# Patient Record
Sex: FEMALE | Race: WHITE | NOT HISPANIC OR LATINO | Employment: OTHER | URBAN - METROPOLITAN AREA
[De-identification: names, ages, dates, MRNs, and addresses within clinical notes are randomized per-mention and may not be internally consistent; named-entity substitution may affect disease eponyms.]

---

## 2019-08-26 ENCOUNTER — OFFICE VISIT (OUTPATIENT)
Dept: OBGYN CLINIC | Facility: CLINIC | Age: 59
End: 2019-08-26
Payer: MEDICARE

## 2019-08-26 ENCOUNTER — APPOINTMENT (OUTPATIENT)
Dept: RADIOLOGY | Facility: AMBULARY SURGERY CENTER | Age: 59
End: 2019-08-26
Payer: MEDICARE

## 2019-08-26 VITALS
DIASTOLIC BLOOD PRESSURE: 83 MMHG | BODY MASS INDEX: 27.27 KG/M2 | WEIGHT: 153.9 LBS | HEIGHT: 63 IN | SYSTOLIC BLOOD PRESSURE: 155 MMHG | HEART RATE: 63 BPM

## 2019-08-26 DIAGNOSIS — M54.42 CHRONIC BILATERAL LOW BACK PAIN WITH BILATERAL SCIATICA: ICD-10-CM

## 2019-08-26 DIAGNOSIS — G89.29 CHRONIC BILATERAL LOW BACK PAIN WITH BILATERAL SCIATICA: ICD-10-CM

## 2019-08-26 DIAGNOSIS — G89.29 CHRONIC BILATERAL LOW BACK PAIN WITH BILATERAL SCIATICA: Primary | ICD-10-CM

## 2019-08-26 DIAGNOSIS — M54.41 CHRONIC BILATERAL LOW BACK PAIN WITH BILATERAL SCIATICA: Primary | ICD-10-CM

## 2019-08-26 DIAGNOSIS — M54.41 CHRONIC BILATERAL LOW BACK PAIN WITH BILATERAL SCIATICA: ICD-10-CM

## 2019-08-26 DIAGNOSIS — M54.42 CHRONIC BILATERAL LOW BACK PAIN WITH BILATERAL SCIATICA: Primary | ICD-10-CM

## 2019-08-26 DIAGNOSIS — M62.9 HAMSTRING TIGHTNESS OF BOTH LOWER EXTREMITIES: ICD-10-CM

## 2019-08-26 PROBLEM — M54.50 LOW BACK PAIN: Status: ACTIVE | Noted: 2019-08-26

## 2019-08-26 PROCEDURE — 72110 X-RAY EXAM L-2 SPINE 4/>VWS: CPT

## 2019-08-26 PROCEDURE — 99203 OFFICE O/P NEW LOW 30 MIN: CPT | Performed by: PHYSICAL MEDICINE & REHABILITATION

## 2019-08-26 RX ORDER — LANOLIN ALCOHOL/MO/W.PET/CERES
1 CREAM (GRAM) TOPICAL 3 TIMES DAILY
COMMUNITY
End: 2021-10-01 | Stop reason: ALTCHOICE

## 2019-08-26 RX ORDER — SIMVASTATIN 20 MG
20 TABLET ORAL DAILY
Refills: 0 | COMMUNITY
Start: 2019-08-19 | End: 2021-01-26 | Stop reason: SDUPTHER

## 2019-08-26 RX ORDER — METOPROLOL TARTRATE 50 MG/1
50 TABLET, FILM COATED ORAL 2 TIMES DAILY
Refills: 0 | COMMUNITY
Start: 2019-08-20 | End: 2021-01-26 | Stop reason: SDUPTHER

## 2019-08-26 RX ORDER — TRAMADOL HYDROCHLORIDE 50 MG/1
50 TABLET ORAL EVERY 8 HOURS PRN
Refills: 0 | COMMUNITY
Start: 2019-08-19

## 2019-08-26 RX ORDER — LOSARTAN POTASSIUM 50 MG/1
50 TABLET ORAL DAILY
Refills: 0 | COMMUNITY
Start: 2019-08-19 | End: 2021-01-23 | Stop reason: SDUPTHER

## 2019-08-26 NOTE — PROGRESS NOTES
Assessments & Orders:   Diagnoses and all orders for this visit:    Chronic bilateral low back pain with bilateral sciatica  -     XR spine lumbar minimum 4 views non injury; Future    Hamstring tightness of both lower extremities    Other orders  -     metoprolol tartrate (LOPRESSOR) 50 mg tablet; Take 50 mg by mouth 2 (two) times a day  -     simvastatin (ZOCOR) 20 mg tablet; Take 20 mg by mouth daily  -     traMADol (ULTRAM) 50 mg tablet; Take 50 mg by mouth every 8 (eight) hours as needed  -     DEXILANT 30 MG capsule  -     losartan (COZAAR) 50 mg tablet; Take 50 mg by mouth daily  -     calcium acetate (PHOSLO) 667 mg capsule; Take 500 mg by mouth 3 (three) times a day with meals  -     glucosamine-chondroitin 500-400 MG tablet; Take 1 tablet by mouth 3 (three) times a day          Imaging Studies:  I personally reviewed the following images:  AP, lateral and dynamic views of the lumbar spine obtained today  There are minimal degenerative changes  No evidence of dynamic instability  No acute osseous abnormality  Impression/Plan:  Low back pain secondary to mechanical low back pain versus radiculopathy  Her back pain with radicular symptoms is likely multifactorial as she has a history of right ankle surgery  Start physical therapy and reassess in 5-6 weeks  Return in about 6 weeks (around 10/7/2019)  HPI:  Wan Jarrell is a 60 y o  female  who presents for evaluation of   Chief Complaint   Patient presents with    Right Leg - Pain       Onset/Mechanism: Chronic but gradually worsening over the past few weeks  Location: Back of the right knee and radiates up into the right buttocks  Quality: Aching  Radiation: As above  Palliative: Tramadol and ibuprofen help a little bit  Provocative: If standing for long periods  Severity: Very severe  Treatment so far: Tramadol and ibuprofen  Review of Systems   Constitutional: Positive for activity change  Negative for fatigue and fever     HENT: Negative for trouble swallowing  Eyes: Negative for visual disturbance  Respiratory: Negative for shortness of breath  Cardiovascular: Negative for chest pain  Gastrointestinal: Negative for abdominal pain  Denies bowel incontinence  Endocrine: Negative for polydipsia  Genitourinary: Negative for difficulty urinating  Denies urinary incontinence  Musculoskeletal: Positive for back pain and gait problem  Skin: Negative for rash and wound  Allergic/Immunologic: Negative for immunocompromised state  Neurological: Positive for numbness  Negative for weakness  Denies saddle anesthesia  Has numbness in the ball of her foot and in the three toes  Hematological: Does not bruise/bleed easily  Psychiatric/Behavioral: Negative for confusion  No calf swelling/tenderness  Following history reviewed and updated:  Past Medical History:   Diagnosis Date    Hyperlipemia     Hypertension      Past Surgical History:   Procedure Laterality Date    ANKLE SURGERY Right 2009/2012    CARPAL TUNNEL RELEASE Bilateral 1990    GALLBLADDER SURGERY  2004    HYSTERECTOMY  2004    MAMMO STEREOTACTIC BREAST BIOPSY RIGHT (ALL INC) Right 2014     Social History   Social History     Substance and Sexual Activity   Alcohol Use Yes    Comment: SOCIAL     Social History     Substance and Sexual Activity   Drug Use Not Currently     Social History     Tobacco Use   Smoking Status Former Smoker   Smokeless Tobacco Former User   Tobacco Comment    13 YEARS      Family History   Problem Relation Age of Onset    Cancer Mother     Diabetes Mother     Hyperlipidemia Mother     Cancer Maternal Grandmother      No Known Allergies     Constitutional:  /83 (BP Location: Right arm, Patient Position: Sitting, Cuff Size: Standard)   Pulse 63   Ht 5' 3" (1 6 m)   Wt 69 8 kg (153 lb 14 4 oz)   BMI 27 26 kg/m²    General: NAD  Eyes: Clear sclerae  ENT: No inflammation, lesion, or mass of lips  No tracheal deviation  Musculoskeletal: As mentioned below  Integumentary: No visible rashes or skin lesions  Pulmonary/Chest: Effort normal  No respiratory distress  Neuro: CN's grossly intact, MCGRATH  Psych: Normal affect and judgement  Vascular: WWP  Back Exam     Comments: Inspection: Morbidly obese  No obvious deformities, lesions or rashes  ROM: Lumbar flexion to 60, extension to just past neutral    Palpation: Diffusely tender from the mid-lumbar paraspinals and down the posterior buttocks, hamstrings and into the hamstring insertions bilaterally  There are no step offs  Neuro: 5/5 strength with bilateral hip flexion, knee extension, ankle dorsiflexion and ankle plantarflexion  Sensation is intact in dermatomes L2-S1  Patellar and Achilles reflexes are hypooactive and equal bilaterally  No clonus  Special tests: Normal bilateral straight leg raise (hamstring tightness), dural stretch (hamstring tightness)  Positive Penny's maneuver  Procedures - none for this visit

## 2019-09-09 ENCOUNTER — EVALUATION (OUTPATIENT)
Dept: PHYSICAL THERAPY | Facility: REHABILITATION | Age: 59
End: 2019-09-09
Payer: MEDICARE

## 2019-09-09 DIAGNOSIS — M54.41 CHRONIC BILATERAL LOW BACK PAIN WITH RIGHT-SIDED SCIATICA: ICD-10-CM

## 2019-09-09 DIAGNOSIS — M62.9 HAMSTRING TIGHTNESS OF BOTH LOWER EXTREMITIES: ICD-10-CM

## 2019-09-09 DIAGNOSIS — G89.29 CHRONIC BILATERAL LOW BACK PAIN WITH RIGHT-SIDED SCIATICA: ICD-10-CM

## 2019-09-09 PROCEDURE — 97162 PT EVAL MOD COMPLEX 30 MIN: CPT | Performed by: PHYSICAL THERAPIST

## 2019-09-09 PROCEDURE — 97110 THERAPEUTIC EXERCISES: CPT | Performed by: PHYSICAL THERAPIST

## 2019-09-09 RX ORDER — MULTIVITAMIN
1 CAPSULE ORAL DAILY
COMMUNITY

## 2019-09-09 NOTE — PROGRESS NOTES
PT Evaluation     Today's date: 2019  Patient name: Ethan Alex  : 1960  MRN: [id number]  Referring provider: Ileana Ojeda DO  Dx:   Encounter Diagnosis     ICD-10-CM    1  Chronic bilateral low back pain with right-sided sciatica M54 41 Ambulatory referral to Physical Therapy    G89 29    2  Hamstring tightness of both lower extremities M62 9 Ambulatory referral to Physical Therapy       Start Time: 1010  Stop Time: 1100  Total time in clinic (min): 50 minutes    Assessment  Assessment details: Ethan Alex is a 60 y o  female present with:   Chronic bilateral low back pain with right-sided sciatica  Hamstring tightness of both lower extremities    Cy Jeannine is presenting with significant right hip musculature weakness which is contributing to her sciatica  She overall is just deconditioned in her tolerance to activity  She has the above listed impairments and will benefit from skilled Physical Therapist management to improve deficits to return to prior level of function      Impairments: abnormal muscle firing, abnormal or restricted ROM, activity intolerance, impaired physical strength, lacks appropriate home exercise program and pain with function    Symptom irritability: moderateBarriers to therapy: Chronic pain, BMI, deconditioning,   Understanding of Dx/Px/POC: good   Prognosis: good    Goals  Impairment Goals  - Decrease pain 4/10  - Improve ROM to 60 degrees flexion  - Increase strength to 4+/5 throughout    Functional Goals  - Return to Prior Level of Function  - Increase Functional Status Measure to: 62  - Patient will be independent with HEP  -Patient will be able to return to gardening for 30 minutes prior to needing to rest    Plan  Patient would benefit from: skilled PT  Planned therapy interventions: joint mobilization, manual therapy, patient education, postural training, activity modification, abdominal trunk stabilization, body mechanics training, flexibility, functional ROM exercises, graded exercise, home exercise program, neuromuscular re-education, strengthening, stretching, therapeutic activities and therapeutic exercise  Frequency: 2x week  Duration in weeks: 10  Plan of Care beginning date: 2019  Plan of Care expiration date: 2019  Treatment plan discussed with: patient        Subjective Evaluation    History of Present Illness  Mechanism of injury: Calin Mondragon notes low back pain of over 10yrs  Notes over the last 5yrs it has been more pronounced  Notes she has had years of right ankle pain, for which she had surgery for two times  Notes currently she gets pain with prolonged standing, activities of daily living gardening, "I push myself and I pay for it " "going out for walks with my 2yr old  granddaughter I make it a block and a half " Notes pain refers into the posterior aspect of her right leg down to knee  When her ankle is irritated she notices swelling and numbness into her toes         Gardening  Pain  Current pain ratin  At worst pain rating: 10  Location: R LB and buttock  Quality: dull ache and burning  Relieving factors: rest and relaxation  Aggravating factors: walking, standing, sitting, stair climbing and lifting  Progression: no change    Social Support    Employment status: not working ([address] [employer] worker)  Patient Goals  Patient goals for therapy: decreased pain, increased motion and increased strength  Patient goal: Gardening,         Objective     Concurrent Complaints  Negative for bladder dysfunction, bowel dysfunction, saddle (S4) numbness and history of trauma    Active Range of Motion   Cervical/Thoracic Spine       Thoracic    Flexion: 45 degrees  with pain  Extension: 5 degrees     with pain  Left rotation:  Restriction level: minimal  Right rotation:  Restriction level: minimal    Strength/Myotome Testing     Left Hip   Planes of Motion   Flexion: 3  Abduction: 2-    Right Hip   Planes of Motion   Flexion: 2-  Abduction: 2-    Left Knee Flexion: 3+  Extension: 3+    Right Knee   Flexion: 3+  Extension: 3+    Tests     Lumbar     Left   Positive crossed SLR and passive SLR  Right   Positive crossed SLR, passive SLR and slump test      Left Hip   Positive ERWIN  Right Hip   Positive ERWIN          Precautions: HTN, chronic pain, prior right ankle surgery     Daily Treatment Diary      Manual 09/09/19                                       Exercise Diary                     bike nv                   Glute sets* 10x5"x2                   Treadmill ambulation nv                   Clamshells 3x8x2" bw                   Bridges* 3x10                                       Low Rows nv                   Mini squats  nv                                                                                                                                                                            Modalities                                                    *=on HEP

## 2019-09-12 ENCOUNTER — OFFICE VISIT (OUTPATIENT)
Dept: PHYSICAL THERAPY | Facility: REHABILITATION | Age: 59
End: 2019-09-12
Payer: MEDICARE

## 2019-09-12 DIAGNOSIS — G89.29 CHRONIC BILATERAL LOW BACK PAIN WITH RIGHT-SIDED SCIATICA: Primary | ICD-10-CM

## 2019-09-12 DIAGNOSIS — M54.41 CHRONIC BILATERAL LOW BACK PAIN WITH RIGHT-SIDED SCIATICA: Primary | ICD-10-CM

## 2019-09-12 DIAGNOSIS — M62.9 HAMSTRING TIGHTNESS OF BOTH LOWER EXTREMITIES: ICD-10-CM

## 2019-09-12 PROCEDURE — 97110 THERAPEUTIC EXERCISES: CPT | Performed by: PHYSICAL THERAPIST

## 2019-09-12 NOTE — PROGRESS NOTES
Daily Note     Today's date: 2019  Patient name: Suzie Pérez  : 1960  MRN: [id number]  Referring provider: Katharina Obrien DO  Dx:   Encounter Diagnosis     ICD-10-CM    1  Chronic bilateral low back pain with right-sided sciatica M54 41     G89 29    2  Hamstring tightness of both lower extremities M62 9        Start Time: 1705  Stop Time: 1750  Total time in clinic (min): 45 minutes    Subjective: Kelsie Hill reports that her back has been sore as she has been pushing through discomfort during her activities of daily living and exercises  Objective: See treatment diary below      Assessment: Tolerated treatment fair  Patient demonstrated fatigue post treatment, exhibited good technique with therapeutic exercises and would benefit from continued PT  Explained to patient importance of explaining her symptoms to PT and telling us when she has pain in order for us to modify her programming, verbalized understanding  Plan: Continue per plan of care             Precautions: HTN, chronic pain, prior right ankle surgery     Daily Treatment Diary      Manual 19                                     Exercise Diary                     bike nv pain                  Glute sets* 10x5"x2 10x5x2"                  Treadmill ambulation nv 5' 0 5 mph                  SLR   3x8           Clamshells* 3x8x2" bw 3x8x2"                  Bridges* 3x10 3x8 pain                  Seated HS stretch  3x30"                  Low Rows nv nv                  Mini squats  nv nv                                                                                                                                                                           Modalities                                                    *=on HEP

## 2019-09-16 ENCOUNTER — OFFICE VISIT (OUTPATIENT)
Dept: PHYSICAL THERAPY | Facility: REHABILITATION | Age: 59
End: 2019-09-16
Payer: MEDICARE

## 2019-09-16 DIAGNOSIS — M62.9 HAMSTRING TIGHTNESS OF BOTH LOWER EXTREMITIES: ICD-10-CM

## 2019-09-16 DIAGNOSIS — G89.29 CHRONIC BILATERAL LOW BACK PAIN WITH RIGHT-SIDED SCIATICA: Primary | ICD-10-CM

## 2019-09-16 DIAGNOSIS — M54.41 CHRONIC BILATERAL LOW BACK PAIN WITH RIGHT-SIDED SCIATICA: Primary | ICD-10-CM

## 2019-09-16 PROCEDURE — 97530 THERAPEUTIC ACTIVITIES: CPT | Performed by: PHYSICAL THERAPIST

## 2019-09-16 PROCEDURE — 97112 NEUROMUSCULAR REEDUCATION: CPT | Performed by: PHYSICAL THERAPIST

## 2019-09-16 PROCEDURE — 97110 THERAPEUTIC EXERCISES: CPT | Performed by: PHYSICAL THERAPIST

## 2019-09-16 NOTE — PROGRESS NOTES
Daily Note     Today's date: 2019  Patient name: Houston Pak  : 1960  MRN: [id number]  Referring provider: Clark Horton DO  Dx:   Encounter Diagnosis     ICD-10-CM    1  Chronic bilateral low back pain with right-sided sciatica M54 41     G89 29    2  Hamstring tightness of both lower extremities M62 9        Start Time: 1203  Stop Time: 1250  Total time in clinic (min): 47 minutes    Subjective: Greg Nguyen reports that her ankles are sore today due to them "giving out on me all day " Notes gardening over the weekend and feeling swollen in her right anterior thigh  Objective: See treatment diary below      Assessment: Tolerated treatment well  Patient demonstrated fatigue post treatment, exhibited good technique with therapeutic exercises and would benefit from continued PT  Patient able to better self monitor her pain levels during exercises and perform more appropriate rests  Plan: Continue per plan of care             Precautions: HTN, chronic pain, prior right ankle surgery     Daily Treatment Diary      Manual 19                                    Exercise Diary                     bike nv pain                  Glute sets* 10x5"x2 10x5x2" 10x5x2"                 Treadmill ambulation nv 5' 0 5 mph np                 SLR *  3x8 3x8          Clamshells* 3x8x2" bw 3x8x2" 3x10x2"                 Bridges* 3x10 3x8 pain 3x8                 Seated HS stretch  3x30" 3x30"                 Low Rows nv nv OTB 3x10                 Mini squats  nv nv 3x10                                                                                                                                                                          Modalities                                                    *=on HEP

## 2019-09-19 ENCOUNTER — OFFICE VISIT (OUTPATIENT)
Dept: PHYSICAL THERAPY | Facility: REHABILITATION | Age: 59
End: 2019-09-19
Payer: MEDICARE

## 2019-09-19 DIAGNOSIS — M54.41 CHRONIC BILATERAL LOW BACK PAIN WITH RIGHT-SIDED SCIATICA: Primary | ICD-10-CM

## 2019-09-19 DIAGNOSIS — M62.9 HAMSTRING TIGHTNESS OF BOTH LOWER EXTREMITIES: ICD-10-CM

## 2019-09-19 DIAGNOSIS — G89.29 CHRONIC BILATERAL LOW BACK PAIN WITH RIGHT-SIDED SCIATICA: Primary | ICD-10-CM

## 2019-09-19 PROCEDURE — 97112 NEUROMUSCULAR REEDUCATION: CPT

## 2019-09-19 PROCEDURE — 97110 THERAPEUTIC EXERCISES: CPT

## 2019-09-23 ENCOUNTER — OFFICE VISIT (OUTPATIENT)
Dept: PHYSICAL THERAPY | Facility: REHABILITATION | Age: 59
End: 2019-09-23
Payer: MEDICARE

## 2019-09-23 DIAGNOSIS — G89.29 CHRONIC BILATERAL LOW BACK PAIN WITH RIGHT-SIDED SCIATICA: Primary | ICD-10-CM

## 2019-09-23 DIAGNOSIS — M54.41 CHRONIC BILATERAL LOW BACK PAIN WITH RIGHT-SIDED SCIATICA: Primary | ICD-10-CM

## 2019-09-23 DIAGNOSIS — M62.9 HAMSTRING TIGHTNESS OF BOTH LOWER EXTREMITIES: ICD-10-CM

## 2019-09-23 PROCEDURE — 97110 THERAPEUTIC EXERCISES: CPT

## 2019-09-23 PROCEDURE — 97112 NEUROMUSCULAR REEDUCATION: CPT

## 2019-09-23 NOTE — PROGRESS NOTES
Daily Note     Today's date: 2019  Patient name: Meenu Hightower  : 1960  MRN: [id number]  Referring provider: Haritha Snell DO  Dx:   Encounter Diagnosis     ICD-10-CM    1  Chronic bilateral low back pain with right-sided sciatica M54 41     G89 29    2  Hamstring tightness of both lower extremities M62 9        Start Time: 1500  Stop Time: 1558  Total time in clinic (min): 58 minutes    Subjective: Patient reports right sided buttock discomfort prior to session stating "still there, better than what it was though "       Objective: See treatment diary below       Assessment: Tolerated treatment fair  Patient demonstrated fatigue post treatment, exhibited good technique with therapeutic exercises and would benefit from continued PT Patient demonstrates most difficulty with SLR flexion RLE  Fatigue noted with increased reps, no pain reported  Plan: Continue per plan of care  Progress treatment as tolerated         Precautions: HTN, chronic pain, prior right ankle surgery     Daily Treatment Diary      Manual 19                                  Exercise Diary                     bike nv pain                  Glute sets* 10x5"x2 10x5x2" 10x5x2"   10x5x2" 2x10x5''             Treadmill ambulation nv 5' 0 5 mph np                 SLR *  3x8 3x8 3x10 3x10        Clamshells* 3x8x2" bw 3x8x2" 3x10x2"   3x10x2"  3x12x2''             Bridges* 3x10 3x8 pain 3x8   3x8  3x10             Seated HS stretch  3x30" 3x30"   3x30"  3x30''             Low Rows nv nv OTB 3x10   OTB  3x10  OTB 3x12             Mini squats  nv nv 3x10   3x10  3x10                                                                                                                                                                       Modalities                                                    *=on HEP

## 2019-09-26 ENCOUNTER — OFFICE VISIT (OUTPATIENT)
Dept: PHYSICAL THERAPY | Facility: REHABILITATION | Age: 59
End: 2019-09-26
Payer: MEDICARE

## 2019-09-26 DIAGNOSIS — M54.41 CHRONIC BILATERAL LOW BACK PAIN WITH RIGHT-SIDED SCIATICA: Primary | ICD-10-CM

## 2019-09-26 DIAGNOSIS — M62.9 HAMSTRING TIGHTNESS OF BOTH LOWER EXTREMITIES: ICD-10-CM

## 2019-09-26 DIAGNOSIS — G89.29 CHRONIC BILATERAL LOW BACK PAIN WITH RIGHT-SIDED SCIATICA: Primary | ICD-10-CM

## 2019-09-26 PROCEDURE — 97530 THERAPEUTIC ACTIVITIES: CPT | Performed by: PHYSICAL THERAPIST

## 2019-09-26 PROCEDURE — 97110 THERAPEUTIC EXERCISES: CPT | Performed by: PHYSICAL THERAPIST

## 2019-09-26 NOTE — PROGRESS NOTES
Daily Note     Today's date: 2019  Patient name: Devyn Abdi  : 1960  MRN: [id number]  Referring provider: Aly Zarate DO  Dx:   Encounter Diagnosis     ICD-10-CM    1  Chronic bilateral low back pain with right-sided sciatica M54 41     G89 29    2  Hamstring tightness of both lower extremities M62 9        Start Time: 1530  Stop Time: 1615  Total time in clinic (min): 45 minutes    Subjective: Alicia Lennon reports that her ankle is bothering her, but her low back pain is okay today  Objective: See treatment diary below      Assessment: Tolerated treatment well  Patient demonstrated fatigue post treatment, exhibited good technique with therapeutic exercises and would benefit from continued PT  Plan: Continue per plan of care              Precautions: HTN, chronic pain, prior right ankle surgery     Daily Treatment Diary      Manual 19                                 Exercise Diary                     bike nv pain                  Glute sets* 10x5"x2 10x5x2" 10x5x2"   10x5x2" 2x10x5''  dc           Treadmill ambulation nv 5' 0 5 mph np                 SLR *  3x8 3x8 3x10 3x10 3x10       Clamshells* 3x8x2" bw 3x8x2" 3x10x2"   3x10x2"   3x12x2''  3x10x2"           Bridges* 3x10 3x8 pain 3x8   3x8   3x10  3x10           Seated HS stretch  3x30" 3x30"   3x30"   3x30''  3x30"           Low Rows nv nv OTB 3x10   OTB  3x10   OTB 3x12  OTB 3x12           Mini squats  nv nv 3x10   3x10   3x10   3x10           Step ups                                                                                                                                                         Modalities                                                    *=on HEP

## 2019-09-30 ENCOUNTER — OFFICE VISIT (OUTPATIENT)
Dept: PHYSICAL THERAPY | Facility: REHABILITATION | Age: 59
End: 2019-09-30
Payer: MEDICARE

## 2019-09-30 DIAGNOSIS — M62.9 HAMSTRING TIGHTNESS OF BOTH LOWER EXTREMITIES: ICD-10-CM

## 2019-09-30 DIAGNOSIS — M54.41 CHRONIC BILATERAL LOW BACK PAIN WITH RIGHT-SIDED SCIATICA: Primary | ICD-10-CM

## 2019-09-30 DIAGNOSIS — G89.29 CHRONIC BILATERAL LOW BACK PAIN WITH RIGHT-SIDED SCIATICA: Primary | ICD-10-CM

## 2019-09-30 PROCEDURE — 97110 THERAPEUTIC EXERCISES: CPT | Performed by: PHYSICAL THERAPIST

## 2019-09-30 PROCEDURE — 97530 THERAPEUTIC ACTIVITIES: CPT | Performed by: PHYSICAL THERAPIST

## 2019-09-30 NOTE — PROGRESS NOTES
Daily Note     Today's date: 2019  Patient name: Noy Luna  : 1960  MRN: [id number]  Referring provider: Abelardo Alvares DO  Dx:   Encounter Diagnosis     ICD-10-CM    1  Chronic bilateral low back pain with right-sided sciatica M54 41     G89 29    2  Hamstring tightness of both lower extremities M62 9                 Pt 1 on 1 from 1047 to 1125    Subjective: Steven Lowry states her low back is ok, but has continued R ankle pain  Objective: See treatment diary below      Assessment: Tolerated treatment well  Cues needed to perform exercises properly  Reported some R ankle pain through out session but able to tolerated  Did well with addition of step ups today, but was fatigued by end of session  Would benefit from continued PT  Plan: Continue per plan of care              Precautions: HTN, chronic pain, prior right ankle surgery     Daily Treatment Diary      Manual 19                                Exercise Diary                     bike nv pain                  Glute sets* 10x5"x2 10x5x2" 10x5x2"   10x5x2" 2x10x5''  dc           Treadmill ambulation nv 5' 0 5 mph np                 SLR *  3x8 3x8 3x10 3x10 3x10 3x10      Clamshells* 3x8x2" bw 3x8x2" 3x10x2"   3x10x2"   3x12x2''  3x10x2"  3x10x2"         Bridges* 3x10 3x8 pain 3x8   3x8   3x10  3x10  3x10         Seated HS stretch  3x30" 3x30"   3x30"   3x30''  3x30"  3x30"         Low Rows nv nv OTB 3x10   OTB  3x10   OTB 3x12  OTB 3x12  OTB 3x12         Mini squats  nv nv 3x10   3x10   3x10   3x10  3x10         Step ups           4" 10x fwrd, 10x lat (w/ L)                                                                                                                                              Modalities                                                    *=on HEP

## 2019-10-03 ENCOUNTER — OFFICE VISIT (OUTPATIENT)
Dept: PHYSICAL THERAPY | Facility: REHABILITATION | Age: 59
End: 2019-10-03
Payer: MEDICARE

## 2019-10-03 DIAGNOSIS — G89.29 CHRONIC BILATERAL LOW BACK PAIN WITH RIGHT-SIDED SCIATICA: Primary | ICD-10-CM

## 2019-10-03 DIAGNOSIS — M62.9 HAMSTRING TIGHTNESS OF BOTH LOWER EXTREMITIES: ICD-10-CM

## 2019-10-03 DIAGNOSIS — M54.41 CHRONIC BILATERAL LOW BACK PAIN WITH RIGHT-SIDED SCIATICA: Primary | ICD-10-CM

## 2019-10-03 PROCEDURE — 97110 THERAPEUTIC EXERCISES: CPT

## 2019-10-03 PROCEDURE — 97530 THERAPEUTIC ACTIVITIES: CPT

## 2019-10-03 NOTE — PROGRESS NOTES
Daily Note     Today's date: 10/3/2019  Patient name: Mine Mejias  : 1960  MRN: [id number]  Referring provider: Duy Negron DO  Dx:   Encounter Diagnosis     ICD-10-CM    1  Chronic bilateral low back pain with right-sided sciatica M54 41     G89 29    2  Hamstring tightness of both lower extremities M62 9        Start Time: 1400  Stop Time: 1445  Total time in clinic (min): 45 minutes    Subjective: Patient reports increased back discomfort prior to session today stating "I am achy, going down the leg a little, think its the weather " Patient is to follow up with MD Monday  Objective: See treatment diary below      Assessment: Tolerated treatment fair  Patient demonstrated fatigue post treatment, exhibited good technique with therapeutic exercises and would benefit from continued PT      Plan: Continue per plan of care  Progress treatment as tolerated         Precautions: HTN, chronic pain, prior right ankle surgery     Daily Treatment Diary      Manual 09/09/19  9/12 9/16 9/19 9/23 9/26 9/30 10/3                               Exercise Diary                     bike nv pain                  Glute sets* 10x5"x2 10x5x2" 10x5x2"   10x5x2" 2x10x5''  dc           Treadmill ambulation nv 5' 0 5 mph np                 SLR *  3x8 3x8 3x10 3x10 3x10 3x10 3x10      Clamshells* 3x8x2" bw 3x8x2" 3x10x2"   3x10x2"   3x12x2''  3x10x2"  3x10x2"  3x10x2''       Bridges* 3x10 3x8 pain 3x8   3x8   3x10  3x10   3x10  3x10       Seated HS stretch  3x30" 3x30"   3x30"   3x30''  3x30"  3x30"  3x30''       Low Rows nv nv OTB 3x10   OTB  3x10   OTB 3x12  OTB 3x12  OTB 3x12  OTB 3x15       Mini squats  nv nv 3x10   3x10   3x10   3x10   3x10  3x10       Step ups            4" 10x fwrd, 10x lat (w/ L)  4'' 2x10 right only ea                                                                                                                                            Modalities *=on HEP

## 2019-10-07 ENCOUNTER — OFFICE VISIT (OUTPATIENT)
Dept: OBGYN CLINIC | Facility: CLINIC | Age: 59
End: 2019-10-07
Payer: MEDICARE

## 2019-10-07 VITALS
SYSTOLIC BLOOD PRESSURE: 144 MMHG | BODY MASS INDEX: 42.15 KG/M2 | HEART RATE: 63 BPM | DIASTOLIC BLOOD PRESSURE: 82 MMHG | WEIGHT: 253 LBS | HEIGHT: 65 IN

## 2019-10-07 DIAGNOSIS — M54.41 CHRONIC BILATERAL LOW BACK PAIN WITH RIGHT-SIDED SCIATICA: Primary | ICD-10-CM

## 2019-10-07 DIAGNOSIS — G57.01 PIRIFORMIS SYNDROME OF RIGHT SIDE: ICD-10-CM

## 2019-10-07 DIAGNOSIS — M62.9 HAMSTRING TIGHTNESS OF BOTH LOWER EXTREMITIES: ICD-10-CM

## 2019-10-07 DIAGNOSIS — G89.29 CHRONIC BILATERAL LOW BACK PAIN WITH RIGHT-SIDED SCIATICA: Primary | ICD-10-CM

## 2019-10-07 PROCEDURE — 99213 OFFICE O/P EST LOW 20 MIN: CPT | Performed by: PHYSICAL MEDICINE & REHABILITATION

## 2019-10-07 NOTE — PROGRESS NOTES
1  Chronic bilateral low back pain with right-sided sciatica  MRI lumbar spine wo contrast   2  Hamstring tightness of both lower extremities       Orders Placed This Encounter   Procedures    MRI lumbar spine wo contrast        Imaging Studies (I personally reviewed images in PACS and report):  AP, lateral and dynamic views of the lumbar spine obtained today  There are minimal degenerative changes  No evidence of dynamic instability  No acute osseous abnormality  Impression:  Patient is here in follow up of chronic low back pain  Since her last visit she has improved  She does still continue to have pain and weakness with right hip flexion which was not as bad on her last visit  She also has findings consistent with piriformis syndrome/sciatic nerve compression  We will obtain an MRI of her lumbar spine  She will follow up with me afterwards  No follow-ups on file  HPI:  Freedom Jessica is a 60 y o  female  who presents in follow up  Here for   Chief Complaint   Patient presents with    Follow-up     Trajectory of symptoms:  50% improved since her last visit  She continues to have issues with stairs and feels that right hip flexion is difficult for her  No other complaints or concerns at this time  No red flag symptoms  Review of Systems   Constitutional: Positive for activity change  Negative for fever  HENT: Negative for sore throat and trouble swallowing  Eyes: Negative for visual disturbance  Respiratory: Negative for shortness of breath  Cardiovascular: Negative for chest pain  Gastrointestinal: Negative for abdominal pain  Denies bowel incontinence  Endocrine: Negative for polydipsia  Genitourinary: Negative for difficulty urinating  Denies urinary incontinence  Musculoskeletal: Positive for arthralgias and back pain  Skin: Negative for rash  Allergic/Immunologic: Negative for immunocompromised state  Neurological: Positive for weakness   Negative for numbness  Denies saddle anesthesia  Hematological: Does not bruise/bleed easily  Psychiatric/Behavioral: Negative for confusion  Following history reviewed and updated:  Past Medical History:   Diagnosis Date    Hyperlipemia     Hypertension      Past Surgical History:   Procedure Laterality Date    ANKLE SURGERY Right 2009/2012    CARPAL TUNNEL RELEASE Bilateral 1990    GALLBLADDER SURGERY  2004    HYSTERECTOMY  2004    MAMMO STEREOTACTIC BREAST BIOPSY RIGHT (ALL INC) Right 2014     Social History   Social History     Substance and Sexual Activity   Alcohol Use Yes    Comment: SOCIAL     Social History     Substance and Sexual Activity   Drug Use Not Currently     Social History     Tobacco Use   Smoking Status Former Smoker   Smokeless Tobacco Former User   Tobacco Comment    13 YEARS      Family History   Problem Relation Age of Onset    Cancer Mother     Diabetes Mother     Hyperlipidemia Mother     Cancer Maternal Grandmother      No Known Allergies     Constitutional:  /82 (BP Location: Right arm, Patient Position: Sitting, Cuff Size: Standard)   Pulse 63   Ht 5' 5" (1 651 m)   Wt 115 kg (253 lb)   BMI 42 10 kg/m²    General: NAD  Eyes: Clear sclerae  ENT: No inflammation, lesion, or mass of lips  No tracheal deviation  Musculoskeletal: As mentioned below  Integumentary: No visible rashes or skin lesions  Pulmonary/Chest: Effort normal  No respiratory distress  Neuro: CN's grossly intact, MCGRATH  Psych: Normal affect and judgement  Vascular: WWP  Ortho Exam   Back Exam      Comments: Inspection: Morbidly obese  No obvious deformities, lesions or rashes  ROM: Lumbar flexion to 60, extension to just past neutral    Palpation: Diffusely tender from the mid-lumbar paraspinals and down the posterior buttocks, hamstrings and into the hamstring insertions bilaterally  There are no step offs    Neuro: 5/5 strength with bilateral hip flexion but has give-way weakness with right hip flexion, knee extension, ankle dorsiflexion and ankle plantarflexion  Sensation is intact in dermatomes L2-S1  Patellar and Achilles reflexes are hypooactive and equal bilaterally  No clonus  Special tests: Normal bilateral straight leg raise (hamstring tightness), dural stretch (hamstring tightness)  Positive Penny's maneuver  Procedures - none for this visit

## 2019-10-10 ENCOUNTER — OFFICE VISIT (OUTPATIENT)
Dept: PHYSICAL THERAPY | Facility: REHABILITATION | Age: 59
End: 2019-10-10
Payer: MEDICARE

## 2019-10-10 DIAGNOSIS — M54.41 CHRONIC BILATERAL LOW BACK PAIN WITH RIGHT-SIDED SCIATICA: Primary | ICD-10-CM

## 2019-10-10 DIAGNOSIS — M62.9 HAMSTRING TIGHTNESS OF BOTH LOWER EXTREMITIES: ICD-10-CM

## 2019-10-10 DIAGNOSIS — G89.29 CHRONIC BILATERAL LOW BACK PAIN WITH RIGHT-SIDED SCIATICA: Primary | ICD-10-CM

## 2019-10-10 PROCEDURE — 97530 THERAPEUTIC ACTIVITIES: CPT

## 2019-10-10 PROCEDURE — 97110 THERAPEUTIC EXERCISES: CPT

## 2019-10-10 NOTE — PROGRESS NOTES
Daily Note     Today's date: 10/10/2019  Patient name: Aury Rashid  : 1960  MRN: [id number]  Referring provider: Eliel Malone DO  Dx:   Encounter Diagnosis     ICD-10-CM    1  Chronic bilateral low back pain with right-sided sciatica M54 41     G89 29    2  Hamstring tightness of both lower extremities M62 9        Start Time: 1500  Stop Time: 1557  Total time in clinic (min): 57 minutes    Subjective: Patient denies any back pain prior to session today stating "I am fine until I start doing stuff " Patient reports having follow up with MD stating "I am getting and MRI done and then going follow back up with the doctor "        Objective: See treatment diary below        Assessment: Tolerated treatment fair  Patient demonstrated fatigue post treatment, exhibited good technique with therapeutic exercises and would benefit from continued PTEncouragement required for patient to complete desired reps as well as desired TE  Therapist educated patient on effects of exercise and what should be felt, little understanding noted by patient  Plan: Continue per plan of care  Progress treatment as tolerated         Precautions: HTN, chronic pain, prior right ankle surgery     Daily Treatment Diary      Manual 09/09/19  9/12 9/16 9/19 9/23 9/26 9/30 10/3 10/10                              Exercise Diary                     bike nv pain                  Glute sets* 10x5"x2 10x5x2" 10x5x2"   10x5x2" 2x10x5''  dc           Treadmill ambulation nv 5' 0 5 mph np                 SLR *  3x8 3x8 3x10 3x10 3x10 3x10 3x10  3x12    Clamshells* 3x8x2" bw 3x8x2" 3x10x2"   3x10x2"   3x12x2''  3x10x2"  3x10x2"   3x10x2''  3x12x2''     Bridges* 3x10 3x8 pain 3x8   3x8   3x10  3x10   3x10   3x10  3x12     Seated HS stretch  3x30" 3x30"   3x30"   3x30''  3x30"  3x30"   3x30''  3x30''     Low Rows nv nv OTB 3x10   OTB  3x10   OTB 3x12  OTB 3x12  OTB 3x12   OTB 3x15  3x12x3'' OTB     Mini squats  nv nv 3x10   3x10   3x10   3x10 3x10   3x10  3x12     Step ups            4" 10x fwrd, 10x lat (w/ L)   4'' 2x10 right only ea  4'' 2x10 R 10 L                                                                                                                                           Modalities                                                    *=on HEP

## 2019-10-14 ENCOUNTER — EVALUATION (OUTPATIENT)
Dept: PHYSICAL THERAPY | Facility: REHABILITATION | Age: 59
End: 2019-10-14
Payer: MEDICARE

## 2019-10-14 DIAGNOSIS — G89.29 CHRONIC BILATERAL LOW BACK PAIN WITH RIGHT-SIDED SCIATICA: Primary | ICD-10-CM

## 2019-10-14 DIAGNOSIS — M54.41 CHRONIC BILATERAL LOW BACK PAIN WITH RIGHT-SIDED SCIATICA: Primary | ICD-10-CM

## 2019-10-14 DIAGNOSIS — M62.9 HAMSTRING TIGHTNESS OF BOTH LOWER EXTREMITIES: ICD-10-CM

## 2019-10-14 PROCEDURE — 97110 THERAPEUTIC EXERCISES: CPT | Performed by: PHYSICAL THERAPIST

## 2019-10-14 PROCEDURE — 97112 NEUROMUSCULAR REEDUCATION: CPT | Performed by: PHYSICAL THERAPIST

## 2019-10-14 NOTE — PROGRESS NOTES
PT Re-Evaluation     Today's date: 10/14/2019  Patient name: Thomas Dick  : 1960  MRN: [id number]   Referring provider: Fernando Sever, DO  Dx:   Encounter Diagnosis     ICD-10-CM    1  Chronic bilateral low back pain with right-sided sciatica M54 41     G89 29    2  Hamstring tightness of both lower extremities M62 9        Start Time: 1140  Stop Time: 1230  Total time in clinic (min): 50 minutes    Assessment  Assessment details: Stella Chatterjee continues to have chief complaint of right leg pain radiating from buttock to posterior aspect of her knee  Patient's back pain has improved, however continues to have pain and limitations of her right hip  Will be having MRI of lumbar spine next week  Dorian Callahan has been compliant with attending PT and home exercise program since initial eval   Stella Chatterjee  has made improvements in objective data since initial eval but is still limited compared to prior level of function  Stella Chatterjee continues with above listed impairments and would benefit from additional skilled PT to address these deficits to return to prior level of function     Impairments: abnormal muscle firing, abnormal or restricted ROM, activity intolerance, impaired physical strength and pain with function    Symptom irritability: moderateBarriers to therapy: Chronic pain, BMI, deconditioning,   Understanding of Dx/Px/POC: good   Prognosis: good    Goals  Impairment Goals  - Decrease pain 4/10 progressing  - Improve ROM to 60 degrees flexion lumbar spine   - Increase strength to 4+/5 throughout progressing    Functional Goals  - Return to Prior Level of Function progressing  - Increase Functional Status Measure to: 62 met  - Patient will be independent with HEP progressing  -Patient will be able to return to gardening for 30 minutes prior to needing to rest currently 20minutes    Plan  Patient would benefit from: skilled PT  Planned therapy interventions: joint mobilization, manual therapy, patient education, postural training, activity modification, abdominal trunk stabilization, body mechanics training, flexibility, functional ROM exercises, graded exercise, home exercise program, neuromuscular re-education, strengthening, stretching, therapeutic activities and therapeutic exercise  Frequency: 2x week  Duration in weeks: 10  Plan of Care beginning date: 2019  Plan of Care expiration date: 2019  Treatment plan discussed with: patient        Subjective Evaluation    History of Present Illness  Mechanism of injury: Levi Villafuerte has been seen for total of 10 visits for outpatient physical therapy  Patient rates overall improvement in her back since beginning PT 75%  In terms of her right leg, she notes no change Patient's global rating of change is " Moderately better (4) " Patient reports improvements with "my back is feeling better " "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" Patient reports most difficulty with "my leg really is what bothers me"    "The pain is always there, especially below my buttock "  Pain  Current pain ratin  At worst pain rating: 10  Location: R LB and buttock  Quality: dull ache and burning  Relieving factors: rest and relaxation  Aggravating factors: walking, standing, sitting, stair climbing and lifting  Progression: no change    Social Support    Employment status: not working ([address] [employer] worker)  Patient Goals  Patient goals for therapy: decreased pain, increased motion and increased strength  Patient goal: Gardening,         Objective     Concurrent Complaints  Negative for bladder dysfunction, bowel dysfunction, saddle (S4) numbness and history of trauma    Active Range of Motion   Cervical/Thoracic Spine       Thoracic    Left rotation:  Restriction level: minimal  Right rotation:  Restriction level: minimal    Lumbar   Flexion: 45 degrees   Extension: 5 degrees     Strength/Myotome Testing     Left Hip   Planes of Motion   Flexion: 3  Abduction: 2-    Right Hip   Planes of Motion Flexion: 2-  Abduction: 2-    Left Knee   Flexion: 3+  Extension: 3+    Right Knee   Flexion: 3+  Extension: 3+    Tests     Lumbar     Left   Positive crossed SLR and passive SLR  Right   Positive crossed SLR, passive SLR and slump test      Left Hip   Positive ERWIN  Right Hip   Positive ERWIN               Precautions: HTN, chronic pain, prior right ankle surgery     Daily Treatment Diary      Manual 09/09/19  9/12 9/16 9/19 9/23 9/26 9/30 10/3 10/10 10/14                             Exercise Diary                     bike nv pain                  Glute sets* 10x5"x2 10x5x2" 10x5x2"   10x5x2" 2x10x5''  dc           Treadmill ambulation nv 5' 0 5 mph np                 SLR *  3x8 3x8 3x10 3x10 3x10 3x10 3x10  3x12 np   Clamshells* 3x8x2" bw 3x8x2" 3x10x2"   3x10x2"   3x12x2''  3x10x2"  3x10x2"   3x10x2''  3x12x2''  3x8 PTB   Seated Sciatic N glide LE only          10x5"x2   Bridges* 3x10 3x8 pain 3x8   3x8   3x10  3x10   3x10   3x10  3x12  3x8   Seated HS stretch  3x30" 3x30"   3x30"   3x30''  3x30"  3x30"   3x30''  3x30''  np   Low Rows nv nv OTB 3x10   OTB  3x10   OTB 3x12  OTB 3x12  OTB 3x12   OTB 3x15  3x12x3'' OTB  np   Mini squats  nv nv 3x10   3x10   3x10   3x10   3x10   3x10  3x12  np   Step ups            4" 10x fwrd, 10x lat (w/ L)   4'' 2x10 right only ea  4'' 2x10 R 10 L   np                                                                                                                                        Modalities                                                    *=on HEP

## 2019-10-15 NOTE — PROGRESS NOTES
Daily Note     Today's date: 2019  Patient name: Es Mojica  : 1960  MRN: [id number]  Referring provider: Edvin Kimbrough DO  Dx:   Encounter Diagnosis     ICD-10-CM    1  Chronic bilateral low back pain with right-sided sciatica M54 41     G89 29    2  Hamstring tightness of both lower extremities M62 9        Start Time: 1530  Stop Time: 1620  Total time in clinic (min): 50 minutes    Subjective: Patient reports feeling "just about the same" compared to last visit  States she knows she is going to overdue it this weekend  Notes laying on her right side when doing the clamshell at home she noted feeling a "strain"  Objective: See treatment diary below      Assessment: Encouraged patient to stop before she gets sore  "Some soreness" noted post treatment  Good understanding of proper form with TE  Tolerated treatment well  Patient demonstrated fatigue post treatment and would benefit from continued PT      Plan: Continue per plan of care        Precautions: HTN, chronic pain, prior right ankle surgery     Daily Treatment Diary      Manual 19                                   Exercise Diary                     bike nv pain                  Glute sets* 10x5"x2 10x5x2" 10x5x2"  10x5x2"               Treadmill ambulation nv 5' 0 5 mph np                 SLR *  3x8 3x8 3x10         Clamshells* 3x8x2" bw 3x8x2" 3x10x2"  3x10x2"               Bridges* 3x10 3x8 pain 3x8  3x8               Seated HS stretch  3x30" 3x30"  3x30"               Low Rows nv nv OTB 3x10  OTB  3x10               Mini squats  nv nv 3x10  3x10                                                                                                                                                                        Modalities                                                    *=on HEP English

## 2019-10-17 ENCOUNTER — OFFICE VISIT (OUTPATIENT)
Dept: PHYSICAL THERAPY | Facility: REHABILITATION | Age: 59
End: 2019-10-17
Payer: MEDICARE

## 2019-10-17 DIAGNOSIS — M54.41 CHRONIC BILATERAL LOW BACK PAIN WITH RIGHT-SIDED SCIATICA: Primary | ICD-10-CM

## 2019-10-17 DIAGNOSIS — G89.29 CHRONIC BILATERAL LOW BACK PAIN WITH RIGHT-SIDED SCIATICA: Primary | ICD-10-CM

## 2019-10-17 DIAGNOSIS — M62.9 HAMSTRING TIGHTNESS OF BOTH LOWER EXTREMITIES: ICD-10-CM

## 2019-10-17 PROCEDURE — 97112 NEUROMUSCULAR REEDUCATION: CPT

## 2019-10-17 PROCEDURE — 97110 THERAPEUTIC EXERCISES: CPT

## 2019-10-17 NOTE — PROGRESS NOTES
Daily Note     Today's date: 10/17/2019  Patient name: Ron Blanchard  : 1960  MRN: [id number]  Referring provider: Gabriel Fairly, DO  Dx: No diagnosis found  Subjective: "It's about the same, 5/10, it really in my R tuckus and down the back of my leg  I was stepping up on a step stool  Boy did I hurt the other night, I barely slept " Pt verbalizes compliance with modifying activities at home, will do only one chore at a time and give herself rest in between  Still struggles with stairs negotiating L LE first   Pt reports not having had MRI done yet, scheduled for Monday 10/21/19  Objective: See treatment diary below      Assessment: Tolerated treatment well  Patient would benefit from continued PT For improved strength, flexibility and overall function  Demonstrates good understanding of TE program  Fatigued by end of session but without increased LBP/radicular sx following treatment  Plan: Continue per plan of care        Precautions: HTN, chronic pain, prior right ankle surgery     Daily Treatment Diary      Manual 10/17  9/16 9/19 9/23 9/26 9/30 10/3 10/10 10/14                             Exercise Diary                     bike                    Glute sets*   10x5x2"   10x5x2" 2x10x5''  dc           Treadmill ambulation   np                 SLR * 3x12  3x8 3x10 3x10 3x10 3x10 3x10  3x12 np   Clamshells* 3x10 PTB  3x10x2"   3x10x2"   3x12x2''  3x10x2"  3x10x2"   3x10x2''   3x12x2''   3x8 PTB   Seated Sciatic N glide LE only @ home today         10x5"x2   Bridges* 3x12  3x8   3x8   3x10  3x10   3x10   3x10   3x12   3x8   Seated HS stretch 3x30''  3x30"   3x30"   3x30''  3x30"  3x30"   3x30''   3x30''   np   Low Rows 3x12x3''   OTB  OTB 3x10   OTB  3x10   OTB 3x12  OTB 3x12  OTB 3x12   OTB 3x15   3x12x3'' OTB   np   Mini squats  3x12  3x10   3x10   3x10   3x10   3x10   3x10   3x12   np   Step ups 4'' 2x10 R, 10 L fwd;   2x10 B lat            4" 10x fwrd, 10x lat (w/ L)   4'' 2x10 right only ea   4'' 2x10 R 10 L    np                                                                                                                                        Modalities                                                    *=on HEP

## 2019-10-21 ENCOUNTER — OFFICE VISIT (OUTPATIENT)
Dept: PHYSICAL THERAPY | Facility: REHABILITATION | Age: 59
End: 2019-10-21
Payer: MEDICARE

## 2019-10-21 ENCOUNTER — HOSPITAL ENCOUNTER (OUTPATIENT)
Dept: MRI IMAGING | Facility: HOSPITAL | Age: 59
Discharge: HOME | End: 2019-10-21
Attending: PHYSICAL MEDICINE & REHABILITATION
Payer: MEDICARE

## 2019-10-21 DIAGNOSIS — M54.41 CHRONIC BILATERAL LOW BACK PAIN WITH RIGHT-SIDED SCIATICA: ICD-10-CM

## 2019-10-21 DIAGNOSIS — M54.41 CHRONIC BILATERAL LOW BACK PAIN WITH RIGHT-SIDED SCIATICA: Primary | ICD-10-CM

## 2019-10-21 DIAGNOSIS — G89.29 CHRONIC BILATERAL LOW BACK PAIN WITH RIGHT-SIDED SCIATICA: ICD-10-CM

## 2019-10-21 DIAGNOSIS — G89.29 CHRONIC BILATERAL LOW BACK PAIN WITH RIGHT-SIDED SCIATICA: Primary | ICD-10-CM

## 2019-10-21 DIAGNOSIS — M62.9 HAMSTRING TIGHTNESS OF BOTH LOWER EXTREMITIES: ICD-10-CM

## 2019-10-21 PROCEDURE — 97112 NEUROMUSCULAR REEDUCATION: CPT

## 2019-10-21 PROCEDURE — 97530 THERAPEUTIC ACTIVITIES: CPT

## 2019-10-21 PROCEDURE — 97110 THERAPEUTIC EXERCISES: CPT

## 2019-10-21 PROCEDURE — 72148 MRI LUMBAR SPINE W/O DYE: CPT

## 2019-10-21 NOTE — PROGRESS NOTES
Daily Note     Today's date: 10/21/2019  Patient name: Deidra Cedeno  : 1960  MRN: [id number]  Referring provider: Mahamed Mueller DO  Dx:   Encounter Diagnosis     ICD-10-CM    1  Chronic bilateral low back pain with right-sided sciatica M54 41     G89 29    2  Hamstring tightness of both lower extremities M62 9        Start Time: 1030  Stop Time: 1118  Total time in clinic (min): 48 minutes    Subjective: Patient reports gardening on Saturday stating "after about 20 minutes of standing, I can feel it and I have to take a break, but then I go back to it " She reports she is to have an MRI completed tonight  Objective: See treatment diary below      Assessment: Tolerated treatment well  Patient demonstrated fatigue post treatment, exhibited good technique with therapeutic exercises and would benefit from continued PT Patient able to tolerate increased reps with certain TE, no pain reported with completion of step ups today  Plan: Continue per plan of care  Progress treatment as tolerated         Precautions: HTN, chronic pain, prior right ankle surgery     Daily Treatment Diary      Manual 10/17 10/21    9/26 9/30 10/3 10/10 10/14                             Exercise Diary                   bike                  Glute sets*       dc           Treadmill ambulation                  SLR * 3x12 3x12     3x10 3x10 3x10  3x12 np   Clamshells* 3x10 PTB 3x12 PTB     3x10x2"  3x10x2"   3x10x2''   3x12x2''   3x8 PTB   Seated Sciatic N glide LE only @ home today 10x5''x2        10x5"x2   Bridges* 3x12 3x12     3x10   3x10   3x10   3x12   3x8   Seated HS stretch 3x30'' 3x30''     3x30"  3x30"   3x30''   3x30''   np   Low Rows 3x12x3''   OTB 3x15x3'' OTB     OTB 3x12  OTB 3x12   OTB 3x15   3x12x3'' OTB   np   Mini squats  3x12 3x12     3x10   3x10   3x10   3x12   np   Step ups 4'' 2x10 R, 10 L fwd;   2x10 B lat  4'' 2x12 ea  Fwd/lat        4" 10x fwrd, 10x lat (w/ L)   4'' 2x10 right only ea   4'' 2x10 R 10 L np                                                                                                                                        Modalities                                                    *=on HEP

## 2019-10-24 ENCOUNTER — OFFICE VISIT (OUTPATIENT)
Dept: PHYSICAL THERAPY | Facility: REHABILITATION | Age: 59
End: 2019-10-24
Payer: MEDICARE

## 2019-10-24 DIAGNOSIS — G89.29 CHRONIC BILATERAL LOW BACK PAIN WITH RIGHT-SIDED SCIATICA: Primary | ICD-10-CM

## 2019-10-24 DIAGNOSIS — M54.41 CHRONIC BILATERAL LOW BACK PAIN WITH RIGHT-SIDED SCIATICA: Primary | ICD-10-CM

## 2019-10-24 DIAGNOSIS — M62.9 HAMSTRING TIGHTNESS OF BOTH LOWER EXTREMITIES: ICD-10-CM

## 2019-10-24 PROCEDURE — 97530 THERAPEUTIC ACTIVITIES: CPT

## 2019-10-24 PROCEDURE — 97110 THERAPEUTIC EXERCISES: CPT

## 2019-10-24 PROCEDURE — 97112 NEUROMUSCULAR REEDUCATION: CPT

## 2019-10-24 NOTE — PROGRESS NOTES
Daily Note     Today's date: 10/24/2019  Patient name: Jovani Evans  : 1960  MRN: [id number]  Referring provider: Sage Barth DO  Dx:   Encounter Diagnosis     ICD-10-CM    1  Chronic bilateral low back pain with right-sided sciatica M54 41     G89 29    2  Hamstring tightness of both lower extremities M62 9        Start Time: 1450  Stop Time: 1550  Total time in clinic (min): 60 minutes    Subjective: Patient reports pain as "the same" prior to session today, reporting increased pain on Tuesday stating "it wasn't just the butt, it was the butt and the whole front of the leg " Patient reports having MRI completed this week, awaiting results  Patient is to follow up with MD this Monday  Objective: See treatment diary below      Assessment: Tolerated treatment fair  Patient demonstrated fatigue post treatment, exhibited good technique with therapeutic exercises and would benefit from continued PT Patient requiring encouragement to complete desired reps  Patient able to tolerate increased resistance with certain TE, no pain reported  Plan: Continue per plan of care  Progress treatment as tolerated         Precautions: HTN, chronic pain, prior right ankle surgery     Daily Treatment Diary      Manual 10/17 10/21 10/24   9/26 9/30 10/3 10/10 10/14                             Exercise Diary                   bike                  Glute sets*       dc           Treadmill ambulation                  SLR * 3x12 3x12  3x12   3x10 3x10 3x10  3x12 np   Clamshells* 3x10 PTB 3x12 PTB 3x12 OTB    3x10x2"  3x10x2"   3x10x2''   3x12x2''   3x8 PTB   Seated Sciatic N glide LE only @ home today 10x5''x2 10x5''x2        10x5"x2   Bridges* 3x12 3x12 3x12    3x10   3x10   3x10   3x12   3x8   Seated HS stretch 3x30'' 3x30'' 3x30''    3x30"  3x30"   3x30''   3x30''   np   Low Rows 3x12x3''   OTB 3x15x3'' OTB 3x10x3'' GTB    OTB 3x12  OTB 3x12   OTB 3x15   3x12x3'' OTB   np   Mini squats  3x12 3x12 3x12    3x10   3x10 3x10   3x12   np   Step ups 4'' 2x10 R, 10 L fwd;   2x10 B lat  4'' 2x12 ea  Fwd/lat 4'' 2x12 ea fwd/lat       4" 10x fwrd, 10x lat (w/ L)   4'' 2x10 right only ea   4'' 2x10 R 10 L    np                                                                                                                                        Modalities                                                    *=on HEP

## 2019-10-28 ENCOUNTER — OFFICE VISIT (OUTPATIENT)
Dept: OBGYN CLINIC | Facility: CLINIC | Age: 59
End: 2019-10-28
Payer: MEDICARE

## 2019-10-28 ENCOUNTER — APPOINTMENT (OUTPATIENT)
Dept: PHYSICAL THERAPY | Facility: REHABILITATION | Age: 59
End: 2019-10-28
Payer: MEDICARE

## 2019-10-28 VITALS
DIASTOLIC BLOOD PRESSURE: 83 MMHG | HEART RATE: 67 BPM | WEIGHT: 250 LBS | SYSTOLIC BLOOD PRESSURE: 135 MMHG | BODY MASS INDEX: 44.3 KG/M2 | HEIGHT: 63 IN

## 2019-10-28 DIAGNOSIS — M25.551 GREATER TROCHANTERIC PAIN SYNDROME OF RIGHT LOWER EXTREMITY: ICD-10-CM

## 2019-10-28 DIAGNOSIS — M62.9 HAMSTRING TIGHTNESS OF BOTH LOWER EXTREMITIES: ICD-10-CM

## 2019-10-28 DIAGNOSIS — G57.01 PIRIFORMIS SYNDROME OF RIGHT SIDE: Primary | ICD-10-CM

## 2019-10-28 PROCEDURE — 99212 OFFICE O/P EST SF 10 MIN: CPT | Performed by: PHYSICAL MEDICINE & REHABILITATION

## 2019-10-28 NOTE — PROGRESS NOTES
1  Piriformis syndrome of right side     2  Hamstring tightness of both lower extremities     3  Greater trochanteric pain syndrome of right lower extremity       No orders of the defined types were placed in this encounter  Imaging Studies (I personally reviewed images in PACS and report):  MRI of the lumbar spine dated 10/21/2019:  Mild facet hypertrophy in the lower lumbar spine  There is a circular hyper intensity at the L1 vertebrae which is likely representative of a hemangioma  No acute osseous abnormalities  Impression:  Patient is here in follow up of chronic right-sided low back pain that is multifactorial in likely due to piriformis syndrome and greater trochanteric pain syndrome  Since her last visit she continues to improve but slowly  We discussed different treatment options including interventional procedures but she wanted to hold off at this point  She will continue with physical therapy and continue doing a home exercise program   I will see her back if needed  Return if symptoms worsen or fail to improve  HPI:  Kelsie Chatterjee is a 60 y o  female  who presents in follow up  Here for   Chief Complaint   Patient presents with    Follow-up       Date of injury:  Chronic pain  Trajectory of symptoms:  Continuing to improve but very slowly  She still has pain at right lateral hip and in the buttocks that radiates down the leg and into the knee never goes past it  She also has chronic ankle pain which she feels like is contributing to this because she is compensating  Overall she feels like she is a lot better than when she 1st saw me  No other complaints or concerns at this time  Review of Systems   Constitutional: Positive for activity change  Negative for fever  HENT: Negative for trouble swallowing  Eyes: Negative for visual disturbance  Respiratory: Negative for shortness of breath  Cardiovascular: Negative for chest pain     Gastrointestinal: Negative for abdominal pain  Denies bowel incontinence  Endocrine: Negative for polydipsia  Genitourinary:        Denies urinary incontinence  Musculoskeletal: Positive for arthralgias and back pain  Skin: Negative for rash  Allergic/Immunologic: Negative for immunocompromised state  Neurological: Negative for weakness and numbness  Denies saddle anesthesia  Hematological: Does not bruise/bleed easily  Psychiatric/Behavioral: Negative for confusion  Following history reviewed and updated:  Past Medical History:   Diagnosis Date    Hyperlipemia     Hypertension      Past Surgical History:   Procedure Laterality Date    ANKLE SURGERY Right 2009/2012    CARPAL TUNNEL RELEASE Bilateral 1990    GALLBLADDER SURGERY  2004    HYSTERECTOMY  2004    MAMMO STEREOTACTIC BREAST BIOPSY RIGHT (ALL INC) Right 2014     Social History   Social History     Substance and Sexual Activity   Alcohol Use Yes    Comment: SOCIAL     Social History     Substance and Sexual Activity   Drug Use Not Currently     Social History     Tobacco Use   Smoking Status Former Smoker   Smokeless Tobacco Former User   Tobacco Comment    13 YEARS      Family History   Problem Relation Age of Onset    Cancer Mother     Diabetes Mother     Hyperlipidemia Mother     Cancer Maternal Grandmother      No Known Allergies     Constitutional:  /83 (BP Location: Right arm, Patient Position: Sitting, Cuff Size: Standard)   Pulse 67   Ht 5' 3" (1 6 m)   Wt 113 kg (250 lb)   BMI 44 29 kg/m²    General: NAD  Eyes: Clear sclerae  ENT: No inflammation, lesion, or mass of lips  No tracheal deviation  Musculoskeletal: As mentioned below  Integumentary: No visible rashes or skin lesions  Pulmonary/Chest: Effort normal  No respiratory distress  Neuro: CN's grossly intact, MCGRATH  Psych: Normal affect and judgement  Vascular: WWP      Ortho Exam   Back Exam      Comments:  Inspection: Morbidly obese   No obvious deformities, lesions or rashes  ROM: Lumbar flexion to 60, extension to just past neutral    Palpation: Diffusely tender from the mid-lumbar paraspinals and down the posterior buttocks, hamstrings and into the hamstring insertions bilaterally  Is also tender to palpation along the greater trochanteric area and down the iliotibial band  There are no step offs  Neuro: 5/5 strength with bilateral hip flexion but has give-way weakness with right hip flexion, knee extension, ankle dorsiflexion and ankle plantarflexion   Sensation is intact in dermatomes L2-S1   Patellar and Achilles reflexes are hypooactive and equal bilaterally   No clonus  Special tests: Normal bilateral straight leg raise (hamstring tightness), dural stretch (hamstring tightness)  Positive Penny's maneuver  Procedures - none for this visit

## 2019-10-31 ENCOUNTER — OFFICE VISIT (OUTPATIENT)
Dept: PHYSICAL THERAPY | Facility: REHABILITATION | Age: 59
End: 2019-10-31
Payer: MEDICARE

## 2019-10-31 DIAGNOSIS — M54.41 CHRONIC BILATERAL LOW BACK PAIN WITH RIGHT-SIDED SCIATICA: Primary | ICD-10-CM

## 2019-10-31 DIAGNOSIS — M62.9 HAMSTRING TIGHTNESS OF BOTH LOWER EXTREMITIES: ICD-10-CM

## 2019-10-31 DIAGNOSIS — G89.29 CHRONIC BILATERAL LOW BACK PAIN WITH RIGHT-SIDED SCIATICA: Primary | ICD-10-CM

## 2019-10-31 PROCEDURE — 97112 NEUROMUSCULAR REEDUCATION: CPT | Performed by: PHYSICAL THERAPIST

## 2019-10-31 PROCEDURE — 97110 THERAPEUTIC EXERCISES: CPT | Performed by: PHYSICAL THERAPIST

## 2019-10-31 NOTE — PROGRESS NOTES
Daily Note     Today's date: 10/31/2019  Patient name: Chen Ferro  : 1960  MRN: [id number]  Referring provider: Akira Gallagher DO  Dx:   Encounter Diagnosis     ICD-10-CM    1  Chronic bilateral low back pain with right-sided sciatica M54 41     G89 29    2  Hamstring tightness of both lower extremities M62 9        Start Time: 1500  Stop Time: 1540  Total time in clinic (min): 40 minutes    Subjective: Silva Lowjose a notes seeing Dr Sean Foote who reported she had OA on the MRI and for her to transition towards HEP  Objective: See treatment diary below      Assessment: Tolerated treatment well  Patient demonstrated fatigue post treatment, exhibited good technique with therapeutic exercises and would benefit from continued PT  Patient will be transitioned towards HEP over the next 3 weeks and progressed as able  Plan: Continue per plan of care        Precautions: HTN, chronic pain, prior right ankle surgery     Daily Treatment Diary      Manual 10/17 10/21 10/24 10/31   9/30 10/3 10/10 10/14                             Exercise Diary                  bike                 Glute sets*                 Treadmill ambulation                 SLR * 3x12 3x12  3x12 nv   3x10 3x10  3x12 np   Clamshells* 3x10 PTB 3x12 PTB 3x12 OTB 3x12 OTB Inc rep   3x10x2"   3x10x2''   3x12x2''   3x8 PTB   Seated Sciatic N glide LE only @ home today 10x5''x2 10x5''x2  np      10x5"x2   Bridges* 3x12 3x12 3x12 3x15     3x10   3x10   3x12   3x8   Seated HS stretch 3x30'' 3x30'' 3x30'' np    3x30"   3x30''   3x30''   np   Low Rows 3x12x3''   OTB 3x15x3'' OTB 3x10x3'' GTB 3x12x3" GTB    OTB 3x12   OTB 3x15   3x12x3'' OTB   np   Mini squats  3x12 3x12 3x12 3x12     3x10   3x10   3x12   np   Step ups fwd/lat 4'' 2x10 R, 10 L fwd;   2x10 B lat  4'' 2x12 ea  Fwd/lat 4'' 2x12 ea fwd/lat 3x12 4"  ea     4" 10x fwrd, 10x lat (w/ L)   4'' 2x10 right only ea   4'' 2x10 R 10 L    np Modalities                                                  *=on HEP

## 2019-11-04 ENCOUNTER — OFFICE VISIT (OUTPATIENT)
Dept: PHYSICAL THERAPY | Facility: REHABILITATION | Age: 59
End: 2019-11-04
Payer: MEDICARE

## 2019-11-04 DIAGNOSIS — M54.41 CHRONIC BILATERAL LOW BACK PAIN WITH RIGHT-SIDED SCIATICA: Primary | ICD-10-CM

## 2019-11-04 DIAGNOSIS — G89.29 CHRONIC BILATERAL LOW BACK PAIN WITH RIGHT-SIDED SCIATICA: Primary | ICD-10-CM

## 2019-11-04 DIAGNOSIS — M62.9 HAMSTRING TIGHTNESS OF BOTH LOWER EXTREMITIES: ICD-10-CM

## 2019-11-04 PROCEDURE — 97110 THERAPEUTIC EXERCISES: CPT

## 2019-11-04 PROCEDURE — 97530 THERAPEUTIC ACTIVITIES: CPT

## 2019-11-04 PROCEDURE — 97112 NEUROMUSCULAR REEDUCATION: CPT

## 2019-11-04 NOTE — PROGRESS NOTES
Daily Note     Today's date: 2019  Patient name: Freedom Jessica  : 1960  MRN: [id number]  Referring provider: Marco Padilla DO  Dx:   Encounter Diagnosis     ICD-10-CM    1  Chronic bilateral low back pain with right-sided sciatica M54 41     G89 29    2  Hamstring tightness of both lower extremities M62 9        Start Time: 1130  Stop Time: 1218  Total time in clinic (min): 48 minutes    Subjective: Patient reporting right knee soreness prior to session today stating "I figured out why my knee always swells up, I was going up and down the basement steps a ton "       Objective: See treatment diary below      Assessment: Tolerated treatment well  Patient demonstrated fatigue post treatment, exhibited good technique with therapeutic exercises and would benefit from continued PT   Encouragement required to complete increased reps with certain TE, with patient demonstrating most difficulty with SLR flexion  Plan: Continue per plan of care  Progress treatment as tolerated         Precautions: HTN, chronic pain, prior right ankle surgery     Daily Treatment Diary      Manual 10/17 10/21 10/24 10/31 11/4  9/30 10/3 10/10 10/14                             Exercise Diary                  bike                 Glute sets*                 Treadmill ambulation                 SLR * 3x12 3x12  3x12 nv 15, 12, 12 ea  3x10 3x10  3x12 np   Clamshells* 3x10 PTB 3x12 PTB 3x12 OTB 3x12 OTB 3x15 OTB   3x10x2"   3x10x2''   3x12x2''   3x8 PTB   Seated Sciatic N glide LE only @ home today 10x5''x2 10x5''x2  np      10x5"x2   Bridges* 3x12 3x12 3x12 3x15 3x15    3x10   3x10   3x12   3x8   Seated HS stretch 3x30'' 3x30'' 3x30'' np 3x30''   3x30"   3x30''   3x30''   np   Low Rows 3x12x3''   OTB 3x15x3'' OTB 3x10x3'' GTB 3x12x3" GTB 3x15x3''   OTB 3x12   OTB 3x15   3x12x3'' OTB   np   Mini squats  3x12 3x12 3x12 3x12 3x15    3x10   3x10   3x12   np   Step ups fwd/lat 4'' 2x10 R, 10 L fwd;   2x10 B lat  4'' 2x12 ea  Fwd/lat 4'' 2x12 ea fwd/lat 3x12 4"  ea 3x12 ea    4" 10x fwrd, 10x lat (w/ L)   4'' 2x10 right only ea   4'' 2x10 R 10 L    np                                                                                                                                  Modalities                                                  *=on HEP

## 2019-11-07 ENCOUNTER — OFFICE VISIT (OUTPATIENT)
Dept: PHYSICAL THERAPY | Facility: REHABILITATION | Age: 59
End: 2019-11-07
Payer: MEDICARE

## 2019-11-07 DIAGNOSIS — M62.9 HAMSTRING TIGHTNESS OF BOTH LOWER EXTREMITIES: ICD-10-CM

## 2019-11-07 DIAGNOSIS — G89.29 CHRONIC BILATERAL LOW BACK PAIN WITH RIGHT-SIDED SCIATICA: Primary | ICD-10-CM

## 2019-11-07 DIAGNOSIS — M54.41 CHRONIC BILATERAL LOW BACK PAIN WITH RIGHT-SIDED SCIATICA: Primary | ICD-10-CM

## 2019-11-07 PROCEDURE — 97112 NEUROMUSCULAR REEDUCATION: CPT

## 2019-11-07 PROCEDURE — 97110 THERAPEUTIC EXERCISES: CPT

## 2019-11-07 PROCEDURE — 97530 THERAPEUTIC ACTIVITIES: CPT

## 2019-11-07 NOTE — PROGRESS NOTES
Daily Note     Today's date: 2019  Patient name: Mine Mejias  : 1960  MRN: [id number]  Referring provider: Duy Negron DO  Dx:   Encounter Diagnosis     ICD-10-CM    1  Chronic bilateral low back pain with right-sided sciatica M54 41     G89 29    2  Hamstring tightness of both lower extremities M62 9        Start Time: 1500  Stop Time: 1558  Total time in clinic (min): 58 minutes    Subjective: Patient reports feeling "okay" prior to session today  She reports "achy, but I know it's the weather "       Objective: See treatment diary below      Assessment: Tolerated treatment well  Patient demonstrated fatigue post treatment, exhibited good technique with therapeutic exercises and would benefit from continued PT Trialed paloff press with patient tolerating well, no pain reported, evident fatigue  Plan: Continue per plan of care  Progress treatment as tolerated         Precautions: HTN, chronic pain, prior right ankle surgery     Daily Treatment Diary      Manual 10/17 10/21 10/24 10/31 11/4 11/7    10/14                             Exercise Diary               bike              Glute sets*              Treadmill ambulation              SLR * 3x12 3x12  3x12 nv 15, 12, 12 ea 15, 12, 12 ea    np   Clamshells* 3x10 PTB 3x12 PTB 3x12 OTB 3x12 OTB 3x15 OTB 3x10 GTB      3x8 PTB   Seated Sciatic N glide LE only @ home today 10x5''x2 10x5''x2  np  10x5''x2    10x5"x2   Bridges* 3x12 3x12 3x12 3x15 3x15 3x15       3x8   Seated HS stretch 3x30'' 3x30'' 3x30'' np 3x30'' 3x30''      np   Low Rows 3x12x3''   OTB 3x15x3'' OTB 3x10x3'' GTB 3x12x3" GTB 3x15x3'' 3x15x3''      np   Mini squats  3x12 3x12 3x12 3x12 3x15 3x15      np   Step ups fwd/lat 4'' 2x10 R, 10 L fwd;   2x10 B lat  4'' 2x12 ea  Fwd/lat 4'' 2x12 ea fwd/lat 3x12 4"  ea 3x12 ea 3x15 ea 4''      np   Paloff press        PTB 10 ea Modalities                                                  *=on HEP

## 2019-11-11 ENCOUNTER — APPOINTMENT (OUTPATIENT)
Dept: PHYSICAL THERAPY | Facility: REHABILITATION | Age: 59
End: 2019-11-11
Payer: MEDICARE

## 2019-11-14 ENCOUNTER — OFFICE VISIT (OUTPATIENT)
Dept: PHYSICAL THERAPY | Facility: REHABILITATION | Age: 59
End: 2019-11-14
Payer: MEDICARE

## 2019-11-14 DIAGNOSIS — M54.41 CHRONIC BILATERAL LOW BACK PAIN WITH RIGHT-SIDED SCIATICA: Primary | ICD-10-CM

## 2019-11-14 DIAGNOSIS — G89.29 CHRONIC BILATERAL LOW BACK PAIN WITH RIGHT-SIDED SCIATICA: Primary | ICD-10-CM

## 2019-11-14 DIAGNOSIS — M62.9 HAMSTRING TIGHTNESS OF BOTH LOWER EXTREMITIES: ICD-10-CM

## 2019-11-14 PROCEDURE — 97112 NEUROMUSCULAR REEDUCATION: CPT | Performed by: PHYSICAL THERAPIST

## 2019-11-14 PROCEDURE — 97530 THERAPEUTIC ACTIVITIES: CPT | Performed by: PHYSICAL THERAPIST

## 2019-11-14 PROCEDURE — 97110 THERAPEUTIC EXERCISES: CPT | Performed by: PHYSICAL THERAPIST

## 2019-11-14 NOTE — PROGRESS NOTES
Daily Note     Today's date: 2019  Patient name: Teresita Bonilla  : 1960  MRN: [id number]  Referring provider: Yanet Marquez DO  Dx:   Encounter Diagnosis     ICD-10-CM    1  Chronic bilateral low back pain with right-sided sciatica M54 41     G89 29    2  Hamstring tightness of both lower extremities M62 9        Start Time: 1430  Stop Time: 1530  Total time in clinic (min): 60 minutes    Subjective: Bita Starkey reports that her back feels a little more sore today due to the cold weather  Objective: See treatment diary below      Assessment: Tolerated treatment well  Patient demonstrated fatigue post treatment, exhibited good technique with therapeutic exercises and would benefit from continued PT  Plan: Continue per plan of care           Precautions: HTN, chronic pain, prior right ankle surgery     Daily Treatment Diary      Manual 10/17 10/21 10/24 10/31 11/4 11/7 11/14   10/14                             Exercise Diary               bike              Glute sets*              Treadmill ambulation              SLR * 3x12 3x12  3x12 nv 15, 12, 12 ea 15, 12, 12 ea 3x12   np   Clamshells* 3x10 PTB 3x12 PTB 3x12 OTB 3x12 OTB 3x15 OTB 3x10 GTB 3x10 GTB     3x8 PTB   Seated Sciatic N glide LE only @ home today 10x5''x2 10x5''x2  np  10x5''x2 np   10x5"x2   Bridges* 3x12 3x12 3x12 3x15 3x15 3x15  3x15     3x8   Seated HS stretch 3x30'' 3x30'' 3x30'' np 3x30'' 3x30'' 3x30"     np   Low Rows 3x12x3''   OTB 3x15x3'' OTB 3x10x3'' GTB 3x12x3" GTB 3x15x3'' 3x15x3'' 3x15 GTB     np   Mini squats  3x12 3x12 3x12 3x12 3x15 3x15 dc     np   STS       3x10      Step ups fwd/lat 4'' 2x10 R, 10 L fwd;   2x10 B lat  4'' 2x12 ea  Fwd/lat 4'' 2x12 ea fwd/lat 3x12 4"  ea 3x12 ea 3x15 ea 4'' 3x10 ea 4"     np   Paloff press        PTB 10 ea  OTB 10ea                                                                                                                     Modalities *=on HEP      Treated by Emelyn COYT [id number]

## 2019-12-02 NOTE — PROGRESS NOTES
PT Discharge    Today's date: 2019  Patient name: Ron Blanchard  : 1960  MRN: [id number]  Referring provider: Gabriel Quiles DO  Dx:   Encounter Diagnosis     ICD-10-CM    1  Chronic bilateral low back pain with right-sided sciatica M54 41     G89 29    2  Hamstring tightness of both lower extremities M62 9        Start Time: 1430  Stop Time: 1530  Total time in clinic (min): 60 minutes    Assessment/Plan  Ron Blanchard has not returned since last appointment, unable to perform objective measures since then  It is assumed they have returned to prior level of function and do not desire additional physical therapy  At this time, Ron Blanchard will be discharged from physical therapy services      Subjective    Objective

## 2020-07-21 ENCOUNTER — APPOINTMENT (OUTPATIENT)
Dept: LAB | Facility: HOSPITAL | Age: 60
End: 2020-07-21
Payer: MEDICARE

## 2020-07-21 ENCOUNTER — TRANSCRIBE ORDERS (OUTPATIENT)
Dept: ADMINISTRATIVE | Facility: HOSPITAL | Age: 60
End: 2020-07-21

## 2020-07-21 DIAGNOSIS — E78.5 HYPERLIPIDEMIA, UNSPECIFIED HYPERLIPIDEMIA TYPE: ICD-10-CM

## 2020-07-21 DIAGNOSIS — D72.829 LEUKOCYTOSIS, UNSPECIFIED TYPE: ICD-10-CM

## 2020-07-21 DIAGNOSIS — I10 ESSENTIAL HYPERTENSION, MALIGNANT: ICD-10-CM

## 2020-07-21 DIAGNOSIS — Z79.899 ENCOUNTER FOR LONG-TERM (CURRENT) USE OF OTHER MEDICATIONS: ICD-10-CM

## 2020-07-21 DIAGNOSIS — E11.9 DIABETES MELLITUS WITHOUT COMPLICATION (HCC): Primary | ICD-10-CM

## 2020-07-21 DIAGNOSIS — E11.9 DIABETES MELLITUS WITHOUT COMPLICATION (HCC): ICD-10-CM

## 2020-07-21 LAB
ALBUMIN SERPL BCP-MCNC: 3.9 G/DL (ref 3.4–4.8)
ALP SERPL-CCNC: 90 U/L (ref 35–140)
ALT SERPL W P-5'-P-CCNC: 17 U/L (ref 5–54)
ANION GAP SERPL CALCULATED.3IONS-SCNC: 7 MMOL/L (ref 4–13)
AST SERPL W P-5'-P-CCNC: 17 U/L (ref 15–41)
BACTERIA UR QL AUTO: NORMAL /HPF
BASOPHILS # BLD AUTO: 0.04 THOUSANDS/ΜL (ref 0–0.1)
BASOPHILS NFR BLD AUTO: 1 % (ref 0–1)
BILIRUB SERPL-MCNC: 0.46 MG/DL (ref 0.3–1.2)
BILIRUB UR QL STRIP: NEGATIVE
BUN SERPL-MCNC: 21 MG/DL (ref 6–20)
CALCIUM SERPL-MCNC: 9.1 MG/DL (ref 8.4–10.2)
CHLORIDE SERPL-SCNC: 102 MMOL/L (ref 96–108)
CHOLEST SERPL-MCNC: 161 MG/DL
CLARITY UR: CLEAR
CO2 SERPL-SCNC: 30 MMOL/L (ref 22–33)
COLOR UR: YELLOW
CREAT SERPL-MCNC: 0.95 MG/DL (ref 0.4–1.1)
CREAT UR-MCNC: 112 MG/DL
EOSINOPHIL # BLD AUTO: 0.24 THOUSAND/ΜL (ref 0–0.61)
EOSINOPHIL NFR BLD AUTO: 3 % (ref 0–6)
ERYTHROCYTE [DISTWIDTH] IN BLOOD BY AUTOMATED COUNT: 13.5 % (ref 11.6–15.1)
EST. AVERAGE GLUCOSE BLD GHB EST-MCNC: 151 MG/DL
GFR SERPL CREATININE-BSD FRML MDRD: 65 ML/MIN/1.73SQ M
GLUCOSE P FAST SERPL-MCNC: 124 MG/DL (ref 65–99)
GLUCOSE UR STRIP-MCNC: NEGATIVE MG/DL
HBA1C MFR BLD: 6.9 %
HCT VFR BLD AUTO: 41.7 % (ref 34.8–46.1)
HDLC SERPL-MCNC: 48 MG/DL
HGB BLD-MCNC: 13.3 G/DL (ref 11.5–15.4)
HGB UR QL STRIP.AUTO: NEGATIVE
IMM GRANULOCYTES # BLD AUTO: 0 THOUSAND/UL (ref 0–0.2)
IMM GRANULOCYTES NFR BLD AUTO: 0 % (ref 0–2)
KETONES UR STRIP-MCNC: NEGATIVE MG/DL
LDLC SERPL CALC-MCNC: 88 MG/DL (ref 0–100)
LEUKOCYTE ESTERASE UR QL STRIP: NEGATIVE
LYMPHOCYTES # BLD AUTO: 2.85 THOUSANDS/ΜL (ref 0.6–4.47)
LYMPHOCYTES NFR BLD AUTO: 38 % (ref 14–44)
MCH RBC QN AUTO: 28.1 PG (ref 26.8–34.3)
MCHC RBC AUTO-ENTMCNC: 31.9 G/DL (ref 31.4–37.4)
MCV RBC AUTO: 88 FL (ref 82–98)
MICROALBUMIN UR-MCNC: 5 MG/L (ref 0–20)
MICROALBUMIN/CREAT 24H UR: 4 MG/G CREATININE (ref 0–30)
MONOCYTES # BLD AUTO: 0.55 THOUSAND/ΜL (ref 0.17–1.22)
MONOCYTES NFR BLD AUTO: 7 % (ref 4–12)
NEUTROPHILS # BLD AUTO: 3.81 THOUSANDS/ΜL (ref 1.85–7.62)
NEUTS SEG NFR BLD AUTO: 51 % (ref 43–75)
NITRITE UR QL STRIP: NEGATIVE
NON-SQ EPI CELLS URNS QL MICRO: NORMAL /HPF
NONHDLC SERPL-MCNC: 113 MG/DL
PH UR STRIP.AUTO: 5.5 [PH]
PLATELET # BLD AUTO: 317 THOUSANDS/UL (ref 149–390)
PMV BLD AUTO: 10.4 FL (ref 8.9–12.7)
POTASSIUM SERPL-SCNC: 4.5 MMOL/L (ref 3.5–5)
PROT SERPL-MCNC: 7.2 G/DL (ref 6.4–8.3)
PROT UR STRIP-MCNC: NEGATIVE MG/DL
RBC # BLD AUTO: 4.73 MILLION/UL (ref 3.81–5.12)
RBC #/AREA URNS AUTO: NORMAL /HPF
SODIUM SERPL-SCNC: 139 MMOL/L (ref 133–145)
SP GR UR STRIP.AUTO: 1.02 (ref 1–1.03)
TRIGL SERPL-MCNC: 126.2 MG/DL
UROBILINOGEN UR QL STRIP.AUTO: 0.2 E.U./DL
WBC # BLD AUTO: 7.49 THOUSAND/UL (ref 4.31–10.16)
WBC #/AREA URNS AUTO: NORMAL /HPF

## 2020-07-21 PROCEDURE — 81001 URINALYSIS AUTO W/SCOPE: CPT | Performed by: INTERNAL MEDICINE

## 2020-07-21 PROCEDURE — 36415 COLL VENOUS BLD VENIPUNCTURE: CPT

## 2020-07-21 PROCEDURE — 80061 LIPID PANEL: CPT

## 2020-07-21 PROCEDURE — 80053 COMPREHEN METABOLIC PANEL: CPT

## 2020-07-21 PROCEDURE — 85025 COMPLETE CBC W/AUTO DIFF WBC: CPT

## 2020-07-21 PROCEDURE — 83036 HEMOGLOBIN GLYCOSYLATED A1C: CPT

## 2020-07-21 PROCEDURE — 82570 ASSAY OF URINE CREATININE: CPT | Performed by: INTERNAL MEDICINE

## 2020-07-21 PROCEDURE — 82043 UR ALBUMIN QUANTITATIVE: CPT | Performed by: INTERNAL MEDICINE

## 2020-10-28 ENCOUNTER — TRANSCRIBE ORDERS (OUTPATIENT)
Dept: ADMINISTRATIVE | Facility: HOSPITAL | Age: 60
End: 2020-10-28

## 2020-10-28 DIAGNOSIS — R10.13 EPIGASTRIC PAIN: ICD-10-CM

## 2020-10-28 DIAGNOSIS — R10.12 LEFT UPPER QUADRANT PAIN: Primary | ICD-10-CM

## 2020-11-02 ENCOUNTER — HOSPITAL ENCOUNTER (OUTPATIENT)
Dept: ULTRASOUND IMAGING | Facility: HOSPITAL | Age: 60
Discharge: HOME | End: 2020-11-02
Attending: INTERNAL MEDICINE
Payer: MEDICARE

## 2020-11-02 DIAGNOSIS — R10.12 LEFT UPPER QUADRANT PAIN: ICD-10-CM

## 2020-11-02 DIAGNOSIS — R10.13 EPIGASTRIC PAIN: ICD-10-CM

## 2020-11-02 PROCEDURE — 76705 ECHO EXAM OF ABDOMEN: CPT

## 2021-01-19 ENCOUNTER — LAB (OUTPATIENT)
Dept: LAB | Facility: HOSPITAL | Age: 61
End: 2021-01-19
Attending: INTERNAL MEDICINE
Payer: MEDICARE

## 2021-01-19 ENCOUNTER — TRANSCRIBE ORDERS (OUTPATIENT)
Dept: ADMINISTRATIVE | Facility: HOSPITAL | Age: 61
End: 2021-01-19

## 2021-01-19 DIAGNOSIS — I10 ESSENTIAL HYPERTENSION, MALIGNANT: ICD-10-CM

## 2021-01-19 DIAGNOSIS — E11.9 DIABETES MELLITUS WITHOUT COMPLICATION (HCC): Primary | ICD-10-CM

## 2021-01-19 DIAGNOSIS — Z79.899 ENCOUNTER FOR LONG-TERM (CURRENT) USE OF OTHER MEDICATIONS: ICD-10-CM

## 2021-01-19 DIAGNOSIS — E78.5 HYPERLIPIDEMIA, UNSPECIFIED HYPERLIPIDEMIA TYPE: ICD-10-CM

## 2021-01-19 DIAGNOSIS — E11.9 DIABETES MELLITUS WITHOUT COMPLICATION (HCC): ICD-10-CM

## 2021-01-19 LAB
ALBUMIN SERPL BCP-MCNC: 3.9 G/DL (ref 3.4–4.8)
ALP SERPL-CCNC: 98.4 U/L (ref 35–140)
ALT SERPL W P-5'-P-CCNC: 16 U/L (ref 5–54)
ANION GAP SERPL CALCULATED.3IONS-SCNC: 7 MMOL/L (ref 4–13)
AST SERPL W P-5'-P-CCNC: 17 U/L (ref 15–41)
BILIRUB SERPL-MCNC: 0.42 MG/DL (ref 0.3–1.2)
BUN SERPL-MCNC: 18 MG/DL (ref 6–20)
CALCIUM SERPL-MCNC: 9.3 MG/DL (ref 8.4–10.2)
CHLORIDE SERPL-SCNC: 103 MMOL/L (ref 96–108)
CHOLEST SERPL-MCNC: 149 MG/DL
CO2 SERPL-SCNC: 29 MMOL/L (ref 22–33)
CREAT SERPL-MCNC: 0.94 MG/DL (ref 0.4–1.1)
EST. AVERAGE GLUCOSE BLD GHB EST-MCNC: 148 MG/DL
GFR SERPL CREATININE-BSD FRML MDRD: 66 ML/MIN/1.73SQ M
GLUCOSE P FAST SERPL-MCNC: 145 MG/DL (ref 70–105)
HBA1C MFR BLD: 6.8 %
HDLC SERPL-MCNC: 53 MG/DL
LDLC SERPL CALC-MCNC: 72 MG/DL (ref 0–100)
NONHDLC SERPL-MCNC: 96 MG/DL
POTASSIUM SERPL-SCNC: 4.6 MMOL/L (ref 3.5–5)
PROT SERPL-MCNC: 7.1 G/DL (ref 6.4–8.3)
SODIUM SERPL-SCNC: 139 MMOL/L (ref 133–145)
TRIGL SERPL-MCNC: 117.9 MG/DL

## 2021-01-19 PROCEDURE — 80061 LIPID PANEL: CPT

## 2021-01-19 PROCEDURE — 83036 HEMOGLOBIN GLYCOSYLATED A1C: CPT

## 2021-01-19 PROCEDURE — 36415 COLL VENOUS BLD VENIPUNCTURE: CPT

## 2021-01-19 PROCEDURE — 80053 COMPREHEN METABOLIC PANEL: CPT

## 2021-01-23 PROBLEM — I10 ESSENTIAL HYPERTENSION: Chronic | Status: ACTIVE | Noted: 2021-01-23

## 2021-01-23 PROBLEM — I12.9 HYPERTENSIVE ARTERIONEPHROSCLEROSIS OF TRANSPLANTED KIDNEY: Chronic | Status: RESOLVED | Noted: 2021-01-23 | Resolved: 2021-01-23

## 2021-01-23 PROBLEM — E78.2 MIXED HYPERLIPIDEMIA: Chronic | Status: ACTIVE | Noted: 2021-01-23

## 2021-01-23 PROBLEM — E11.9 TYPE 2 DIABETES MELLITUS WITHOUT COMPLICATION, WITHOUT LONG-TERM CURRENT USE OF INSULIN (HCC): Chronic | Status: ACTIVE | Noted: 2021-01-23

## 2021-01-23 PROBLEM — M79.672 PAIN IN BOTH FEET: Chronic | Status: ACTIVE | Noted: 2021-01-23

## 2021-01-23 PROBLEM — G47.33 OBSTRUCTIVE SLEEP APNEA SYNDROME: Chronic | Status: ACTIVE | Noted: 2021-01-23

## 2021-01-23 PROBLEM — K21.9 GASTROESOPHAGEAL REFLUX DISEASE WITHOUT ESOPHAGITIS: Chronic | Status: ACTIVE | Noted: 2021-01-23

## 2021-01-23 PROBLEM — M25.579 PAIN IN ANKLE: Chronic | Status: ACTIVE | Noted: 2021-01-23

## 2021-01-23 PROBLEM — M79.671 PAIN IN BOTH FEET: Chronic | Status: ACTIVE | Noted: 2021-01-23

## 2021-01-23 PROBLEM — T86.19 HYPERTENSIVE ARTERIONEPHROSCLEROSIS OF TRANSPLANTED KIDNEY: Chronic | Status: ACTIVE | Noted: 2021-01-23

## 2021-01-23 PROBLEM — I12.9 HYPERTENSIVE ARTERIONEPHROSCLEROSIS OF TRANSPLANTED KIDNEY: Chronic | Status: ACTIVE | Noted: 2021-01-23

## 2021-01-23 PROBLEM — T86.19 HYPERTENSIVE ARTERIONEPHROSCLEROSIS OF TRANSPLANTED KIDNEY: Chronic | Status: RESOLVED | Noted: 2021-01-23 | Resolved: 2021-01-23

## 2021-01-23 RX ORDER — LOSARTAN POTASSIUM 100 MG/1
25 TABLET ORAL DAILY
COMMUNITY
End: 2021-01-26 | Stop reason: SDUPTHER

## 2021-01-23 RX ORDER — CALCIUM CARBONATE 200(500)MG
1 TABLET,CHEWABLE ORAL 2 TIMES DAILY
COMMUNITY
End: 2021-08-31 | Stop reason: HOSPADM

## 2021-01-23 NOTE — ASSESSMENT & PLAN NOTE
She was seen [place] [facility] [place] [place] [facility]  Had MRI

## 2021-01-23 NOTE — ASSESSMENT & PLAN NOTE
And has pain in both ankles  Patient has been following podiatry , she requires pain medication tramadol all  most 3 times per day

## 2021-01-26 ENCOUNTER — OFFICE VISIT (OUTPATIENT)
Dept: INTERNAL MEDICINE CLINIC | Facility: CLINIC | Age: 61
End: 2021-01-26
Payer: MEDICARE

## 2021-01-26 VITALS
TEMPERATURE: 98.7 F | OXYGEN SATURATION: 97 % | SYSTOLIC BLOOD PRESSURE: 132 MMHG | HEIGHT: 63 IN | WEIGHT: 278 LBS | HEART RATE: 68 BPM | BODY MASS INDEX: 49.26 KG/M2 | DIASTOLIC BLOOD PRESSURE: 84 MMHG

## 2021-01-26 DIAGNOSIS — M25.562 CHRONIC PAIN OF BOTH KNEES: ICD-10-CM

## 2021-01-26 DIAGNOSIS — E66.01 OBESITY, MORBID (HCC): ICD-10-CM

## 2021-01-26 DIAGNOSIS — G89.29 CHRONIC PAIN OF BOTH KNEES: ICD-10-CM

## 2021-01-26 DIAGNOSIS — M79.671 PAIN IN BOTH FEET: Chronic | ICD-10-CM

## 2021-01-26 DIAGNOSIS — M25.561 CHRONIC PAIN OF BOTH KNEES: ICD-10-CM

## 2021-01-26 DIAGNOSIS — M79.672 PAIN IN BOTH FEET: Chronic | ICD-10-CM

## 2021-01-26 DIAGNOSIS — E11.9 TYPE 2 DIABETES MELLITUS WITHOUT COMPLICATION, WITHOUT LONG-TERM CURRENT USE OF INSULIN (HCC): ICD-10-CM

## 2021-01-26 DIAGNOSIS — K21.9 GASTROESOPHAGEAL REFLUX DISEASE WITHOUT ESOPHAGITIS: Chronic | ICD-10-CM

## 2021-01-26 DIAGNOSIS — E78.2 MIXED HYPERLIPIDEMIA: Chronic | ICD-10-CM

## 2021-01-26 DIAGNOSIS — I10 ESSENTIAL HYPERTENSION: Primary | Chronic | ICD-10-CM

## 2021-01-26 PROCEDURE — 99214 OFFICE O/P EST MOD 30 MIN: CPT | Performed by: INTERNAL MEDICINE

## 2021-01-26 RX ORDER — LOSARTAN POTASSIUM 100 MG/1
100 TABLET ORAL DAILY
Qty: 90 TABLET | Refills: 1 | Status: SHIPPED | OUTPATIENT
Start: 2021-01-26 | End: 2021-07-20

## 2021-01-26 RX ORDER — SIMVASTATIN 20 MG
20 TABLET ORAL DAILY
Qty: 90 TABLET | Refills: 1 | Status: SHIPPED | OUTPATIENT
Start: 2021-01-26 | End: 2021-07-20

## 2021-01-26 RX ORDER — METOPROLOL TARTRATE 50 MG/1
50 TABLET, FILM COATED ORAL 2 TIMES DAILY
Qty: 180 TABLET | Refills: 1 | Status: SHIPPED | OUTPATIENT
Start: 2021-01-26 | End: 2021-07-20

## 2021-01-26 RX ORDER — IBUPROFEN 200 MG
1 TABLET ORAL 3 TIMES DAILY PRN
COMMUNITY
End: 2021-08-31 | Stop reason: HOSPADM

## 2021-01-26 NOTE — PATIENT INSTRUCTIONS
Patient advised to continue present medications discussed with the patient medications and laboratory data in detail  Follow-up with me in 3 months    If any blood test was ordered please do 1 week prior to next appointment  If you have any questions please call the office [phone]

## 2021-01-27 NOTE — ASSESSMENT & PLAN NOTE
Discussed with the patient about surgical intervention for obesity, but she does not want any kind of surgery So advised to decrease portion/ calorie intake exercise to lose weight

## 2021-01-27 NOTE — ASSESSMENT & PLAN NOTE
Patient is status post EGD has been followed by gastroenterologist Dr Walter Sanchez  on [address] [place] [address]

## 2021-01-27 NOTE — ASSESSMENT & PLAN NOTE
Most likely osteoarthritis  Discussed with the patient to see an orthopedic specialist but she refused  Strongly advised to lose weight and exercise

## 2021-01-27 NOTE — ASSESSMENT & PLAN NOTE
Lab Results   Component Value Date    HGBA1C 6 8 (H) 01/19/2021   Her fasting blood sugar 145  on diet  Advise for 1800 calorie ADA diet

## 2021-01-27 NOTE — ASSESSMENT & PLAN NOTE
Her cholesterol is 149 and LDL is 72  On medication and low-cholesterol low saturated fat diet  Advised to continue present medication and diet

## 2021-01-27 NOTE — PROGRESS NOTES
Assessment/Plan:         Problem List Items Addressed This Visit        Digestive    Gastroesophageal reflux disease without esophagitis (Chronic)     Patient is status post EGD has been followed by gastroenterologist Dr Marilu Armando  on [address]  Relevant Medications    calcium carbonate (TUMS) 500 mg chewable tablet    Dexilant 60 MG capsule       Endocrine    Type 2 diabetes mellitus without complication, without long-term current use of insulin (HCC) (Chronic)       Lab Results   Component Value Date    HGBA1C 6 8 (H) 01/19/2021   Her fasting blood sugar 145  on diet  Advise for 1800 calorie ADA diet  Relevant Orders    Basic metabolic panel    CBC and differential       Cardiovascular and Mediastinum    Essential hypertension - Primary (Chronic)     Her blood pressure is well controlled on present medication and on low salt  diet  Relevant Medications    metoprolol tartrate (LOPRESSOR) 50 mg tablet    losartan (COZAAR) 100 MG tablet    Other Relevant Orders    Basic metabolic panel    CBC and differential       Other    Mixed hyperlipidemia (Chronic)     Her cholesterol is 149 and LDL is 72  On medication and low-cholesterol low saturated fat diet  Advised to continue present medication and diet  Relevant Medications    simvastatin (ZOCOR) 20 mg tablet    Pain in both feet (Chronic)     And has pain in both ankles  Patient has been following podiatry , she requires pain medication tramadol all  most 3 times per day  Obesity, morbid ([address] )     Discussed with the patient about surgical intervention for obesity, but she does not want any kind of surgery So advised to decrease portion/ calorie intake exercise to lose weight  Pain in both knees     Most likely osteoarthritis  Discussed with the patient to see an orthopedic specialist but she refused  Strongly advised to lose weight and exercise                    Subjective:      Patient ID: Yoni Blanca is a 63 y o  female  Patient is here for follow-up of  She has a chronic pain in her feet,ankles and also with both knees  She denies any chest pain, shortness of breath, pain in abdomen  She denies any fever, chills, mucus her  Patient has been following her podiatrist for management of her chronic pain of the feet and ankles  She had seen orthopedic specialist in the past for pain in the knees  The following portions of the patient's history were reviewed and updated as appropriate:   Past Medical History:  She has a past medical history of Dyslipidemia, Essential hypertension (1/23/2021), Gastroesophageal reflux disease without esophagitis (1/23/2021), GERD (gastroesophageal reflux disease), HTN (hypertension), Hyperlipemia, Hypertension, Hypertensive arterionephrosclerosis of transplanted kidney (1/23/2021), Insomnia, Leukocytosis, Mixed hyperlipidemia (1/23/2021), Numbness, Obstructive sleep apnea syndrome (1/23/2021), Pain in ankle (1/23/2021), Pain in both feet (1/23/2021), Pain in both knees (1/26/2021), Skin lesions, and Type 2 diabetes mellitus without complication, without long-term current use of insulin ([facility] [address] ) (1/23/2021)  _______________________________________________________________________  Past Surgical History:   has a past surgical history that includes Carpal tunnel release (Bilateral, 1990); Gallbladder surgery (2004); Hysterectomy (2004); Ankle surgery (Right, 2009/2012); Mammo stereotactic breast biopsy right (all inc) (Right, 2014); Mammo (historical) (10/02/2018); and Colonoscopy (03/07/2017)  ,  _______________________________________________________________________  Family History:  family history includes Breast cancer in her mother; Cancer in her maternal grandmother and mother; Diabetes in her mother; Hyperlipidemia in her mother ,  _______________________________________________________________________  Social History:   reports that she has quit smoking   She has quit using smokeless tobacco  She reports current alcohol use  She reports previous drug use ,  _______________________________________________________________________  Allergies:  is allergic to aspirin and lisinopril     _______________________________________________________________________  Current Outpatient Medications   Medication Sig Dispense Refill    calcium carbonate (TUMS) 500 mg chewable tablet Chew 1 tablet 2 (two) times a day      DEXILANT 30 MG capsule   0    Dexilant 60 MG capsule Use 30 capsules daily after lunch       glucosamine-chondroitin 500-400 MG tablet Take 1 tablet by mouth 3 (three) times a day      ibuprofen (MOTRIN) 200 mg tablet Take 1 tablet by mouth 3 (three) times a day as needed      losartan (COZAAR) 100 MG tablet Take 1 tablet (100 mg total) by mouth daily 90 tablet 1    metoprolol tartrate (LOPRESSOR) 50 mg tablet Take 1 tablet (50 mg total) by mouth 2 (two) times a day 180 tablet 1    Multiple Vitamin (MULTIVITAMIN) capsule Take 1 capsule by mouth daily      simvastatin (ZOCOR) 20 mg tablet Take 1 tablet (20 mg total) by mouth daily 90 tablet 1    traMADol (ULTRAM) 50 mg tablet Take 50 mg by mouth every 8 (eight) hours as needed  0     No current facility-administered medications for this visit       _______________________________________________________________________  Review of Systems   Constitutional: Negative for chills, fatigue and fever  HENT: Negative for congestion, ear pain, postnasal drip, sinus pain, sore throat and trouble swallowing  Eyes: Negative for pain and visual disturbance  Respiratory: Negative for cough, chest tightness and shortness of breath  Cardiovascular: Negative for chest pain, palpitations and leg swelling  Gastrointestinal: Negative for abdominal pain, blood in stool, constipation, diarrhea and nausea  Genitourinary: Negative for dysuria, flank pain and frequency  Musculoskeletal: Positive for arthralgias  Negative for myalgias  Skin: Negative for rash  Neurological: Negative for dizziness, speech difficulty, weakness and headaches  Hematological: Does not bruise/bleed easily  Psychiatric/Behavioral: Negative for behavioral problems  Objective:  Vitals:    01/26/21 1125   BP: 132/84   BP Location: Right arm   Patient Position: Sitting   Cuff Size: Adult   Pulse: 68   Temp: 98 7 °F (37 1 °C)   TempSrc: Tympanic   SpO2: 97%   Weight: 126 kg (278 lb)   Height: 5' 3" (1 6 m)     Body mass index is 49 25 kg/m²  Physical Exam  Vitals signs and nursing note reviewed  Constitutional:       Appearance: Normal appearance  HENT:      Head: Normocephalic  Right Ear: Tympanic membrane and external ear normal       Left Ear: Tympanic membrane and external ear normal       Mouth/Throat:      Comments: Patient has face mask on  Eyes:      General: No scleral icterus  Pupils: Pupils are equal, round, and reactive to light  Neck:      Musculoskeletal: No neck rigidity or muscular tenderness  Vascular: No carotid bruit  Cardiovascular:      Rate and Rhythm: Normal rate and regular rhythm  Pulses: Normal pulses  Heart sounds: Normal heart sounds  No murmur  Pulmonary:      Effort: Pulmonary effort is normal  No respiratory distress  Breath sounds: Normal breath sounds  No wheezing  Abdominal:      General: Abdomen is flat  Palpations: Abdomen is soft  There is no mass  Tenderness: There is no abdominal tenderness  There is no rebound  Musculoskeletal:         General: Tenderness (Who was knees has some tenderness on flexion extension with some crepitation  Also has a pain ankles on flexion active extension and pain in feet as well  No gross swelling noted  Patient is ambulating without assistance at present ) present  Right lower leg: No edema  Left lower leg: No edema  Skin:     General: Skin is warm  Findings: No rash     Neurological:      General: No focal deficit present  Mental Status: She is alert  Motor: No weakness  Psychiatric:         Mood and Affect: Mood normal          Behavior: Behavior normal        BMI Counseling: Body mass index is 49 25 kg/m²  The BMI is above normal  Nutrition recommendations include decreasing portion sizes, decreasing fast food intake, consuming healthier snacks, moderation in carbohydrate intake, increasing intake of lean protein and reducing intake of cholesterol  Exercise recommendations include exercising 3-5 times per week and strength training exercises  No pharmacotherapy was ordered

## 2021-03-19 ENCOUNTER — OFFICE VISIT (OUTPATIENT)
Dept: INTERNAL MEDICINE CLINIC | Facility: CLINIC | Age: 61
End: 2021-03-19
Payer: MEDICARE

## 2021-03-19 ENCOUNTER — TELEPHONE (OUTPATIENT)
Dept: INTERNAL MEDICINE CLINIC | Facility: CLINIC | Age: 61
End: 2021-03-19

## 2021-03-19 VITALS
SYSTOLIC BLOOD PRESSURE: 154 MMHG | TEMPERATURE: 98.2 F | HEIGHT: 63 IN | OXYGEN SATURATION: 97 % | WEIGHT: 278 LBS | BODY MASS INDEX: 49.26 KG/M2 | HEART RATE: 68 BPM | DIASTOLIC BLOOD PRESSURE: 82 MMHG

## 2021-03-19 DIAGNOSIS — E11.9 TYPE 2 DIABETES MELLITUS WITHOUT COMPLICATION, WITHOUT LONG-TERM CURRENT USE OF INSULIN (HCC): Chronic | ICD-10-CM

## 2021-03-19 DIAGNOSIS — R60.0 EDEMA OF BOTH LOWER LEGS: Primary | ICD-10-CM

## 2021-03-19 DIAGNOSIS — I10 ESSENTIAL HYPERTENSION: Chronic | ICD-10-CM

## 2021-03-19 DIAGNOSIS — S80.811S ABRASION OF RIGHT LOWER EXTREMITY, SEQUELA: ICD-10-CM

## 2021-03-19 DIAGNOSIS — E78.2 MIXED HYPERLIPIDEMIA: Chronic | ICD-10-CM

## 2021-03-19 PROBLEM — S80.811A ABRASION OF RIGHT LEG: Status: ACTIVE | Noted: 2021-03-19

## 2021-03-19 PROCEDURE — 99213 OFFICE O/P EST LOW 20 MIN: CPT | Performed by: INTERNAL MEDICINE

## 2021-03-19 RX ORDER — HYDROCHLOROTHIAZIDE 25 MG/1
25 TABLET ORAL DAILY
Qty: 90 TABLET | Refills: 1 | Status: SHIPPED | OUTPATIENT
Start: 2021-03-19 | End: 2021-05-13 | Stop reason: SDUPTHER

## 2021-03-19 RX ORDER — DOXYCYCLINE HYCLATE 100 MG/1
100 CAPSULE ORAL EVERY 12 HOURS SCHEDULED
Qty: 14 CAPSULE | Refills: 0 | Status: SHIPPED | OUTPATIENT
Start: 2021-03-19 | End: 2021-03-26

## 2021-03-19 NOTE — PATIENT INSTRUCTIONS
Patient advised to continue present medications discussed with the patient medications and laboratory data in detail  Follow-up with me in 3 months    If any blood test was ordered please do 1 week prior to next appointment  If you have any questions please call the office [phone]

## 2021-03-19 NOTE — ASSESSMENT & PLAN NOTE
Lab Results   Component Value Date    HGBA1C 6 8 (H) 01/19/2021   Diet controlled  Advised to continue 1800 calorie ADA diet

## 2021-03-19 NOTE — TELEPHONE ENCOUNTER
She bumped her right leg last week - noticed last night that her shin area is leaking clear liquid  Do you want to see her? Or just call her?

## 2021-03-19 NOTE — PROGRESS NOTES
Assessment/Plan:    1  Edema of both lower legs  Assessment & Plan:  Possible dependent edema  Advised to keep legs elevated  Decrease salt intake  Will start HCTZ 25 mg once a day as blood pressure is elevated  Orders:  -     hydrochlorothiazide (HYDRODIURIL) 25 mg tablet; Take 1 tablet (25 mg total) by mouth daily    2  Essential hypertension  Assessment & Plan:  Blood pressure elevated  Will add hydrochlorothiazide 25 mg tablet once a day as she also has edema both lower extremity  Advised for low-salt diet  Advised to continue the present medications  Advised to lose weight  Orders:  -     hydrochlorothiazide (HYDRODIURIL) 25 mg tablet; Take 1 tablet (25 mg total) by mouth daily    3  Abrasion of right lower extremity, sequela  Assessment & Plan:  About week ago she script her right lower leg  since then she had open skin with some a clear fluid discharge due to edema of legs  Has a very minimal erythema possible mild as early cellulitis  Will start on cephalexin 500 mg p o  T t i d     Orders:  -     doxycycline hyclate (VIBRAMYCIN) 100 mg capsule; Take 1 capsule (100 mg total) by mouth every 12 (twelve) hours for 7 days    4  Type 2 diabetes mellitus without complication, without long-term current use of insulin [facility])  Assessment & Plan:    Lab Results   Component Value Date    HGBA1C 6 8 (H) 01/19/2021   Diet controlled  Advised to continue 1800 calorie ADA diet  5  Mixed hyperlipidemia  Assessment & Plan:  Well controlled on present medication and diet  Cholesterol 149 and LDL 72  Advised to continue low shift cholesterol low saturated fat diet      6  BMI 45 0-49 9, adult [facility])  Assessment & Plan:  Discussed with the patient advised to lose weight exercise  Decrease portion control low-calorie diet  Subjective:  Patient presents for follow-up of her medical problems  Patient ID: Izaiah Briones is a 63 y o  female      HPI  60-year-old white female presents for follow-up of her medical problems  She scraped her right lower leg since then she is having some open skin and some clear watery discharge coming out from that  She has edema of lower extremities bilaterally  She denies any fever or chills  She denies any cough, orthopnea , PND shortness of breath  The following portions of the patient's history were reviewed and updated as appropriate:     Past Medical History:  She has a past medical history of Abrasion of right leg (3/19/2021), BMI 45 0-49 9, adult ([facility] ) (3/19/2021), Dyslipidemia, Edema of both lower legs (3/19/2021), Essential hypertension (1/23/2021), Gastroesophageal reflux disease without esophagitis (1/23/2021), GERD (gastroesophageal reflux disease), HTN (hypertension), Hyperlipemia, Hypertension, Hypertensive arterionephrosclerosis of transplanted kidney (1/23/2021), Insomnia, Leukocytosis, Mixed hyperlipidemia (1/23/2021), Numbness, Obstructive sleep apnea syndrome (1/23/2021), Pain in ankle (1/23/2021), Pain in both feet (1/23/2021), Pain in both knees (1/26/2021), Skin lesions, and Type 2 diabetes mellitus without complication, without long-term current use of insulin ([facility] ) (1/23/2021)  ,  _______________________________________________________________________  Past Surgical History:   has a past surgical history that includes Carpal tunnel release (Bilateral, 1990); Gallbladder surgery (2004); Hysterectomy (2004); Ankle surgery (Right, 2009/2012); Mammo stereotactic breast biopsy right (all inc) (Right, 2014); Mammo (historical) (10/02/2018); and Colonoscopy (03/07/2017)  ,  _______________________________________________________________________  Family History:  family history includes Breast cancer in her mother; Cancer in her maternal grandmother and mother; Diabetes in her mother; Hyperlipidemia in her mother ,  _______________________________________________________________________  Social History:   reports that she has quit smoking   She has quit using smokeless tobacco  She reports current alcohol use  She reports previous drug use ,  _______________________________________________________________________  Allergies:  is allergic to aspirin and lisinopril     _______________________________________________________________________  Current Outpatient Medications   Medication Sig Dispense Refill    calcium carbonate (TUMS) 500 mg chewable tablet Chew 1 tablet 2 (two) times a day      DEXILANT 30 MG capsule   0    esomeprazole (NexIUM) 20 mg capsule Take 20 mg by mouth every morning before breakfast      glucosamine-chondroitin 500-400 MG tablet Take 1 tablet by mouth 3 (three) times a day      ibuprofen (MOTRIN) 200 mg tablet Take 1 tablet by mouth 3 (three) times a day as needed      losartan (COZAAR) 100 MG tablet Take 1 tablet (100 mg total) by mouth daily 90 tablet 1    metoprolol tartrate (LOPRESSOR) 50 mg tablet Take 1 tablet (50 mg total) by mouth 2 (two) times a day 180 tablet 1    Multiple Vitamin (MULTIVITAMIN) capsule Take 1 capsule by mouth daily      simvastatin (ZOCOR) 20 mg tablet Take 1 tablet (20 mg total) by mouth daily 90 tablet 1    traMADol (ULTRAM) 50 mg tablet Take 50 mg by mouth every 8 (eight) hours as needed  0    Dexilant 60 MG capsule Use 30 capsules daily after lunch       doxycycline hyclate (VIBRAMYCIN) 100 mg capsule Take 1 capsule (100 mg total) by mouth every 12 (twelve) hours for 7 days 14 capsule 0    hydrochlorothiazide (HYDRODIURIL) 25 mg tablet Take 1 tablet (25 mg total) by mouth daily 90 tablet 1     No current facility-administered medications for this visit       _______________________________________________________________________  Review of Systems   Constitutional: Negative for chills and fever  HENT: Negative for congestion, ear pain, hearing loss, nosebleeds, sinus pain, sore throat and trouble swallowing  Eyes: Negative for discharge, redness and visual disturbance     Respiratory: Negative for cough, chest tightness and shortness of breath  Cardiovascular: Positive for leg swelling  Negative for chest pain and palpitations  Gastrointestinal: Negative for abdominal pain, blood in stool, constipation, diarrhea, nausea and vomiting  Genitourinary: Negative for dysuria, flank pain, frequency and hematuria  Musculoskeletal: Positive for arthralgias (Patient has pain in knees and has a chronic pain in feet  )  Negative for myalgias and neck pain  Skin: Positive for wound ( right lower leg)  Negative for color change and rash  Neurological: Negative for dizziness, speech difficulty, weakness and headaches  Hematological: Does not bruise/bleed easily  Psychiatric/Behavioral: Negative for agitation and behavioral problems  Objective:  Vitals:    03/19/21 1058   BP: 154/82   BP Location: Right arm   Patient Position: Sitting   Cuff Size: Adult   Pulse: 68   Temp: 98 2 °F (36 8 °C)   TempSrc: Tympanic   SpO2: 97%   Weight: 126 kg (278 lb)   Height: 5' 3" (1 6 m)     Body mass index is 49 25 kg/m²  Physical Exam  Vitals signs and nursing note reviewed  Constitutional:       General: She is not in acute distress  Appearance: Normal appearance  She is obese  HENT:      Head: Normocephalic and atraumatic  Right Ear: Ear canal and external ear normal       Left Ear: Ear canal and external ear normal       Mouth/Throat:      Comments: Patient has a face mask on  Eyes:      General: No scleral icterus  Extraocular Movements: Extraocular movements intact  Conjunctiva/sclera: Conjunctivae normal    Neck:      Musculoskeletal: Normal range of motion and neck supple  No muscular tenderness  Cardiovascular:      Rate and Rhythm: Normal rate and regular rhythm  Pulses: Normal pulses  Heart sounds: No murmur  Pulmonary:      Effort: Pulmonary effort is normal       Breath sounds: Normal breath sounds     Abdominal:      General: Bowel sounds are normal  Palpations: Abdomen is soft  Tenderness: There is no abdominal tenderness  Musculoskeletal: Normal range of motion  Right lower leg: Edema (Has 1+ pedal edema Homans signs negative) present  Left lower leg: Edema ( has 1+ pedal edema Homans signs negative) present  Skin:     General: Skin is warm  Findings: No rash  Comments: Patient has about dime-size abrasion right mid lower leg with some mild erythema without any foreign discharge  Has a slight clear watery discharge from it  Neurological:      General: No focal deficit present  Mental Status: She is alert and oriented to person, place, and time     Psychiatric:         Mood and Affect: Mood normal          Behavior: Behavior normal

## 2021-03-20 NOTE — ASSESSMENT & PLAN NOTE
Possible dependent edema  Advised to keep legs elevated  Decrease salt intake  Will start HCTZ 25 mg once a day as blood pressure is elevated

## 2021-03-20 NOTE — ASSESSMENT & PLAN NOTE
Well controlled on present medication and diet  Cholesterol 149 and LDL 72    Advised to continue low shift cholesterol low saturated fat diet

## 2021-03-20 NOTE — ASSESSMENT & PLAN NOTE
Blood pressure elevated  Will add hydrochlorothiazide 25 mg tablet once a day as she also has edema both lower extremity  Advised for low-salt diet  Advised to continue the present medications  Advised to lose weight

## 2021-03-20 NOTE — ASSESSMENT & PLAN NOTE
About week ago she script her right lower leg  since then she had open skin with some a clear fluid discharge due to edema of legs  Has a very minimal erythema possible mild as early cellulitis  Will start on cephalexin 500 mg p o  T t i d

## 2021-03-20 NOTE — ASSESSMENT & PLAN NOTE
Discussed with the patient advised to lose weight exercise  Decrease portion control low-calorie diet

## 2021-04-19 ENCOUNTER — APPOINTMENT (OUTPATIENT)
Dept: LAB | Facility: HOSPITAL | Age: 61
End: 2021-04-19
Attending: INTERNAL MEDICINE
Payer: MEDICARE

## 2021-04-19 ENCOUNTER — TELEPHONE (OUTPATIENT)
Dept: INTERNAL MEDICINE CLINIC | Facility: CLINIC | Age: 61
End: 2021-04-19

## 2021-04-19 DIAGNOSIS — E11.9 TYPE 2 DIABETES MELLITUS WITHOUT COMPLICATION, WITHOUT LONG-TERM CURRENT USE OF INSULIN (HCC): ICD-10-CM

## 2021-04-19 DIAGNOSIS — I10 ESSENTIAL HYPERTENSION: Chronic | ICD-10-CM

## 2021-04-19 LAB
ANION GAP SERPL CALCULATED.3IONS-SCNC: 9 MMOL/L (ref 4–13)
BASOPHILS # BLD AUTO: 0.05 THOUSANDS/ΜL (ref 0–0.1)
BASOPHILS NFR BLD AUTO: 1 % (ref 0–1)
BUN SERPL-MCNC: 16 MG/DL (ref 6–20)
CALCIUM SERPL-MCNC: 9.3 MG/DL (ref 8.4–10.2)
CHLORIDE SERPL-SCNC: 98 MMOL/L (ref 96–108)
CO2 SERPL-SCNC: 30 MMOL/L (ref 22–33)
CREAT SERPL-MCNC: 1.04 MG/DL (ref 0.4–1.1)
EOSINOPHIL # BLD AUTO: 0.23 THOUSAND/ΜL (ref 0–0.61)
EOSINOPHIL NFR BLD AUTO: 2 % (ref 0–6)
ERYTHROCYTE [DISTWIDTH] IN BLOOD BY AUTOMATED COUNT: 14.3 % (ref 11.6–15.1)
GFR SERPL CREATININE-BSD FRML MDRD: 58 ML/MIN/1.73SQ M
GLUCOSE P FAST SERPL-MCNC: 201 MG/DL (ref 70–105)
HCT VFR BLD AUTO: 41.7 % (ref 34.8–46.1)
HGB BLD-MCNC: 13.6 G/DL (ref 11.5–15.4)
IMM GRANULOCYTES # BLD AUTO: 0.02 THOUSAND/UL (ref 0–0.2)
IMM GRANULOCYTES NFR BLD AUTO: 0 % (ref 0–2)
LYMPHOCYTES # BLD AUTO: 3.71 THOUSANDS/ΜL (ref 0.6–4.47)
LYMPHOCYTES NFR BLD AUTO: 36 % (ref 14–44)
MCH RBC QN AUTO: 28.8 PG (ref 26.8–34.3)
MCHC RBC AUTO-ENTMCNC: 32.6 G/DL (ref 31.4–37.4)
MCV RBC AUTO: 88 FL (ref 82–98)
MONOCYTES # BLD AUTO: 0.97 THOUSAND/ΜL (ref 0.17–1.22)
MONOCYTES NFR BLD AUTO: 9 % (ref 4–12)
NEUTROPHILS # BLD AUTO: 5.29 THOUSANDS/ΜL (ref 1.85–7.62)
NEUTS SEG NFR BLD AUTO: 52 % (ref 43–75)
PLATELET # BLD AUTO: 391 THOUSANDS/UL (ref 149–390)
PMV BLD AUTO: 10 FL (ref 8.9–12.7)
POTASSIUM SERPL-SCNC: 3.4 MMOL/L (ref 3.5–5)
RBC # BLD AUTO: 4.72 MILLION/UL (ref 3.81–5.12)
SODIUM SERPL-SCNC: 137 MMOL/L (ref 133–145)
WBC # BLD AUTO: 10.27 THOUSAND/UL (ref 4.31–10.16)

## 2021-04-19 PROCEDURE — 36415 COLL VENOUS BLD VENIPUNCTURE: CPT

## 2021-04-19 PROCEDURE — 80048 BASIC METABOLIC PNL TOTAL CA: CPT

## 2021-04-19 PROCEDURE — 85025 COMPLETE CBC W/AUTO DIFF WBC: CPT

## 2021-04-20 ENCOUNTER — OFFICE VISIT (OUTPATIENT)
Dept: INTERNAL MEDICINE CLINIC | Facility: CLINIC | Age: 61
End: 2021-04-20
Payer: MEDICARE

## 2021-04-20 VITALS
WEIGHT: 267 LBS | OXYGEN SATURATION: 97 % | HEIGHT: 63 IN | TEMPERATURE: 98.4 F | BODY MASS INDEX: 47.31 KG/M2 | HEART RATE: 74 BPM | SYSTOLIC BLOOD PRESSURE: 132 MMHG | DIASTOLIC BLOOD PRESSURE: 82 MMHG

## 2021-04-20 DIAGNOSIS — R60.0 EDEMA OF BOTH LOWER LEGS: ICD-10-CM

## 2021-04-20 DIAGNOSIS — I10 ESSENTIAL HYPERTENSION: Chronic | ICD-10-CM

## 2021-04-20 DIAGNOSIS — M79.672 PAIN IN BOTH FEET: Chronic | ICD-10-CM

## 2021-04-20 DIAGNOSIS — E11.9 TYPE 2 DIABETES MELLITUS WITHOUT COMPLICATION, WITHOUT LONG-TERM CURRENT USE OF INSULIN (HCC): Primary | Chronic | ICD-10-CM

## 2021-04-20 DIAGNOSIS — E78.2 MIXED HYPERLIPIDEMIA: Chronic | ICD-10-CM

## 2021-04-20 DIAGNOSIS — K21.9 GASTROESOPHAGEAL REFLUX DISEASE WITHOUT ESOPHAGITIS: Chronic | ICD-10-CM

## 2021-04-20 DIAGNOSIS — M79.671 PAIN IN BOTH FEET: Chronic | ICD-10-CM

## 2021-04-20 DIAGNOSIS — E87.6 HYPOKALEMIA: ICD-10-CM

## 2021-04-20 PROCEDURE — 99214 OFFICE O/P EST MOD 30 MIN: CPT | Performed by: INTERNAL MEDICINE

## 2021-04-20 RX ORDER — TRIAMTERENE AND HYDROCHLOROTHIAZIDE 37.5; 25 MG/1; MG/1
1 CAPSULE ORAL EVERY MORNING
Qty: 30 CAPSULE | Refills: 1 | Status: SHIPPED | OUTPATIENT
Start: 2021-04-20 | End: 2021-06-12

## 2021-04-20 NOTE — PATIENT INSTRUCTIONS
Patient advised to continue present medications discussed with the patient medications and laboratory data in detail  Follow-up with me in 3 months    If any blood test was ordered please do 1 week prior to next appointment  If you have any questions please call the office [phone]

## 2021-04-20 NOTE — PROGRESS NOTES
Assessment/Plan:    1  Type 2 diabetes mellitus without complication, without long-term current use of insulin ([facility])  Assessment & Plan:    Lab Results   Component Value Date    HGBA1C 6 8 (H) 01/19/2021   Her fasting blood sugar of 201  Discussed with the patient sugar significantly increased from 145-201 in last 3 months  She had taken prednisone about month ago for her foot pain by podiatrist   Patient prescribed glucometer and its supply to start checking blood sugar at home  Advised to check blood sugar once a day a m  p m  alternate day  Keep blood sugar log  Will check patient's hemoglobin A1c  Patient was advised strongly to watch diet for 1800 calorie ADA diet  If not better will need to start medication for diabetes  Orders:  -     Basic metabolic panel; Future  -     Hemoglobin A1C; Future    2  Essential hypertension  Assessment & Plan:  Blood pressure better  She has her potassium is low most likely from HCTZ  So I decided to discontinue HCTZ and start her on try him taking HCTZ combination as patient has slight residual edema of legs although it  is better as well  Orders:  -     triamterene-hydrochlorothiazide (DYAZIDE) 37 5-25 mg per capsule; Take 1 capsule by mouth every morning  -     Basic metabolic panel; Future    3  Mixed hyperlipidemia  Assessment & Plan:  Cholesterol 149, triglyceride 117, LDL 72 advised to continue present medication  Advised for low-cholesterol low saturated fat diet  4  Edema of both lower legs  Assessment & Plan:  Better on HCTZ but has slightly residual trace edema  for which I will change HCTZ to Dyazide and also due to hypokalemia    Orders:  -     triamterene-hydrochlorothiazide (DYAZIDE) 37 5-25 mg per capsule; Take 1 capsule by mouth every morning    5  Pain in both feet  Assessment & Plan:  Patient has been followed by podiatrist Dr Lani Villatoro  Takes time tramadol  She is she was given prednisone which helped her pain in the feet    Discussed with patient cannot continue prednisone  long-term due to diabetes getting worse and other side effects  Orders:  -     Basic metabolic panel; Future    6  Gastroesophageal reflux disease without esophagitis  Assessment & Plan:  Patient symptoms are well controlled on Dexilant  Patient had been followed by gastroenterologist       7  Hypokalemia  Assessment & Plan:  Patient states he has been almost every day  Most likely secondary to HCTZ  Will DC HCTZ and start Dyazide as above  Orders:  -     Basic metabolic panel; Future    8  BMI 45 0-49 9, adult [facility])  Assessment & Plan:  Strongly advised to watch diet lose weight exercise  Subjective:  Patient presents for follow-up  Patient ID: Naveed Cedillo is a 63 y o  female  HPI   58-year-old white female patient presents for follow-up of her medical problems  She denies any chest pain, shortness with, pain abdomen  She denies any cough, fever, chills  She denies any nausea, vomiting, diarrhea  She has pain in the feet more on the right than left side for which she has been followed by podiatrist Dr Adriano Reyes  She was prescribed prednisone therapy for pain in the feet which was effective per patient      The following portions of the patient's history were reviewed and updated as appropriate:     Past Medical History:  She has a past medical history of Abrasion of right leg (3/19/2021), BMI 45 0-49 9, adult ([facility] ) (3/19/2021), Dyslipidemia, Edema of both lower legs (3/19/2021), Essential hypertension (1/23/2021), Gastroesophageal reflux disease without esophagitis (1/23/2021), GERD (gastroesophageal reflux disease), HTN (hypertension), Hyperlipemia, Hypertension, Hypertensive arterionephrosclerosis of transplanted kidney (1/23/2021), Hypokalemia (4/20/2021), Insomnia, Leukocytosis, Mixed hyperlipidemia (1/23/2021), Numbness, Obstructive sleep apnea syndrome (1/23/2021), Pain in ankle (1/23/2021), Pain in both feet (1/23/2021), Pain in both knees (1/26/2021), Skin lesions, and Type 2 diabetes mellitus without complication, without long-term current use of insulin ([address] ) (1/23/2021)  ,  _______________________________________________________________________  Past Surgical History:   has a past surgical history that includes Carpal tunnel release (Bilateral, 1990); Gallbladder surgery (2004); Hysterectomy (2004); Ankle surgery (Right, 2009/2012); Mammo stereotactic breast biopsy right (all inc) (Right, 2014); Mammo (historical) (10/02/2018); and Colonoscopy (03/07/2017)  ,  _______________________________________________________________________  Family History:  family history includes Breast cancer in her mother; Cancer in her maternal grandmother and mother; Diabetes in her mother; Hyperlipidemia in her mother ,  _______________________________________________________________________  Social History:   reports that she has quit smoking  She has quit using smokeless tobacco  She reports current alcohol use  She reports previous drug use ,  _______________________________________________________________________  Allergies:  is allergic to aspirin and lisinopril     _______________________________________________________________________  Current Outpatient Medications   Medication Sig Dispense Refill    calcium carbonate (TUMS) 500 mg chewable tablet Chew 1 tablet 2 (two) times a day      DEXILANT 30 MG capsule Take 1 capsule by mouth daily  0    glucosamine-chondroitin 500-400 MG tablet Take 1 tablet by mouth 3 (three) times a day      hydrochlorothiazide (HYDRODIURIL) 25 mg tablet Take 1 tablet (25 mg total) by mouth daily 90 tablet 1    losartan (COZAAR) 100 MG tablet Take 1 tablet (100 mg total) by mouth daily 90 tablet 1    metoprolol tartrate (LOPRESSOR) 50 mg tablet Take 1 tablet (50 mg total) by mouth 2 (two) times a day 180 tablet 1    Multiple Vitamin (MULTIVITAMIN) capsule Take 1 capsule by mouth daily      simvastatin (ZOCOR) 20 mg tablet Take 1 tablet (20 mg total) by mouth daily 90 tablet 1    traMADol (ULTRAM) 50 mg tablet Take 50 mg by mouth every 8 (eight) hours as needed  0    ibuprofen (MOTRIN) 200 mg tablet Take 1 tablet by mouth 3 (three) times a day as needed      triamterene-hydrochlorothiazide (DYAZIDE) 37 5-25 mg per capsule Take 1 capsule by mouth every morning 30 capsule 1     No current facility-administered medications for this visit       _______________________________________________________________________  Review of Systems   Constitutional: Negative for chills and fever  HENT: Negative for congestion, ear pain, hearing loss, nosebleeds, sinus pain, sore throat and trouble swallowing  Eyes: Negative for discharge, redness and visual disturbance  Respiratory: Negative for cough, chest tightness and shortness of breath  Cardiovascular: Negative for chest pain and palpitations  Gastrointestinal: Negative for abdominal pain, blood in stool, constipation, diarrhea, nausea and vomiting  Genitourinary: Negative for dysuria, flank pain, frequency and hematuria  Musculoskeletal: Positive for arthralgias (Gets pain in knees and foot  has pain in the knees which is chronic in nature had seen Orthopedics in the past was told she has arthritis  Needs to lose weight  Gets pain in the feet  Followed by podiatrist )  Negative for myalgias and neck pain  Skin: Negative for color change and rash  Neurological: Negative for dizziness, speech difficulty, weakness and headaches  Hematological: Does not bruise/bleed easily  Psychiatric/Behavioral: Negative for agitation and behavioral problems  Objective:  Vitals:    04/20/21 1031   BP: 132/82   BP Location: Left arm   Patient Position: Sitting   Cuff Size: Adult   Pulse: 74   Temp: 98 4 °F (36 9 °C)   TempSrc: Tympanic   SpO2: 97%   Weight: 121 kg (267 lb)   Height: 5' 3" (1 6 m)     Body mass index is 47 3 kg/m²       Physical Exam  Vitals signs and nursing note reviewed  Constitutional:       General: She is not in acute distress  Appearance: Normal appearance  She is obese  HENT:      Head: Normocephalic and atraumatic  Right Ear: Ear canal and external ear normal       Left Ear: Ear canal and external ear normal       Mouth/Throat:      Comments: Patient has face mask on  Eyes:      General: No scleral icterus  Extraocular Movements: Extraocular movements intact  Conjunctiva/sclera: Conjunctivae normal    Neck:      Musculoskeletal: Normal range of motion and neck supple  No muscular tenderness  Cardiovascular:      Rate and Rhythm: Normal rate and regular rhythm  Pulses: Normal pulses  Heart sounds: No murmur  Pulmonary:      Effort: Pulmonary effort is normal       Breath sounds: Normal breath sounds  Abdominal:      General: Bowel sounds are normal       Palpations: Abdomen is soft  Tenderness: There is no abdominal tenderness  Musculoskeletal: Normal range of motion  Right lower leg: Edema (Has trace pedal edema  Jf Valarie is negative) present  Left lower leg: Edema ( has trace pedal edema Homans is negative) present  Skin:     General: Skin is warm  Findings: No rash  Neurological:      General: No focal deficit present  Mental Status: She is alert and oriented to person, place, and time  Psychiatric:         Mood and Affect: Mood normal          Behavior: Behavior normal        I spent 30 minutes with the patient today    More than 50% time spent for reviewing of external notes, reviewing of the results of diagnostics test, management of care, patient education and ordering of test

## 2021-04-21 NOTE — ASSESSMENT & PLAN NOTE
Patient symptoms are well controlled on Dexilant    Patient had been followed by gastroenterologist

## 2021-04-21 NOTE — ASSESSMENT & PLAN NOTE
Lab Results   Component Value Date    HGBA1C 6 8 (H) 01/19/2021   Her fasting blood sugar of 201  Discussed with the patient sugar significantly increased from 145-201 in last 3 months  She had taken prednisone about month ago for her foot pain by podiatrist   Patient prescribed glucometer and its supply to start checking blood sugar at home  Advised to check blood sugar once a day a m  p m  alternate day  Keep blood sugar log  Will check patient's hemoglobin A1c  Patient was advised strongly to watch diet for 1800 calorie ADA diet  If not better will need to start medication for diabetes

## 2021-04-21 NOTE — ASSESSMENT & PLAN NOTE
Better on HCTZ but has slightly residual trace edema  for which I will change HCTZ to Dyazide and also due to hypokalemia

## 2021-04-21 NOTE — ASSESSMENT & PLAN NOTE
Patient states he has been almost every day  Most likely secondary to HCTZ  Will DC HCTZ and start Dyazide as above

## 2021-04-21 NOTE — ASSESSMENT & PLAN NOTE
Blood pressure better  She has her potassium is low most likely from HCTZ  So I decided to discontinue HCTZ and start her on try him taking HCTZ combination as patient has slight residual edema of legs although it  is better as well

## 2021-04-21 NOTE — ASSESSMENT & PLAN NOTE
Patient has been followed by podiatrist Dr Angel Bajwa  Takes time tramadol  She is she was given prednisone which helped her pain in the feet  Discussed with patient cannot continue prednisone  long-term due to diabetes getting worse and other side effects

## 2021-04-21 NOTE — ASSESSMENT & PLAN NOTE
Cholesterol 149, triglyceride 117, LDL 72 advised to continue present medication  Advised for low-cholesterol low saturated fat diet

## 2021-05-04 ENCOUNTER — TELEPHONE (OUTPATIENT)
Dept: GASTROENTEROLOGY | Facility: CLINIC | Age: 61
End: 2021-05-04

## 2021-05-04 DIAGNOSIS — K21.9 GASTROESOPHAGEAL REFLUX DISEASE, UNSPECIFIED WHETHER ESOPHAGITIS PRESENT: ICD-10-CM

## 2021-05-04 DIAGNOSIS — K21.9 GASTROESOPHAGEAL REFLUX DISEASE WITHOUT ESOPHAGITIS: Primary | Chronic | ICD-10-CM

## 2021-05-04 NOTE — TELEPHONE ENCOUNTER
Pt states there is a long delay in getting refills of her Dexilant because it is unavailable  She would like to change to Nexium 20 mg

## 2021-05-11 ENCOUNTER — APPOINTMENT (OUTPATIENT)
Dept: LAB | Facility: HOSPITAL | Age: 61
End: 2021-05-11
Attending: INTERNAL MEDICINE
Payer: MEDICARE

## 2021-05-11 DIAGNOSIS — I10 ESSENTIAL HYPERTENSION: Chronic | ICD-10-CM

## 2021-05-11 DIAGNOSIS — E11.9 TYPE 2 DIABETES MELLITUS WITHOUT COMPLICATION, WITHOUT LONG-TERM CURRENT USE OF INSULIN (HCC): Chronic | ICD-10-CM

## 2021-05-11 DIAGNOSIS — M79.672 PAIN IN BOTH FEET: Chronic | ICD-10-CM

## 2021-05-11 DIAGNOSIS — E87.6 HYPOKALEMIA: ICD-10-CM

## 2021-05-11 DIAGNOSIS — M79.671 PAIN IN BOTH FEET: Chronic | ICD-10-CM

## 2021-05-11 LAB
ANION GAP SERPL CALCULATED.3IONS-SCNC: 8 MMOL/L (ref 4–13)
BUN SERPL-MCNC: 20 MG/DL (ref 6–20)
CALCIUM SERPL-MCNC: 9.7 MG/DL (ref 8.4–10.2)
CHLORIDE SERPL-SCNC: 101 MMOL/L (ref 96–108)
CO2 SERPL-SCNC: 29 MMOL/L (ref 22–33)
CREAT SERPL-MCNC: 1.28 MG/DL (ref 0.4–1.1)
EST. AVERAGE GLUCOSE BLD GHB EST-MCNC: 177 MG/DL
GFR SERPL CREATININE-BSD FRML MDRD: 45 ML/MIN/1.73SQ M
GLUCOSE P FAST SERPL-MCNC: 178 MG/DL (ref 70–105)
HBA1C MFR BLD: 7.8 %
POTASSIUM SERPL-SCNC: 4.8 MMOL/L (ref 3.5–5)
SODIUM SERPL-SCNC: 138 MMOL/L (ref 133–145)

## 2021-05-11 PROCEDURE — 36415 COLL VENOUS BLD VENIPUNCTURE: CPT

## 2021-05-11 PROCEDURE — 80048 BASIC METABOLIC PNL TOTAL CA: CPT

## 2021-05-11 PROCEDURE — 83036 HEMOGLOBIN GLYCOSYLATED A1C: CPT

## 2021-05-13 ENCOUNTER — OFFICE VISIT (OUTPATIENT)
Dept: INTERNAL MEDICINE CLINIC | Facility: CLINIC | Age: 61
End: 2021-05-13
Payer: MEDICARE

## 2021-05-13 VITALS
DIASTOLIC BLOOD PRESSURE: 78 MMHG | BODY MASS INDEX: 46.42 KG/M2 | OXYGEN SATURATION: 99 % | TEMPERATURE: 98.6 F | HEIGHT: 63 IN | SYSTOLIC BLOOD PRESSURE: 130 MMHG | HEART RATE: 70 BPM | WEIGHT: 262 LBS

## 2021-05-13 DIAGNOSIS — I10 ESSENTIAL HYPERTENSION: Chronic | ICD-10-CM

## 2021-05-13 DIAGNOSIS — E78.2 MIXED HYPERLIPIDEMIA: Chronic | ICD-10-CM

## 2021-05-13 DIAGNOSIS — K21.9 GASTROESOPHAGEAL REFLUX DISEASE WITHOUT ESOPHAGITIS: Chronic | ICD-10-CM

## 2021-05-13 DIAGNOSIS — E11.9 TYPE 2 DIABETES MELLITUS WITHOUT COMPLICATION, WITHOUT LONG-TERM CURRENT USE OF INSULIN (HCC): Primary | ICD-10-CM

## 2021-05-13 DIAGNOSIS — E87.6 HYPOKALEMIA: ICD-10-CM

## 2021-05-13 DIAGNOSIS — R60.0 EDEMA OF BOTH LOWER LEGS: ICD-10-CM

## 2021-05-13 PROCEDURE — 99213 OFFICE O/P EST LOW 20 MIN: CPT | Performed by: INTERNAL MEDICINE

## 2021-05-13 RX ORDER — BLOOD-GLUCOSE METER
KIT MISCELLANEOUS
COMMUNITY
Start: 2021-05-03 | End: 2021-11-30 | Stop reason: SDUPTHER

## 2021-05-13 RX ORDER — DEXLANSOPRAZOLE 30 MG/1
30 CAPSULE, DELAYED RELEASE ORAL DAILY
COMMUNITY
End: 2021-08-31 | Stop reason: HOSPADM

## 2021-05-13 RX ORDER — LANCETS 28 GAUGE
EACH MISCELLANEOUS
COMMUNITY
Start: 2021-05-03 | End: 2021-10-01 | Stop reason: SDUPTHER

## 2021-05-13 RX ORDER — BLOOD-GLUCOSE METER
KIT MISCELLANEOUS DAILY
COMMUNITY
Start: 2021-05-03

## 2021-05-13 NOTE — PROGRESS NOTES
Assessment/Plan:    1  Type 2 diabetes mellitus without complication, without long-term current use of insulin ([facility])  Assessment & Plan:    Lab Results   Component Value Date    HGBA1C 7 8 (H) 05/11/2021   Discussed with the patient that her hemoglobin A1c increased from 6 8-7 8  Discussed with the patient to start medication but she would like to wait and try to watch her diet very closely and lose weight  If A1c persistently more than 7 will need medication  Orders:  -     Comprehensive metabolic panel; Future  -     Microalbumin / creatinine urine ratio  -     UA (URINE) with reflex to Scope    2  Essential hypertension  Assessment & Plan:  Blood pressure well controlled  Advised to continue present medications  Advised for low-salt diet  Orders:  -     CBC and differential; Future  -     Comprehensive metabolic panel; Future    3  Gastroesophageal reflux disease without esophagitis  Assessment & Plan:  Patient has been followed by GI specialist   Symptoms are controlled on present medication Has been watching diet and GE reflux precautions      4  Edema of both lower legs  Assessment & Plan:  Resolved after being on Dyazide  Advised to keep legs elevated  Patient states he cannot put on elastic stockings  May try Ace wrap if get worse      5  Mixed hyperlipidemia  Assessment & Plan:  Her LDL is 72 well controlled on the simvastatin and low-cholesterol low saturated fat diet  Orders:  -     Lipid panel; Future    6  Hypokalemia  Assessment & Plan:  Her potassium increased from 3 4-4 8 after discussing HCTZ and started on Dyazide  7  BMI 45 0-49 9, adult [facility])  Assessment & Plan:  Discussed with the patient to decrease portion size and exercise  Advised to decrease sugar and carbs intake as well as cholesterol intake  Subjective:  Patient presents for follow-up  Patient ID: Karen Garcia is a 63 y o  female       HPI     58-year-old white female patient presents for follow-up her medical problems  Patient states her edema of legs is much better after she is taking Dyazide  She denies any chest pain or shortness of breath or pain abdomen  She denies any cough, fever, chills  Denies any nausea, vomiting, diarrhea    The following portions of the patient's history were reviewed and updated as appropriate:     Past Medical History:  She has a past medical history of Abrasion of right leg (3/19/2021), BMI 45 0-49 9, adult ([facility] ) (3/19/2021), Dyslipidemia, Edema of both lower legs (3/19/2021), Essential hypertension (1/23/2021), Gastroesophageal reflux disease without esophagitis (1/23/2021), GERD (gastroesophageal reflux disease), HTN (hypertension), Hyperlipemia, Hypertension, Hypertensive arterionephrosclerosis of transplanted kidney (1/23/2021), Hypokalemia (4/20/2021), Insomnia, Leukocytosis, Mixed hyperlipidemia (1/23/2021), Numbness, Obstructive sleep apnea syndrome (1/23/2021), Pain in ankle (1/23/2021), Pain in both feet (1/23/2021), Pain in both knees (1/26/2021), Skin lesions, and Type 2 diabetes mellitus without complication, without long-term current use of insulin ([facility] ) (1/23/2021)  ,  _______________________________________________________________________  Past Surgical History:   has a past surgical history that includes Carpal tunnel release (Bilateral, 1990); Gallbladder surgery (2004); Hysterectomy (2004); Ankle surgery (Right, 2009/2012); Mammo stereotactic breast biopsy right (all inc) (Right, 2014); Mammo (historical) (10/02/2018); and Colonoscopy (03/07/2017)  ,  _______________________________________________________________________  Family History:  family history includes Breast cancer in her mother; Cancer in her maternal grandmother and mother; Diabetes in her mother; Hyperlipidemia in her mother ,  _______________________________________________________________________  Social History:   reports that she has quit smoking   She has quit using smokeless tobacco  She reports current alcohol use  She reports previous drug use ,  _______________________________________________________________________  Allergies:  is allergic to aspirin and lisinopril     _______________________________________________________________________  Current Outpatient Medications   Medication Sig Dispense Refill    Blood Glucose Monitoring Suppl (FreeStyle Lite) TOYIN daily      calcium carbonate (TUMS) 500 mg chewable tablet Chew 1 tablet 2 (two) times a day      dexlansoprazole (DEXILANT) 30 MG capsule Take 30 mg by mouth daily      esomeprazole (NexIUM) 20 mg capsule Take 1 capsule (20 mg total) by mouth daily in the early morning 30 capsule 0    FREESTYLE LITE test strip use 1 TEST STRIP to TEST BLOOD SUGAR once daily      glucosamine-chondroitin 500-400 MG tablet Take 1 tablet by mouth 3 (three) times a day      ibuprofen (MOTRIN) 200 mg tablet Take 1 tablet by mouth 3 (three) times a day as needed      Lancets (freestyle) lancets use 1 LANCET to TEST BLOOD SUGAR once daily      losartan (COZAAR) 100 MG tablet Take 1 tablet (100 mg total) by mouth daily 90 tablet 1    metoprolol tartrate (LOPRESSOR) 50 mg tablet Take 1 tablet (50 mg total) by mouth 2 (two) times a day 180 tablet 1    Multiple Vitamin (MULTIVITAMIN) capsule Take 1 capsule by mouth daily      simvastatin (ZOCOR) 20 mg tablet Take 1 tablet (20 mg total) by mouth daily 90 tablet 1    traMADol (ULTRAM) 50 mg tablet Take 50 mg by mouth every 8 (eight) hours as needed  0    triamterene-hydrochlorothiazide (DYAZIDE) 37 5-25 mg per capsule Take 1 capsule by mouth every morning 30 capsule 1     No current facility-administered medications for this visit       _______________________________________________________________________  Review of Systems   Constitutional: Negative for chills and fever  HENT: Negative for congestion, ear pain, hearing loss, nosebleeds, sinus pain, sore throat and trouble swallowing      Eyes: Negative for discharge, redness and visual disturbance  Respiratory: Negative for cough, chest tightness and shortness of breath  Cardiovascular: Negative for chest pain and palpitations  Gastrointestinal: Negative for abdominal pain, blood in stool, constipation, diarrhea, nausea and vomiting  Genitourinary: Negative for dysuria, flank pain, frequency and hematuria  Musculoskeletal: Positive for arthralgias (Gets pain in the feet  )  Negative for myalgias and neck pain  Skin: Negative for color change and rash  Neurological: Negative for dizziness, speech difficulty, weakness and headaches  Hematological: Does not bruise/bleed easily  Psychiatric/Behavioral: Negative for agitation and behavioral problems  Objective:  Vitals:    05/13/21 1530   BP: 130/78   BP Location: Right arm   Patient Position: Sitting   Cuff Size: Adult   Pulse: 70   Temp: 98 6 °F (37 °C)   TempSrc: Tympanic   SpO2: 99%   Weight: 119 kg (262 lb)   Height: 5' 3" (1 6 m)     Body mass index is 46 41 kg/m²  Physical Exam  Vitals signs and nursing note reviewed  Constitutional:       General: She is not in acute distress  Appearance: Normal appearance  HENT:      Head: Normocephalic and atraumatic  Right Ear: Ear canal and external ear normal       Left Ear: Ear canal and external ear normal       Mouth/Throat:      Comments: Patient has a face mask on  Eyes:      General: No scleral icterus  Extraocular Movements: Extraocular movements intact  Conjunctiva/sclera: Conjunctivae normal    Neck:      Musculoskeletal: Normal range of motion and neck supple  No muscular tenderness  Cardiovascular:      Rate and Rhythm: Normal rate and regular rhythm  Pulses: Normal pulses  Heart sounds: No murmur  Pulmonary:      Effort: Pulmonary effort is normal       Breath sounds: Normal breath sounds  Abdominal:      General: Bowel sounds are normal       Palpations: Abdomen is soft        Tenderness: There is no abdominal tenderness  Musculoskeletal: Normal range of motion  Right lower leg: No edema  Left lower leg: No edema  Skin:     General: Skin is warm  Findings: No rash  Neurological:      General: No focal deficit present  Mental Status: She is alert and oriented to person, place, and time     Psychiatric:         Mood and Affect: Mood normal          Behavior: Behavior normal

## 2021-05-13 NOTE — PATIENT INSTRUCTIONS
Patient advised to continue present medications discussed with the patient medications and laboratory data in detail  Follow-up with me in 3 months    If any blood test was ordered please do 1 week prior to next appointment  If you have any questions please call the office [phone]

## 2021-05-14 NOTE — ASSESSMENT & PLAN NOTE
Discussed with the patient to decrease portion size and exercise  Advised to decrease sugar and carbs intake as well as cholesterol intake

## 2021-05-14 NOTE — ASSESSMENT & PLAN NOTE
Resolved after being on Dyazide  Advised to keep legs elevated  Patient states he cannot put on elastic stockings    May try Ace wrap if get worse

## 2021-05-14 NOTE — ASSESSMENT & PLAN NOTE
Patient has been followed by GI specialist   Symptoms are controlled on present medication Has been watching diet and GE reflux precautions

## 2021-05-14 NOTE — ASSESSMENT & PLAN NOTE
Lab Results   Component Value Date    HGBA1C 7 8 (H) 05/11/2021   Discussed with the patient that her hemoglobin A1c increased from 6 8-7 8  Discussed with the patient to start medication but she would like to wait and try to watch her diet very closely and lose weight  If A1c persistently more than 7 will need medication

## 2021-06-03 DIAGNOSIS — K21.9 GASTROESOPHAGEAL REFLUX DISEASE, UNSPECIFIED WHETHER ESOPHAGITIS PRESENT: ICD-10-CM

## 2021-06-12 DIAGNOSIS — R60.0 EDEMA OF BOTH LOWER LEGS: ICD-10-CM

## 2021-06-12 DIAGNOSIS — I10 ESSENTIAL HYPERTENSION: Chronic | ICD-10-CM

## 2021-06-12 RX ORDER — TRIAMTERENE AND HYDROCHLOROTHIAZIDE 37.5; 25 MG/1; MG/1
1 CAPSULE ORAL EVERY MORNING
Qty: 30 CAPSULE | Refills: 2 | Status: SHIPPED | OUTPATIENT
Start: 2021-06-12 | End: 2021-08-31 | Stop reason: HOSPADM

## 2021-07-20 DIAGNOSIS — I10 ESSENTIAL HYPERTENSION: Chronic | ICD-10-CM

## 2021-07-20 DIAGNOSIS — E78.2 MIXED HYPERLIPIDEMIA: Chronic | ICD-10-CM

## 2021-07-20 RX ORDER — LOSARTAN POTASSIUM 100 MG/1
TABLET ORAL
Qty: 90 TABLET | Refills: 1 | Status: SHIPPED | OUTPATIENT
Start: 2021-07-20 | End: 2021-08-31 | Stop reason: HOSPADM

## 2021-07-20 RX ORDER — METOPROLOL TARTRATE 50 MG/1
TABLET, FILM COATED ORAL
Qty: 180 TABLET | Refills: 1 | Status: SHIPPED | OUTPATIENT
Start: 2021-07-20 | End: 2021-12-07 | Stop reason: SDUPTHER

## 2021-07-20 RX ORDER — SIMVASTATIN 20 MG
TABLET ORAL
Qty: 90 TABLET | Refills: 1 | Status: SHIPPED | OUTPATIENT
Start: 2021-07-20 | End: 2021-10-01 | Stop reason: SDUPTHER

## 2021-08-13 ENCOUNTER — APPOINTMENT (EMERGENCY)
Dept: RADIOLOGY | Facility: HOSPITAL | Age: 61
End: 2021-08-13
Payer: MEDICARE

## 2021-08-13 ENCOUNTER — APPOINTMENT (EMERGENCY)
Dept: CT IMAGING | Facility: HOSPITAL | Age: 61
End: 2021-08-13
Payer: MEDICARE

## 2021-08-13 ENCOUNTER — HOSPITAL ENCOUNTER (EMERGENCY)
Facility: HOSPITAL | Age: 61
End: 2021-08-14
Payer: MEDICARE

## 2021-08-13 DIAGNOSIS — U07.1 COVID-19: Primary | ICD-10-CM

## 2021-08-13 LAB
ALBUMIN SERPL BCP-MCNC: 3.6 G/DL (ref 3.4–4.8)
ALP SERPL-CCNC: 55.8 U/L (ref 35–140)
ALT SERPL W P-5'-P-CCNC: 23 U/L (ref 5–54)
ANION GAP SERPL CALCULATED.3IONS-SCNC: 13 MMOL/L (ref 4–13)
AST SERPL W P-5'-P-CCNC: 44 U/L (ref 15–41)
BASOPHILS # BLD MANUAL: 0 THOUSAND/UL (ref 0–0.1)
BASOPHILS NFR MAR MANUAL: 0 % (ref 0–1)
BILIRUB SERPL-MCNC: 0.49 MG/DL (ref 0.3–1.2)
BNP SERPL-MCNC: 17 PG/ML (ref 1–100)
BUN SERPL-MCNC: 51 MG/DL (ref 6–20)
CALCIUM SERPL-MCNC: 8.1 MG/DL (ref 8.4–10.2)
CHLORIDE SERPL-SCNC: 95 MMOL/L (ref 96–108)
CO2 SERPL-SCNC: 24 MMOL/L (ref 22–33)
CREAT SERPL-MCNC: 5.56 MG/DL (ref 0.4–1.1)
CRP SERPL QL: 6.3 MG/L (ref 0–1)
D DIMER PPP FEU-MCNC: 0.97 MG/L FEU (ref 0.19–0.49)
EOSINOPHIL # BLD MANUAL: 0 THOUSAND/UL (ref 0–0.4)
EOSINOPHIL NFR BLD MANUAL: 0 % (ref 0–6)
ERYTHROCYTE [DISTWIDTH] IN BLOOD BY AUTOMATED COUNT: 13.4 % (ref 11.6–15.1)
GFR SERPL CREATININE-BSD FRML MDRD: 8 ML/MIN/1.73SQ M
GLUCOSE SERPL-MCNC: 126 MG/DL (ref 65–140)
HCT VFR BLD AUTO: 43.9 % (ref 34.8–46.1)
HGB BLD-MCNC: 14.9 G/DL (ref 11.5–15.4)
LACTATE SERPL-SCNC: 1.4 MMOL/L (ref 0–2)
LYMPHOCYTES # BLD AUTO: 1.02 THOUSAND/UL (ref 0.6–4.47)
LYMPHOCYTES # BLD AUTO: 14 % (ref 14–44)
MCH RBC QN AUTO: 28.8 PG (ref 26.8–34.3)
MCHC RBC AUTO-ENTMCNC: 33.9 G/DL (ref 31.4–37.4)
MCV RBC AUTO: 85 FL (ref 82–98)
MONOCYTES # BLD AUTO: 0.51 THOUSAND/UL (ref 0–1.22)
MONOCYTES NFR BLD: 7 % (ref 4–12)
NEUTROPHILS # BLD MANUAL: 5.75 THOUSAND/UL (ref 1.85–7.62)
NEUTS SEG NFR BLD AUTO: 79 % (ref 43–75)
PLATELET # BLD AUTO: 210 THOUSANDS/UL (ref 149–390)
PLATELET BLD QL SMEAR: ADEQUATE
PMV BLD AUTO: 10.6 FL (ref 8.9–12.7)
POTASSIUM SERPL-SCNC: 4 MMOL/L (ref 3.5–5)
PROT SERPL-MCNC: 7.4 G/DL (ref 6.4–8.3)
RBC # BLD AUTO: 5.18 MILLION/UL (ref 3.81–5.12)
RBC MORPH BLD: NORMAL
SARS-COV-2 RNA RESP QL NAA+PROBE: POSITIVE
SODIUM SERPL-SCNC: 132 MMOL/L (ref 133–145)
TOTAL CELLS COUNTED SPEC: 100
WBC # BLD AUTO: 7.28 THOUSAND/UL (ref 4.31–10.16)

## 2021-08-13 PROCEDURE — 85007 BL SMEAR W/DIFF WBC COUNT: CPT

## 2021-08-13 PROCEDURE — 99285 EMERGENCY DEPT VISIT HI MDM: CPT

## 2021-08-13 PROCEDURE — 86140 C-REACTIVE PROTEIN: CPT

## 2021-08-13 PROCEDURE — 87040 BLOOD CULTURE FOR BACTERIA: CPT

## 2021-08-13 PROCEDURE — 74176 CT ABD & PELVIS W/O CONTRAST: CPT

## 2021-08-13 PROCEDURE — 96374 THER/PROPH/DIAG INJ IV PUSH: CPT

## 2021-08-13 PROCEDURE — 83605 ASSAY OF LACTIC ACID: CPT

## 2021-08-13 PROCEDURE — 80053 COMPREHEN METABOLIC PANEL: CPT

## 2021-08-13 PROCEDURE — 71045 X-RAY EXAM CHEST 1 VIEW: CPT

## 2021-08-13 PROCEDURE — 85379 FIBRIN DEGRADATION QUANT: CPT

## 2021-08-13 PROCEDURE — 82728 ASSAY OF FERRITIN: CPT

## 2021-08-13 PROCEDURE — 96365 THER/PROPH/DIAG IV INF INIT: CPT

## 2021-08-13 PROCEDURE — 83880 ASSAY OF NATRIURETIC PEPTIDE: CPT

## 2021-08-13 PROCEDURE — 94760 N-INVAS EAR/PLS OXIMETRY 1: CPT

## 2021-08-13 PROCEDURE — 96375 TX/PRO/DX INJ NEW DRUG ADDON: CPT

## 2021-08-13 PROCEDURE — 85027 COMPLETE CBC AUTOMATED: CPT

## 2021-08-13 PROCEDURE — 93005 ELECTROCARDIOGRAM TRACING: CPT

## 2021-08-13 PROCEDURE — 36415 COLL VENOUS BLD VENIPUNCTURE: CPT

## 2021-08-13 PROCEDURE — 87635 SARS-COV-2 COVID-19 AMP PRB: CPT

## 2021-08-13 PROCEDURE — 96361 HYDRATE IV INFUSION ADD-ON: CPT

## 2021-08-13 PROCEDURE — 84145 PROCALCITONIN (PCT): CPT

## 2021-08-13 PROCEDURE — G1004 CDSM NDSC: HCPCS

## 2021-08-13 RX ORDER — SODIUM CHLORIDE 9 MG/ML
500 INJECTION, SOLUTION INTRAVENOUS ONCE
Status: DISCONTINUED | OUTPATIENT
Start: 2021-08-13 | End: 2021-08-13

## 2021-08-13 RX ORDER — CEFTRIAXONE 1 G/50ML
1000 INJECTION, SOLUTION INTRAVENOUS ONCE
Status: COMPLETED | OUTPATIENT
Start: 2021-08-13 | End: 2021-08-13

## 2021-08-13 RX ORDER — DEXAMETHASONE SODIUM PHOSPHATE 10 MG/ML
10 INJECTION, SOLUTION INTRAMUSCULAR; INTRAVENOUS ONCE
Status: COMPLETED | OUTPATIENT
Start: 2021-08-13 | End: 2021-08-13

## 2021-08-13 RX ORDER — SODIUM CHLORIDE 9 MG/ML
1000 INJECTION, SOLUTION INTRAVENOUS ONCE
Status: DISCONTINUED | OUTPATIENT
Start: 2021-08-13 | End: 2021-08-13

## 2021-08-13 RX ORDER — SODIUM CHLORIDE 9 MG/ML
2000 INJECTION, SOLUTION INTRAVENOUS ONCE
Status: COMPLETED | OUTPATIENT
Start: 2021-08-13 | End: 2021-08-13

## 2021-08-13 RX ADMIN — SODIUM CHLORIDE 2000 ML/HR: 0.9 INJECTION, SOLUTION INTRAVENOUS at 18:22

## 2021-08-13 RX ADMIN — DOXYCYCLINE 100 MG: 100 INJECTION, POWDER, LYOPHILIZED, FOR SOLUTION INTRAVENOUS at 18:42

## 2021-08-13 RX ADMIN — DEXAMETHASONE SODIUM PHOSPHATE 10 MG: 10 INJECTION, SOLUTION INTRAMUSCULAR; INTRAVENOUS at 17:54

## 2021-08-13 RX ADMIN — SODIUM CHLORIDE 1000 ML: 0.9 INJECTION, SOLUTION INTRAVENOUS at 20:51

## 2021-08-13 RX ADMIN — CEFTRIAXONE 1000 MG: 1 INJECTION, SOLUTION INTRAVENOUS at 18:39

## 2021-08-13 NOTE — ED NOTES
Pt found to be 85% on RA, started on 6L NC, o2 sat up to 92%, respiratory called for mid-flow at this time       Jae Heimlich, RN  08/13/21 1827

## 2021-08-13 NOTE — ED PROVIDER NOTES
History  Chief Complaint   Patient presents with    Shortness of Breath     pt presents with sob, spouse recently dx with covid, o2 sat on room air 80     26-year-old female history multiple problems including hypertension borderline diabetes obesity presents secondary to increasing difficulty breathing since last Sunday  Patient has had a cough and congestion since that time  Of nose patient had a close contact and her boyfriend who is COVID positive  Patient was having difficulty breathing this evening with any kind of exertion so therefore EMS was called  They found patient with pulse ox of 86% on room air with some labored breathing  Patient was placed on 6 L nasal cannula and pulse ox him to 92-93%  Patient arrives at 90-92% on 6 L nasal cannula  Patient denies any nausea vomiting denies any chest pain she does have some leg cramps  Patient has an ongoing persistent cough and congestion  She is awake and alert mild respiratory distress on my evaluation          Prior to Admission Medications   Prescriptions Last Dose Informant Patient Reported? Taking?    Blood Glucose Monitoring Suppl (FreeStyle Lite) TOYIN   Yes No   Sig: daily   FREESTYLE LITE test strip   Yes No   Sig: use 1 TEST STRIP to TEST BLOOD SUGAR once daily   Lancets (freestyle) lancets   Yes No   Sig: use 1 LANCET to TEST BLOOD SUGAR once daily   Multiple Vitamin (MULTIVITAMIN) capsule  Self Yes No   Sig: Take 1 capsule by mouth daily   calcium carbonate (TUMS) 500 mg chewable tablet   Yes No   Sig: Chew 1 tablet 2 (two) times a day   dexlansoprazole (DEXILANT) 30 MG capsule   Yes No   Sig: Take 30 mg by mouth daily   esomeprazole (NexIUM) 20 mg capsule   No No   Sig: Take 1 capsule (20 mg total) by mouth daily in the early morning   glucosamine-chondroitin 500-400 MG tablet   Yes No   Sig: Take 1 tablet by mouth 3 (three) times a day   ibuprofen (MOTRIN) 200 mg tablet   Yes No   Sig: Take 1 tablet by mouth 3 (three) times a day as needed losartan (COZAAR) 100 MG tablet   No No   Sig: take 1 tablet by mouth once daily   metoprolol tartrate (LOPRESSOR) 50 mg tablet   No No   Sig: take 1 tablet by mouth twice a day   simvastatin (ZOCOR) 20 mg tablet   No No   Sig: take 1 tablet by mouth once daily   traMADol (ULTRAM) 50 mg tablet   Yes No   Sig: Take 50 mg by mouth every 8 (eight) hours as needed   triamterene-hydrochlorothiazide (DYAZIDE) 37 5-25 mg per capsule   No No   Sig: take 1 capsule by mouth every morning      Facility-Administered Medications: None       Past Medical History:   Diagnosis Date    Abrasion of right leg 3/19/2021    BMI 45 0-49 9, adult ([facility] ) 3/19/2021    Dyslipidemia     Edema of both lower legs 3/19/2021    Essential hypertension 1/23/2021    Gastroesophageal reflux disease without esophagitis 1/23/2021    GERD (gastroesophageal reflux disease)     HTN (hypertension)     Hyperlipemia     Hypertension     Hypertensive arterionephrosclerosis of transplanted kidney 1/23/2021    Hypokalemia 4/20/2021    Insomnia     Leukocytosis     Mixed hyperlipidemia 1/23/2021    Numbness     Obstructive sleep apnea syndrome 1/23/2021    Pain in ankle 1/23/2021    Pain in both feet 1/23/2021    Pain in both knees 1/26/2021    Skin lesions     Type 2 diabetes mellitus without complication, without long-term current use of insulin ([facility] ) 1/23/2021       Past Surgical History:   Procedure Laterality Date    ANKLE SURGERY Right 2009/2012    CARPAL TUNNEL RELEASE Bilateral 1990    COLONOSCOPY  03/07/2017    Dr Fabián Jimenez  2004    HYSTERECTOMY  2004    MAMMO (HISTORICAL)  10/02/2018    MAMMO STEREOTACTIC BREAST BIOPSY RIGHT (ALL INC) Right 2014       Family History   Problem Relation Age of Onset    Cancer Mother     Diabetes Mother     Hyperlipidemia Mother     Breast cancer Mother     Cancer Maternal Grandmother      I have reviewed and agree with the history as documented  E-Cigarette/Vaping     E-Cigarette/Vaping Substances     Social History     Tobacco Use    Smoking status: Former Smoker    Smokeless tobacco: Former User    Tobacco comment: 15 YEARS    Substance Use Topics    Alcohol use: Yes     Comment: SOCIAL    Drug use: Not Currently     Comment: No drug use - As per [employer]       Review of Systems   Constitutional: Positive for fatigue and fever  Negative for chills  HENT: Negative for congestion  Eyes: Negative for visual disturbance  Respiratory: Positive for cough and shortness of breath  Cardiovascular: Negative for chest pain  Gastrointestinal: Negative for abdominal pain  Endocrine: Negative for cold intolerance  Genitourinary: Negative for frequency  Musculoskeletal: Negative for gait problem  Skin: Negative for rash  Neurological: Positive for weakness  Negative for dizziness  Psychiatric/Behavioral: Negative for behavioral problems and confusion  Physical Exam  Physical Exam  Vitals and nursing note reviewed  Constitutional:       Appearance: She is well-developed  She is diaphoretic  HENT:      Head: Normocephalic and atraumatic  Eyes:      Conjunctiva/sclera: Conjunctivae normal       Pupils: Pupils are equal, round, and reactive to light  Cardiovascular:      Rate and Rhythm: Normal rate and regular rhythm  Heart sounds: Normal heart sounds  Pulmonary:      Effort: Tachypnea present  Breath sounds: Examination of the right-upper field reveals rhonchi  Examination of the left-upper field reveals rhonchi  Examination of the right-middle field reveals rhonchi  Examination of the left-middle field reveals rhonchi  Examination of the right-lower field reveals rhonchi  Examination of the left-lower field reveals rhonchi  Rhonchi present  Abdominal:      General: Bowel sounds are normal       Palpations: Abdomen is soft  Musculoskeletal:         General: Normal range of motion        Cervical back: Normal range of motion and neck supple  Skin:     General: Skin is warm  Capillary Refill: Capillary refill takes less than 2 seconds  Neurological:      Mental Status: She is alert and oriented to person, place, and time     Psychiatric:         Behavior: Behavior normal          Vital Signs  ED Triage Vitals [08/13/21 1732]   Temperature Pulse Respirations BP SpO2   100 2 °F (37 9 °C) (!) 118 18 -- (!) 85 %      Temp Source Heart Rate Source Patient Position - Orthostatic VS BP Location FiO2 (%)   Oral Monitor Lying Left arm --      Pain Score       --           Vitals:    08/13/21 1732   Pulse: (!) 118   Patient Position - Orthostatic VS: Lying         Visual Acuity      ED Medications  Medications   dexamethasone (PF) (DECADRON) injection 10 mg (has no administration in time range)       Diagnostic Studies  Results Reviewed     Procedure Component Value Units Date/Time    Ferritin [[id number]]     Lab Status: No result Specimen: Blood     C-reactive protein [[id number]]     Lab Status: No result Specimen: Blood     Novel Coronavirus (Covid-19),PCR [facility] - 2 Hour Stat [[id number]]     Lab Status: No result Specimen: Nares from Nose     CBC and differential [[id number]]     Lab Status: No result Specimen: Blood     Comprehensive metabolic panel [[id number]]     Lab Status: No result Specimen: Blood     D-dimer, quantitative [[id number]]     Lab Status: No result Specimen: Blood     Procalcitonin with AM Reflex [[id number]]     Lab Status: No result Specimen: Blood     Lactic acid, plasma [[id number]]     Lab Status: No result Specimen: Blood                  XR chest 1 view portable    (Results Pending)              Procedures  Procedures         ED Course                                           MDM  Number of Diagnoses or Management Options  Diagnosis management comments:             Disposition  Final diagnoses:   None     ED Disposition     None      Follow-up Information    None         Patient's Medications   Discharge Prescriptions    No medications on file     No discharge procedures on file      PDMP Review     None          ED Provider  Electronically Signed by           Jose Cantrell MD  08/14/21 4650

## 2021-08-14 ENCOUNTER — HOSPITAL ENCOUNTER (INPATIENT)
Facility: HOSPITAL | Age: 61
LOS: 17 days | Discharge: NURSING, REHAB OR LONG-TERM CARE | DRG: 177 | End: 2021-08-31
Attending: ANESTHESIOLOGY | Admitting: ANESTHESIOLOGY
Payer: MEDICARE

## 2021-08-14 VITALS
SYSTOLIC BLOOD PRESSURE: 119 MMHG | DIASTOLIC BLOOD PRESSURE: 89 MMHG | TEMPERATURE: 99.4 F | RESPIRATION RATE: 29 BRPM | OXYGEN SATURATION: 93 % | HEART RATE: 81 BPM

## 2021-08-14 DIAGNOSIS — I48.91 ATRIAL FIBRILLATION (HCC): Primary | ICD-10-CM

## 2021-08-14 DIAGNOSIS — J96.01 ACUTE RESPIRATORY FAILURE WITH HYPOXIA (HCC): ICD-10-CM

## 2021-08-14 DIAGNOSIS — K64.9 HEMORRHOIDS: ICD-10-CM

## 2021-08-14 DIAGNOSIS — M54.50 LOW BACK PAIN: ICD-10-CM

## 2021-08-14 DIAGNOSIS — I10 ESSENTIAL HYPERTENSION: Chronic | ICD-10-CM

## 2021-08-14 DIAGNOSIS — E11.9 TYPE 2 DIABETES MELLITUS WITHOUT COMPLICATION, WITHOUT LONG-TERM CURRENT USE OF INSULIN (HCC): ICD-10-CM

## 2021-08-14 DIAGNOSIS — E83.42 HYPOMAGNESEMIA: ICD-10-CM

## 2021-08-14 PROBLEM — U07.1 COVID-19: Status: ACTIVE | Noted: 2021-08-14

## 2021-08-14 PROBLEM — N17.9 ACUTE KIDNEY INJURY (HCC): Status: ACTIVE | Noted: 2021-08-14

## 2021-08-14 LAB
ALBUMIN SERPL BCP-MCNC: 2.4 G/DL (ref 3.5–5)
ALP SERPL-CCNC: 59 U/L (ref 46–116)
ALT SERPL W P-5'-P-CCNC: 31 U/L (ref 12–78)
ANION GAP SERPL CALCULATED.3IONS-SCNC: 14 MMOL/L (ref 4–13)
ANION GAP SERPL CALCULATED.3IONS-SCNC: 18 MMOL/L (ref 4–13)
APTT PPP: 209 SECONDS (ref 23–37)
APTT PPP: 26 SECONDS (ref 23–37)
APTT PPP: >210 SECONDS (ref 23–37)
APTT PPP: >210 SECONDS (ref 23–37)
AST SERPL W P-5'-P-CCNC: 47 U/L (ref 5–45)
BASOPHILS # BLD MANUAL: 0 THOUSAND/UL (ref 0–0.1)
BASOPHILS NFR MAR MANUAL: 0 % (ref 0–1)
BILIRUB SERPL-MCNC: 0.33 MG/DL (ref 0.2–1)
BUN SERPL-MCNC: 53 MG/DL (ref 5–25)
BUN SERPL-MCNC: 55 MG/DL (ref 5–25)
CALCIUM ALBUM COR SERPL-MCNC: 8.3 MG/DL (ref 8.3–10.1)
CALCIUM SERPL-MCNC: 6.9 MG/DL (ref 8.3–10.1)
CALCIUM SERPL-MCNC: 7 MG/DL (ref 8.3–10.1)
CHLORIDE SERPL-SCNC: 104 MMOL/L (ref 100–108)
CHLORIDE SERPL-SCNC: 104 MMOL/L (ref 100–108)
CK MB SERPL-MCNC: 1.1 NG/ML (ref 0–5)
CK MB SERPL-MCNC: <1 % (ref 0–2.5)
CK SERPL-CCNC: 341 U/L (ref 26–192)
CO2 SERPL-SCNC: 15 MMOL/L (ref 21–32)
CO2 SERPL-SCNC: 21 MMOL/L (ref 21–32)
CREAT SERPL-MCNC: 4.26 MG/DL (ref 0.6–1.3)
CREAT SERPL-MCNC: 4.67 MG/DL (ref 0.6–1.3)
CRP SERPL QL: 67 MG/L
EOSINOPHIL # BLD MANUAL: 0 THOUSAND/UL (ref 0–0.4)
EOSINOPHIL NFR BLD MANUAL: 0 % (ref 0–6)
ERYTHROCYTE [DISTWIDTH] IN BLOOD BY AUTOMATED COUNT: 13.2 % (ref 11.6–15.1)
FERRITIN SERPL-MCNC: 608 NG/ML (ref 8–388)
GFR SERPL CREATININE-BSD FRML MDRD: 11 ML/MIN/1.73SQ M
GFR SERPL CREATININE-BSD FRML MDRD: 9 ML/MIN/1.73SQ M
GLUCOSE SERPL-MCNC: 191 MG/DL (ref 65–140)
GLUCOSE SERPL-MCNC: 206 MG/DL (ref 65–140)
GLUCOSE SERPL-MCNC: 228 MG/DL (ref 65–140)
GLUCOSE SERPL-MCNC: 248 MG/DL (ref 65–140)
GLUCOSE SERPL-MCNC: 262 MG/DL (ref 65–140)
HCT VFR BLD AUTO: 43 % (ref 34.8–46.1)
HGB BLD-MCNC: 13.7 G/DL (ref 11.5–15.4)
INR PPP: 1.05 (ref 0.84–1.19)
LACTATE SERPL-SCNC: 1 MMOL/L (ref 0.5–2)
LYMPHOCYTES # BLD AUTO: 0.77 THOUSAND/UL (ref 0.6–4.47)
LYMPHOCYTES # BLD AUTO: 15 % (ref 14–44)
MAGNESIUM SERPL-MCNC: 1.6 MG/DL (ref 1.6–2.6)
MCH RBC QN AUTO: 29 PG (ref 26.8–34.3)
MCHC RBC AUTO-ENTMCNC: 31.9 G/DL (ref 31.4–37.4)
MCV RBC AUTO: 91 FL (ref 82–98)
MONOCYTES # BLD AUTO: 0.26 THOUSAND/UL (ref 0–1.22)
MONOCYTES NFR BLD: 5 % (ref 4–12)
NEUTROPHILS # BLD MANUAL: 4.08 THOUSAND/UL (ref 1.85–7.62)
NEUTS BAND NFR BLD MANUAL: 3 % (ref 0–8)
NEUTS SEG NFR BLD AUTO: 77 % (ref 43–75)
NRBC BLD AUTO-RTO: 0 /100 WBCS
PHOSPHATE SERPL-MCNC: 3.7 MG/DL (ref 2.3–4.1)
PLATELET # BLD AUTO: 180 THOUSANDS/UL (ref 149–390)
PLATELET BLD QL SMEAR: ADEQUATE
PMV BLD AUTO: 10.7 FL (ref 8.9–12.7)
POTASSIUM SERPL-SCNC: 3.7 MMOL/L (ref 3.5–5.3)
POTASSIUM SERPL-SCNC: 3.7 MMOL/L (ref 3.5–5.3)
PROCALCITONIN SERPL-MCNC: 0.09 NG/ML
PROT SERPL-MCNC: 6.8 G/DL (ref 6.4–8.2)
PROTHROMBIN TIME: 13.6 SECONDS (ref 11.6–14.5)
RBC # BLD AUTO: 4.72 MILLION/UL (ref 3.81–5.12)
RBC MORPH BLD: NORMAL
SODIUM SERPL-SCNC: 137 MMOL/L (ref 136–145)
SODIUM SERPL-SCNC: 139 MMOL/L (ref 136–145)
TOTAL CELLS COUNTED SPEC: 100
TROPONIN I SERPL-MCNC: <0.02 NG/ML
WBC # BLD AUTO: 5.1 THOUSAND/UL (ref 4.31–10.16)

## 2021-08-14 PROCEDURE — 83735 ASSAY OF MAGNESIUM: CPT | Performed by: NURSE PRACTITIONER

## 2021-08-14 PROCEDURE — 82948 REAGENT STRIP/BLOOD GLUCOSE: CPT

## 2021-08-14 PROCEDURE — 84484 ASSAY OF TROPONIN QUANT: CPT | Performed by: NURSE PRACTITIONER

## 2021-08-14 PROCEDURE — 84100 ASSAY OF PHOSPHORUS: CPT | Performed by: NURSE PRACTITIONER

## 2021-08-14 PROCEDURE — 80053 COMPREHEN METABOLIC PANEL: CPT | Performed by: NURSE PRACTITIONER

## 2021-08-14 PROCEDURE — 93005 ELECTROCARDIOGRAM TRACING: CPT

## 2021-08-14 PROCEDURE — 87081 CULTURE SCREEN ONLY: CPT | Performed by: NURSE PRACTITIONER

## 2021-08-14 PROCEDURE — 84145 PROCALCITONIN (PCT): CPT | Performed by: PHYSICIAN ASSISTANT

## 2021-08-14 PROCEDURE — 94640 AIRWAY INHALATION TREATMENT: CPT

## 2021-08-14 PROCEDURE — 85007 BL SMEAR W/DIFF WBC COUNT: CPT | Performed by: NURSE PRACTITIONER

## 2021-08-14 PROCEDURE — 94760 N-INVAS EAR/PLS OXIMETRY 1: CPT

## 2021-08-14 PROCEDURE — 99223 1ST HOSP IP/OBS HIGH 75: CPT | Performed by: ANESTHESIOLOGY

## 2021-08-14 PROCEDURE — 80048 BASIC METABOLIC PNL TOTAL CA: CPT | Performed by: PHYSICIAN ASSISTANT

## 2021-08-14 PROCEDURE — 85027 COMPLETE CBC AUTOMATED: CPT | Performed by: NURSE PRACTITIONER

## 2021-08-14 PROCEDURE — 86140 C-REACTIVE PROTEIN: CPT | Performed by: NURSE PRACTITIONER

## 2021-08-14 PROCEDURE — 85730 THROMBOPLASTIN TIME PARTIAL: CPT | Performed by: ANESTHESIOLOGY

## 2021-08-14 PROCEDURE — 83605 ASSAY OF LACTIC ACID: CPT | Performed by: PHYSICIAN ASSISTANT

## 2021-08-14 PROCEDURE — 82553 CREATINE MB FRACTION: CPT | Performed by: NURSE PRACTITIONER

## 2021-08-14 PROCEDURE — 82550 ASSAY OF CK (CPK): CPT | Performed by: NURSE PRACTITIONER

## 2021-08-14 PROCEDURE — 85730 THROMBOPLASTIN TIME PARTIAL: CPT | Performed by: NURSE PRACTITIONER

## 2021-08-14 PROCEDURE — 85610 PROTHROMBIN TIME: CPT | Performed by: NURSE PRACTITIONER

## 2021-08-14 PROCEDURE — XW033E5 INTRODUCTION OF REMDESIVIR ANTI-INFECTIVE INTO PERIPHERAL VEIN, PERCUTANEOUS APPROACH, NEW TECHNOLOGY GROUP 5: ICD-10-PCS | Performed by: ANESTHESIOLOGY

## 2021-08-14 PROCEDURE — 94660 CPAP INITIATION&MGMT: CPT

## 2021-08-14 RX ORDER — HEPARIN SODIUM 1000 [USP'U]/ML
8000 INJECTION, SOLUTION INTRAVENOUS; SUBCUTANEOUS
Status: DISCONTINUED | OUTPATIENT
Start: 2021-08-14 | End: 2021-08-18

## 2021-08-14 RX ORDER — SODIUM CHLORIDE FOR INHALATION 0.9 %
3 VIAL, NEBULIZER (ML) INHALATION
Status: DISCONTINUED | OUTPATIENT
Start: 2021-08-14 | End: 2021-08-18

## 2021-08-14 RX ORDER — SODIUM CHLORIDE, SODIUM GLUCONATE, SODIUM ACETATE, POTASSIUM CHLORIDE, MAGNESIUM CHLORIDE, SODIUM PHOSPHATE, DIBASIC, AND POTASSIUM PHOSPHATE .53; .5; .37; .037; .03; .012; .00082 G/100ML; G/100ML; G/100ML; G/100ML; G/100ML; G/100ML; G/100ML
75 INJECTION, SOLUTION INTRAVENOUS CONTINUOUS
Status: DISCONTINUED | OUTPATIENT
Start: 2021-08-14 | End: 2021-08-14

## 2021-08-14 RX ORDER — ATORVASTATIN CALCIUM 40 MG/1
40 TABLET, FILM COATED ORAL
Status: DISCONTINUED | OUTPATIENT
Start: 2021-08-14 | End: 2021-08-31 | Stop reason: HOSPADM

## 2021-08-14 RX ORDER — SODIUM CHLORIDE, SODIUM GLUCONATE, SODIUM ACETATE, POTASSIUM CHLORIDE, MAGNESIUM CHLORIDE, SODIUM PHOSPHATE, DIBASIC, AND POTASSIUM PHOSPHATE .53; .5; .37; .037; .03; .012; .00082 G/100ML; G/100ML; G/100ML; G/100ML; G/100ML; G/100ML; G/100ML
500 INJECTION, SOLUTION INTRAVENOUS ONCE
Status: COMPLETED | OUTPATIENT
Start: 2021-08-14 | End: 2021-08-14

## 2021-08-14 RX ORDER — BENZONATATE 100 MG/1
100 CAPSULE ORAL 3 TIMES DAILY PRN
Status: DISCONTINUED | OUTPATIENT
Start: 2021-08-14 | End: 2021-08-31 | Stop reason: HOSPADM

## 2021-08-14 RX ORDER — PANTOPRAZOLE SODIUM 20 MG/1
20 TABLET, DELAYED RELEASE ORAL
Status: DISCONTINUED | OUTPATIENT
Start: 2021-08-14 | End: 2021-08-31 | Stop reason: HOSPADM

## 2021-08-14 RX ORDER — CEFTRIAXONE 1 G/50ML
1000 INJECTION, SOLUTION INTRAVENOUS EVERY 24 HOURS
Status: DISCONTINUED | OUTPATIENT
Start: 2021-08-14 | End: 2021-08-15

## 2021-08-14 RX ORDER — DOXYCYCLINE HYCLATE 100 MG/1
100 CAPSULE ORAL EVERY 12 HOURS
Status: DISCONTINUED | OUTPATIENT
Start: 2021-08-14 | End: 2021-08-15

## 2021-08-14 RX ORDER — DEXAMETHASONE SODIUM PHOSPHATE 4 MG/ML
6 INJECTION, SOLUTION INTRA-ARTICULAR; INTRALESIONAL; INTRAMUSCULAR; INTRAVENOUS; SOFT TISSUE EVERY 24 HOURS
Status: DISCONTINUED | OUTPATIENT
Start: 2021-08-14 | End: 2021-08-18

## 2021-08-14 RX ORDER — HEPARIN SODIUM 1000 [USP'U]/ML
4000 INJECTION, SOLUTION INTRAVENOUS; SUBCUTANEOUS
Status: DISCONTINUED | OUTPATIENT
Start: 2021-08-14 | End: 2021-08-18

## 2021-08-14 RX ORDER — TRAMADOL HYDROCHLORIDE 50 MG/1
50 TABLET ORAL EVERY 8 HOURS PRN
Status: DISCONTINUED | OUTPATIENT
Start: 2021-08-14 | End: 2021-08-31 | Stop reason: HOSPADM

## 2021-08-14 RX ORDER — HEPARIN SODIUM 10000 [USP'U]/100ML
3-30 INJECTION, SOLUTION INTRAVENOUS
Status: DISCONTINUED | OUTPATIENT
Start: 2021-08-14 | End: 2021-08-18

## 2021-08-14 RX ADMIN — DOXYCYCLINE 100 MG: 100 CAPSULE ORAL at 13:57

## 2021-08-14 RX ADMIN — SODIUM CHLORIDE, SODIUM GLUCONATE, SODIUM ACETATE, POTASSIUM CHLORIDE, MAGNESIUM CHLORIDE, SODIUM PHOSPHATE, DIBASIC, AND POTASSIUM PHOSPHATE 75 ML/HR: .53; .5; .37; .037; .03; .012; .00082 INJECTION, SOLUTION INTRAVENOUS at 08:58

## 2021-08-14 RX ADMIN — PANTOPRAZOLE SODIUM 20 MG: 20 TABLET, DELAYED RELEASE ORAL at 06:31

## 2021-08-14 RX ADMIN — REMDESIVIR 200 MG: 100 INJECTION, POWDER, LYOPHILIZED, FOR SOLUTION INTRAVENOUS at 07:13

## 2021-08-14 RX ADMIN — BENZONATATE 100 MG: 100 CAPSULE ORAL at 21:47

## 2021-08-14 RX ADMIN — SODIUM CHLORIDE, SODIUM GLUCONATE, SODIUM ACETATE, POTASSIUM CHLORIDE, MAGNESIUM CHLORIDE, SODIUM PHOSPHATE, DIBASIC, AND POTASSIUM PHOSPHATE 75 ML/HR: .53; .5; .37; .037; .03; .012; .00082 INJECTION, SOLUTION INTRAVENOUS at 14:38

## 2021-08-14 RX ADMIN — CEFTRIAXONE 1000 MG: 1 INJECTION, SOLUTION INTRAVENOUS at 14:38

## 2021-08-14 RX ADMIN — HEPARIN SODIUM 18 UNITS/KG/HR: 10000 INJECTION, SOLUTION INTRAVENOUS at 02:51

## 2021-08-14 RX ADMIN — BENZONATATE 100 MG: 100 CAPSULE ORAL at 11:41

## 2021-08-14 RX ADMIN — HEPARIN SODIUM 15 UNITS/KG/HR: 10000 INJECTION, SOLUTION INTRAVENOUS at 17:33

## 2021-08-14 RX ADMIN — ISODIUM CHLORIDE 3 ML: 0.03 SOLUTION RESPIRATORY (INHALATION) at 14:03

## 2021-08-14 RX ADMIN — ATORVASTATIN CALCIUM 40 MG: 40 TABLET, FILM COATED ORAL at 21:47

## 2021-08-14 RX ADMIN — INSULIN LISPRO 2 UNITS: 100 INJECTION, SOLUTION INTRAVENOUS; SUBCUTANEOUS at 17:26

## 2021-08-14 RX ADMIN — SODIUM CHLORIDE, SODIUM GLUCONATE, SODIUM ACETATE, POTASSIUM CHLORIDE, MAGNESIUM CHLORIDE, SODIUM PHOSPHATE, DIBASIC, AND POTASSIUM PHOSPHATE 500 ML: .53; .5; .37; .037; .03; .012; .00082 INJECTION, SOLUTION INTRAVENOUS at 14:02

## 2021-08-14 RX ADMIN — ATORVASTATIN CALCIUM 40 MG: 40 TABLET, FILM COATED ORAL at 02:19

## 2021-08-14 RX ADMIN — ISODIUM CHLORIDE 3 ML: 0.03 SOLUTION RESPIRATORY (INHALATION) at 19:23

## 2021-08-14 RX ADMIN — DEXAMETHASONE SODIUM PHOSPHATE 6 MG: 4 INJECTION INTRA-ARTICULAR; INTRALESIONAL; INTRAMUSCULAR; INTRAVENOUS; SOFT TISSUE at 11:38

## 2021-08-14 NOTE — ASSESSMENT & PLAN NOTE
· History of HTN on losartan  Will hold here given ANTHONY  · Cardiac monitoring/VS per protocol  · Consider PRN antihypertensives if SBP persistently <180   Currently controlled around 140's

## 2021-08-14 NOTE — ASSESSMENT & PLAN NOTE
Lab Results   Component Value Date    HGBA1C 7 8 (H) 05/11/2021       No results for input(s): POCGLU in the last 72 hours      Blood Sugar Average: Last 72 hrs:  · Has tried to control BG with diet  · BG AC/HS with SSI Algorithm 1 given insulin naive  · Goal BG <180 in setting of steroid administration

## 2021-08-14 NOTE — PLAN OF CARE
Problem: Potential for Falls  Goal: Patient will remain free of falls  Description: INTERVENTIONS:  - Educate patient/family on patient safety including physical limitations  - Instruct patient to call for assistance with activity   - Consult OT/PT to assist with strengthening/mobility   - Keep Call bell within reach  - Keep bed low and locked with side rails adjusted as appropriate  - Keep care items and personal belongings within reach  - Initiate and maintain comfort rounds  - Make Fall Risk Sign visible to staff  - Offer Toileting every 2  Hours, in advance of need  - Initiate/Maintain bed alarm  - Obtain necessary fall risk management equipment:   - Apply yellow socks and bracelet for high fall risk patients  - Consider moving patient to room near nurses station  Outcome: Progressing     Problem: PAIN - ADULT  Goal: Verbalizes/displays adequate comfort level or baseline comfort level  Description: Interventions:  - Encourage patient to monitor pain and request assistance  - Assess pain using appropriate pain scale  - Administer analgesics based on type and severity of pain and evaluate response  - Implement non-pharmacological measures as appropriate and evaluate response  - Consider cultural and social influences on pain and pain management  - Notify physician/advanced practitioner if interventions unsuccessful or patient reports new pain  Outcome: Progressing     Problem: INFECTION - ADULT  Goal: Absence or prevention of progression during hospitalization  Description: INTERVENTIONS:  - Assess and monitor for signs and symptoms of infection  - Monitor lab/diagnostic results  - Monitor all insertion sites, i e  indwelling lines, tubes, and drains  - Monitor endotracheal if appropriate and nasal secretions for changes in amount and color  - [place] appropriate cooling/warming therapies per order  - Administer medications as ordered  - Instruct and encourage patient and family to use good hand hygiene technique  - Identify and instruct in appropriate isolation precautions for identified infection/condition  Outcome: Progressing     Problem: DISCHARGE PLANNING  Goal: Discharge to home or other facility with appropriate resources  Description: INTERVENTIONS:  - Identify barriers to discharge w/patient and caregiver  - Arrange for needed discharge resources and transportation as appropriate  - Identify discharge learning needs (meds, wound care, etc )  - Arrange for interpretive services to assist at discharge as needed  - Refer to Case Management Department for coordinating discharge planning if the patient needs post-hospital services based on physician/advanced practitioner order or complex needs related to functional status, cognitive ability, or social support system  Outcome: Progressing     Problem: Knowledge Deficit  Goal: Patient/family/caregiver demonstrates understanding of disease process, treatment plan, medications, and discharge instructions  Description: Complete learning assessment and assess knowledge base    Interventions:  - Provide teaching at level of understanding  - Provide teaching via preferred learning methods  Outcome: Progressing     Problem: RESPIRATORY - ADULT  Goal: Achieves optimal ventilation and oxygenation  Description: INTERVENTIONS:  - Assess for changes in respiratory status  - Assess for changes in mentation and behavior  - Position to facilitate oxygenation and minimize respiratory effort  - Oxygen administered by appropriate delivery if ordered  - Initiate smoking cessation education as indicated  - Encourage broncho-pulmonary hygiene including cough, deep breathe, Incentive Spirometry  - Assess the need for suctioning and aspirate as needed  - Assess and instruct to report SOB or any respiratory difficulty  - Respiratory Therapy support as indicated  Outcome: Progressing

## 2021-08-14 NOTE — QUICK NOTE
I personally spoke with significant other, Chad Crump, and provided update on patient's clinical status  All questions answered

## 2021-08-14 NOTE — H&P
[address] 44  H&P- Soniajurgen Gomez 1960, 63 y o  female MRN: [id number]  Unit/Bed#: ICU 05 Encounter: [id number]  Primary Care Provider: Odalis Mireles MD   Date and time admitted to hospital: 8/14/2021  1:01 AM    COVID-19  Assessment & Plan  · Symptom onset was 8/8 with fatigue, poor appetite, cold sweats  · Progressed to diarrhea 8/10, cough and dyspnea with coughing fits  · Sick contact was significant other who was COVID + 2 weeks ago  · Presented to [facility] at the advisement of her daughter  · SpO2 was 85% on room air  Improved upon placement of midflow O2  · COVID + in ED 8/13  CT C/A/P with bibasilar pulmonary groundglass infiltrates consistent with Covid pneumonia  · Will initiate on moderate protocol  · Admission labs include Ferritin 608, CRP 6 3, D dimer 0 97 and procal 0 09  · Check blood type, baseline trop, CK  Admission BNP 17  · Atorvastatin 40mg qHS  · Dexamethasone 6mg IV daily x10D through 8/24 (D#1)  · She received 1 dose of Ceftriaxone and Doxycycline in the ED  Will hold on further doses as procal neg  Will monitor fever curve, CBC off of antibiotics  · Not a candidate for convalescent plasma as symptom onset -6D  · Remdesivir per protocol  · Heparin drip for full AC as Creat clearance <30      Acute kidney injury ([facility] [address] )  Assessment & Plan  · Baseline creat 0 94-1  28  Now with creat 5 56 and Crcl 20 in setting of 6 days of poor appetite and decreased PO intake  · S/p 3L IVF's in ED  Will hold on further IVF boluses at this time and allow for PO intake as to avoid worsening of COVID 19 pneumonia   · Close I&O monitoring, trend BMP, avoid nephrotoxins    Type 2 diabetes mellitus without complication, without long-term current use of insulin (HCC)  Assessment & Plan  Lab Results   Component Value Date    HGBA1C 7 8 (H) 05/11/2021       No results for input(s): POCGLU in the last 72 hours      Blood Sugar Average: Last 72 hrs:  · Has tried to control BG with diet  · BG AC/HS with SSI Algorithm 1 given insulin naive  · Goal BG <180 in setting of steroid administration    Essential hypertension  Assessment & Plan  · History of HTN on losartan  Will hold here given ANTHONY  · Cardiac monitoring/VS per protocol  · Consider PRN antihypertensives if SBP persistently <180  Currently controlled around 140's      BMI 39 0-39 9,adult  Assessment & Plan  · Nutrition and exercise counseling when appropriate      Gastroesophageal reflux disease without esophagitis  Assessment & Plan  · Takes esomeprazole  · Protonix 20mg PO daily      Mixed hyperlipidemia  Assessment & Plan  · Replaced home simvastatin with atorvastatin given COVID protocol    Pain in both feet  Assessment & Plan  · History of R  Foot pain s/p surgery x2  Unable to locate surgical documents in care everywhere  · Takes Tramadol 50mg PO TID PRN for foot pain  Will continue for now and consider d/c if oxygenation status declines    -------------------------------------------------------------------------------------------------------------  Chief Complaint: Fatigue, cough    History of Present Illness   HX and PE limited by: Good historian  Kyle Segura is a 63 y o  female past medical history of type 2 diabetes without use of insulin, hypertension, GERD, obesity, hyperlipidemia, and chronic pain in her feet who presents with ongoing fatigue, cold sweats, exertional dyspnea, and worsening dry cough which began last Sunday 8/8 approximately 6 days prior to admission  Also reports diarrhea which began 4 days ago  Otherwise no chest pain, palpitations, nausea or vomiting  She resides with her significant other who was diagnosed approximately 2 weeks ago with COVID-19, and her test was subsequently positive in the ED  On arrival to the ED she had an SpO2 of 85% on room air which improved to mid 90's on 10L midflow  Labs were significant for ANTHONY with creat 5 56, CRP 6 3, ferritin 608   She was brought to [facility] ICU for step down monitoring and treatment of her moderate COVID illness  History obtained from chart review and the patient   -------------------------------------------------------------------------------------------------------------  Dispo: Admit to Stepdown Level 1    Code Status: Level 1 - Full Code  --------------------------------------------------------------------------------------------------------------  Review of Systems   Constitutional: Positive for activity change (fatigued/mostly laying in bed this week), appetite change (decreased PO intake x6 days), diaphoresis and fatigue  Negative for chills and fever  HENT: Positive for congestion  Eyes: Negative  Respiratory: Positive for cough (dry, non productive) and shortness of breath (with coughing episodes and mild HERNANDEZ)  Negative for wheezing  Cardiovascular: Negative for chest pain, palpitations and leg swelling  Gastrointestinal: Positive for diarrhea (x4 days)  Negative for abdominal pain, blood in stool, constipation, nausea and vomiting  Anal bleeding: epigastric  Genitourinary: Negative  Musculoskeletal: Positive for arthralgias (chronic foot pain on tramadol)  Skin: Negative  Neurological: Positive for weakness (generalized x 6days, worsening)  Negative for dizziness, tremors, light-headedness and headaches  Psychiatric/Behavioral: Negative  All other systems reviewed and are negative  A 12-point, complete review of systems was reviewed and negative except as stated above     Physical Exam  Vitals and nursing note reviewed  Constitutional:       General: She is not in acute distress  Appearance: She is obese  She is ill-appearing  She is not toxic-appearing  HENT:      Mouth/Throat:      Mouth: Mucous membranes are moist    Eyes:      General: No scleral icterus  Pupils: Pupils are equal, round, and reactive to light  Cardiovascular:      Rate and Rhythm: Normal rate and regular rhythm  Pulses: Normal pulses  Heart sounds: Normal heart sounds  No murmur heard  Pulmonary:      Effort: Pulmonary effort is normal  No respiratory distress  Breath sounds: Decreased breath sounds present  No wheezing or rhonchi  Abdominal:      General: Bowel sounds are normal  There is no distension  Palpations: Abdomen is soft  Tenderness: There is abdominal tenderness (epigstric on palpation)  There is no guarding  Musculoskeletal:      Cervical back: Neck supple  Right lower leg: No edema  Left lower leg: No edema  Skin:     General: Skin is warm and dry  Capillary Refill: Capillary refill takes less than 2 seconds  Coloration: Skin is pale  Neurological:      General: No focal deficit present  Mental Status: She is alert and oriented to person, place, and time  GCS: GCS eye subscore is 4  GCS verbal subscore is 5  GCS motor subscore is 6  Psychiatric:         Behavior: Behavior is cooperative        --------------------------------------------------------------------------------------------------------------  Vitals:   Vitals:    08/14/21 0100   BP: 146/66   Pulse: 80   Resp: (!) 27   Temp: (!) 96 9 °F (36 1 °C)   TempSrc: Temporal   SpO2: 93%   Weight: 100 kg (221 lb 5 5 oz)   Height: 5' 3" (1 6 m)     Temp  Min: 96 9 °F (36 1 °C)  Max: 100 2 °F (37 9 °C)  IBW (Ideal Body Weight): 52 4 kg  Height: 5' 3" (160 cm)  Body mass index is 39 21 kg/m²      Laboratory and Diagnostics:  Results from last 7 days   Lab Units 08/13/21  1809   WBC Thousand/uL 7 28   HEMOGLOBIN g/dL 14 9   HEMATOCRIT % 43 9   PLATELETS Thousands/uL 210   MONO PCT % 7     Results from last 7 days   Lab Units 08/13/21  1809   SODIUM mmol/L 132*   POTASSIUM mmol/L 4 0   CHLORIDE mmol/L 95*   CO2 mmol/L 24   ANION GAP mmol/L 13   BUN mg/dL 51*   CREATININE mg/dL 5 56*   CALCIUM mg/dL 8 1*   GLUCOSE RANDOM mg/dL 126   ALT U/L 23   AST U/L 44*   ALK PHOS U/L 55 8   ALBUMIN g/dL 3 6   TOTAL BILIRUBIN mg/dL 0 49 Results from last 7 days   Lab Units 08/13/21  1809   LACTIC ACID mmol/L 1 4     ABG:    VBG:    Results from last 7 days   Lab Units 08/13/21  1809   PROCALCITONIN ng/ml 0 09       Micro:  Results from last 7 days   Lab Units 08/13/21  1834 08/13/21  1829   BLOOD CULTURE  Received in Microbiology Lab  Culture in Progress  Received in Microbiology Lab  Culture in Progress  EKG: NSR on monitor  EKG pending  Imaging: I have personally reviewed pertinent reports  and I have personally reviewed pertinent films in PACS  CT C/A/P Bibasilar pulmonary groundglass infiltrates indicating Covid pneumonia in this patient with confirmed Covid 19  No hydronephrosis  Fluid throughout the proximal and mid colon in keeping with a nonspecific diarrheal illness        Historical Information   Past Medical History:   Diagnosis Date    Abrasion of right leg 3/19/2021    BMI 45 0-49 9, adult ([facility] [address] ) 3/19/2021    Dyslipidemia     Edema of both lower legs 3/19/2021    Essential hypertension 1/23/2021    Gastroesophageal reflux disease without esophagitis 1/23/2021    GERD (gastroesophageal reflux disease)     HTN (hypertension)     Hyperlipemia     Hypertension     Hypertensive arterionephrosclerosis of transplanted kidney 1/23/2021    Hypokalemia 4/20/2021    Insomnia     Leukocytosis     Mixed hyperlipidemia 1/23/2021    Numbness     Obstructive sleep apnea syndrome 1/23/2021    Pain in ankle 1/23/2021    Pain in both feet 1/23/2021    Pain in both knees 1/26/2021    Skin lesions     Type 2 diabetes mellitus without complication, without long-term current use of insulin ([facility] [address] ) 1/23/2021     Past Surgical History:   Procedure Laterality Date    ANKLE SURGERY Right 2009/2012    CARPAL TUNNEL RELEASE Bilateral 1990    COLONOSCOPY  03/07/2017    Dr Turcios Dense  2004    HYSTERECTOMY  2004    MAMMO (HISTORICAL)  10/02/2018    MAMMO STEREOTACTIC BREAST BIOPSY RIGHT (ALL INC) Right 2014     Social History   Social History     Substance and Sexual Activity   Alcohol Use Yes    Comment: SOCIAL     Social History     Substance and Sexual Activity   Drug Use Not Currently    Comment: No drug use - As per [employer]     Social History     Tobacco Use   Smoking Status Former Smoker   Smokeless Tobacco Former User   Tobacco Comment    15 YEARS      Exercise History: N/A  Family History:   Family History   Problem Relation Age of Onset    Cancer Mother     Diabetes Mother     Hyperlipidemia Mother     Breast cancer Mother     Cancer Maternal Grandmother      I have reviewed this patient's family history and commented on sigificant items within the HPI      Medications:  Current Facility-Administered Medications   Medication Dose Route Frequency    atorvastatin (LIPITOR) tablet 40 mg  40 mg Oral HS    dexamethasone (DECADRON) injection 6 mg  6 mg Intravenous Q24H    heparin (porcine) 25,000 units in 0 45% NaCl 250 mL infusion (premix)  3-30 Units/kg/hr (Order-Specific) Intravenous Titrated    heparin (porcine) injection 4,000 Units  4,000 Units Intravenous Q1H PRN    heparin (porcine) injection 8,000 Units  8,000 Units Intravenous Q1H PRN    pantoprazole (PROTONIX) EC tablet 20 mg  20 mg Oral Early Morning    remdesivir (Veklury) 200 mg in sodium chloride 0 9 % 250 mL IVPB  200 mg Intravenous Q24H    Followed by   Lisbet Robles ON 8/15/2021] remdesivir (Veklury) 100 mg in sodium chloride 0 9 % 250 mL IVPB  100 mg Intravenous Q24H    traMADol (ULTRAM) tablet 50 mg  50 mg Oral Q8H PRN     Home medications:  Prior to Admission Medications   Prescriptions Last Dose Informant Patient Reported? Taking?    Blood Glucose Monitoring Suppl (FreeStyle Lite) TOYIN   Yes No   Sig: daily   FREESTYLE LITE test strip   Yes No   Sig: use 1 TEST STRIP to TEST BLOOD SUGAR once daily   Lancets (freestyle) lancets   Yes No   Sig: use 1 LANCET to TEST BLOOD SUGAR once daily   Multiple Vitamin (MULTIVITAMIN) capsule  Self Yes No   Sig: Take 1 capsule by mouth daily   calcium carbonate (TUMS) 500 mg chewable tablet Not Taking at Unknown time  Yes No   Sig: Chew 1 tablet 2 (two) times a day   Patient not taking: Reported on 8/14/2021   dexlansoprazole (DEXILANT) 30 MG capsule   Yes No   Sig: Take 30 mg by mouth daily   esomeprazole (NexIUM) 20 mg capsule   No No   Sig: Take 1 capsule (20 mg total) by mouth daily in the early morning   glucosamine-chondroitin 500-400 MG tablet   Yes No   Sig: Take 1 tablet by mouth 3 (three) times a day   ibuprofen (MOTRIN) 200 mg tablet   Yes No   Sig: Take 1 tablet by mouth 3 (three) times a day as needed   losartan (COZAAR) 100 MG tablet   No No   Sig: take 1 tablet by mouth once daily   metoprolol tartrate (LOPRESSOR) 50 mg tablet   No No   Sig: take 1 tablet by mouth twice a day   simvastatin (ZOCOR) 20 mg tablet   No No   Sig: take 1 tablet by mouth once daily   traMADol (ULTRAM) 50 mg tablet   Yes No   Sig: Take 50 mg by mouth every 8 (eight) hours as needed   triamterene-hydrochlorothiazide (DYAZIDE) 37 5-25 mg per capsule   No No   Sig: take 1 capsule by mouth every morning      Facility-Administered Medications: None     Allergies: Allergies   Allergen Reactions    Aspirin GI Intolerance    Lisinopril Fatigue       ------------------------------------------------------------------------------------------------------------  Advance Directive and Living Will:      Power of :    POLST:    ------------------------------------------------------------------------------------------------------------  Anticipated Length of Stay is > 2 midnights    Care Time Delivered:   No Critical Care time spent       [facility]        Portions of the record may have been created with voice recognition software  Occasional wrong word or "sound a like" substitutions may have occurred due to the inherent limitations of voice recognition software    Read the chart carefully and recognize, using context, where substitutions have occurred

## 2021-08-14 NOTE — ASSESSMENT & PLAN NOTE
· Symptom onset was 8/8 with fatigue, poor appetite, cold sweats  · Progressed to diarrhea 8/10, cough and dyspnea with coughing fits  · Sick contact was significant other who was COVID + 2 weeks ago  · Presented to [facility] ED at the advisement of her daughter  · SpO2 was 85% on room air  Improved upon placement of midflow O2  · COVID + in ED 8/13  CT C/A/P with bibasilar pulmonary groundglass infiltrates consistent with Covid pneumonia  · Will initiate on moderate protocol  · Admission labs include Ferritin 608, CRP 6 3, D dimer 0 97 and procal 0 09  · Check blood type, baseline trop, CK  Admission BNP 17  · Atorvastatin 40mg qHS  · Dexamethasone 6mg IV daily x10D through 8/24 (D#1)  · She received 1 dose of Ceftriaxone and Doxycycline in the ED  Will hold on further doses as procal neg  Will monitor fever curve, CBC off of antibiotics  · Not a candidate for convalescent plasma as symptom onset -6D  · Remdesivir per protocol     · Heparin drip for full AC as Creat clearance <30

## 2021-08-14 NOTE — ASSESSMENT & PLAN NOTE
· History of R  Foot pain s/p surgery x2  Unable to locate surgical documents in care everywhere  · Takes Tramadol 50mg PO TID PRN for foot pain   Will continue for now and consider d/c if oxygenation status declines

## 2021-08-14 NOTE — ASSESSMENT & PLAN NOTE
· Baseline creat 0 94-1  28  Now with creat 5 56 and Crcl 20 in setting of 6 days of poor appetite and decreased PO intake  · S/p 3L IVF's in ED   Will hold on further IVF boluses at this time and allow for PO intake as to avoid worsening of COVID 19 pneumonia   · Close I&O monitoring, trend BMP, avoid nephrotoxins

## 2021-08-14 NOTE — ED NOTES
[address] ICU bed 5   Level 1 Step Down  Accepting physician Dr Suleiman Bustos   Phone # for report [phone] [employer] , RN  08/13/21 3665 [facility], RN  08/13/21 4214

## 2021-08-14 NOTE — EMTALA/ACUTE CARE TRANSFER
[address] [facility] [address]  Dept: [phone]      EMTALA TRANSFER CONSENT    NAME Devyn Abdi                                         1960                              MRN [id number]    I have been informed of my rights regarding examination, treatment, and transfer   by Dr Dionicio Olivares DO    Benefits: Specialized equipment and/or services available at the receiving facility (Include comment)________________________    Risks: Potential for delay in receiving treatment, Potential deterioration of medical condition, Loss of IV, Increased discomfort during transfer, Possible worsening of condition or death during transfer      Consent for Transfer:  I acknowledge that my medical condition has been evaluated and explained to me by the emergency department physician or other qualified medical person and/or my attending physician, who has recommended that I be transferred to the service of  Accepting Physician: Melani Fuentes at [address] Name, [address] : [facility]  The above potential benefits of such transfer, the potential risks associated with such transfer, and the probable risks of not being transferred have been explained to me, and I fully understand them  The doctor has explained that, in my case, the benefits of transfer outweigh the risks  I agree to be transferred  I authorize the performance of emergency medical procedures and treatments upon me in both transit and upon arrival at the receiving facility  Additionally, I authorize the release of any and all medical records to the receiving facility and request they be transported with me, if possible  I understand that the safest mode of transportation during a medical emergency is an ambulance and that the Hospital advocates the use of this mode of transport   Risks of traveling to the receiving facility by car, including absence of medical control, life sustaining equipment, such as oxygen, and medical personnel has been explained to me and I fully understand them  (MORRO CORRECT BOX BELOW)  [  ]  I consent to the stated transfer and to be transported by ambulance/helicopter  [  ]  I consent to the stated transfer, but refuse transportation by ambulance and accept full responsibility for my transportation by car  I understand the risks of non-ambulance transfers and I exonerate the Hospital and its staff from any deterioration in my condition that results from this refusal     X___________________________________________    DATE  21  TIME________  Signature of patient or legally responsible individual signing on patient behalf           RELATIONSHIP TO PATIENT_________________________          Provider Certification    NAME Eric Ogden                                         1960                              MRN [id number]    A medical screening exam was performed on the above named patient  Based on the examination:    Condition Necessitating Transfer The encounter diagnosis was COVID-19  Patient Condition: The patient has been stabilized such that within reasonable medical probability, no material deterioration of the patient condition or the condition of the unborn child(sean) is likely to result from the transfer    Reason for Transfer: Level of Care needed not available at this facility    Transfer Requirements: [address]   · Space available and qualified personnel available for treatment as acknowledged by [facility]  · Agreed to accept transfer and to provide appropriate medical treatment as acknowledged by       Jose Ozuna  · Appropriate medical records of the examination and treatment of the patient are provided at the time of transfer   [address] _______  · Transfer will be performed by qualified personnel from    and appropriate transfer equipment as required, including the use of necessary and appropriate life support measures      Provider Certification: I have examined the patient and explained the following risks and benefits of being transferred/refusing transfer to the patient/family:  General risk, such as traffic hazards, adverse weather conditions, rough terrain or turbulence, possible failure of equipment (including vehicle or aircraft), or consequences of actions of persons outside the control of the transport personnel, Unanticipated needs of medical equipment and personnel during transport, Risk of worsening condition, The possibility of a transport vehicle being unavailable      Based on these reasonable risks and benefits to the patient and/or the unborn child(sean), and based upon the information available at the time of the patients examination, I certify that the medical benefits reasonably to be expected from the provision of appropriate medical treatments at another medical facility outweigh the increasing risks, if any, to the individuals medical condition, and in the case of labor to the unborn child, from effecting the transfer      X____________________________________________ DATE 08/13/21        TIME_______      ORIGINAL - SEND TO MEDICAL RECORDS   COPY - SEND WITH PATIENT DURING TRANSFER

## 2021-08-14 NOTE — EMTALA/ACUTE CARE TRANSFER
[address] [facility] [address]  Dept: [phone]      EMTALA TRANSFER CONSENT    NAME Janet Morton                                         1960                              MRN [id number]    I have been informed of my rights regarding examination, treatment, and transfer   by Dr Miya Gibson DO    Benefits: Specialized equipment and/or services available at the receiving facility (Include comment)________________________    Risks: Potential for delay in receiving treatment, Potential deterioration of medical condition, Loss of IV, Increased discomfort during transfer, Possible worsening of condition or death during transfer      Consent for Transfer:  I acknowledge that my medical condition has been evaluated and explained to me by the emergency department physician or other qualified medical person and/or my attending physician, who has recommended that I be transferred to the service of  Accepting Physician: Elisha Stockton at [address] [facility] Name, [facility] : [facility]  The above potential benefits of such transfer, the potential risks associated with such transfer, and the probable risks of not being transferred have been explained to me, and I fully understand them  The doctor has explained that, in my case, the benefits of transfer outweigh the risks  I agree to be transferred  I authorize the performance of emergency medical procedures and treatments upon me in both transit and upon arrival at the receiving facility  Additionally, I authorize the release of any and all medical records to the receiving facility and request they be transported with me, if possible  I understand that the safest mode of transportation during a medical emergency is an ambulance and that the Hospital advocates the use of this mode of transport   Risks of traveling to the receiving facility by car, including absence of medical control, life sustaining equipment, such as oxygen, and medical personnel has been explained to me and I fully understand them  (MORRO CORRECT BOX BELOW)  [  ]  I consent to the stated transfer and to be transported by ambulance/helicopter  [  ]  I consent to the stated transfer, but refuse transportation by ambulance and accept full responsibility for my transportation by car  I understand the risks of non-ambulance transfers and I exonerate the Hospital and its staff from any deterioration in my condition that results from this refusal     X___________________________________________    DATE  21  TIME________  Signature of patient or legally responsible individual signing on patient behalf           RELATIONSHIP TO PATIENT_________________________          Provider Certification    NAME Meenu Hightower                                         1960                              MRN [id number]    A medical screening exam was performed on the above named patient  Based on the examination:    Condition Necessitating Transfer The encounter diagnosis was COVID-19      Patient Condition: The patient has been stabilized such that within reasonable medical probability, no material deterioration of the patient condition or the condition of the unborn child(sean) is likely to result from the transfer    Reason for Transfer: Level of Care needed not available at this facility    Transfer Requirements: [address]   · Space available and qualified personnel available for treatment as acknowledged by [facility]  · Agreed to accept transfer and to provide appropriate medical treatment as acknowledged by       [employer]  · Appropriate medical records of the examination and treatment of the patient are provided at the time of transfer   [address] _______  · Transfer will be performed by qualified personnel from    and appropriate transfer equipment as required, including the use of necessary and appropriate life support measures  Provider Certification: I have examined the patient and explained the following risks and benefits of being transferred/refusing transfer to the patient/family:  General risk, such as traffic hazards, adverse weather conditions, rough terrain or turbulence, possible failure of equipment (including vehicle or aircraft), or consequences of actions of persons outside the control of the transport personnel, Unanticipated needs of medical equipment and personnel during transport, Risk of worsening condition, The possibility of a transport vehicle being unavailable      Based on these reasonable risks and benefits to the patient and/or the unborn child(sean), and based upon the information available at the time of the patients examination, I certify that the medical benefits reasonably to be expected from the provision of appropriate medical treatments at another medical facility outweigh the increasing risks, if any, to the individuals medical condition, and in the case of labor to the unborn child, from effecting the transfer      X____________________________________________ DATE 08/13/21        TIME_______      ORIGINAL - SEND TO MEDICAL RECORDS   COPY - SEND WITH PATIENT DURING TRANSFER

## 2021-08-15 LAB
ALBUMIN SERPL BCP-MCNC: 2.4 G/DL (ref 3.5–5)
ALP SERPL-CCNC: 61 U/L (ref 46–116)
ALT SERPL W P-5'-P-CCNC: 30 U/L (ref 12–78)
ANION GAP SERPL CALCULATED.3IONS-SCNC: 16 MMOL/L (ref 4–13)
APTT PPP: 100 SECONDS (ref 23–37)
APTT PPP: 46 SECONDS (ref 23–37)
APTT PPP: 73 SECONDS (ref 23–37)
APTT PPP: >210 SECONDS (ref 23–37)
AST SERPL W P-5'-P-CCNC: 40 U/L (ref 5–45)
BASOPHILS # BLD AUTO: 0.02 THOUSANDS/ΜL (ref 0–0.1)
BASOPHILS NFR BLD AUTO: 0 % (ref 0–1)
BILIRUB SERPL-MCNC: 0.34 MG/DL (ref 0.2–1)
BUN SERPL-MCNC: 56 MG/DL (ref 5–25)
CALCIUM ALBUM COR SERPL-MCNC: 8.5 MG/DL (ref 8.3–10.1)
CALCIUM SERPL-MCNC: 7.2 MG/DL (ref 8.3–10.1)
CHLORIDE SERPL-SCNC: 107 MMOL/L (ref 100–108)
CO2 SERPL-SCNC: 20 MMOL/L (ref 21–32)
CREAT SERPL-MCNC: 2.93 MG/DL (ref 0.6–1.3)
CRP SERPL QL: 44.5 MG/L
EOSINOPHIL # BLD AUTO: 0 THOUSAND/ΜL (ref 0–0.61)
EOSINOPHIL NFR BLD AUTO: 0 % (ref 0–6)
ERYTHROCYTE [DISTWIDTH] IN BLOOD BY AUTOMATED COUNT: 13.2 % (ref 11.6–15.1)
GFR SERPL CREATININE-BSD FRML MDRD: 17 ML/MIN/1.73SQ M
GLUCOSE SERPL-MCNC: 187 MG/DL (ref 65–140)
GLUCOSE SERPL-MCNC: 207 MG/DL (ref 65–140)
GLUCOSE SERPL-MCNC: 216 MG/DL (ref 65–140)
GLUCOSE SERPL-MCNC: 255 MG/DL (ref 65–140)
GLUCOSE SERPL-MCNC: 302 MG/DL (ref 65–140)
HCT VFR BLD AUTO: 38.2 % (ref 34.8–46.1)
HGB BLD-MCNC: 12.9 G/DL (ref 11.5–15.4)
IMM GRANULOCYTES # BLD AUTO: 0.09 THOUSAND/UL (ref 0–0.2)
IMM GRANULOCYTES NFR BLD AUTO: 1 % (ref 0–2)
LYMPHOCYTES # BLD AUTO: 1.9 THOUSANDS/ΜL (ref 0.6–4.47)
LYMPHOCYTES NFR BLD AUTO: 15 % (ref 14–44)
MAGNESIUM SERPL-MCNC: 1.7 MG/DL (ref 1.6–2.6)
MCH RBC QN AUTO: 29 PG (ref 26.8–34.3)
MCHC RBC AUTO-ENTMCNC: 33.8 G/DL (ref 31.4–37.4)
MCV RBC AUTO: 86 FL (ref 82–98)
MONOCYTES # BLD AUTO: 0.94 THOUSAND/ΜL (ref 0.17–1.22)
MONOCYTES NFR BLD AUTO: 7 % (ref 4–12)
MRSA NOSE QL CULT: NORMAL
NEUTROPHILS # BLD AUTO: 10 THOUSANDS/ΜL (ref 1.85–7.62)
NEUTS SEG NFR BLD AUTO: 77 % (ref 43–75)
NRBC BLD AUTO-RTO: 0 /100 WBCS
PHOSPHATE SERPL-MCNC: 2.3 MG/DL (ref 2.3–4.1)
PLATELET # BLD AUTO: 242 THOUSANDS/UL (ref 149–390)
PMV BLD AUTO: 10.5 FL (ref 8.9–12.7)
POTASSIUM SERPL-SCNC: 3.1 MMOL/L (ref 3.5–5.3)
PROCALCITONIN SERPL-MCNC: 0.08 NG/ML
PROCALCITONIN SERPL-MCNC: <0.05 NG/ML
PROT SERPL-MCNC: 6.6 G/DL (ref 6.4–8.2)
RBC # BLD AUTO: 4.45 MILLION/UL (ref 3.81–5.12)
SODIUM SERPL-SCNC: 143 MMOL/L (ref 136–145)
WBC # BLD AUTO: 12.95 THOUSAND/UL (ref 4.31–10.16)

## 2021-08-15 PROCEDURE — 85730 THROMBOPLASTIN TIME PARTIAL: CPT | Performed by: ANESTHESIOLOGY

## 2021-08-15 PROCEDURE — 80053 COMPREHEN METABOLIC PANEL: CPT | Performed by: PHYSICIAN ASSISTANT

## 2021-08-15 PROCEDURE — 87493 C DIFF AMPLIFIED PROBE: CPT | Performed by: PHYSICIAN ASSISTANT

## 2021-08-15 PROCEDURE — 94760 N-INVAS EAR/PLS OXIMETRY 1: CPT

## 2021-08-15 PROCEDURE — 84145 PROCALCITONIN (PCT): CPT | Performed by: PHYSICIAN ASSISTANT

## 2021-08-15 PROCEDURE — 94660 CPAP INITIATION&MGMT: CPT

## 2021-08-15 PROCEDURE — 83735 ASSAY OF MAGNESIUM: CPT | Performed by: PHYSICIAN ASSISTANT

## 2021-08-15 PROCEDURE — 86140 C-REACTIVE PROTEIN: CPT | Performed by: ANESTHESIOLOGY

## 2021-08-15 PROCEDURE — 94640 AIRWAY INHALATION TREATMENT: CPT

## 2021-08-15 PROCEDURE — 99232 SBSQ HOSP IP/OBS MODERATE 35: CPT | Performed by: ANESTHESIOLOGY

## 2021-08-15 PROCEDURE — 85025 COMPLETE CBC W/AUTO DIFF WBC: CPT | Performed by: PHYSICIAN ASSISTANT

## 2021-08-15 PROCEDURE — 82948 REAGENT STRIP/BLOOD GLUCOSE: CPT

## 2021-08-15 PROCEDURE — 84100 ASSAY OF PHOSPHORUS: CPT | Performed by: PHYSICIAN ASSISTANT

## 2021-08-15 RX ORDER — MAGNESIUM SULFATE HEPTAHYDRATE 40 MG/ML
4 INJECTION, SOLUTION INTRAVENOUS ONCE
Status: COMPLETED | OUTPATIENT
Start: 2021-08-15 | End: 2021-08-15

## 2021-08-15 RX ORDER — INSULIN GLARGINE 100 [IU]/ML
10 INJECTION, SOLUTION SUBCUTANEOUS
Status: DISCONTINUED | OUTPATIENT
Start: 2021-08-15 | End: 2021-08-17

## 2021-08-15 RX ORDER — UREA 10 %
500 LOTION (ML) TOPICAL
Status: DISCONTINUED | OUTPATIENT
Start: 2021-08-15 | End: 2021-08-15

## 2021-08-15 RX ORDER — POTASSIUM CHLORIDE 20 MEQ/1
40 TABLET, EXTENDED RELEASE ORAL
Status: COMPLETED | OUTPATIENT
Start: 2021-08-15 | End: 2021-08-15

## 2021-08-15 RX ADMIN — INSULIN LISPRO 2 UNITS: 100 INJECTION, SOLUTION INTRAVENOUS; SUBCUTANEOUS at 11:57

## 2021-08-15 RX ADMIN — INSULIN GLARGINE 10 UNITS: 100 INJECTION, SOLUTION SUBCUTANEOUS at 21:53

## 2021-08-15 RX ADMIN — HEPARIN SODIUM 4000 UNITS: 1000 INJECTION INTRAVENOUS; SUBCUTANEOUS at 22:47

## 2021-08-15 RX ADMIN — DEXAMETHASONE SODIUM PHOSPHATE 6 MG: 4 INJECTION INTRA-ARTICULAR; INTRALESIONAL; INTRAMUSCULAR; INTRAVENOUS; SOFT TISSUE at 11:30

## 2021-08-15 RX ADMIN — ISODIUM CHLORIDE 3 ML: 0.03 SOLUTION RESPIRATORY (INHALATION) at 07:23

## 2021-08-15 RX ADMIN — MAGNESIUM SULFATE HEPTAHYDRATE 4 G: 40 INJECTION, SOLUTION INTRAVENOUS at 11:30

## 2021-08-15 RX ADMIN — POTASSIUM CHLORIDE 40 MEQ: 1500 TABLET, EXTENDED RELEASE ORAL at 11:30

## 2021-08-15 RX ADMIN — POTASSIUM PHOSPHATE, MONOBASIC AND POTASSIUM PHOSPHATE, DIBASIC 12 MMOL: 224; 236 INJECTION, SOLUTION, CONCENTRATE INTRAVENOUS at 08:34

## 2021-08-15 RX ADMIN — ISODIUM CHLORIDE 3 ML: 0.03 SOLUTION RESPIRATORY (INHALATION) at 19:45

## 2021-08-15 RX ADMIN — MAGNESIUM OXIDE TAB 400 MG (241.3 MG ELEMENTAL MG) 800 MG: 400 (241.3 MG) TAB at 11:30

## 2021-08-15 RX ADMIN — PANTOPRAZOLE SODIUM 20 MG: 20 TABLET, DELAYED RELEASE ORAL at 06:39

## 2021-08-15 RX ADMIN — ISODIUM CHLORIDE 3 ML: 0.03 SOLUTION RESPIRATORY (INHALATION) at 14:26

## 2021-08-15 RX ADMIN — POTASSIUM CHLORIDE 40 MEQ: 1500 TABLET, EXTENDED RELEASE ORAL at 07:55

## 2021-08-15 RX ADMIN — DOXYCYCLINE 100 MG: 100 CAPSULE ORAL at 02:12

## 2021-08-15 RX ADMIN — POTASSIUM CHLORIDE 40 MEQ: 1500 TABLET, EXTENDED RELEASE ORAL at 17:35

## 2021-08-15 RX ADMIN — Medication 500 MG: at 08:33

## 2021-08-15 RX ADMIN — INSULIN LISPRO 1 UNITS: 100 INJECTION, SOLUTION INTRAVENOUS; SUBCUTANEOUS at 07:55

## 2021-08-15 RX ADMIN — INSULIN LISPRO 3 UNITS: 100 INJECTION, SOLUTION INTRAVENOUS; SUBCUTANEOUS at 17:41

## 2021-08-15 RX ADMIN — ATORVASTATIN CALCIUM 40 MG: 40 TABLET, FILM COATED ORAL at 21:53

## 2021-08-15 RX ADMIN — MAGNESIUM OXIDE TAB 400 MG (241.3 MG ELEMENTAL MG) 800 MG: 400 (241.3 MG) TAB at 17:35

## 2021-08-15 RX ADMIN — GLYCERIN, PETROLATUM, PHENYLEPHRINE HCL, PRAMOXINE HCL 1 APPLICATION: 144; 2.5; 10; 15 CREAM TOPICAL at 15:19

## 2021-08-15 RX ADMIN — REMDESIVIR 100 MG: 100 INJECTION, POWDER, LYOPHILIZED, FOR SOLUTION INTRAVENOUS at 07:00

## 2021-08-15 NOTE — ASSESSMENT & PLAN NOTE
· Baseline creat 0 94-1  28  Was 5 56 on arrival to ED in setting of 6 days of poor appetite, diarrhea, and decreased PO intake  · S/p 3L IVF's in ED  Minimal improvement in Creat to 4 67 on repeat labs 8/14   AM labs pending    · S/p additional 75ml isolyte x12 hours yesterday for gentle rehydration  · Close I&O monitoring, trend BMP, avoid nephrotoxins

## 2021-08-15 NOTE — QUICK NOTE
I personally spoke with patient's significant other, Pat Samuels, and provided clinical update  All questions answered

## 2021-08-15 NOTE — ASSESSMENT & PLAN NOTE
Lab Results   Component Value Date    HGBA1C 7 8 (H) 05/11/2021       Recent Labs     08/14/21  1136 08/14/21  1724 08/14/21  2206   POCGLU 248* 206* 228*       Blood Sugar Average: Last 72 hrs:  · (P) 691 [id number]   · Has tried to control BG with diet  · BG AC/HS, increased to Algorithm 3 yesterday  May require further increase to achieve goal BG<180 in setting of steroid admin

## 2021-08-15 NOTE — ASSESSMENT & PLAN NOTE
· History of HTN on losartan  Will hold here given ANTHONY  · Cardiac monitoring/VS per protocol  · Consider PRN antihypertensives if SBP persistently >180   Currently 110-120's

## 2021-08-15 NOTE — ASSESSMENT & PLAN NOTE
· Nutrition and exercise counseling when appropriate  · Habitus likely contributing to clinical picture

## 2021-08-15 NOTE — ASSESSMENT & PLAN NOTE
· AEB hypoxia, SpO2 85% in ED, tachypnea with RR 25 and HERNANDEZ necessitating placement of midflow O2 to 10L  COVID +in ED 8/13  · Symptom onset Sunday 8/8 with fatigue, poor appetite, cold sweats  Sick contact w/significant other who was COVID + 2 weeks prior  · Developed diarrhea 8/10, cough and dyspnea with coughing fits  · CT C/A/P with bibasilar pulmonary groundglass infiltrates consistent with Covid pneumonia  · Cont  on moderate protocol  · Admission labs include Ferritin 608, CRP 6 3, D dimer 0 97 and procal 0 09  Repeat levels w/CRP 67 and procal 0 08  · Antibiotics discontinued as procal neg x2   · Atorvastatin 40mg qHS  · Dexamethasone 6mg IV daily x10D through 8/24 (D#3)  · Monitor fever curve, CBC  · Not a candidate for convalescent plasma as symptom onset -6D  · Remdesivir per protocol  · Heparin drip for full AC as Creat clearance <30  · FiO2 uptitrated yesterday to 12L midflow due to hypoxia  Used CPAP 10/50% overnight with very mild improvement in oxygenation to 93% from 91%  C/o discomfort with mask and slept poorly     · Encouraged self proning today

## 2021-08-15 NOTE — PROGRESS NOTES
[address]  Progress Note Nahid Vega 1960, 63 y o  female MRN: [id number]  Unit/Bed#: ICU 05 Encounter: [id number]  Primary Care Provider: Jose Manuel Dillon MD   Date and time admitted to hospital: 8/14/2021  1:01 AM    * Acute respiratory failure with hypoxia due to COVID 19 pneumonia  Assessment & Plan  · AEB hypoxia, SpO2 85% in ED, tachypnea with RR 25 and HERNANDEZ necessitating placement of midflow O2 to 10L  COVID +in ED 8/13  · Symptom onset Sunday 8/8 with fatigue, poor appetite, cold sweats  Sick contact w/significant other who was COVID + 2 weeks prior  · Developed diarrhea 8/10, cough and dyspnea with coughing fits  · CT C/A/P with bibasilar pulmonary groundglass infiltrates consistent with Covid pneumonia  · Cont  on moderate protocol  · Admission labs include Ferritin 608, CRP 6 3, D dimer 0 97 and procal 0 09  Repeat levels w/CRP 67 and procal 0 08  · Antibiotics discontinued as procal neg x2   · Atorvastatin 40mg qHS  · Dexamethasone 6mg IV daily x10D through 8/24 (D#3)  · Monitor fever curve, CBC  · Not a candidate for convalescent plasma as symptom onset -6D  · Remdesivir per protocol  · Heparin drip for full AC as Creat clearance <30  · FiO2 uptitrated yesterday to 12L midflow due to hypoxia  Used CPAP 10/50% overnight with very mild improvement in oxygenation to 93% from 91%  C/o discomfort with mask and slept poorly  · Encouraged self proning today       Acute kidney injury ([facility] [address] )  Assessment & Plan  · Baseline creat 0 94-1  28  Was 5 56 on arrival to ED in setting of 6 days of poor appetite, diarrhea, and decreased PO intake  · S/p 3L IVF's in ED  Minimal improvement in Creat to 4 67 on repeat labs 8/14   AM labs pending    · S/p additional 75ml isolyte x12 hours yesterday for gentle rehydration  · Close I&O monitoring, trend BMP, avoid nephrotoxins    Type 2 diabetes mellitus without complication, without long-term current use of insulin (HCC)  Assessment & Plan  Lab Results   Component Value Date    HGBA1C 7 8 (H) 05/11/2021       Recent Labs     08/14/21  1136 08/14/21  1724 08/14/21  2206   POCGLU 248* 206* 228*       Blood Sugar Average: Last 72 hrs:  · (P) 185 [id number]   · Has tried to control BG with diet  · BG AC/HS, increased to Algorithm 3 yesterday  May require further increase to achieve goal BG<180 in setting of steroid admin  Essential hypertension  Assessment & Plan  · History of HTN on losartan  Will hold here given ANTHONY  · Cardiac monitoring/VS per protocol  · Consider PRN antihypertensives if SBP persistently >180  Currently 110-120's      BMI 39 0-39 9,adult  Assessment & Plan  · Nutrition and exercise counseling when appropriate  · Habitus likely contributing to clinical picture      Gastroesophageal reflux disease without esophagitis  Assessment & Plan  · Takes esomeprazole  · Replaced with Protonix 20mg PO daily      Mixed hyperlipidemia  Assessment & Plan  · Replaced home simvastatin with atorvastatin per COVID protocol    Pain in both feet  Assessment & Plan  · History of R  Foot pain s/p surgery x2  Unable to locate surgical documents in care everywhere  · Takes Tramadol 50mg PO TID PRN for foot pain  Will continue for now and consider d/c if oxygenation status declines      ----------------------------------------------------------------------------------------  HPI/24hr events: Required up-titration of FiO2 to 12L midflow yesterday  Tolerated CPAP 10/50% overnight for recruitment with minimal increase in SpO2 to 93% from 90-91%  Reports discomfort of mask and subsequently poor sleep overnight  Briefly desaturated to 80's when O2 removed for med administration  Discussed self proning plans for today while on midflow  She is resistant due to chronic pain but agreeable to try it  Procal negative x2, antibiotics discontinued  Appetite improved yesterday  BG uncontrolled >180, may require further escalation of SSI algorithm   No other changes  Patient appropriate for transfer out of the ICU today?: Patient does not meet criteria for ICU Follow-up Clinic; referral has not been made  Disposition: Transfer to Med-Surg   Code Status: Level 1 - Full Code  ---------------------------------------------------------------------------------------  SUBJECTIVE  "I hardly slept last night, the mask was uncomfortable  I'm still coughing and get short of breath with the coughing fits  I'm having pain and don't think I can lay on my stomach; I'm a side sleeper "    Review of Systems   Constitutional: Positive for fatigue (barely slept last night )  Negative for appetite change (improved appetite yesterday) and chills  HENT: Negative  Respiratory: Positive for cough (coughing fits causing SOB, not improved with tessalon) and shortness of breath (with exertion/coughing)  Negative for wheezing  Cardiovascular: Negative for chest pain, palpitations and leg swelling  Gastrointestinal: Positive for abdominal pain (muscular pain when coughing)  Negative for constipation, nausea and vomiting  Endocrine: Negative  Genitourinary: Negative  Musculoskeletal: Positive for arthralgias (generalized and back while laying in bed)  Skin: Negative  Allergic/Immunologic: Negative  Neurological: Negative  Psychiatric/Behavioral: Negative  All other systems reviewed and are negative      Review of systems was reviewed and negative unless stated above in HPI/24-hour events   ---------------------------------------------------------------------------------------  OBJECTIVE    Vitals   Vitals:    08/15/21 0000 08/15/21 0045 08/15/21 0200 08/15/21 0400   BP: 143/68  141/71    BP Location:       Pulse: 79  77 87   Resp: (!) 36  21 22   Temp:    97 8 °F (36 6 °C)   TempSrc:       SpO2: 93% 93% 94% 92%   Weight:       Height:         Temp (24hrs), Av 5 °F (36 4 °C), Min:97 1 °F (36 2 °C), Max:97 8 °F (36 6 °C)  Current: Temperature: 97 8 °F (36 6 °C)          Respiratory:  SpO2: SpO2: 92 %, SpO2 Activity: SpO2 Activity: At Rest, SpO2 Device: O2 Device: Mid flow nasal cannula  Nasal Cannula O2 Flow Rate (L/min): 12 L/min    Invasive/non-invasive ventilation settings   Respiratory    Lab Data (Last 4 hours)    None         O2/Vent Data (Last 4 hours)    None                Physical Exam  Vitals and nursing note reviewed  Constitutional:       General: She is not in acute distress  Appearance: She is obese  She is ill-appearing  She is not toxic-appearing  HENT:      Head: Normocephalic and atraumatic  Mouth/Throat:      Mouth: Mucous membranes are moist    Eyes:      General: Lids are normal       Pupils: Pupils are equal, round, and reactive to light  Cardiovascular:      Rate and Rhythm: Regular rhythm  Tachycardia present  Pulses: Normal pulses  Radial pulses are 2+ on the right side and 2+ on the left side  Dorsalis pedis pulses are 2+ on the right side and 2+ on the left side  Heart sounds: Normal heart sounds  No murmur heard  Pulmonary:      Effort: No respiratory distress  Breath sounds: No wheezing, rhonchi or rales  Abdominal:      General: There is no distension  Palpations: Abdomen is soft  Tenderness: There is abdominal tenderness (abdominal muscles tender)  There is no guarding  Musculoskeletal:      Right lower leg: No edema  Left lower leg: No edema  Skin:     General: Skin is warm and dry  Coloration: Skin is pale  Neurological:      General: No focal deficit present  Mental Status: She is alert and oriented to person, place, and time  Psychiatric:         Attention and Perception: Attention normal          Behavior: Behavior is cooperative           Laboratory and Diagnostics:  Results from last 7 days   Lab Units 08/14/21  0627 08/13/21  1809   WBC Thousand/uL 5 10 7 28   HEMOGLOBIN g/dL 13 7 14 9   HEMATOCRIT % 43 0 43 9   PLATELETS Thousands/uL 180 210 BANDS PCT % 3  --    MONO PCT % 5 7     Results from last 7 days   Lab Units 08/14/21  1400 08/14/21  0627 08/13/21  1809   SODIUM mmol/L 139 137 132*   POTASSIUM mmol/L 3 7 3 7 4 0   CHLORIDE mmol/L 104 104 95*   CO2 mmol/L 21 15* 24   ANION GAP mmol/L 14* 18* 13   BUN mg/dL 55* 53* 51*   CREATININE mg/dL 4 26* 4 67* 5 56*   CALCIUM mg/dL 6 9* 7 0* 8 1*   GLUCOSE RANDOM mg/dL 262* 191* 126   ALT U/L  --  31 23   AST U/L  --  47* 44*   ALK PHOS U/L  --  59 55 8   ALBUMIN g/dL  --  2 4* 3 6   TOTAL BILIRUBIN mg/dL  --  0 33 0 49     Results from last 7 days   Lab Units 08/14/21  0627   MAGNESIUM mg/dL 1 6   PHOSPHORUS mg/dL 3 7      Results from last 7 days   Lab Units 08/15/21  0212 08/14/21  1843 08/14/21  1753 08/14/21  0945 08/14/21  0248   INR   --   --   --   --  1 05   PTT seconds >210* 209* >210* >210* 26      Results from last 7 days   Lab Units 08/14/21  0247   TROPONIN I ng/mL <0 02     Results from last 7 days   Lab Units 08/14/21  1400 08/13/21  1809   LACTIC ACID mmol/L 1 0 1 4     ABG:    VBG:    Results from last 7 days   Lab Units 08/14/21  1400 08/13/21  1809   PROCALCITONIN ng/ml 0 08 0 09       Micro  Results from last 7 days   Lab Units 08/13/21  1834 08/13/21  1829   BLOOD CULTURE  No Growth at 24 hrs  No Growth at 24 hrs  EKG: NSR on monitor  Rate 70's-80's  Imaging: No new imaging    Intake and Output  I/O       08/13 0701 - 08/14 0700 08/14 0701 - 08/15 0700    P  O  200 230    I V  (mL/kg) 57 (0 6) 1217 7 (12 2)    IV Piggyback  300    Total Intake(mL/kg) 257 (2 6) 1747 7 (17 5)    Urine (mL/kg/hr) 550 1525 (0 6)    Total Output 550 1525    Net -293 +222 7                Height and Weights   Height: 5' 3" (160 cm)  IBW (Ideal Body Weight): 52 4 kg  Body mass index is 39 13 kg/m²    Weight (last 2 days)     Date/Time   Weight    08/14/21 0600   100 (220 9)    08/14/21 0142   100 (221 34)    08/14/21 0100   100 (221 34)                Nutrition       Diet Orders   (From admission, onward)             Start     Ordered    08/14/21 0113  Diet Zaki/CHO Controlled; Consistent Carbohydrate Diet Level 2 (5 carb servings/75 grams CHO/meal)  Diet effective now     Question Answer Comment   Diet Type Zaki/CHO Controlled    Zaki/CHO Controlled Consistent Carbohydrate Diet Level 2 (5 carb servings/75 grams CHO/meal)    RD to adjust diet per protocol?  Yes        08/14/21 0141                  Active Medications  Scheduled Meds:  Current Facility-Administered Medications   Medication Dose Route Frequency Provider Last Rate    atorvastatin  40 mg Oral HS Brenna Rome, CRNP      benzonatate  100 mg Oral TID PRN Anna Mariah, PA-C      dexamethasone  6 mg Intravenous Q24H Brenna Rome, CRNP      heparin (porcine)  3-30 Units/kg/hr (Order-Specific) Intravenous Titrated Brenna Rome, CRNP 9 Units/kg/hr (08/15/21 0356)    heparin (porcine)  4,000 Units Intravenous Q1H PRN Brenna Rome, CRNP      heparin (porcine)  8,000 Units Intravenous Q1H PRN Brenna Rome, CRNP      insulin lispro  1-6 Units Subcutaneous TID AC Anna Mariah, PA-C      pantoprazole  20 mg Oral Early Morning Brenna Rome, CRNP      remdesivir  100 mg Intravenous Q24H Brenna Rome, CRNP      sodium chloride  3 mL Nebulization TID Anna Mariah, PA-C      traMADol  50 mg Oral Q8H PRN Brenna Rome, CRNP       Continuous Infusions:  heparin (porcine), 3-30 Units/kg/hr (Order-Specific), Last Rate: 9 Units/kg/hr (08/15/21 0356)      PRN Meds:   benzonatate, 100 mg, TID PRN  heparin (porcine), 4,000 Units, Q1H PRN  heparin (porcine), 8,000 Units, Q1H PRN  traMADol, 50 mg, Q8H PRN        Invasive Devices Review  Invasive Devices     Peripheral Intravenous Line            Peripheral IV 08/13/21 Right Antecubital 1 day    Peripheral IV 08/14/21 Proximal;Right;Ventral (anterior) Forearm 1 day                Rationale for remaining devices: PIV's for med infusions  ---------------------------------------------------------------------------------------  Advance Directive and Living Will:      Power of :    POLST:    ---------------------------------------------------------------------------------------  Care Time Delivered:   No Critical Care time spent       [facility]      Portions of the record may have been created with voice recognition software  Occasional wrong word or "sound a like" substitutions may have occurred due to the inherent limitations of voice recognition software    Read the chart carefully and recognize, using context, where substitutions have occurred

## 2021-08-16 ENCOUNTER — APPOINTMENT (INPATIENT)
Dept: RADIOLOGY | Facility: HOSPITAL | Age: 61
DRG: 177 | End: 2021-08-16
Payer: MEDICARE

## 2021-08-16 LAB
ANION GAP SERPL CALCULATED.3IONS-SCNC: 13 MMOL/L (ref 4–13)
APTT PPP: 128 SECONDS (ref 23–37)
APTT PPP: 185 SECONDS (ref 23–37)
APTT PPP: 28 SECONDS (ref 23–37)
BASOPHILS # BLD AUTO: 0.01 THOUSANDS/ΜL (ref 0–0.1)
BASOPHILS NFR BLD AUTO: 0 % (ref 0–1)
BUN SERPL-MCNC: 45 MG/DL (ref 5–25)
C DIFF TOX B TCDB STL QL NAA+PROBE: NEGATIVE
CALCIUM SERPL-MCNC: 7.4 MG/DL (ref 8.3–10.1)
CHLORIDE SERPL-SCNC: 110 MMOL/L (ref 100–108)
CO2 SERPL-SCNC: 20 MMOL/L (ref 21–32)
CREAT SERPL-MCNC: 2.03 MG/DL (ref 0.6–1.3)
EOSINOPHIL # BLD AUTO: 0 THOUSAND/ΜL (ref 0–0.61)
EOSINOPHIL NFR BLD AUTO: 0 % (ref 0–6)
ERYTHROCYTE [DISTWIDTH] IN BLOOD BY AUTOMATED COUNT: 13.2 % (ref 11.6–15.1)
GFR SERPL CREATININE-BSD FRML MDRD: 26 ML/MIN/1.73SQ M
GLUCOSE SERPL-MCNC: 141 MG/DL (ref 65–140)
GLUCOSE SERPL-MCNC: 200 MG/DL (ref 65–140)
GLUCOSE SERPL-MCNC: 202 MG/DL (ref 65–140)
GLUCOSE SERPL-MCNC: 211 MG/DL (ref 65–140)
GLUCOSE SERPL-MCNC: 224 MG/DL (ref 65–140)
HCT VFR BLD AUTO: 37.6 % (ref 34.8–46.1)
HGB BLD-MCNC: 12.4 G/DL (ref 11.5–15.4)
IMM GRANULOCYTES # BLD AUTO: 0.15 THOUSAND/UL (ref 0–0.2)
IMM GRANULOCYTES NFR BLD AUTO: 1 % (ref 0–2)
LYMPHOCYTES # BLD AUTO: 1.18 THOUSANDS/ΜL (ref 0.6–4.47)
LYMPHOCYTES NFR BLD AUTO: 10 % (ref 14–44)
MAGNESIUM SERPL-MCNC: 2.8 MG/DL (ref 1.6–2.6)
MCH RBC QN AUTO: 28.5 PG (ref 26.8–34.3)
MCHC RBC AUTO-ENTMCNC: 33 G/DL (ref 31.4–37.4)
MCV RBC AUTO: 86 FL (ref 82–98)
MONOCYTES # BLD AUTO: 0.87 THOUSAND/ΜL (ref 0.17–1.22)
MONOCYTES NFR BLD AUTO: 7 % (ref 4–12)
NEUTROPHILS # BLD AUTO: 9.9 THOUSANDS/ΜL (ref 1.85–7.62)
NEUTS SEG NFR BLD AUTO: 82 % (ref 43–75)
NRBC BLD AUTO-RTO: 0 /100 WBCS
PHOSPHATE SERPL-MCNC: 2 MG/DL (ref 2.3–4.1)
PLATELET # BLD AUTO: 273 THOUSANDS/UL (ref 149–390)
PMV BLD AUTO: 10.7 FL (ref 8.9–12.7)
POTASSIUM SERPL-SCNC: 4.6 MMOL/L (ref 3.5–5.3)
RBC # BLD AUTO: 4.35 MILLION/UL (ref 3.81–5.12)
SODIUM SERPL-SCNC: 143 MMOL/L (ref 136–145)
WBC # BLD AUTO: 12.11 THOUSAND/UL (ref 4.31–10.16)

## 2021-08-16 PROCEDURE — 85730 THROMBOPLASTIN TIME PARTIAL: CPT | Performed by: ANESTHESIOLOGY

## 2021-08-16 PROCEDURE — 99232 SBSQ HOSP IP/OBS MODERATE 35: CPT | Performed by: INTERNAL MEDICINE

## 2021-08-16 PROCEDURE — 94640 AIRWAY INHALATION TREATMENT: CPT

## 2021-08-16 PROCEDURE — 83735 ASSAY OF MAGNESIUM: CPT | Performed by: PHYSICIAN ASSISTANT

## 2021-08-16 PROCEDURE — 82948 REAGENT STRIP/BLOOD GLUCOSE: CPT

## 2021-08-16 PROCEDURE — 94660 CPAP INITIATION&MGMT: CPT

## 2021-08-16 PROCEDURE — 85025 COMPLETE CBC W/AUTO DIFF WBC: CPT | Performed by: PHYSICIAN ASSISTANT

## 2021-08-16 PROCEDURE — 84100 ASSAY OF PHOSPHORUS: CPT | Performed by: PHYSICIAN ASSISTANT

## 2021-08-16 PROCEDURE — 94760 N-INVAS EAR/PLS OXIMETRY 1: CPT

## 2021-08-16 PROCEDURE — 85730 THROMBOPLASTIN TIME PARTIAL: CPT | Performed by: INTERNAL MEDICINE

## 2021-08-16 PROCEDURE — 80048 BASIC METABOLIC PNL TOTAL CA: CPT | Performed by: PHYSICIAN ASSISTANT

## 2021-08-16 PROCEDURE — 71045 X-RAY EXAM CHEST 1 VIEW: CPT

## 2021-08-16 RX ADMIN — ISODIUM CHLORIDE 3 ML: 0.03 SOLUTION RESPIRATORY (INHALATION) at 08:22

## 2021-08-16 RX ADMIN — TRAMADOL HYDROCHLORIDE 50 MG: 50 TABLET, FILM COATED ORAL at 03:48

## 2021-08-16 RX ADMIN — REMDESIVIR 100 MG: 100 INJECTION, POWDER, LYOPHILIZED, FOR SOLUTION INTRAVENOUS at 07:04

## 2021-08-16 RX ADMIN — ISODIUM CHLORIDE 3 ML: 0.03 SOLUTION RESPIRATORY (INHALATION) at 20:35

## 2021-08-16 RX ADMIN — PANTOPRAZOLE SODIUM 20 MG: 20 TABLET, DELAYED RELEASE ORAL at 06:51

## 2021-08-16 RX ADMIN — MAGNESIUM OXIDE TAB 400 MG (241.3 MG ELEMENTAL MG) 800 MG: 400 (241.3 MG) TAB at 17:11

## 2021-08-16 RX ADMIN — DEXAMETHASONE SODIUM PHOSPHATE 6 MG: 4 INJECTION INTRA-ARTICULAR; INTRALESIONAL; INTRAMUSCULAR; INTRAVENOUS; SOFT TISSUE at 12:20

## 2021-08-16 RX ADMIN — INSULIN GLARGINE 10 UNITS: 100 INJECTION, SOLUTION SUBCUTANEOUS at 21:52

## 2021-08-16 RX ADMIN — ATORVASTATIN CALCIUM 40 MG: 40 TABLET, FILM COATED ORAL at 21:52

## 2021-08-16 RX ADMIN — HEPARIN SODIUM 8000 UNITS: 1000 INJECTION INTRAVENOUS; SUBCUTANEOUS at 15:14

## 2021-08-16 RX ADMIN — HEPARIN SODIUM 7 UNITS/KG/HR: 10000 INJECTION, SOLUTION INTRAVENOUS at 07:03

## 2021-08-16 RX ADMIN — ISODIUM CHLORIDE 3 ML: 0.03 SOLUTION RESPIRATORY (INHALATION) at 14:52

## 2021-08-16 RX ADMIN — INSULIN LISPRO 4 UNITS: 100 INJECTION, SOLUTION INTRAVENOUS; SUBCUTANEOUS at 12:20

## 2021-08-16 RX ADMIN — GLYCERIN, PETROLATUM, PHENYLEPHRINE HCL, PRAMOXINE HCL: 144; 2.5; 10; 15 CREAM TOPICAL at 15:38

## 2021-08-16 RX ADMIN — INSULIN LISPRO 4 UNITS: 100 INJECTION, SOLUTION INTRAVENOUS; SUBCUTANEOUS at 08:52

## 2021-08-16 RX ADMIN — GLYCERIN, PETROLATUM, PHENYLEPHRINE HCL, PRAMOXINE HCL: 144; 2.5; 10; 15 CREAM TOPICAL at 09:00

## 2021-08-16 RX ADMIN — MAGNESIUM OXIDE TAB 400 MG (241.3 MG ELEMENTAL MG) 800 MG: 400 (241.3 MG) TAB at 08:45

## 2021-08-16 RX ADMIN — TRAMADOL HYDROCHLORIDE 50 MG: 50 TABLET, FILM COATED ORAL at 15:15

## 2021-08-16 NOTE — ASSESSMENT & PLAN NOTE
· AEB hypoxia, SpO2 85% in ED, tachypnea with RR 25 and HERNANDEZ necessitating placement of midflow O2 to 10L  COVID +in ED 8/13  · Symptom onset Sunday 8/8 with fatigue, poor appetite, cold sweats  Sick contact w/significant other who was COVID + 2 weeks prior  · Developed diarrhea 8/10, cough and dyspnea with coughing fits  · CT C/A/P with bibasilar pulmonary groundglass infiltrates consistent with Covid pneumonia  · Cont  on moderate protocol  · Admission labs include Ferritin 608, CRP 6 3, D dimer 0 97 and procal 0 09  Repeat levels w/CRP 67 and procal 0 08  Plan  · Antibiotics discontinued as procal neg x2   · Atorvastatin 40mg qHS  · Dexamethasone 6mg IV daily x10D through 8/24 (D#3)  · Monitor fever curve, CBC  · Not a candidate for convalescent plasma as symptom onset -6D  · Remdesivir per protocol  · Heparin drip for full AC as Creat clearance <30  · FiO2 uptitrated yesterday to 15L midflow /80%  · Used CPAP 10/50% overnight with very mild improvement in oxygenation to 93% from 91%  C/o discomfort with mask and slept poorly     · Encouraged self proning today

## 2021-08-16 NOTE — PROGRESS NOTES
Bill [address]  Progress Note Sameera North 1960, 63 y o  female MRN: [id number]  Unit/Bed#: ICU 05 Encounter: [id number]  Primary Care Provider: Jf Olmos MD   Date and time admitted to hospital: 8/14/2021  1:01 AM    * Acute respiratory failure with hypoxia due to COVID 19 pneumonia  Assessment & Plan  · AEB hypoxia, SpO2 85% in ED, tachypnea with RR 25 and HERNANDEZ necessitating placement of midflow O2 to 10L  COVID +in ED 8/13  · Symptom onset Sunday 8/8 with fatigue, poor appetite, cold sweats  Sick contact w/significant other who was COVID + 2 weeks prior  · Developed diarrhea 8/10, cough and dyspnea with coughing fits  · CT C/A/P with bibasilar pulmonary groundglass infiltrates consistent with Covid pneumonia  · Cont  on moderate protocol  · Admission labs include Ferritin 608, CRP 6 3, D dimer 0 97 and procal 0 09  Repeat levels w/CRP 67 and procal 0 08  Plan  · Antibiotics discontinued as procal neg x2   · Atorvastatin 40mg qHS  · Dexamethasone 6mg IV daily x10D through 8/24 (D#3)  · Monitor fever curve, CBC  · Not a candidate for convalescent plasma as symptom onset -6D  · Remdesivir per protocol  · Heparin drip for full AC as Creat clearance <30  · FiO2 uptitrated yesterday to 15L midflow /80%  · Used CPAP 10/50% overnight with very mild improvement in oxygenation to 93% from 91%  C/o discomfort with mask and slept poorly  · Encouraged self proning today       Acute kidney injury ([facility] [address] )  Assessment & Plan  · Improving 2 03  · Baseline creat 0 94-1  28  Was 5 56 on arrival to ED in setting of 6 days of poor appetite, diarrhea, and decreased PO intake  · S/p 3L IVF's in ED  Minimal improvement in Creat to 4 67 on repeat labs 8/14   AM labs pending    · S/p gentle rehydration  · Close I&O monitoring, trend BMP, avoid nephrotoxins    BMI 39 0-39 9,adult  Assessment & Plan  · Nutrition and exercise counseling when appropriate  · Habitus likely contributing to clinical picture      Pain in both feet  Assessment & Plan  · History of R  Foot pain s/p surgery x2  Unable to locate surgical documents in care everywhere  · Takes Tramadol 50mg PO TID PRN for foot pain  Will continue for now and consider d/c if oxygenation status declines    Gastroesophageal reflux disease without esophagitis  Assessment & Plan  · Takes esomeprazole  · Replaced with Protonix 20mg PO daily      Type 2 diabetes mellitus without complication, without long-term current use of insulin [facility])  Assessment & Plan  Lab Results   Component Value Date    HGBA1C 7 8 (H) 05/11/2021       Recent Labs     08/15/21  0752 08/15/21  1155 08/15/21  1737 08/15/21  2152   POCGLU 187* 216* 255* 302*       Blood Sugar Average: Last 72 hrs:  · (P) 234 [id number]   · Has tried to control BG with diet  · BG AC/HS, increased to Algorithm 4 and  Lantus 10   · May require further increase to achieve goal BG<180 in setting of steroid admin  Mixed hyperlipidemia  Assessment & Plan  · Replaced home simvastatin with atorvastatin per COVID protocol    Essential hypertension  Assessment & Plan  · History of HTN on losartan  Will hold here given ANTHONY  · Cardiac monitoring/VS per protocol  · Consider PRN antihypertensives if SBP persistently >180  Currently 110-120's      ----------------------------------------------------------------------------------------  HPI/24hr events: none, patient has problems pronging due to back pain  Disposition: transfer to [facility]  Code Status: Level 1 - Full Code  ---------------------------------------------------------------------------------------  SUBJECTIVE  No acute complains still felling SOB with dry cough but improving  Review of Systems   Constitutional: Positive for fatigue  Negative for fever  HENT: Negative for congestion  Respiratory: Positive for cough and shortness of breath  Negative for apnea, chest tightness and wheezing      Cardiovascular: Negative for chest pain, palpitations and leg swelling  Gastrointestinal: Negative for abdominal distention, constipation, diarrhea, nausea and vomiting  Genitourinary: Negative for dysuria  Musculoskeletal: Positive for arthralgias and back pain  Negative for gait problem  Skin: Negative for color change  Neurological: Negative for weakness, numbness and headaches  Psychiatric/Behavioral: Negative for confusion  Review of systems was reviewed and negative unless stated above in HPI/24-hour events   ---------------------------------------------------------------------------------------  OBJECTIVE    Vitals   Vitals:    21 0127 21 0200 21 0400 21 0600   BP:  127/82 123/70 128/58   BP Location:       Pulse:  84 82 76   Resp:  (!) 26 (!) 31 (!) 28   Temp:       TempSrc:       SpO2: 91% (!) 86% 92% 94%   Weight:       Height:         Temp (24hrs), Av 8 °F (36 6 °C), Min:97 6 °F (36 4 °C), Max:98 °F (36 7 °C)  Current: Temperature: 98 °F (36 7 °C)          Respiratory:  SpO2: SpO2: 94 %  Nasal Cannula O2 Flow Rate (L/min): 15 L/min    Invasive/non-invasive ventilation settings   Respiratory    Lab Data (Last 4 hours)    None         O2/Vent Data (Last 4 hours)    None                Physical Exam  Constitutional:       General: She is not in acute distress  Appearance: She is obese  HENT:      Head: Normocephalic and atraumatic  Eyes:      General: No scleral icterus  Right eye: No discharge  Left eye: No discharge  Cardiovascular:      Rate and Rhythm: Normal rate and regular rhythm  Pulses: Normal pulses  Heart sounds: Normal heart sounds  No murmur heard  Pulmonary:      Effort: Pulmonary effort is normal  No respiratory distress  Breath sounds: Normal breath sounds  Abdominal:      General: Abdomen is flat  Bowel sounds are normal  There is no distension  Palpations: Abdomen is soft  Tenderness: There is no abdominal tenderness   There is no guarding  Musculoskeletal:         General: Normal range of motion  Cervical back: Normal range of motion  Right lower leg: No edema  Left lower leg: No edema  Skin:     General: Skin is warm  Capillary Refill: Capillary refill takes less than 2 seconds  Neurological:      General: No focal deficit present  Mental Status: She is alert and oriented to person, place, and time  Mental status is at baseline     Psychiatric:         Mood and Affect: Mood normal          Laboratory and Diagnostics:  Results from last 7 days   Lab Units 08/16/21  0506 08/15/21  0645 08/14/21  0627 08/13/21  1809   WBC Thousand/uL 12 11* 12 95* 5 10 7 28   HEMOGLOBIN g/dL 12 4 12 9 13 7 14 9   HEMATOCRIT % 37 6 38 2 43 0 43 9   PLATELETS Thousands/uL 273 242 180 210   NEUTROS PCT % 82* 77*  --   --    BANDS PCT %  --   --  3  --    MONOS PCT % 7 7  --   --    MONO PCT %  --   --  5 7     Results from last 7 days   Lab Units 08/16/21  0505 08/15/21  0645 08/14/21  1400 08/14/21  0627 08/13/21  1809   SODIUM mmol/L 143 143 139 137 132*   POTASSIUM mmol/L 4 6 3 1* 3 7 3 7 4 0   CHLORIDE mmol/L 110* 107 104 104 95*   CO2 mmol/L 20* 20* 21 15* 24   ANION GAP mmol/L 13 16* 14* 18* 13   BUN mg/dL 45* 56* 55* 53* 51*   CREATININE mg/dL 2 03* 2 93* 4 26* 4 67* 5 56*   CALCIUM mg/dL 7 4* 7 2* 6 9* 7 0* 8 1*   GLUCOSE RANDOM mg/dL 224* 207* 262* 191* 126   ALT U/L  --  30  --  31 23   AST U/L  --  40  --  47* 44*   ALK PHOS U/L  --  61  --  59 55 8   ALBUMIN g/dL  --  2 4*  --  2 4* 3 6   TOTAL BILIRUBIN mg/dL  --  0 34  --  0 33 0 49     Results from last 7 days   Lab Units 08/16/21  0505 08/15/21  0645 08/14/21  0627   MAGNESIUM mg/dL 2 8* 1 7 1 6   PHOSPHORUS mg/dL 2 0* 2 3 3 7      Results from last 7 days   Lab Units 08/16/21  0505 08/15/21  2203 08/15/21  1520 08/15/21  0756 08/15/21  0212 08/14/21  1843 08/14/21  1753 08/14/21  0248   INR   --   --   --   --   --   --   --  1 05   PTT seconds 128* 46* 73* 100* >210* 209* >210* 26      Results from last 7 days   Lab Units 08/14/21  0247   TROPONIN I ng/mL <0 02     Results from last 7 days   Lab Units 08/14/21  1400 08/13/21  1809   LACTIC ACID mmol/L 1 0 1 4     ABG:    VBG:    Results from last 7 days   Lab Units 08/15/21  0645 08/14/21  1400 08/13/21  1809   PROCALCITONIN ng/ml <0 05 0 08 0 09       Micro  Results from last 7 days   Lab Units 08/14/21  0148 08/13/21  1834 08/13/21  1829   BLOOD CULTURE   --  No Growth at 48 hrs  No Growth at 48 hrs  MRSA CULTURE ONLY  No Methicillin Resistant Staphlyococcus aureus (MRSA) isolated  --   --    Intake and Output  I/O       08/14 0701 - 08/15 0700 08/15 0701 - 08/16 0700    P  O  230 540    I V  (mL/kg) 1650 1 (15 9) 206 3 (2)    IV Piggyback 300 350    Total Intake(mL/kg) 2180 1 (21) 1096 3 (10 5)    Urine (mL/kg/hr) 2135 (0 9) 2100 (0 8)    Stool  0    Total Output 2135 2100    Net +45 1 -1003 8          Unmeasured Stool Occurrence  3 x          Height and Weights   Height: 5' 3" (160 cm)  IBW (Ideal Body Weight): 52 4 kg  Body mass index is 40 54 kg/m²  Weight (last 2 days)     Date/Time   Weight    08/15/21 0600   104 (228 84)    08/14/21 0600   100 (220 9)    08/14/21 0142   100 (221 34)    08/14/21 0100   100 (221 34)                Nutrition       Diet Orders   (From admission, onward)             Start     Ordered    08/14/21 0113  Diet Zaki/CHO Controlled; Consistent Carbohydrate Diet Level 2 (5 carb servings/75 grams CHO/meal)  Diet effective now     Question Answer Comment   Diet Type Zaki/CHO Controlled    Zaki/CHO Controlled Consistent Carbohydrate Diet Level 2 (5 carb servings/75 grams CHO/meal)    RD to adjust diet per protocol?  Yes        08/14/21 0141                  Active Medications  Scheduled Meds:  Current Facility-Administered Medications   Medication Dose Route Frequency Provider Last Rate    atorvastatin  40 mg Oral HS Kenisha Louise, CRNP      benzonatate  100 mg Oral TID PRN Sky Cisneros, PA-GANESH      dexamethasone  6 mg Intravenous Q24H Long Drape, CRNP      heparin (porcine)  3-30 Units/kg/hr (Order-Specific) Intravenous Titrated Long Drape, CRNP 9 Units/kg/hr (08/15/21 2249)    heparin (porcine)  4,000 Units Intravenous Q1H PRN Long Drape, CRNP      heparin (porcine)  8,000 Units Intravenous Q1H PRN Long Drape, CRNP      insulin glargine  10 Units Subcutaneous HS Arley Marquez PA-C      insulin lispro  4-20 Units Subcutaneous TID [facility] Jonell Duverney, MD      magnesium oxide  800 mg Oral BID Tubbs KatTERE hartman      pantoprazole  20 mg Oral Early Morning Long Drape, CRNP      Pramox-PE-Glycerin-Petrolatum   Rectal BID PRN Tubbs TERE Marquez      remdesivir  100 mg Intravenous Q24H Long Drape, CRNP      sodium chloride  3 mL Nebulization TID Tubbs TERE Marquez      traMADol  50 mg Oral Q8H PRN Long Drape, CRNP       Continuous Infusions:  heparin (porcine), 3-30 Units/kg/hr (Order-Specific), Last Rate: 9 Units/kg/hr (08/15/21 2249)      PRN Meds:   benzonatate, 100 mg, TID PRN  heparin (porcine), 4,000 Units, Q1H PRN  heparin (porcine), 8,000 Units, Q1H PRN  Pramox-PE-Glycerin-Petrolatum, , BID PRN  traMADol, 50 mg, Q8H PRN        Invasive Devices Review  Invasive Devices     Peripheral Intravenous Line            Peripheral IV 08/13/21 Right Antecubital 2 days    Peripheral IV 08/14/21 Proximal;Right;Ventral (anterior) Forearm 2 days                Jonell Duverney, MD      Portions of the record may have been created with voice recognition software  Occasional wrong word or "sound a like" substitutions may have occurred due to the inherent limitations of voice recognition software    Read the chart carefully and recognize, using context, where substitutions have occurred

## 2021-08-16 NOTE — RESPIRATORY THERAPY NOTE
Pt refusing to use Cpap tonight  States didn't sleep well with it previous night,hard to breath with ffm on and was emotional when refusing to use it  Discussed with her some other options including  hybrid ffm or nasal but wasn't interested  TERE made aware   Pap unit at bedside on stdby

## 2021-08-16 NOTE — ASSESSMENT & PLAN NOTE
· Improving 2 03  · Baseline creat 0 94-1  28  Was 5 56 on arrival to ED in setting of 6 days of poor appetite, diarrhea, and decreased PO intake  · S/p 3L IVF's in ED  Minimal improvement in Creat to 4 67 on repeat labs 8/14   AM labs pending    · S/p gentle rehydration  · Close I&O monitoring, trend BMP, avoid nephrotoxins

## 2021-08-16 NOTE — ASSESSMENT & PLAN NOTE
Lab Results   Component Value Date    HGBA1C 7 8 (H) 05/11/2021       Recent Labs     08/15/21  0752 08/15/21  1155 08/15/21  1737 08/15/21  2152   POCGLU 187* 216* 255* 302*       Blood Sugar Average: Last 72 hrs:  · (P) 234 [id number]   · Has tried to control BG with diet  · BG AC/HS, increased to Algorithm 4 and  Lantus 10   · May require further increase to achieve goal BG<180 in setting of steroid admin

## 2021-08-17 LAB
ALBUMIN SERPL BCP-MCNC: 2.4 G/DL (ref 3.5–5)
ALP SERPL-CCNC: 70 U/L (ref 46–116)
ALT SERPL W P-5'-P-CCNC: 36 U/L (ref 12–78)
ANION GAP SERPL CALCULATED.3IONS-SCNC: 9 MMOL/L (ref 4–13)
APTT PPP: 188 SECONDS (ref 23–37)
APTT PPP: 37 SECONDS (ref 23–37)
APTT PPP: 71 SECONDS (ref 23–37)
AST SERPL W P-5'-P-CCNC: 40 U/L (ref 5–45)
BASOPHILS # BLD AUTO: 0.01 THOUSANDS/ΜL (ref 0–0.1)
BASOPHILS NFR BLD AUTO: 0 % (ref 0–1)
BILIRUB SERPL-MCNC: 0.59 MG/DL (ref 0.2–1)
BUN SERPL-MCNC: 33 MG/DL (ref 5–25)
CA-I BLD-SCNC: 1.08 MMOL/L (ref 1.12–1.32)
CALCIUM ALBUM COR SERPL-MCNC: 9.1 MG/DL (ref 8.3–10.1)
CALCIUM SERPL-MCNC: 7.8 MG/DL (ref 8.3–10.1)
CHLORIDE SERPL-SCNC: 108 MMOL/L (ref 100–108)
CO2 SERPL-SCNC: 23 MMOL/L (ref 21–32)
CREAT SERPL-MCNC: 1.57 MG/DL (ref 0.6–1.3)
CRP SERPL QL: 32.1 MG/L
D DIMER PPP FEU-MCNC: 1.21 UG/ML FEU
EOSINOPHIL # BLD AUTO: 0 THOUSAND/ΜL (ref 0–0.61)
EOSINOPHIL NFR BLD AUTO: 0 % (ref 0–6)
ERYTHROCYTE [DISTWIDTH] IN BLOOD BY AUTOMATED COUNT: 13.3 % (ref 11.6–15.1)
GFR SERPL CREATININE-BSD FRML MDRD: 35 ML/MIN/1.73SQ M
GLUCOSE SERPL-MCNC: 106 MG/DL (ref 65–140)
GLUCOSE SERPL-MCNC: 163 MG/DL (ref 65–140)
GLUCOSE SERPL-MCNC: 193 MG/DL (ref 65–140)
GLUCOSE SERPL-MCNC: 220 MG/DL (ref 65–140)
GLUCOSE SERPL-MCNC: 232 MG/DL (ref 65–140)
HCT VFR BLD AUTO: 40.6 % (ref 34.8–46.1)
HGB BLD-MCNC: 13.2 G/DL (ref 11.5–15.4)
IMM GRANULOCYTES # BLD AUTO: 0.15 THOUSAND/UL (ref 0–0.2)
IMM GRANULOCYTES NFR BLD AUTO: 1 % (ref 0–2)
LYMPHOCYTES # BLD AUTO: 1.34 THOUSANDS/ΜL (ref 0.6–4.47)
LYMPHOCYTES NFR BLD AUTO: 11 % (ref 14–44)
MCH RBC QN AUTO: 28.3 PG (ref 26.8–34.3)
MCHC RBC AUTO-ENTMCNC: 32.5 G/DL (ref 31.4–37.4)
MCV RBC AUTO: 87 FL (ref 82–98)
MONOCYTES # BLD AUTO: 0.6 THOUSAND/ΜL (ref 0.17–1.22)
MONOCYTES NFR BLD AUTO: 5 % (ref 4–12)
NEUTROPHILS # BLD AUTO: 9.72 THOUSANDS/ΜL (ref 1.85–7.62)
NEUTS SEG NFR BLD AUTO: 83 % (ref 43–75)
NRBC BLD AUTO-RTO: 0 /100 WBCS
PLATELET # BLD AUTO: 318 THOUSANDS/UL (ref 149–390)
PMV BLD AUTO: 10.5 FL (ref 8.9–12.7)
POTASSIUM SERPL-SCNC: 4.5 MMOL/L (ref 3.5–5.3)
PROT SERPL-MCNC: 6.7 G/DL (ref 6.4–8.2)
RBC # BLD AUTO: 4.66 MILLION/UL (ref 3.81–5.12)
SODIUM SERPL-SCNC: 140 MMOL/L (ref 136–145)
WBC # BLD AUTO: 11.82 THOUSAND/UL (ref 4.31–10.16)

## 2021-08-17 PROCEDURE — 94660 CPAP INITIATION&MGMT: CPT

## 2021-08-17 PROCEDURE — 80053 COMPREHEN METABOLIC PANEL: CPT | Performed by: INTERNAL MEDICINE

## 2021-08-17 PROCEDURE — 85025 COMPLETE CBC W/AUTO DIFF WBC: CPT | Performed by: INTERNAL MEDICINE

## 2021-08-17 PROCEDURE — 94640 AIRWAY INHALATION TREATMENT: CPT

## 2021-08-17 PROCEDURE — 82330 ASSAY OF CALCIUM: CPT | Performed by: INTERNAL MEDICINE

## 2021-08-17 PROCEDURE — 82948 REAGENT STRIP/BLOOD GLUCOSE: CPT

## 2021-08-17 PROCEDURE — 94760 N-INVAS EAR/PLS OXIMETRY 1: CPT

## 2021-08-17 PROCEDURE — 86140 C-REACTIVE PROTEIN: CPT | Performed by: INTERNAL MEDICINE

## 2021-08-17 PROCEDURE — 85379 FIBRIN DEGRADATION QUANT: CPT | Performed by: INTERNAL MEDICINE

## 2021-08-17 PROCEDURE — 85730 THROMBOPLASTIN TIME PARTIAL: CPT | Performed by: INTERNAL MEDICINE

## 2021-08-17 PROCEDURE — 99232 SBSQ HOSP IP/OBS MODERATE 35: CPT | Performed by: INTERNAL MEDICINE

## 2021-08-17 RX ORDER — LIDOCAINE 50 MG/G
1 PATCH TOPICAL DAILY
Status: DISCONTINUED | OUTPATIENT
Start: 2021-08-17 | End: 2021-08-31 | Stop reason: HOSPADM

## 2021-08-17 RX ORDER — INSULIN GLARGINE 100 [IU]/ML
15 INJECTION, SOLUTION SUBCUTANEOUS
Status: DISCONTINUED | OUTPATIENT
Start: 2021-08-17 | End: 2021-08-20

## 2021-08-17 RX ADMIN — INSULIN LISPRO 4 UNITS: 100 INJECTION, SOLUTION INTRAVENOUS; SUBCUTANEOUS at 06:52

## 2021-08-17 RX ADMIN — BENZONATATE 100 MG: 100 CAPSULE ORAL at 21:29

## 2021-08-17 RX ADMIN — HEPARIN SODIUM 8000 UNITS: 1000 INJECTION INTRAVENOUS; SUBCUTANEOUS at 20:39

## 2021-08-17 RX ADMIN — INSULIN LISPRO 2 UNITS: 100 INJECTION, SOLUTION INTRAVENOUS; SUBCUTANEOUS at 21:28

## 2021-08-17 RX ADMIN — ISODIUM CHLORIDE 3 ML: 0.03 SOLUTION RESPIRATORY (INHALATION) at 19:48

## 2021-08-17 RX ADMIN — MAGNESIUM OXIDE TAB 400 MG (241.3 MG ELEMENTAL MG) 800 MG: 400 (241.3 MG) TAB at 17:43

## 2021-08-17 RX ADMIN — PANTOPRAZOLE SODIUM 20 MG: 20 TABLET, DELAYED RELEASE ORAL at 06:50

## 2021-08-17 RX ADMIN — HEPARIN SODIUM 5 UNITS/KG/HR: 10000 INJECTION, SOLUTION INTRAVENOUS at 14:01

## 2021-08-17 RX ADMIN — MAGNESIUM OXIDE TAB 400 MG (241.3 MG ELEMENTAL MG) 800 MG: 400 (241.3 MG) TAB at 09:06

## 2021-08-17 RX ADMIN — DEXAMETHASONE SODIUM PHOSPHATE 6 MG: 4 INJECTION INTRA-ARTICULAR; INTRALESIONAL; INTRAMUSCULAR; INTRAVENOUS; SOFT TISSUE at 12:26

## 2021-08-17 RX ADMIN — INSULIN LISPRO 8 UNITS: 100 INJECTION, SOLUTION INTRAVENOUS; SUBCUTANEOUS at 17:45

## 2021-08-17 RX ADMIN — REMDESIVIR 100 MG: 100 INJECTION, POWDER, LYOPHILIZED, FOR SOLUTION INTRAVENOUS at 06:58

## 2021-08-17 RX ADMIN — LIDOCAINE 5% 1 PATCH: 700 PATCH TOPICAL at 09:45

## 2021-08-17 RX ADMIN — ISODIUM CHLORIDE 3 ML: 0.03 SOLUTION RESPIRATORY (INHALATION) at 13:17

## 2021-08-17 RX ADMIN — ISODIUM CHLORIDE 3 ML: 0.03 SOLUTION RESPIRATORY (INHALATION) at 08:43

## 2021-08-17 RX ADMIN — ATORVASTATIN CALCIUM 40 MG: 40 TABLET, FILM COATED ORAL at 21:29

## 2021-08-17 RX ADMIN — INSULIN GLARGINE 15 UNITS: 100 INJECTION, SOLUTION SUBCUTANEOUS at 21:28

## 2021-08-17 RX ADMIN — TRAMADOL HYDROCHLORIDE 50 MG: 50 TABLET, FILM COATED ORAL at 02:52

## 2021-08-17 NOTE — PROGRESS NOTES
[address]  Progress Note Daniel Mccauley 1960, 63 y o  female MRN: [id number]  Unit/Bed#: ICU 05 Encounter: [id number]  Primary Care Provider: June Gutierrez MD   Date and time admitted to hospital: 8/14/2021  1:01 AM    * Acute respiratory failure with hypoxia due to COVID 19 pneumonia  Assessment & Plan  · AEB hypoxia, SpO2 85% in ED, tachypnea with RR 25 and HERNANDEZ necessitating placement of midflow O2 to 10L  COVID +in ED 8/13  · Symptom onset Sunday 8/8 with fatigue, poor appetite, cold sweats  Sick contact w/significant other who was COVID + 2 weeks prior  · Developed diarrhea 8/10, cough and dyspnea with coughing fits  · CT C/A/P with bibasilar pulmonary groundglass infiltrates consistent with Covid pneumonia  · Cont  on moderate protocol  · Admission labs include Ferritin 608, CRP 6 3, D dimer 0 97 and procal 0 09  Repeat levels w/CRP 67 and procal 0 08  Plan  · Antibiotics discontinued as procal neg x2   · Atorvastatin 40mg qHS  · Dexamethasone 6mg IV daily x10D through 8/24   · Monitor fever curve, CBC  · Not a candidate for convalescent plasma as symptom onset -6D  · Remdesivir per protocol  Finished  · Heparin drip for full AC as Creat clearance <30  · FiO2 uptitrated yesterday to HF 70/30  · Denied CPAP at night  · Encouraged self proning today       Acute kidney injury ([facility] [address] )  Assessment & Plan  · Improving 2 03-->1 57  · Baseline creat 0 94-1  28  Was 5 56 on arrival to ED in setting of 6 days of poor appetite, diarrhea, and decreased PO intake  · S/p 3L IVF's in ED  Minimal improvement in Creat to 4 67 on repeat labs 8/14  AM labs pending    · S/p gentle rehydration  · Close I&O monitoring, trend BMP, avoid nephrotoxins    BMI 39 0-39 9,adult  Assessment & Plan  · Nutrition and exercise counseling when appropriate  · Habitus likely contributing to clinical picture      Pain in both feet  Assessment & Plan  · History of R  Foot pain s/p surgery x2   Unable to locate surgical documents in care everywhere  · Takes Tramadol 50mg PO TID PRN for foot pain  Will continue for now and consider d/c if oxygenation status declines    Gastroesophageal reflux disease without esophagitis  Assessment & Plan  · Takes esomeprazole  · Replaced with Protonix 20mg PO daily      Type 2 diabetes mellitus without complication, without long-term current use of insulin [facility])  Assessment & Plan  Lab Results   Component Value Date    HGBA1C 7 8 (H) 05/11/2021       Recent Labs     08/16/21  1710 08/16/21  2144 08/17/21  1101 08/17/21  1535   POCGLU 141* 211* 106 232*       Blood Sugar Average: Last 72 hrs:  · (P) 210 [id number]   · Has tried to control BG with diet  · BG AC/HS, increased to Algorithm 4 and  Lantus 15   · May require further increase to achieve goal BG<180 in setting of steroid admin  Mixed hyperlipidemia  Assessment & Plan  · Replaced home simvastatin with atorvastatin per COVID protocol    Essential hypertension  Assessment & Plan  · History of HTN on losartan  Will hold here given ANTHONY  · Cardiac monitoring/VS per protocol  · Consider PRN antihypertensives if SBP persistently >180  Currently 110-120's        ----------------------------------------------------------------------------------------  HPI/24hr events: none    Disposition: Continue Critical Care   Code Status: Level 1 - Full Code  ---------------------------------------------------------------------------------------  SUBJECTIVE  Patient reports still having shortness of breath and cough but overall feeling better  Signs of on her patient, diarrhea or constipation  She does not want to use CPAP night or sleep problems due to her reflux  Review of Systems   Constitutional: Negative for fatigue and fever  HENT: Negative for congestion  Eyes: Negative for visual disturbance  Respiratory: Positive for cough and shortness of breath  Cardiovascular: Negative for chest pain, palpitations and leg swelling  Gastrointestinal: Negative for abdominal pain, constipation, diarrhea, nausea and vomiting  Genitourinary: Negative for dysuria  Musculoskeletal: Positive for arthralgias and back pain  Skin: Negative for color change  Neurological: Negative for weakness, numbness and headaches  Psychiatric/Behavioral: Negative for confusion  Review of systems was reviewed and negative unless stated above in HPI/24-hour events   ---------------------------------------------------------------------------------------  OBJECTIVE    Vitals   Vitals:    21 1500 21 1600 21 1608 21 1612   BP:  115/62     BP Location:       Pulse:  92 90    Resp:  (!) 45 (!) 35    Temp: 99 °F (37 2 °C)      TempSrc: Temporal      SpO2:  (!) 88% 91% 90%   Weight:       Height:         Temp (24hrs), Av 5 °F (36 9 °C), Min:98 °F (36 7 °C), Max:99 °F (37 2 °C)  Current: Temperature: 99 °F (37 2 °C)          Respiratory:  SpO2: SpO2: 90 %  Nasal Cannula O2 Flow Rate (L/min): 12 L/min    Invasive/non-invasive ventilation settings   Respiratory    Lab Data (Last 4 hours)    None         O2/Vent Data (Last 4 hours)    None                Physical Exam  Constitutional:       General: She is not in acute distress  Appearance: She is obese  She is ill-appearing  HENT:      Head: Normocephalic and atraumatic  Eyes:      General:         Right eye: No discharge  Left eye: No discharge  Conjunctiva/sclera: Conjunctivae normal    Cardiovascular:      Rate and Rhythm: Normal rate and regular rhythm  Pulses: Normal pulses  Heart sounds: Normal heart sounds  No murmur heard  Pulmonary:      Effort: Pulmonary effort is normal  No respiratory distress  Breath sounds: Normal breath sounds  Abdominal:      General: Abdomen is flat  Bowel sounds are normal  There is no distension  Palpations: Abdomen is soft  Tenderness: There is no abdominal tenderness  There is no guarding  Musculoskeletal:         General: Normal range of motion  Cervical back: Normal range of motion  Skin:     General: Skin is warm  Capillary Refill: Capillary refill takes less than 2 seconds  Neurological:      General: No focal deficit present  Mental Status: She is alert and oriented to person, place, and time  Mental status is at baseline     Psychiatric:         Mood and Affect: Mood normal          Laboratory and Diagnostics:  Results from last 7 days   Lab Units 08/17/21  0455 08/16/21  0506 08/15/21  0645 08/14/21  0627 08/13/21  1809   WBC Thousand/uL 11 82* 12 11* 12 95* 5 10 7 28   HEMOGLOBIN g/dL 13 2 12 4 12 9 13 7 14 9   HEMATOCRIT % 40 6 37 6 38 2 43 0 43 9   PLATELETS Thousands/uL 318 273 242 180 210   NEUTROS PCT % 83* 82* 77*  --   --    BANDS PCT %  --   --   --  3  --    MONOS PCT % 5 7 7  --   --    MONO PCT %  --   --   --  5 7     Results from last 7 days   Lab Units 08/17/21  0455 08/16/21  0505 08/15/21  0645 08/14/21  1400 08/14/21  0627 08/13/21  1809   SODIUM mmol/L 140 143 143 139 137 132*   POTASSIUM mmol/L 4 5 4 6 3 1* 3 7 3 7 4 0   CHLORIDE mmol/L 108 110* 107 104 104 95*   CO2 mmol/L 23 20* 20* 21 15* 24   ANION GAP mmol/L 9 13 16* 14* 18* 13   BUN mg/dL 33* 45* 56* 55* 53* 51*   CREATININE mg/dL 1 57* 2 03* 2 93* 4 26* 4 67* 5 56*   CALCIUM mg/dL 7 8* 7 4* 7 2* 6 9* 7 0* 8 1*   GLUCOSE RANDOM mg/dL 163* 224* 207* 262* 191* 126   ALT U/L 36  --  30  --  31 23   AST U/L 40  --  40  --  47* 44*   ALK PHOS U/L 70  --  61  --  59 55 8   ALBUMIN g/dL 2 4*  --  2 4*  --  2 4* 3 6   TOTAL BILIRUBIN mg/dL 0 59  --  0 34  --  0 33 0 49     Results from last 7 days   Lab Units 08/16/21  0505 08/15/21  0645 08/14/21  0627   MAGNESIUM mg/dL 2 8* 1 7 1 6   PHOSPHORUS mg/dL 2 0* 2 3 3 7      Results from last 7 days   Lab Units 08/17/21  1112 08/17/21  0454 08/16/21  2150 08/16/21  1411 08/16/21  0505 08/15/21  2203 08/15/21  1520 08/14/21  0248   INR   --   --   --   --   -- --   --  1 05   PTT seconds 188* 71* 185* 28 128* 46* 73* 26      Results from last 7 days   Lab Units 08/14/21  0247   TROPONIN I ng/mL <0 02     Results from last 7 days   Lab Units 08/14/21  1400 08/13/21  1809   LACTIC ACID mmol/L 1 0 1 4     ABG:    VBG:    Results from last 7 days   Lab Units 08/15/21  0645 08/14/21  1400 08/13/21  1809   PROCALCITONIN ng/ml <0 05 0 08 0 09       Micro  Results from last 7 days   Lab Units 08/15/21  1310 08/14/21  0148 08/13/21  1834 08/13/21  1829   BLOOD CULTURE   --   --  No Growth at 72 hrs  No Growth at 72 hrs  MRSA CULTURE ONLY   --  No Methicillin Resistant Staphlyococcus aureus (MRSA) isolated  --   --    C DIFF TOXIN B BY PCR  Negative  --   --   --    Intake and Output  I/O       08/15 0701 - 08/16 0700 08/16 0701 - 08/17 0700    P  O  540 600    I V  (mL/kg) 206 3 (1 9) 89 3 (0 8)    IV Piggyback 350     Total Intake(mL/kg) 1096 3 (10 1) 689 3 (6 3)    Urine (mL/kg/hr) 2100 (0 8) 1250 (0 5)    Stool 0 0    Total Output 2100 1250    Net -1003 8 -560 7          Unmeasured Stool Occurrence 3 x 5 x          Height and Weights   Height: 5' 3" (160 cm)  IBW (Ideal Body Weight): 52 4 kg  Body mass index is 43 11 kg/m²    Weight (last 2 days)     Date/Time   Weight    08/17/21 0600   110 (243 39)    08/16/21 0600   109 (240 3)    08/15/21 0600   104 (228 84)                Nutrition       Diet Orders   (From admission, onward)             Start     Ordered    08/17/21 0933  Dietary nutrition supplements  Once     Question Answer Comment   Select Supplement: Glucerna-Chocolate    Frequency Breakfast        08/17/21 0933    08/16/21 1045  Room Service  Once     Question:  Type of Service  Answer:  Room Service - Appropriate with Assistance    08/16/21 1044    08/14/21 0113  Diet Zaki/CHO Controlled; Consistent Carbohydrate Diet Level 2 (5 carb servings/75 grams CHO/meal)  Diet effective now     Question Answer Comment   Diet Type Zaki/CHO Controlled    Zaki/CHO Controlled Consistent Carbohydrate Diet Level 2 (5 carb servings/75 grams CHO/meal)    RD to adjust diet per protocol?  Yes        08/14/21 0141                  Active Medications  Scheduled Meds:  Current Facility-Administered Medications   Medication Dose Route Frequency Provider Last Rate    atorvastatin  40 mg Oral HS Gerardo Redo, CRNP      benzonatate  100 mg Oral TID PRN Moise Kenzie PA-C      dexamethasone  6 mg Intravenous Q24H Gerardo Redo, CRNP      heparin (porcine)  3-30 Units/kg/hr (Order-Specific) Intravenous Titrated Gerardo Redo, CRNP 5 Units/kg/hr (08/17/21 1401)    heparin (porcine)  4,000 Units Intravenous Q1H PRN Gearrdo Redo, CRNP      heparin (porcine)  8,000 Units Intravenous Q1H PRN Gerardo Redo, CRNP      insulin glargine  15 Units Subcutaneous HS Radha Woods MD      insulin lispro  4-20 Units Subcutaneous TID [facility] Radha Woods MD      lidocaine  1 patch Topical Daily Radha Woods MD      magnesium oxide  800 mg Oral BID Moise Kenzie PA-C      pantoprazole  20 mg Oral Early Morning Gerardo Redo, CRNP      Pramox-PE-Glycerin-Petrolatum   Rectal BID PRN Moise KenzieHILARY cornejo-C      remdesivir  100 mg Intravenous Q24H Gerardo Redo, CRNP      sodium chloride  3 mL Nebulization TID Moise Kenzie, PA-C      traMADol  50 mg Oral Q8H PRN Gerardo Redo, CRNP       Continuous Infusions:  heparin (porcine), 3-30 Units/kg/hr (Order-Specific), Last Rate: 5 Units/kg/hr (08/17/21 1401)      PRN Meds:   benzonatate, 100 mg, TID PRN  heparin (porcine), 4,000 Units, Q1H PRN  heparin (porcine), 8,000 Units, Q1H PRN  Pramox-PE-Glycerin-Petrolatum, , BID PRN  traMADol, 50 mg, Q8H PRN        Invasive Devices Review  Invasive Devices     Peripheral Intravenous Line            Peripheral IV 08/13/21 Right Antecubital 3 days    Peripheral IV 08/14/21 Proximal;Right;Ventral (anterior) Forearm 3 days                Radha Woods MD      Portions of the record may have been created with voice recognition software  Occasional wrong word or "sound a like" substitutions may have occurred due to the inherent limitations of voice recognition software    Read the chart carefully and recognize, using context, where substitutions have occurred

## 2021-08-17 NOTE — ASSESSMENT & PLAN NOTE
· Improving 2 03-->1 57  · Baseline creat 0 94-1  28  Was 5 56 on arrival to ED in setting of 6 days of poor appetite, diarrhea, and decreased PO intake  · S/p 3L IVF's in ED  Minimal improvement in Creat to 4 67 on repeat labs 8/14   AM labs pending    · S/p gentle rehydration  · Close I&O monitoring, trend BMP, avoid nephrotoxins

## 2021-08-17 NOTE — ASSESSMENT & PLAN NOTE
Lab Results   Component Value Date    HGBA1C 7 8 (H) 05/11/2021       Recent Labs     08/16/21  1710 08/16/21  2144 08/17/21  1101 08/17/21  1535   POCGLU 141* 211* 106 232*       Blood Sugar Average: Last 72 hrs:  · (P) 733 [id number]   · Has tried to control BG with diet  · BG AC/HS, increased to Algorithm 4 and  Lantus 15   · May require further increase to achieve goal BG<180 in setting of steroid admin

## 2021-08-17 NOTE — ASSESSMENT & PLAN NOTE
· History of HTN on losartan/metoprolol/dyazide  Will hold here given ANTHONY/bradycardia   · Cardiac monitoring/VS per protocol  · Consider PRN antihypertensives if SBP persistently >180   Currently 110-120's

## 2021-08-17 NOTE — ASSESSMENT & PLAN NOTE
· Improving 2 03-->1 5  · Baseline creat 0 94-1  28   Was 5 56 on arrival to ED in setting of 6 days of poor appetite, diarrhea, and decreased PO intake  · Close I&O monitoring   · trend BMP  · avoid nephrotoxins

## 2021-08-17 NOTE — ASSESSMENT & PLAN NOTE
· AEB hypoxia, SpO2 85% in ED, tachypnea with RR 25 and HERNANDEZ necessitating placement of midflow O2 to 10L  COVID +in ED 8/13  · Symptom onset Sunday 8/8 with fatigue, poor appetite, cold sweats  Sick contact w/significant other who was COVID + 2 weeks prior  · Developed diarrhea 8/10, cough and dyspnea with coughing fits  · CT C/A/P with bibasilar pulmonary groundglass infiltrates consistent with Covid pneumonia  · Cont  on moderate protocol  · Admission labs include Ferritin 608, CRP 6 3, D dimer 0 97 and procal 0 09  Repeat levels w/CRP 67 and procal 0 08  Plan  · Antibiotics discontinued as procal neg x2   · Atorvastatin 40mg qHS  · Dexamethasone 6mg IV daily x10D through 8/24   · Monitor fever curve, CBC  · Not a candidate for convalescent plasma as symptom onset -6D  · Remdesivir per protocol  Finished  · Heparin drip for full AC as Creat clearance <30-->lovenox   · FiO2 uptitrated overnight to  HF 75/35  · Encouraged self proning today

## 2021-08-17 NOTE — ASSESSMENT & PLAN NOTE
· Takes esomeprazole  · Replaced with Protonix 20mg PO daily Follow up in outpatient epilepsy service ([phone], specify epilepsy) or clinic (need to specify this when making an appointment) [phone]; Follow up in outpatient epilepsy service ([phone], specify epilepsy) or clinic (need to specify this when making an appointment) [phone].

## 2021-08-17 NOTE — ASSESSMENT & PLAN NOTE
Lab Results   Component Value Date    HGBA1C 7 8 (H) 05/11/2021       Recent Labs     08/16/21  0844 08/16/21  1207 08/16/21  1710 08/16/21  2144   POCGLU 200* 202* 141* 211*       Blood Sugar Average: Last 72 hrs:  · (P) 217 [id number]   · Has tried to control BG with diet  · BG AC/HS, increased to Algorithm 4 and  Lantus 15   · May require further increase to achieve goal BG<180 in setting of steroid admin

## 2021-08-17 NOTE — ASSESSMENT & PLAN NOTE
· AEB hypoxia, SpO2 85% in ED, tachypnea with RR 25 and HERNANDEZ necessitating placement of midflow O2 to 10L  COVID +in ED 8/13  · Symptom onset Sunday 8/8 with fatigue, poor appetite, cold sweats  Sick contact w/significant other who was COVID + 2 weeks prior  · Developed diarrhea 8/10, cough and dyspnea with coughing fits  · CT C/A/P with bibasilar pulmonary groundglass infiltrates consistent with Covid pneumonia  · Cont  on moderate protocol  · Admission labs include Ferritin 608, CRP 6 3, D dimer 0 97 and procal 0 09  Repeat levels w/CRP 67 and procal 0 08  Plan  · Antibiotics discontinued as procal neg x2   · Atorvastatin 40mg qHS  · Dexamethasone 6mg IV daily x10D through 8/24   · Monitor fever curve, CBC  · Not a candidate for convalescent plasma as symptom onset -6D  · Remdesivir per protocol  Finished  · Heparin drip for full AC as Creat clearance <30  · FiO2 uptitrated yesterday to HF 60/30  · Denied CPAP at night  · Encouraged self proning today Island Pedicle Flap-Requiring Vessel Identification Text: The defect edges were debeveled with a #15 scalpel blade.  Given the location of the defect, shape of the defect and the proximity to free margins an island pedicle advancement flap was deemed most appropriate.  Using a sterile surgical marker, an appropriate advancement flap was drawn, based on the axial vessel mentioned above, incorporating the defect, outlining the appropriate donor tissue and placing the expected incisions within the relaxed skin tension lines where possible.    The area thus outlined was incised deep to adipose tissue with a #15 scalpel blade.  The skin margins were undermined to an appropriate distance in all directions around the primary defect and laterally outward around the island pedicle utilizing iris scissors.  There was minimal undermining beneath the pedicle flap.

## 2021-08-18 LAB
ANION GAP SERPL CALCULATED.3IONS-SCNC: 8 MMOL/L (ref 4–13)
APTT PPP: 150 SECONDS (ref 23–37)
APTT PPP: 44 SECONDS (ref 23–37)
ATRIAL RATE: 71 BPM
BASOPHILS # BLD AUTO: 0.01 THOUSANDS/ΜL (ref 0–0.1)
BASOPHILS NFR BLD AUTO: 0 % (ref 0–1)
BUN SERPL-MCNC: 30 MG/DL (ref 5–25)
CALCIUM SERPL-MCNC: 7.5 MG/DL (ref 8.3–10.1)
CHLORIDE SERPL-SCNC: 108 MMOL/L (ref 100–108)
CO2 SERPL-SCNC: 23 MMOL/L (ref 21–32)
CREAT SERPL-MCNC: 1.51 MG/DL (ref 0.6–1.3)
CRP SERPL QL: 65.4 MG/L
EOSINOPHIL # BLD AUTO: 0 THOUSAND/ΜL (ref 0–0.61)
EOSINOPHIL NFR BLD AUTO: 0 % (ref 0–6)
ERYTHROCYTE [DISTWIDTH] IN BLOOD BY AUTOMATED COUNT: 13.2 % (ref 11.6–15.1)
GFR SERPL CREATININE-BSD FRML MDRD: 37 ML/MIN/1.73SQ M
GLUCOSE SERPL-MCNC: 182 MG/DL (ref 65–140)
GLUCOSE SERPL-MCNC: 182 MG/DL (ref 65–140)
GLUCOSE SERPL-MCNC: 183 MG/DL (ref 65–140)
GLUCOSE SERPL-MCNC: 190 MG/DL (ref 65–140)
GLUCOSE SERPL-MCNC: 208 MG/DL (ref 65–140)
GLUCOSE SERPL-MCNC: 209 MG/DL (ref 65–140)
HCT VFR BLD AUTO: 36.4 % (ref 34.8–46.1)
HGB BLD-MCNC: 12 G/DL (ref 11.5–15.4)
IMM GRANULOCYTES # BLD AUTO: 0.09 THOUSAND/UL (ref 0–0.2)
IMM GRANULOCYTES NFR BLD AUTO: 1 % (ref 0–2)
LYMPHOCYTES # BLD AUTO: 1.38 THOUSANDS/ΜL (ref 0.6–4.47)
LYMPHOCYTES NFR BLD AUTO: 16 % (ref 14–44)
MCH RBC QN AUTO: 28.6 PG (ref 26.8–34.3)
MCHC RBC AUTO-ENTMCNC: 33 G/DL (ref 31.4–37.4)
MCV RBC AUTO: 87 FL (ref 82–98)
MONOCYTES # BLD AUTO: 0.43 THOUSAND/ΜL (ref 0.17–1.22)
MONOCYTES NFR BLD AUTO: 5 % (ref 4–12)
NEUTROPHILS # BLD AUTO: 6.66 THOUSANDS/ΜL (ref 1.85–7.62)
NEUTS SEG NFR BLD AUTO: 78 % (ref 43–75)
NRBC BLD AUTO-RTO: 0 /100 WBCS
P AXIS: 40 DEGREES
PLATELET # BLD AUTO: 314 THOUSANDS/UL (ref 149–390)
PMV BLD AUTO: 10.6 FL (ref 8.9–12.7)
POTASSIUM SERPL-SCNC: 4.1 MMOL/L (ref 3.5–5.3)
PR INTERVAL: 196 MS
PROCALCITONIN SERPL-MCNC: <0.05 NG/ML
QRS AXIS: 1 DEGREES
QRSD INTERVAL: 86 MS
QT INTERVAL: 402 MS
QTC INTERVAL: 436 MS
RBC # BLD AUTO: 4.19 MILLION/UL (ref 3.81–5.12)
SODIUM SERPL-SCNC: 139 MMOL/L (ref 136–145)
T WAVE AXIS: 6 DEGREES
VENTRICULAR RATE: 71 BPM
WBC # BLD AUTO: 8.57 THOUSAND/UL (ref 4.31–10.16)

## 2021-08-18 PROCEDURE — 82948 REAGENT STRIP/BLOOD GLUCOSE: CPT

## 2021-08-18 PROCEDURE — 85730 THROMBOPLASTIN TIME PARTIAL: CPT | Performed by: INTERNAL MEDICINE

## 2021-08-18 PROCEDURE — 85025 COMPLETE CBC W/AUTO DIFF WBC: CPT | Performed by: INTERNAL MEDICINE

## 2021-08-18 PROCEDURE — 84145 PROCALCITONIN (PCT): CPT | Performed by: INTERNAL MEDICINE

## 2021-08-18 PROCEDURE — 80048 BASIC METABOLIC PNL TOTAL CA: CPT | Performed by: INTERNAL MEDICINE

## 2021-08-18 PROCEDURE — 99232 SBSQ HOSP IP/OBS MODERATE 35: CPT | Performed by: INTERNAL MEDICINE

## 2021-08-18 PROCEDURE — 94660 CPAP INITIATION&MGMT: CPT

## 2021-08-18 PROCEDURE — 86140 C-REACTIVE PROTEIN: CPT | Performed by: INTERNAL MEDICINE

## 2021-08-18 PROCEDURE — 93010 ELECTROCARDIOGRAM REPORT: CPT | Performed by: INTERNAL MEDICINE

## 2021-08-18 PROCEDURE — 94640 AIRWAY INHALATION TREATMENT: CPT

## 2021-08-18 PROCEDURE — 94760 N-INVAS EAR/PLS OXIMETRY 1: CPT

## 2021-08-18 RX ORDER — AMLODIPINE BESYLATE 10 MG/1
10 TABLET ORAL DAILY
Status: DISCONTINUED | OUTPATIENT
Start: 2021-08-18 | End: 2021-08-25

## 2021-08-18 RX ADMIN — INSULIN LISPRO 4 UNITS: 100 INJECTION, SOLUTION INTRAVENOUS; SUBCUTANEOUS at 17:04

## 2021-08-18 RX ADMIN — GLYCERIN, PETROLATUM, PHENYLEPHRINE HCL, PRAMOXINE HCL: 144; 2.5; 10; 15 CREAM TOPICAL at 13:55

## 2021-08-18 RX ADMIN — MAGNESIUM OXIDE TAB 400 MG (241.3 MG ELEMENTAL MG) 800 MG: 400 (241.3 MG) TAB at 17:04

## 2021-08-18 RX ADMIN — REMDESIVIR 100 MG: 100 INJECTION, POWDER, LYOPHILIZED, FOR SOLUTION INTRAVENOUS at 05:30

## 2021-08-18 RX ADMIN — ENOXAPARIN SODIUM 105 MG: 120 INJECTION SUBCUTANEOUS at 11:23

## 2021-08-18 RX ADMIN — ENOXAPARIN SODIUM 105 MG: 120 INJECTION SUBCUTANEOUS at 21:37

## 2021-08-18 RX ADMIN — DEXAMETHASONE SODIUM PHOSPHATE 6 MG: 4 INJECTION INTRA-ARTICULAR; INTRALESIONAL; INTRAMUSCULAR; INTRAVENOUS; SOFT TISSUE at 12:29

## 2021-08-18 RX ADMIN — LIDOCAINE 5% 1 PATCH: 700 PATCH TOPICAL at 09:00

## 2021-08-18 RX ADMIN — TRAMADOL HYDROCHLORIDE 50 MG: 50 TABLET, FILM COATED ORAL at 18:49

## 2021-08-18 RX ADMIN — GLYCERIN, PETROLATUM, PHENYLEPHRINE HCL, PRAMOXINE HCL 1 APPLICATION: 144; 2.5; 10; 15 CREAM TOPICAL at 03:18

## 2021-08-18 RX ADMIN — INSULIN LISPRO 4 UNITS: 100 INJECTION, SOLUTION INTRAVENOUS; SUBCUTANEOUS at 08:19

## 2021-08-18 RX ADMIN — AMLODIPINE BESYLATE 10 MG: 10 TABLET ORAL at 11:24

## 2021-08-18 RX ADMIN — INSULIN GLARGINE 15 UNITS: 100 INJECTION, SOLUTION SUBCUTANEOUS at 21:37

## 2021-08-18 RX ADMIN — IPRATROPIUM BROMIDE 0.5 MG: 0.5 SOLUTION RESPIRATORY (INHALATION) at 19:53

## 2021-08-18 RX ADMIN — PANTOPRAZOLE SODIUM 20 MG: 20 TABLET, DELAYED RELEASE ORAL at 05:27

## 2021-08-18 RX ADMIN — IPRATROPIUM BROMIDE 0.5 MG: 0.5 SOLUTION RESPIRATORY (INHALATION) at 11:47

## 2021-08-18 RX ADMIN — INSULIN LISPRO 4 UNITS: 100 INJECTION, SOLUTION INTRAVENOUS; SUBCUTANEOUS at 11:24

## 2021-08-18 RX ADMIN — INSULIN LISPRO 4 UNITS: 100 INJECTION, SOLUTION INTRAVENOUS; SUBCUTANEOUS at 21:38

## 2021-08-18 RX ADMIN — ATORVASTATIN CALCIUM 40 MG: 40 TABLET, FILM COATED ORAL at 21:37

## 2021-08-18 RX ADMIN — MAGNESIUM OXIDE TAB 400 MG (241.3 MG ELEMENTAL MG) 800 MG: 400 (241.3 MG) TAB at 09:00

## 2021-08-18 RX ADMIN — IPRATROPIUM BROMIDE 0.5 MG: 0.5 SOLUTION RESPIRATORY (INHALATION) at 09:09

## 2021-08-18 RX ADMIN — ISODIUM CHLORIDE 3 ML: 0.03 SOLUTION RESPIRATORY (INHALATION) at 08:17

## 2021-08-18 RX ADMIN — IPRATROPIUM BROMIDE 0.5 MG: 0.5 SOLUTION RESPIRATORY (INHALATION) at 16:17

## 2021-08-18 NOTE — ASSESSMENT & PLAN NOTE
· AEB hypoxia, SpO2 85% in ED, tachypnea with RR 25 and HERNANDEZ necessitating placement of midflow O2 to 10L  COVID +in ED 8/13  · Symptom onset Sunday 8/8 with fatigue, poor appetite, cold sweats  Sick contact w/significant other who was COVID + 2 weeks prior  · Developed diarrhea 8/10, cough and dyspnea with coughing fits  · CT C/A/P with bibasilar pulmonary groundglass infiltrates consistent with Covid pneumonia  · Cont  on moderate protocol  · Admission labs include Ferritin 608, CRP 6 3, D dimer 0 97 and procal 0 09  Repeat levels w/CRP 67 and procal 0 08    Plan  · Antibiotics discontinued as procal neg x2   · Atorvastatin 40mg qHS  · Dexamethasone escalated to 0 1mg/kg BID on 8/19, continue through 8/24   · Monitor fever curve, CBC  · Not a candidate for convalescent plasma as symptom onset -6D  · Remdesivir per protocol, completed   · Heparin drip for full AC on admission, transitioned to therapeutic lovenox due to improved renal function   · Currently on HFNC 75/35 stable last 48 hours sat low 90  · Encouraged self proning today, however patient refusing   · Pulmonary hygiene

## 2021-08-18 NOTE — ASSESSMENT & PLAN NOTE
· History of HTN on losartan/metoprolol/dyazide  Will hold here given ANTHONY/bradycardia   · Cardiac monitoring/VS per protocol  · Consider PRN antihypertensives if SBP persistently >180   Currently 110-120's  · Add amlodipine 10

## 2021-08-18 NOTE — PROGRESS NOTES
[address]  Progress Note Elba Saldaña 1960, 63 y o  female MRN: [id number]  Unit/Bed#: ICU 05 Encounter: [id number]  Primary Care Provider: Christy Alexander MD   Date and time admitted to hospital: 8/14/2021  1:01 AM    * Acute respiratory failure with hypoxia due to COVID 19 pneumonia  Assessment & Plan  · AEB hypoxia, SpO2 85% in ED, tachypnea with RR 25 and HERNANDEZ necessitating placement of midflow O2 to 10L  COVID +in ED 8/13  · Symptom onset Sunday 8/8 with fatigue, poor appetite, cold sweats  Sick contact w/significant other who was COVID + 2 weeks prior  · Developed diarrhea 8/10, cough and dyspnea with coughing fits  · CT C/A/P with bibasilar pulmonary groundglass infiltrates consistent with Covid pneumonia  · Cont  on moderate protocol  · Admission labs include Ferritin 608, CRP 6 3, D dimer 0 97 and procal 0 09  Repeat levels w/CRP 67 and procal 0 08  Plan  · Antibiotics discontinued as procal neg x2   · Atorvastatin 40mg qHS  · Dexamethasone 6mg IV daily x10D through 8/24   · Monitor fever curve, CBC  · Not a candidate for convalescent plasma as symptom onset -6D  · Remdesivir per protocol  Finished  · Heparin drip for full AC as Creat clearance <30-->lovenox   · FiO2 uptitrated overnight to  HF 75/35  · Encouraged self proning today       Acute kidney injury ([facility] [address] )  Assessment & Plan  · Improving 2 03-->1 5  · Baseline creat 0 94-1  28  Was 5 56 on arrival to ED in setting of 6 days of poor appetite, diarrhea, and decreased PO intake  · Close I&O monitoring   · trend BMP  · avoid nephrotoxins    BMI 39 0-39 9,adult  Assessment & Plan  · Nutrition and exercise counseling when appropriate  · Habitus likely contributing to clinical picture      Pain in both feet  Assessment & Plan  · History of R  Foot pain s/p surgery x2  Unable to locate surgical documents in care everywhere  · Takes Tramadol 50mg PO TID PRN for foot pain   Will continue for now and consider d/c if oxygenation status declines    Gastroesophageal reflux disease without esophagitis  Assessment & Plan  · Takes esomeprazole  · Replaced with Protonix 20mg PO daily      Type 2 diabetes mellitus without complication, without long-term current use of insulin [facility])  Assessment & Plan  Lab Results   Component Value Date    HGBA1C 7 8 (H) 05/11/2021       Recent Labs     08/16/21  1710 08/16/21  2144 08/17/21  1101 08/17/21  1535   POCGLU 141* 211* 106 232*       Blood Sugar Average: Last 72 hrs:  · (P) 210 [id number]   · Has tried to control BG with diet  · BG AC/HS, increased to Algorithm 4 and  Lantus 15   · May require further increase to achieve goal BG<180 in setting of steroid admin  Mixed hyperlipidemia  Assessment & Plan  · Replaced home simvastatin with atorvastatin per COVID protocol    Essential hypertension  Assessment & Plan  · History of HTN on losartan/metoprolol/dyazide  Will hold here given ANTHONY/bradycardia   · Cardiac monitoring/VS per protocol  · Consider PRN antihypertensives if SBP persistently >180  Currently 110-120's          ----------------------------------------------------------------------------------------  HPI/24hr events: none      Disposition: Continue Critical Care   Code Status: Level 1 - Full Code  ---------------------------------------------------------------------------------------  SUBJECTIVE  No overnight events, no acute complains  Review of Systems   Constitutional: Positive for fatigue  Negative for fever  HENT: Negative for congestion  Eyes: Negative for visual disturbance  Respiratory: Positive for cough and shortness of breath  Negative for wheezing  Cardiovascular: Negative for chest pain, palpitations and leg swelling  Gastrointestinal: Negative for abdominal pain, constipation, diarrhea, nausea and vomiting  Endocrine: Negative for polydipsia, polyphagia and polyuria  Genitourinary: Negative for dysuria and urgency     Musculoskeletal: Positive for arthralgias and back pain  Skin: Negative for color change  Neurological: Negative for weakness, numbness and headaches  Psychiatric/Behavioral: Negative for confusion  Review of systems was reviewed and negative unless stated above in HPI/24-hour events   ---------------------------------------------------------------------------------------  OBJECTIVE    Vitals   Vitals:    21 0413 21 0500 21 0527 21 0600   BP:    134/63   BP Location:       Pulse:  (!) 50 (!) 52 74   Resp:  (!) 23 (!) 23 (!) 29   Temp:       TempSrc:       SpO2: 92% 92% 93% 91%   Weight:   110 kg (242 lb 1 oz)    Height:         Temp (24hrs), Av 4 °F (36 9 °C), Min:97 7 °F (36 5 °C), Max:99 °F (37 2 °C)  Current: Temperature: 98 5 °F (36 9 °C)          Respiratory:  SpO2: SpO2: 91 %  Nasal Cannula O2 Flow Rate (L/min): 12 L/min    Invasive/non-invasive ventilation settings   Respiratory    Lab Data (Last 4 hours)    None         O2/Vent Data (Last 4 hours)      413          Non-Invasive Ventilation Mode HFNC (High flow)                   Physical Exam  Constitutional:       General: She is not in acute distress  Appearance: She is obese  HENT:      Head: Normocephalic and atraumatic  Eyes:      General:         Right eye: No discharge  Left eye: No discharge  Conjunctiva/sclera: Conjunctivae normal    Cardiovascular:      Rate and Rhythm: Normal rate and regular rhythm  Pulses: Normal pulses  Heart sounds: Normal heart sounds  No murmur heard  Pulmonary:      Effort: Pulmonary effort is normal  No respiratory distress  Breath sounds: Normal breath sounds  Abdominal:      General: Abdomen is flat  Bowel sounds are normal  There is no distension  Palpations: Abdomen is soft  Tenderness: There is no abdominal tenderness  There is no guarding  Musculoskeletal:         General: Normal range of motion        Cervical back: Normal range of motion  Skin:     General: Skin is warm  Capillary Refill: Capillary refill takes less than 2 seconds  Neurological:      General: No focal deficit present  Mental Status: She is alert and oriented to person, place, and time  Mental status is at baseline     Psychiatric:         Mood and Affect: Mood normal          Laboratory and Diagnostics:  Results from last 7 days   Lab Units 08/18/21  0333 08/17/21  0455 08/16/21  0506 08/15/21  0645 08/14/21  0627 08/13/21  1809   WBC Thousand/uL 8 57 11 82* 12 11* 12 95* 5 10 7 28   HEMOGLOBIN g/dL 12 0 13 2 12 4 12 9 13 7 14 9   HEMATOCRIT % 36 4 40 6 37 6 38 2 43 0 43 9   PLATELETS Thousands/uL 314 318 273 242 180 210   NEUTROS PCT % 78* 83* 82* 77*  --   --    BANDS PCT %  --   --   --   --  3  --    MONOS PCT % 5 5 7 7  --   --    MONO PCT %  --   --   --   --  5 7     Results from last 7 days   Lab Units 08/18/21  0333 08/17/21  0455 08/16/21  0505 08/15/21  0645 08/14/21  1400 08/14/21  0627 08/13/21  1809   SODIUM mmol/L 139 140 143 143 139 137 132*   POTASSIUM mmol/L 4 1 4 5 4 6 3 1* 3 7 3 7 4 0   CHLORIDE mmol/L 108 108 110* 107 104 104 95*   CO2 mmol/L 23 23 20* 20* 21 15* 24   ANION GAP mmol/L 8 9 13 16* 14* 18* 13   BUN mg/dL 30* 33* 45* 56* 55* 53* 51*   CREATININE mg/dL 1 51* 1 57* 2 03* 2 93* 4 26* 4 67* 5 56*   CALCIUM mg/dL 7 5* 7 8* 7 4* 7 2* 6 9* 7 0* 8 1*   GLUCOSE RANDOM mg/dL 208* 163* 224* 207* 262* 191* 126   ALT U/L  --  36  --  30  --  31 23   AST U/L  --  40  --  40  --  47* 44*   ALK PHOS U/L  --  70  --  61  --  59 55 8   ALBUMIN g/dL  --  2 4*  --  2 4*  --  2 4* 3 6   TOTAL BILIRUBIN mg/dL  --  0 59  --  0 34  --  0 33 0 49     Results from last 7 days   Lab Units 08/16/21  0505 08/15/21  0645 08/14/21  0627   MAGNESIUM mg/dL 2 8* 1 7 1 6   PHOSPHORUS mg/dL 2 0* 2 3 3 7      Results from last 7 days   Lab Units 08/18/21  0333 08/17/21  1950 08/17/21  1112 08/17/21  0454 08/16/21  2150 08/16/21  1411 08/16/21  0505 08/14/21  0248 INR   --   --   --   --   --   --   --  1 05   PTT seconds 150* 37 188* 71* 185* 28 128* 26      Results from last 7 days   Lab Units 08/14/21  0247   TROPONIN I ng/mL <0 02     Results from last 7 days   Lab Units 08/14/21  1400 08/13/21  1809   LACTIC ACID mmol/L 1 0 1 4     ABG:    VBG:    Results from last 7 days   Lab Units 08/15/21  0645 08/14/21  1400 08/13/21  1809   PROCALCITONIN ng/ml <0 05 0 08 0 09       Micro  Results from last 7 days   Lab Units 08/15/21  1310 08/14/21  0148 08/13/21  1834 08/13/21  1829   BLOOD CULTURE   --   --  No Growth After 4 Days  No Growth After 4 Days  MRSA CULTURE ONLY   --  No Methicillin Resistant Staphlyococcus aureus (MRSA) isolated  --   --    C DIFF TOXIN B BY PCR  Negative  --   --   --        Intake and Output  I/O       08/16 0701 - 08/17 0700 08/17 0701 - 08/18 0700    P  O  600 770    I V  (mL/kg) 89 3 (0 8) 169 8 (1 5)    Total Intake(mL/kg) 689 3 (6 3) 939 8 (8 5)    Urine (mL/kg/hr) 2050 (0 8) 900 (0 3)    Stool 0 0    Total Output 2050 900    Net -1360 7 +39 8          Unmeasured Stool Occurrence 6 x 2 x          Height and Weights   Height: 5' 3" (160 cm)  IBW (Ideal Body Weight): 52 4 kg  Body mass index is 42 88 kg/m²    Weight (last 2 days)     Date/Time   Weight    08/18/21 0527   110 (242 07)    08/17/21 0600   110 (243 39)    08/16/21 0600   109 (240 3)                Nutrition       Diet Orders   (From admission, onward)             Start     Ordered    08/17/21 0933  Dietary nutrition supplements  Once     Question Answer Comment   Select Supplement: Glucerna-Chocolate    Frequency Breakfast        08/17/21 0933    08/16/21 1045  Room Service  Once     Question:  Type of Service  Answer:  Room Service - Appropriate with Assistance    08/16/21 1044    08/14/21 0113  Diet Zaki/CHO Controlled; Consistent Carbohydrate Diet Level 2 (5 carb servings/75 grams CHO/meal)  Diet effective now     Question Answer Comment   Diet Type Zaki/CHO Controlled    Zaki/CHO Controlled Consistent Carbohydrate Diet Level 2 (5 carb servings/75 grams CHO/meal)    RD to adjust diet per protocol? Yes        08/14/21 0141                  Active Medications  Scheduled Meds:  Current Facility-Administered Medications   Medication Dose Route Frequency Provider Last Rate    atorvastatin  40 mg Oral HS Dewane Cardinal, CRNP      benzonatate  100 mg Oral TID PRN Royden Brochure, PA-C      dexamethasone  6 mg Intravenous Q24H Dewane Cardinal, CRNP      heparin (porcine)  3-30 Units/kg/hr (Order-Specific) Intravenous Titrated Dewane Cardinal, CRNP 6 Units/kg/hr (08/18/21 0508)    heparin (porcine)  4,000 Units Intravenous Q1H PRN Dewane Cardinal, CRNP      heparin (porcine)  8,000 Units Intravenous Q1H PRN Dewane Cardinal, CRNP      insulin glargine  15 Units Subcutaneous HS Yarely Terrell MD      insulin lispro  4-20 Units Subcutaneous TID AC Marylene Acton, PA-C      lidocaine  1 patch Topical Daily Yarely Terrell MD      magnesium oxide  800 mg Oral BID Royden Brochure, PA-C      pantoprazole  20 mg Oral Early Morning Dewane Cardinal, CRNP      Pramox-PE-Glycerin-Petrolatum   Rectal BID PRN Royden Brochure, PA-C      sodium chloride  3 mL Nebulization TID Royden Brochure, PA-C      traMADol  50 mg Oral Q8H PRN Dewane Cardinal, CRNP       Continuous Infusions:  heparin (porcine), 3-30 Units/kg/hr (Order-Specific), Last Rate: 6 Units/kg/hr (08/18/21 0508)      PRN Meds:   benzonatate, 100 mg, TID PRN  heparin (porcine), 4,000 Units, Q1H PRN  heparin (porcine), 8,000 Units, Q1H PRN  Pramox-PE-Glycerin-Petrolatum, , BID PRN  traMADol, 50 mg, Q8H PRN        Invasive Devices Review  Invasive Devices     Peripheral Intravenous Line            Peripheral IV 08/17/21 Right Forearm <1 day    Peripheral IV 08/18/21 Right Forearm <1 day                  Yarely Terrell MD      Portions of the record may have been created with voice recognition software    Occasional wrong word or "sound a like" substitutions may have occurred due to the inherent limitations of voice recognition software    Read the chart carefully and recognize, using context, where substitutions have occurred

## 2021-08-19 ENCOUNTER — TELEPHONE (OUTPATIENT)
Dept: INTERNAL MEDICINE CLINIC | Facility: CLINIC | Age: 61
End: 2021-08-19

## 2021-08-19 LAB
ALBUMIN SERPL BCP-MCNC: 2.2 G/DL (ref 3.5–5)
ALP SERPL-CCNC: 71 U/L (ref 46–116)
ALT SERPL W P-5'-P-CCNC: 29 U/L (ref 12–78)
ANION GAP SERPL CALCULATED.3IONS-SCNC: 9 MMOL/L (ref 4–13)
AST SERPL W P-5'-P-CCNC: 27 U/L (ref 5–45)
BACTERIA BLD CULT: NORMAL
BACTERIA BLD CULT: NORMAL
BASOPHILS # BLD AUTO: 0.01 THOUSANDS/ΜL (ref 0–0.1)
BASOPHILS NFR BLD AUTO: 0 % (ref 0–1)
BILIRUB SERPL-MCNC: 0.65 MG/DL (ref 0.2–1)
BUN SERPL-MCNC: 27 MG/DL (ref 5–25)
CALCIUM ALBUM COR SERPL-MCNC: 9.4 MG/DL (ref 8.3–10.1)
CALCIUM SERPL-MCNC: 8 MG/DL (ref 8.3–10.1)
CHLORIDE SERPL-SCNC: 108 MMOL/L (ref 100–108)
CO2 SERPL-SCNC: 25 MMOL/L (ref 21–32)
CREAT SERPL-MCNC: 1.38 MG/DL (ref 0.6–1.3)
CRP SERPL QL: 36.9 MG/L
D DIMER PPP FEU-MCNC: 1.19 UG/ML FEU
EOSINOPHIL # BLD AUTO: 0 THOUSAND/ΜL (ref 0–0.61)
EOSINOPHIL NFR BLD AUTO: 0 % (ref 0–6)
ERYTHROCYTE [DISTWIDTH] IN BLOOD BY AUTOMATED COUNT: 12.9 % (ref 11.6–15.1)
FERRITIN SERPL-MCNC: 493 NG/ML (ref 8–388)
GFR SERPL CREATININE-BSD FRML MDRD: 41 ML/MIN/1.73SQ M
GLUCOSE SERPL-MCNC: 194 MG/DL (ref 65–140)
GLUCOSE SERPL-MCNC: 209 MG/DL (ref 65–140)
GLUCOSE SERPL-MCNC: 247 MG/DL (ref 65–140)
GLUCOSE SERPL-MCNC: 251 MG/DL (ref 65–140)
GLUCOSE SERPL-MCNC: 262 MG/DL (ref 65–140)
HCT VFR BLD AUTO: 37.6 % (ref 34.8–46.1)
HGB BLD-MCNC: 12.3 G/DL (ref 11.5–15.4)
IMM GRANULOCYTES # BLD AUTO: 0.09 THOUSAND/UL (ref 0–0.2)
IMM GRANULOCYTES NFR BLD AUTO: 1 % (ref 0–2)
LYMPHOCYTES # BLD AUTO: 1.09 THOUSANDS/ΜL (ref 0.6–4.47)
LYMPHOCYTES NFR BLD AUTO: 12 % (ref 14–44)
MCH RBC QN AUTO: 28.5 PG (ref 26.8–34.3)
MCHC RBC AUTO-ENTMCNC: 32.7 G/DL (ref 31.4–37.4)
MCV RBC AUTO: 87 FL (ref 82–98)
MONOCYTES # BLD AUTO: 0.34 THOUSAND/ΜL (ref 0.17–1.22)
MONOCYTES NFR BLD AUTO: 4 % (ref 4–12)
NEUTROPHILS # BLD AUTO: 7.8 THOUSANDS/ΜL (ref 1.85–7.62)
NEUTS SEG NFR BLD AUTO: 83 % (ref 43–75)
NRBC BLD AUTO-RTO: 0 /100 WBCS
PLATELET # BLD AUTO: 360 THOUSANDS/UL (ref 149–390)
PMV BLD AUTO: 10.5 FL (ref 8.9–12.7)
POTASSIUM SERPL-SCNC: 4.8 MMOL/L (ref 3.5–5.3)
PROCALCITONIN SERPL-MCNC: <0.05 NG/ML
PROT SERPL-MCNC: 6.6 G/DL (ref 6.4–8.2)
RBC # BLD AUTO: 4.31 MILLION/UL (ref 3.81–5.12)
SODIUM SERPL-SCNC: 142 MMOL/L (ref 136–145)
WBC # BLD AUTO: 9.33 THOUSAND/UL (ref 4.31–10.16)

## 2021-08-19 PROCEDURE — 86140 C-REACTIVE PROTEIN: CPT | Performed by: NURSE PRACTITIONER

## 2021-08-19 PROCEDURE — 85379 FIBRIN DEGRADATION QUANT: CPT | Performed by: NURSE PRACTITIONER

## 2021-08-19 PROCEDURE — 82948 REAGENT STRIP/BLOOD GLUCOSE: CPT

## 2021-08-19 PROCEDURE — 85025 COMPLETE CBC W/AUTO DIFF WBC: CPT | Performed by: INTERNAL MEDICINE

## 2021-08-19 PROCEDURE — 82728 ASSAY OF FERRITIN: CPT | Performed by: NURSE PRACTITIONER

## 2021-08-19 PROCEDURE — 80053 COMPREHEN METABOLIC PANEL: CPT | Performed by: INTERNAL MEDICINE

## 2021-08-19 PROCEDURE — 94760 N-INVAS EAR/PLS OXIMETRY 1: CPT

## 2021-08-19 PROCEDURE — 84145 PROCALCITONIN (PCT): CPT | Performed by: INTERNAL MEDICINE

## 2021-08-19 PROCEDURE — 99232 SBSQ HOSP IP/OBS MODERATE 35: CPT | Performed by: INTERNAL MEDICINE

## 2021-08-19 PROCEDURE — 94640 AIRWAY INHALATION TREATMENT: CPT

## 2021-08-19 RX ADMIN — INSULIN LISPRO 8 UNITS: 100 INJECTION, SOLUTION INTRAVENOUS; SUBCUTANEOUS at 11:55

## 2021-08-19 RX ADMIN — MAGNESIUM OXIDE TAB 400 MG (241.3 MG ELEMENTAL MG) 800 MG: 400 (241.3 MG) TAB at 08:00

## 2021-08-19 RX ADMIN — INSULIN LISPRO 8 UNITS: 100 INJECTION, SOLUTION INTRAVENOUS; SUBCUTANEOUS at 16:52

## 2021-08-19 RX ADMIN — AMLODIPINE BESYLATE 10 MG: 10 TABLET ORAL at 08:00

## 2021-08-19 RX ADMIN — DEXAMETHASONE SODIUM PHOSPHATE 11 MG: 10 INJECTION, SOLUTION INTRAMUSCULAR; INTRAVENOUS at 23:49

## 2021-08-19 RX ADMIN — INSULIN GLARGINE 15 UNITS: 100 INJECTION, SOLUTION SUBCUTANEOUS at 21:37

## 2021-08-19 RX ADMIN — INSULIN LISPRO 4 UNITS: 100 INJECTION, SOLUTION INTRAVENOUS; SUBCUTANEOUS at 08:00

## 2021-08-19 RX ADMIN — ENOXAPARIN SODIUM 105 MG: 120 INJECTION SUBCUTANEOUS at 21:37

## 2021-08-19 RX ADMIN — BENZONATATE 100 MG: 100 CAPSULE ORAL at 00:39

## 2021-08-19 RX ADMIN — PANTOPRAZOLE SODIUM 20 MG: 20 TABLET, DELAYED RELEASE ORAL at 06:46

## 2021-08-19 RX ADMIN — IPRATROPIUM BROMIDE 0.5 MG: 0.5 SOLUTION RESPIRATORY (INHALATION) at 15:31

## 2021-08-19 RX ADMIN — BENZONATATE 100 MG: 100 CAPSULE ORAL at 21:50

## 2021-08-19 RX ADMIN — INSULIN LISPRO 4 UNITS: 100 INJECTION, SOLUTION INTRAVENOUS; SUBCUTANEOUS at 21:38

## 2021-08-19 RX ADMIN — ENOXAPARIN SODIUM 105 MG: 120 INJECTION SUBCUTANEOUS at 08:00

## 2021-08-19 RX ADMIN — LIDOCAINE 5% 1 PATCH: 700 PATCH TOPICAL at 08:00

## 2021-08-19 RX ADMIN — DEXAMETHASONE SODIUM PHOSPHATE 11 MG: 10 INJECTION, SOLUTION INTRAMUSCULAR; INTRAVENOUS at 00:14

## 2021-08-19 RX ADMIN — IPRATROPIUM BROMIDE 0.5 MG: 0.5 SOLUTION RESPIRATORY (INHALATION) at 19:53

## 2021-08-19 RX ADMIN — DEXAMETHASONE SODIUM PHOSPHATE 11 MG: 10 INJECTION, SOLUTION INTRAMUSCULAR; INTRAVENOUS at 11:13

## 2021-08-19 RX ADMIN — IPRATROPIUM BROMIDE 0.5 MG: 0.5 SOLUTION RESPIRATORY (INHALATION) at 11:20

## 2021-08-19 RX ADMIN — MAGNESIUM OXIDE TAB 400 MG (241.3 MG ELEMENTAL MG) 800 MG: 400 (241.3 MG) TAB at 17:01

## 2021-08-19 RX ADMIN — IPRATROPIUM BROMIDE 0.5 MG: 0.5 SOLUTION RESPIRATORY (INHALATION) at 07:23

## 2021-08-19 RX ADMIN — ATORVASTATIN CALCIUM 40 MG: 40 TABLET, FILM COATED ORAL at 21:37

## 2021-08-19 NOTE — PLAN OF CARE
Problem: Potential for Falls  Goal: Patient will remain free of falls  Description: INTERVENTIONS:  - Educate patient/family on patient safety including physical limitations  - Instruct patient to call for assistance with activity   - Consult OT/PT to assist with strengthening/mobility   - Keep Call bell within reach  - Keep bed low and locked with side rails adjusted as appropriate  - Keep care items and personal belongings within reach  - Initiate and maintain comfort rounds  - Make Fall Risk Sign visible to staff  - Offer Toileting every 2 Hours, in advance of need  - Initiate/Maintain BED alarm  - Apply yellow socks and bracelet for high fall risk patients  - Consider moving patient to room near nurses station  Outcome: Progressing     Problem: PAIN - ADULT  Goal: Verbalizes/displays adequate comfort level or baseline comfort level  Description: Interventions:  - Encourage patient to monitor pain and request assistance  - Assess pain using appropriate pain scale  - Administer analgesics based on type and severity of pain and evaluate response  - Implement non-pharmacological measures as appropriate and evaluate response  - Consider cultural and social influences on pain and pain management  - Notify physician/advanced practitioner if interventions unsuccessful or patient reports new pain  Outcome: Progressing     Problem: INFECTION - ADULT  Goal: Absence or prevention of progression during hospitalization  Description: INTERVENTIONS:  - Assess and monitor for signs and symptoms of infection  - Monitor lab/diagnostic results  - Monitor all insertion sites, i e  indwelling lines, tubes, and drains  - [place] appropriate cooling/warming therapies per order  - Administer medications as ordered  - Instruct and encourage patient and family to use good hand hygiene technique  - Identify and instruct in appropriate isolation precautions for identified infection/condition  Outcome: Progressing     Problem: DISCHARGE PLANNING  Goal: Discharge to home or other facility with appropriate resources  Description: INTERVENTIONS:  - Identify barriers to discharge w/patient and caregiver  - Arrange for needed discharge resources and transportation as appropriate  - Identify discharge learning needs (meds, wound care, etc )  - Arrange for interpretive services to assist at discharge as needed  - Refer to Case Management Department for coordinating discharge planning if the patient needs post-hospital services based on physician/advanced practitioner order or complex needs related to functional status, cognitive ability, or social support system  Outcome: Progressing     Problem: Knowledge Deficit  Goal: Patient/family/caregiver demonstrates understanding of disease process, treatment plan, medications, and discharge instructions  Description: Complete learning assessment and assess knowledge base    Interventions:  - Provide teaching at level of understanding  - Provide teaching via preferred learning methods  Outcome: Progressing     Problem: RESPIRATORY - ADULT  Goal: Achieves optimal ventilation and oxygenation  Description: INTERVENTIONS:  - Assess for changes in respiratory status  - Assess for changes in mentation and behavior  - Position to facilitate oxygenation and minimize respiratory effort  - Oxygen administered by appropriate delivery if ordered  - Initiate smoking cessation education as indicated  - Encourage broncho-pulmonary hygiene including cough, deep breathe, Incentive Spirometry  - Assess the need for suctioning and aspirate as needed  - Assess and instruct to report SOB or any respiratory difficulty  - Respiratory Therapy support as indicated  Outcome: Progressing     Problem: MOBILITY - ADULT  Goal: Maintain or return to baseline ADL function  Description: INTERVENTIONS:  -  Assess patient's ability to carry out ADLs; assess patient's baseline for ADL function and identify physical deficits which impact ability to perform ADLs (bathing, care of mouth/teeth, toileting, grooming, dressing, etc )  - Assess/evaluate cause of self-care deficits   - Assess range of motion  - Assess patient's mobility; develop plan if impaired  - Assess patient's need for assistive devices and provide as appropriate  - Encourage maximum independence but intervene and supervise when necessary  - Involve family in performance of ADLs  - Assess for home care needs following discharge   - Consider OT consult to assist with ADL evaluation and planning for discharge  - Provide patient education as appropriate  Outcome: Progressing  Goal: Maintains/Returns to pre admission functional level  Description: INTERVENTIONS:  - Perform BMAT or MOVE assessment daily    - Set and communicate daily mobility goal to care team and patient/family/caregiver  - Collaborate with rehabilitation services on mobility goals if consulted  - Perform Range of Motion 3 times a day  - Reposition patient every 2 hours    - Dangle patient 3 times a day  - Stand patient 3 times a day  - Ambulate patient 3 times a day  - Out of bed to chair 3 times a day   - Out of bed for meals 3 times a day  - Out of bed for toileting  - Record patient progress and toleration of activity level   Outcome: Progressing     Problem: Prexisting or High Potential for Compromised Skin Integrity  Goal: Skin integrity is maintained or improved  Description: INTERVENTIONS:  - Identify patients at risk for skin breakdown  - Assess and monitor skin integrity  - Assess and monitor nutrition and hydration status  - Monitor labs   - Assess for incontinence   - Turn and reposition patient  - Assist with mobility/ambulation  - Relieve pressure over bony prominences  - Avoid friction and shearing  - Provide appropriate hygiene as needed including keeping skin clean and dry  - Evaluate need for skin moisturizer/barrier cream  - Collaborate with interdisciplinary team   - Patient/family teaching  - Consider wound care consult   Outcome: Progressing

## 2021-08-19 NOTE — TELEPHONE ENCOUNTER
Renu Morales - is in [address] [place] [address] ICU since Friday - COVID +     Wanted you to know

## 2021-08-19 NOTE — ASSESSMENT & PLAN NOTE
Lab Results   Component Value Date    HGBA1C 7 8 (H) 05/11/2021       Recent Labs     08/18/21  0815 08/18/21  1119 08/18/21  1704 08/18/21  2100   POCGLU 182* 183* 190* 209*       Blood Sugar Average: Last 72 hrs:  · (P) 119 [id number]   · Has tried to control BG with diet  · BG AC/HS, increased to Algorithm 4 and  Lantus 15   · May require further increase to achieve goal BG<180 in setting of increased steroid admin

## 2021-08-19 NOTE — PROGRESS NOTES
Bill [address]  Progress Note Nathan Post 1960, 63 y o  female MRN: [id number]  Unit/Bed#: ICU 05 Encounter: [id number]  Primary Care Provider: Татьяна Rocha MD   Date and time admitted to hospital: 8/14/2021  1:01 AM    * Acute respiratory failure with hypoxia due to COVID 19 pneumonia  Assessment & Plan  · AEB hypoxia, SpO2 85% in ED, tachypnea with RR 25 and HERNANDEZ necessitating placement of midflow O2 to 10L  COVID +in ED 8/13  · Symptom onset Sunday 8/8 with fatigue, poor appetite, cold sweats  Sick contact w/significant other who was COVID + 2 weeks prior  · Developed diarrhea 8/10, cough and dyspnea with coughing fits  · CT C/A/P with bibasilar pulmonary groundglass infiltrates consistent with Covid pneumonia  · Cont  on moderate protocol  · Admission labs include Ferritin 608, CRP 6 3, D dimer 0 97 and procal 0 09  Repeat levels w/CRP 67 and procal 0 08  Plan  · Antibiotics discontinued as procal neg x2   · Atorvastatin 40mg qHS  · Dexamethasone escalated to 0 1mg/kg BID on 8/19, continue through 8/24   · Monitor fever curve, CBC  · Not a candidate for convalescent plasma as symptom onset -6D  · Remdesivir per protocol, completed   · Heparin drip for full AC on admission, transitioned to therapeutic lovenox due to improved renal function   · Currently on HFNC 75/35 stable last 48 hours sat low 90  · Encouraged self proning today, however patient refusing   · Pulmonary hygiene       Acute kidney injury ([facility] [address] )  Assessment & Plan  · Improving 2 03-->1 5  · Baseline creat 0 94-1  28  Was 5 56 on arrival to ED in setting of 6 days of poor appetite, diarrhea, and decreased PO intake  · Close I&O monitoring   · trend BMP  · avoid nephrotoxins    BMI 39 0-39 9,adult  Assessment & Plan  · Nutrition and exercise counseling when appropriate  · Habitus likely contributing to clinical picture      Pain in both feet  Assessment & Plan  · History of R  Foot pain s/p surgery x2   Unable to locate surgical documents in care everywhere  · Takes Tramadol 50mg PO TID PRN for foot pain  Will continue for now and consider d/c if oxygenation status declines    Gastroesophageal reflux disease without esophagitis  Assessment & Plan  · Takes esomeprazole  · Replaced with Protonix 20mg PO daily      Type 2 diabetes mellitus without complication, without long-term current use of insulin [facility])  Assessment & Plan  Lab Results   Component Value Date    HGBA1C 7 8 (H) 05/11/2021       Recent Labs     08/18/21  0815 08/18/21  1119 08/18/21  1704 08/18/21  2100   POCGLU 182* 183* 190* 209*       Blood Sugar Average: Last 72 hrs:  · (P) 281 [id number]   · Has tried to control BG with diet  · BG AC/HS, increased to Algorithm 4 and  Lantus 15   · May require further increase to achieve goal BG<180 in setting of increased steroid admin  Mixed hyperlipidemia  Assessment & Plan  · Replaced home simvastatin with atorvastatin per COVID protocol    Essential hypertension  Assessment & Plan  · History of HTN on losartan/metoprolol/dyazide  Will hold here given ANTHONY/bradycardia   · Cardiac monitoring/VS per protocol  · Consider PRN antihypertensives if SBP persistently >180  Currently 110-120's  · Add amlodipine 10            ----------------------------------------------------------------------------------------  HPI/24hr events: none, denies CPAP at night      Disposition: Continue Critical Care   Code Status: Level 1 - Full Code  ---------------------------------------------------------------------------------------  SUBJECTIVE  No acute complains  Small amount hemophthisis <tea spoon once  Review of Systems   Constitutional: Negative for fatigue, fever and unexpected weight change  HENT: Negative for congestion  Eyes: Negative for visual disturbance  Respiratory: Positive for cough and shortness of breath  Negative for wheezing  Cardiovascular: Negative for chest pain, palpitations and leg swelling  Gastrointestinal: Negative for abdominal pain, constipation, diarrhea, nausea and vomiting  Genitourinary: Negative for dysuria and urgency  Musculoskeletal: Positive for arthralgias and back pain  Skin: Negative for color change  Neurological: Negative for weakness, numbness and headaches  Psychiatric/Behavioral: Negative for confusion  Review of systems was reviewed and negative unless stated above in HPI/24-hour events   ---------------------------------------------------------------------------------------  OBJECTIVE    Vitals   Vitals:    21 0205 21 0304 21 0400 21 0406   BP: 142/64   115/56   BP Location: Left arm   Left arm   Pulse: 55   (!) 52   Resp: (!) 28   (!) 24   Temp:   98 °F (36 7 °C)    TempSrc:   Temporal    SpO2: 95% 95%  91%   Weight:       Height:         Temp (24hrs), Av 1 °F (36 7 °C), Min:97 7 °F (36 5 °C), Max:98 7 °F (37 1 °C)  Current: Temperature: 98 °F (36 7 °C)          Respiratory:  SpO2: SpO2: 91 %  Nasal Cannula O2 Flow Rate (L/min): 35 L/min    Invasive/non-invasive ventilation settings   Respiratory    Lab Data (Last 4 hours)    None         O2/Vent Data (Last 4 hours)       030          Non-Invasive Ventilation Mode HFNC (High flow)                   Physical Exam  Constitutional:       Appearance: She is obese  HENT:      Head: Normocephalic and atraumatic  Eyes:      Conjunctiva/sclera: Conjunctivae normal    Cardiovascular:      Rate and Rhythm: Normal rate and regular rhythm  Pulses: Normal pulses  Heart sounds: Normal heart sounds  No murmur heard  Pulmonary:      Effort: Pulmonary effort is normal  No respiratory distress  Breath sounds: Normal breath sounds  Abdominal:      General: Abdomen is flat  Bowel sounds are normal  There is no distension  Palpations: Abdomen is soft  Tenderness: There is no abdominal tenderness  There is no guarding     Musculoskeletal:         General: Normal range of motion  Cervical back: Normal range of motion  Skin:     General: Skin is warm  Capillary Refill: Capillary refill takes less than 2 seconds  Neurological:      General: No focal deficit present  Mental Status: She is alert and oriented to person, place, and time  Mental status is at baseline     Psychiatric:         Mood and Affect: Mood normal          Laboratory and Diagnostics:  Results from last 7 days   Lab Units 08/18/21  0333 08/17/21  0455 08/16/21  0506 08/15/21  0645 08/14/21  0627 08/13/21  1809   WBC Thousand/uL 8 57 11 82* 12 11* 12 95* 5 10 7 28   HEMOGLOBIN g/dL 12 0 13 2 12 4 12 9 13 7 14 9   HEMATOCRIT % 36 4 40 6 37 6 38 2 43 0 43 9   PLATELETS Thousands/uL 314 318 273 242 180 210   NEUTROS PCT % 78* 83* 82* 77*  --   --    BANDS PCT %  --   --   --   --  3  --    MONOS PCT % 5 5 7 7  --   --    MONO PCT %  --   --   --   --  5 7     Results from last 7 days   Lab Units 08/18/21  0333 08/17/21  0455 08/16/21  0505 08/15/21  0645 08/14/21  1400 08/14/21  0627 08/13/21  1809   SODIUM mmol/L 139 140 143 143 139 137 132*   POTASSIUM mmol/L 4 1 4 5 4 6 3 1* 3 7 3 7 4 0   CHLORIDE mmol/L 108 108 110* 107 104 104 95*   CO2 mmol/L 23 23 20* 20* 21 15* 24   ANION GAP mmol/L 8 9 13 16* 14* 18* 13   BUN mg/dL 30* 33* 45* 56* 55* 53* 51*   CREATININE mg/dL 1 51* 1 57* 2 03* 2 93* 4 26* 4 67* 5 56*   CALCIUM mg/dL 7 5* 7 8* 7 4* 7 2* 6 9* 7 0* 8 1*   GLUCOSE RANDOM mg/dL 208* 163* 224* 207* 262* 191* 126   ALT U/L  --  36  --  30  --  31 23   AST U/L  --  40  --  40  --  47* 44*   ALK PHOS U/L  --  70  --  61  --  59 55 8   ALBUMIN g/dL  --  2 4*  --  2 4*  --  2 4* 3 6   TOTAL BILIRUBIN mg/dL  --  0 59  --  0 34  --  0 33 0 49     Results from last 7 days   Lab Units 08/16/21  0505 08/15/21  0645 08/14/21  0627   MAGNESIUM mg/dL 2 8* 1 7 1 6   PHOSPHORUS mg/dL 2 0* 2 3 3 7      Results from last 7 days   Lab Units 08/18/21  1020 08/18/21  0333 08/17/21  1950 08/17/21  1112 08/17/21  0454 08/16/21  2150 08/16/21  1411 08/14/21  0248   INR   --   --   --   --   --   --   --  1 05   PTT seconds 44* 150* 37 188* 71* 185* 28 26      Results from last 7 days   Lab Units 08/14/21  0247   TROPONIN I ng/mL <0 02     Results from last 7 days   Lab Units 08/14/21  1400 08/13/21  1809   LACTIC ACID mmol/L 1 0 1 4     ABG:    VBG:    Results from last 7 days   Lab Units 08/18/21  0333 08/15/21  0645 08/14/21  1400 08/13/21  1809   PROCALCITONIN ng/ml <0 05 <0 05 0 08 0 09       Micro  Results from last 7 days   Lab Units 08/15/21  1310 08/14/21  0148 08/13/21  1834 08/13/21  1829   BLOOD CULTURE   --   --  No Growth After 5 Days  No Growth After 5 Days  MRSA CULTURE ONLY   --  No Methicillin Resistant Staphlyococcus aureus (MRSA) isolated  --   --    C DIFF TOXIN B BY PCR  Negative  --   --   --      Intake and Output  I/O       08/17 0701 - 08/18 0700 08/18 0701 - 08/19 0700    P  O  890 800    I V  (mL/kg) 355 7 (3 2) 149 9 (1 4)    Total Intake(mL/kg) 1245 7 (11 3) 949 9 (8 6)    Urine (mL/kg/hr) 1200 (0 5) 1000 (0 4)    Stool 0 0    Total Output 1200 1000    Net +45 7 -50 1          Unmeasured Stool Occurrence 2 x 3 x          Height and Weights   Height: 5' 3" (160 cm)  IBW (Ideal Body Weight): 52 4 kg  Body mass index is 42 88 kg/m²    Weight (last 2 days)     Date/Time   Weight    08/18/21 0527   110 (242 07)    08/17/21 0600   110 (243 39)                Nutrition       Diet Orders   (From admission, onward)             Start     Ordered    08/17/21 0933  Dietary nutrition supplements  Once     Question Answer Comment   Select Supplement: Glucerna-Chocolate    Frequency Breakfast        08/17/21 0933    08/16/21 1045  Room Service  Once     Question:  Type of Service  Answer:  Room Service - Appropriate with Assistance    08/16/21 1044    08/14/21 0113  Diet Zaki/CHO Controlled; Consistent Carbohydrate Diet Level 2 (5 carb servings/75 grams CHO/meal)  Diet effective now     Question Answer Comment   Diet Type Zaki/CHO Controlled    Zaki/CHO Controlled Consistent Carbohydrate Diet Level 2 (5 carb servings/75 grams CHO/meal)    RD to adjust diet per protocol? Yes        08/14/21 0141                  Active Medications  Scheduled Meds:  Current Facility-Administered Medications   Medication Dose Route Frequency Provider Last Rate    amLODIPine  10 mg Oral Daily Gina Larsen MD      atorvastatin  40 mg Oral HS Samira Lat, SHERRYNP      benzonatate  100 mg Oral TID PRN Carol Ann Freeman PA-C      dexamethasone  0 1 mg/kg Intravenous Q12H MARCK Almaraz 11 mg (08/19/21 0014)    enoxaparin  1 mg/kg Subcutaneous Q12H [address] Gina Larsen MD      insulin glargine  15 Units Subcutaneous HS Gina Larsen MD      insulin lispro  2-12 Units Subcutaneous HS MARCK Almaraz      insulin lispro  4-20 Units Subcutaneous TID AC MARCK Almaraz      ipratropium  0 5 mg Nebulization 4x Daily Gina Larsen MD      lidocaine  1 patch Topical Daily Gina Larsen MD      magnesium oxide  800 mg Oral BID Carol Ann Freeman PA-C      pantoprazole  20 mg Oral Early Morning Samira Lat, CRNP      Pramox-PE-Glycerin-Petrolatum   Rectal BID PRN Carol Ann Freeman PA-C      traMADol  50 mg Oral Q8H PRN Samira Lat, CRNP       Continuous Infusions:     PRN Meds:   benzonatate, 100 mg, TID PRN  Pramox-PE-Glycerin-Petrolatum, , BID PRN  traMADol, 50 mg, Q8H PRN        Invasive Devices Review  Invasive Devices     Peripheral Intravenous Line            Peripheral IV 08/17/21 Right Forearm 1 day    Peripheral IV 08/18/21 Right Forearm 1 day                  Gina Larsen MD      Portions of the record may have been created with voice recognition software  Occasional wrong word or "sound a like" substitutions may have occurred due to the inherent limitations of voice recognition software    Read the chart carefully and recognize, using context, where substitutions have occurred

## 2021-08-20 ENCOUNTER — APPOINTMENT (INPATIENT)
Dept: RADIOLOGY | Facility: HOSPITAL | Age: 61
DRG: 177 | End: 2021-08-20
Payer: MEDICARE

## 2021-08-20 PROBLEM — N17.9 ACUTE KIDNEY INJURY (HCC): Status: RESOLVED | Noted: 2021-08-14 | Resolved: 2021-08-20

## 2021-08-20 LAB
ATRIAL RATE: 97 BPM
GLUCOSE SERPL-MCNC: 215 MG/DL (ref 65–140)
GLUCOSE SERPL-MCNC: 237 MG/DL (ref 65–140)
GLUCOSE SERPL-MCNC: 281 MG/DL (ref 65–140)
GLUCOSE SERPL-MCNC: 311 MG/DL (ref 65–140)
P AXIS: 57 DEGREES
PR INTERVAL: 179 MS
QRS AXIS: 35 DEGREES
QRSD INTERVAL: 94 MS
QT INTERVAL: 338 MS
QTC INTERVAL: 430 MS
T WAVE AXIS: 27 DEGREES
VENTRICULAR RATE: 97 BPM

## 2021-08-20 PROCEDURE — 82948 REAGENT STRIP/BLOOD GLUCOSE: CPT

## 2021-08-20 PROCEDURE — 71045 X-RAY EXAM CHEST 1 VIEW: CPT

## 2021-08-20 PROCEDURE — 93010 ELECTROCARDIOGRAM REPORT: CPT | Performed by: INTERNAL MEDICINE

## 2021-08-20 PROCEDURE — 94640 AIRWAY INHALATION TREATMENT: CPT

## 2021-08-20 PROCEDURE — 99232 SBSQ HOSP IP/OBS MODERATE 35: CPT | Performed by: INTERNAL MEDICINE

## 2021-08-20 PROCEDURE — 94760 N-INVAS EAR/PLS OXIMETRY 1: CPT

## 2021-08-20 PROCEDURE — 94660 CPAP INITIATION&MGMT: CPT

## 2021-08-20 RX ORDER — INSULIN GLARGINE 100 [IU]/ML
18 INJECTION, SOLUTION SUBCUTANEOUS
Status: DISCONTINUED | OUTPATIENT
Start: 2021-08-20 | End: 2021-08-21

## 2021-08-20 RX ORDER — DEXAMETHASONE SODIUM PHOSPHATE 4 MG/ML
10 INJECTION, SOLUTION INTRA-ARTICULAR; INTRALESIONAL; INTRAMUSCULAR; INTRAVENOUS; SOFT TISSUE DAILY
Status: DISCONTINUED | OUTPATIENT
Start: 2021-08-22 | End: 2021-08-20

## 2021-08-20 RX ADMIN — LIDOCAINE 5% 1 PATCH: 700 PATCH TOPICAL at 08:26

## 2021-08-20 RX ADMIN — IPRATROPIUM BROMIDE 0.5 MG: 0.5 SOLUTION RESPIRATORY (INHALATION) at 15:11

## 2021-08-20 RX ADMIN — INSULIN LISPRO 12 UNITS: 100 INJECTION, SOLUTION INTRAVENOUS; SUBCUTANEOUS at 11:53

## 2021-08-20 RX ADMIN — MAGNESIUM OXIDE TAB 400 MG (241.3 MG ELEMENTAL MG) 800 MG: 400 (241.3 MG) TAB at 18:44

## 2021-08-20 RX ADMIN — ENOXAPARIN SODIUM 105 MG: 120 INJECTION SUBCUTANEOUS at 21:14

## 2021-08-20 RX ADMIN — IPRATROPIUM BROMIDE 0.5 MG: 0.5 SOLUTION RESPIRATORY (INHALATION) at 08:38

## 2021-08-20 RX ADMIN — DEXAMETHASONE SODIUM PHOSPHATE 11 MG: 10 INJECTION, SOLUTION INTRAMUSCULAR; INTRAVENOUS at 11:59

## 2021-08-20 RX ADMIN — DEXAMETHASONE SODIUM PHOSPHATE 11 MG: 10 INJECTION, SOLUTION INTRAMUSCULAR; INTRAVENOUS at 23:27

## 2021-08-20 RX ADMIN — INSULIN LISPRO 8 UNITS: 100 INJECTION, SOLUTION INTRAVENOUS; SUBCUTANEOUS at 21:14

## 2021-08-20 RX ADMIN — MAGNESIUM OXIDE TAB 400 MG (241.3 MG ELEMENTAL MG) 800 MG: 400 (241.3 MG) TAB at 08:24

## 2021-08-20 RX ADMIN — PANTOPRAZOLE SODIUM 20 MG: 20 TABLET, DELAYED RELEASE ORAL at 05:44

## 2021-08-20 RX ADMIN — INSULIN GLARGINE 18 UNITS: 100 INJECTION, SOLUTION SUBCUTANEOUS at 21:13

## 2021-08-20 RX ADMIN — IPRATROPIUM BROMIDE 0.5 MG: 0.5 SOLUTION RESPIRATORY (INHALATION) at 11:17

## 2021-08-20 RX ADMIN — IPRATROPIUM BROMIDE 0.5 MG: 0.5 SOLUTION RESPIRATORY (INHALATION) at 19:39

## 2021-08-20 RX ADMIN — ENOXAPARIN SODIUM 105 MG: 120 INJECTION SUBCUTANEOUS at 08:24

## 2021-08-20 RX ADMIN — BENZONATATE 100 MG: 100 CAPSULE ORAL at 23:27

## 2021-08-20 RX ADMIN — AMLODIPINE BESYLATE 10 MG: 10 TABLET ORAL at 08:31

## 2021-08-20 RX ADMIN — ATORVASTATIN CALCIUM 40 MG: 40 TABLET, FILM COATED ORAL at 21:16

## 2021-08-20 RX ADMIN — INSULIN LISPRO 8 UNITS: 100 INJECTION, SOLUTION INTRAVENOUS; SUBCUTANEOUS at 16:04

## 2021-08-20 RX ADMIN — INSULIN LISPRO 8 UNITS: 100 INJECTION, SOLUTION INTRAVENOUS; SUBCUTANEOUS at 07:00

## 2021-08-20 NOTE — PROGRESS NOTES
Bill 45  Progress Note Skinny Burnette 1960, 63 y o  female MRN: [id number]  Unit/Bed#: ICU 05 Encounter: [id number]  Primary Care Provider: Arelis Dos Santos MD   Date and time admitted to hospital: 8/14/2021  1:01 AM    * Acute respiratory failure with hypoxia due to COVID 19 pneumonia  Assessment & Plan  · AEB hypoxia, SpO2 85% in ED, tachypnea with RR 25 and HERNANDEZ necessitating placement of midflow O2 to 10L  COVID +in ED 8/13  · Symptom onset Sunday 8/8 with fatigue, poor appetite, cold sweats  Sick contact w/significant other who was COVID + 2 weeks prior  · Developed diarrhea 8/10, cough and dyspnea with coughing fits  · CT C/A/P with bibasilar pulmonary groundglass infiltrates consistent with Covid pneumonia  · Cont  on moderate protocol  · Admission labs include Ferritin 608, CRP 6 3, D dimer 0 97 and procal 0 09  Repeat levels w/CRP 67 and procal 0 08  Plan  · Antibiotics discontinued as procal neg x2   · Atorvastatin 40mg qHS  · Dexamethasone escalated to 0 1mg/kg BID on 8/19, continue through 8/24   · Monitor fever curve, CBC  · Not a candidate for convalescent plasma as symptom onset -6D  · Remdesivir per protocol, completed   · Heparin drip for full AC on admission, transitioned to therapeutic lovenox due to improved renal function   · Currently on HFNC 65/35 stable last 48 hours sat low 90  CXR 20/8 shows improvements  · Encouraged self proning today, however patient refusing   · Pulmonary hygiene       Acute kidney injury (HCC)-resolved as of 8/20/2021  Assessment & Plan  · Improving 2 03-->1 5-->1 3  · Baseline creat 0 94-1  28   Was 5 56 on arrival to ED in setting of 6 days of poor appetite, diarrhea, and decreased PO intake  · Close I&O monitoring   · trend BMP  · avoid nephrotoxins    BMI 39 0-39 9,adult  Assessment & Plan  · Nutrition and exercise counseling when appropriate  · Habitus likely contributing to clinical picture      Pain in both feet  Assessment & Plan  · History of R  Foot pain s/p surgery x2  Unable to locate surgical documents in care everywhere  · Takes Tramadol 50mg PO TID PRN for foot pain  Will continue for now and consider d/c if oxygenation status declines    Gastroesophageal reflux disease without esophagitis  Assessment & Plan  · Takes esomeprazole  · Replaced with Protonix 20mg PO daily      Type 2 diabetes mellitus without complication, without long-term current use of insulin [facility])  Assessment & Plan  Lab Results   Component Value Date    HGBA1C 7 8 (H) 05/11/2021       Recent Labs     08/19/21  0648 08/19/21  1151 08/19/21  1535 08/19/21  2126   POCGLU 209* 262* 251* 247*       Blood Sugar Average: Last 72 hrs:  · (P) 873 [id number]   · Has tried to control BG with diet  · BG AC/HS, increased to Algorithm 4 and  Lantus 15   · May require further increase to achieve goal BG<180 in setting of increased steroid admin  Mixed hyperlipidemia  Assessment & Plan  · Replaced home simvastatin with atorvastatin per COVID protocol    Essential hypertension  Assessment & Plan  · History of HTN on losartan/metoprolol/dyazide  Will hold here given ANTHONY/bradycardia   · Cardiac monitoring/VS per protocol  · Consider PRN antihypertensives if SBP persistently >180  Currently 110-120's  · Add amlodipine 10            ----------------------------------------------------------------------------------------  HPI/24hr events: None    Disposition: Continue Critical Care   Code Status: Level 1 - Full Code  ---------------------------------------------------------------------------------------  SUBJECTIVE  No acute complains  Review of Systems   Constitutional: Negative for fatigue and fever  HENT: Negative for congestion  Eyes: Negative for visual disturbance  Respiratory: Positive for cough  Negative for shortness of breath  Cardiovascular: Negative for chest pain, palpitations and leg swelling     Gastrointestinal: Negative for abdominal pain, Capillary Refill: Capillary refill takes less than 2 seconds  Neurological:      General: No focal deficit present  Mental Status: She is alert     Psychiatric:         Mood and Affect: Mood normal          Laboratory and Diagnostics:  Results from last 7 days   Lab Units 08/19/21  0706 08/18/21  0333 08/17/21  0455 08/16/21  0506 08/15/21  0645 08/14/21  0627 08/13/21  1809   WBC Thousand/uL 9 33 8 57 11 82* 12 11* 12 95* 5 10 7 28   HEMOGLOBIN g/dL 12 3 12 0 13 2 12 4 12 9 13 7 14 9   HEMATOCRIT % 37 6 36 4 40 6 37 6 38 2 43 0 43 9   PLATELETS Thousands/uL 360 314 318 273 242 180 210   NEUTROS PCT % 83* 78* 83* 82* 77*  --   --    BANDS PCT %  --   --   --   --   --  3  --    MONOS PCT % 4 5 5 7 7  --   --    MONO PCT %  --   --   --   --   --  5 7     Results from last 7 days   Lab Units 08/19/21  0706 08/18/21  0333 08/17/21  0455 08/16/21  0505 08/15/21  0645 08/14/21  1400 08/14/21  0627 08/13/21  1809   SODIUM mmol/L 142 139 140 143 143 139 137 132*   POTASSIUM mmol/L 4 8 4 1 4 5 4 6 3 1* 3 7 3 7 4 0   CHLORIDE mmol/L 108 108 108 110* 107 104 104 95*   CO2 mmol/L 25 23 23 20* 20* 21 15* 24   ANION GAP mmol/L 9 8 9 13 16* 14* 18* 13   BUN mg/dL 27* 30* 33* 45* 56* 55* 53* 51*   CREATININE mg/dL 1 38* 1 51* 1 57* 2 03* 2 93* 4 26* 4 67* 5 56*   CALCIUM mg/dL 8 0* 7 5* 7 8* 7 4* 7 2* 6 9* 7 0* 8 1*   GLUCOSE RANDOM mg/dL 194* 208* 163* 224* 207* 262* 191* 126   ALT U/L 29  --  36  --  30  --  31 23   AST U/L 27  --  40  --  40  --  47* 44*   ALK PHOS U/L 71  --  70  --  61  --  59 55 8   ALBUMIN g/dL 2 2*  --  2 4*  --  2 4*  --  2 4* 3 6   TOTAL BILIRUBIN mg/dL 0 65  --  0 59  --  0 34  --  0 33 0 49     Results from last 7 days   Lab Units 08/16/21  0505 08/15/21  0645 08/14/21  0627   MAGNESIUM mg/dL 2 8* 1 7 1 6   PHOSPHORUS mg/dL 2 0* 2 3 3 7      Results from last 7 days   Lab Units 08/18/21  1020 08/18/21  0333 08/17/21  1950 08/17/21  1112 08/17/21  0454 08/16/21  2150 08/16/21  1411 08/14/21  0248 INR   --   --   --   --   --   --   --  1 05   PTT seconds 44* 150* 37 188* 71* 185* 28 26      Results from last 7 days   Lab Units 08/14/21  0247   TROPONIN I ng/mL <0 02     Results from last 7 days   Lab Units 08/14/21  1400 08/13/21  1809   LACTIC ACID mmol/L 1 0 1 4     ABG:    VBG:    Results from last 7 days   Lab Units 08/19/21  0706 08/18/21  0333 08/15/21  0645 08/14/21  1400 08/13/21  1809   PROCALCITONIN ng/ml <0 05 <0 05 <0 05 0 08 0 09       Micro  Results from last 7 days   Lab Units 08/15/21  1310 08/14/21  0148 08/13/21  1834 08/13/21  1829   BLOOD CULTURE   --   --  No Growth After 5 Days  No Growth After 5 Days  MRSA CULTURE ONLY   --  No Methicillin Resistant Staphlyococcus aureus (MRSA) isolated  --   --    C DIFF TOXIN B BY PCR  Negative  --   --   --      Intake and Output  I/O       08/18 0701 - 08/19 0700 08/19 0701 - 08/20 0700    P  O  800 1020    I V  (mL/kg) 149 9 (1 4) 30 (0 3)    Total Intake(mL/kg) 949 9 (9) 1050 (10)    Urine (mL/kg/hr) 1000 (0 4) 1600 (0 6)    Stool 0     Total Output 1000 1600    Net -50 1 -550          Unmeasured Stool Occurrence 3 x           Height and Weights   Height: 5' 3" (160 cm)  IBW (Ideal Body Weight): 52 4 kg  Body mass index is 41 01 kg/m²    Weight (last 2 days)     Date/Time   Weight    08/20/21 0500   105 (231 48)    08/19/21 0600   106 (233 91)    08/18/21 0527   110 (242 07)                Nutrition       Diet Orders   (From admission, onward)             Start     Ordered    08/17/21 0933  Dietary nutrition supplements  Once     Question Answer Comment   Select Supplement: Glucerna-Chocolate    Frequency Breakfast        08/17/21 0933    08/16/21 1045  Room Service  Once     Question:  Type of Service  Answer:  Room Service - Appropriate with Assistance    08/16/21 1044    08/14/21 0113  Diet Zaki/CHO Controlled; Consistent Carbohydrate Diet Level 2 (5 carb servings/75 grams CHO/meal)  Diet effective now     Question Answer Comment   Diet Type Zaki/CHO Controlled    Zaki/CHO Controlled Consistent Carbohydrate Diet Level 2 (5 carb servings/75 grams CHO/meal)    RD to adjust diet per protocol? Yes        08/14/21 0141                  Active Medications  Scheduled Meds:  Current Facility-Administered Medications   Medication Dose Route Frequency Provider Last Rate    amLODIPine  10 mg Oral Daily Juan Carlos Anguiano MD      atorvastatin  40 mg Oral HS MARCK Paul      benzonatate  100 mg Oral TID PRN Eleanor Jimenez PA-C      dexamethasone  0 1 mg/kg Intravenous Q12H SHERRY MurdockNP 11 mg (08/19/21 7429)    enoxaparin  1 mg/kg Subcutaneous Q12H [address] Juan Carlos Anguiano MD      insulin glargine  15 Units Subcutaneous HS Juan Carlos Anguiano MD      insulin lispro  2-12 Units Subcutaneous HS MARCK Murdock      insulin lispro  4-20 Units Subcutaneous TID AC MARCK Murdock      ipratropium  0 5 mg Nebulization 4x Daily Juan Carlos Anguiano MD      lidocaine  1 patch Topical Daily Juan Carlos Anguiano MD      magnesium oxide  800 mg Oral BID Eleanor Jimenez PA-C      pantoprazole  20 mg Oral Early Morning MARCK Paul      Pramox-PE-Glycerin-Petrolatum   Rectal BID PRN Eleanor Jimenez PA-C      traMADol  50 mg Oral Q8H PRN MARCK Paul       Continuous Infusions:     PRN Meds:   benzonatate, 100 mg, TID PRN  Pramox-PE-Glycerin-Petrolatum, , BID PRN  traMADol, 50 mg, Q8H PRN        Invasive Devices Review  Invasive Devices     Peripheral Intravenous Line            Peripheral IV 08/17/21 Right Forearm 2 days    Peripheral IV 08/18/21 Right Forearm 2 days                Juan Carlos Anguiano MD      Portions of the record may have been created with voice recognition software  Occasional wrong word or "sound a like" substitutions may have occurred due to the inherent limitations of voice recognition software    Read the chart carefully and recognize, using context, where substitutions have occurred

## 2021-08-20 NOTE — ASSESSMENT & PLAN NOTE
Lab Results   Component Value Date    HGBA1C 7 8 (H) 05/11/2021       Recent Labs     08/19/21  0648 08/19/21  1151 08/19/21  1535 08/19/21 2126   POCGLU 209* 262* 251* 247*       Blood Sugar Average: Last 72 hrs:  · (P) 101 [id number]   · Has tried to control BG with diet  · BG AC/HS, increased to Algorithm 4 and  Lantus 15   · May require further increase to achieve goal BG<180 in setting of increased steroid admin

## 2021-08-20 NOTE — ASSESSMENT & PLAN NOTE
· Improving 2 03-->1 5-->1 3  · Baseline creat 0 94-1  28   Was 5 56 on arrival to ED in setting of 6 days of poor appetite, diarrhea, and decreased PO intake  · Close I&O monitoring   · trend BMP  · avoid nephrotoxins

## 2021-08-20 NOTE — ASSESSMENT & PLAN NOTE
· AEB hypoxia, SpO2 85% in ED, tachypnea with RR 25 and HERNANDEZ necessitating placement of midflow O2 to 10L  COVID +in ED 8/13  · Symptom onset Sunday 8/8 with fatigue, poor appetite, cold sweats  Sick contact w/significant other who was COVID + 2 weeks prior  · Developed diarrhea 8/10, cough and dyspnea with coughing fits  · CT C/A/P with bibasilar pulmonary groundglass infiltrates consistent with Covid pneumonia  · Cont  on moderate protocol  · Admission labs include Ferritin 608, CRP 6 3, D dimer 0 97 and procal 0 09  Repeat levels w/CRP 67 and procal 0 08  Plan  · Antibiotics discontinued as procal neg x2   · Atorvastatin 40mg qHS  · Dexamethasone escalated to 0 1mg/kg BID on 8/19, continue through 8/24   · Monitor fever curve, CBC  · Not a candidate for convalescent plasma as symptom onset -6D  · Remdesivir per protocol, completed   · Heparin drip for full AC on admission, transitioned to therapeutic lovenox due to improved renal function   · Currently on HFNC 65/35 stable last 48 hours sat low 90  CXR 20/8 shows improvements  · Encouraged self proning today, however patient refusing   · Pulmonary hygiene

## 2021-08-21 LAB
ALBUMIN SERPL BCP-MCNC: 2 G/DL (ref 3.5–5)
ALP SERPL-CCNC: 63 U/L (ref 46–116)
ALT SERPL W P-5'-P-CCNC: 26 U/L (ref 12–78)
ANION GAP SERPL CALCULATED.3IONS-SCNC: 10 MMOL/L (ref 4–13)
AST SERPL W P-5'-P-CCNC: 27 U/L (ref 5–45)
BASOPHILS # BLD AUTO: 0.01 THOUSANDS/ΜL (ref 0–0.1)
BASOPHILS NFR BLD AUTO: 0 % (ref 0–1)
BILIRUB SERPL-MCNC: 0.71 MG/DL (ref 0.2–1)
BUN SERPL-MCNC: 32 MG/DL (ref 5–25)
CALCIUM ALBUM COR SERPL-MCNC: 9.2 MG/DL (ref 8.3–10.1)
CALCIUM SERPL-MCNC: 7.6 MG/DL (ref 8.3–10.1)
CHLORIDE SERPL-SCNC: 105 MMOL/L (ref 100–108)
CO2 SERPL-SCNC: 21 MMOL/L (ref 21–32)
CREAT SERPL-MCNC: 1.15 MG/DL (ref 0.6–1.3)
EOSINOPHIL # BLD AUTO: 0 THOUSAND/ΜL (ref 0–0.61)
EOSINOPHIL NFR BLD AUTO: 0 % (ref 0–6)
ERYTHROCYTE [DISTWIDTH] IN BLOOD BY AUTOMATED COUNT: 12.5 % (ref 11.6–15.1)
GFR SERPL CREATININE-BSD FRML MDRD: 51 ML/MIN/1.73SQ M
GLUCOSE SERPL-MCNC: 216 MG/DL (ref 65–140)
GLUCOSE SERPL-MCNC: 225 MG/DL (ref 65–140)
GLUCOSE SERPL-MCNC: 295 MG/DL (ref 65–140)
GLUCOSE SERPL-MCNC: 302 MG/DL (ref 65–140)
GLUCOSE SERPL-MCNC: 317 MG/DL (ref 65–140)
HCT VFR BLD AUTO: 36.7 % (ref 34.8–46.1)
HGB BLD-MCNC: 12.4 G/DL (ref 11.5–15.4)
IMM GRANULOCYTES # BLD AUTO: 0.23 THOUSAND/UL (ref 0–0.2)
IMM GRANULOCYTES NFR BLD AUTO: 2 % (ref 0–2)
LYMPHOCYTES # BLD AUTO: 1 THOUSANDS/ΜL (ref 0.6–4.47)
LYMPHOCYTES NFR BLD AUTO: 10 % (ref 14–44)
MCH RBC QN AUTO: 28.8 PG (ref 26.8–34.3)
MCHC RBC AUTO-ENTMCNC: 33.8 G/DL (ref 31.4–37.4)
MCV RBC AUTO: 85 FL (ref 82–98)
MONOCYTES # BLD AUTO: 0.63 THOUSAND/ΜL (ref 0.17–1.22)
MONOCYTES NFR BLD AUTO: 6 % (ref 4–12)
NEUTROPHILS # BLD AUTO: 8.39 THOUSANDS/ΜL (ref 1.85–7.62)
NEUTS SEG NFR BLD AUTO: 82 % (ref 43–75)
NRBC BLD AUTO-RTO: 0 /100 WBCS
PLATELET # BLD AUTO: 400 THOUSANDS/UL (ref 149–390)
PMV BLD AUTO: 10.3 FL (ref 8.9–12.7)
POTASSIUM SERPL-SCNC: 4.9 MMOL/L (ref 3.5–5.3)
PROT SERPL-MCNC: 6.2 G/DL (ref 6.4–8.2)
RBC # BLD AUTO: 4.31 MILLION/UL (ref 3.81–5.12)
SODIUM SERPL-SCNC: 136 MMOL/L (ref 136–145)
WBC # BLD AUTO: 10.26 THOUSAND/UL (ref 4.31–10.16)

## 2021-08-21 PROCEDURE — 94760 N-INVAS EAR/PLS OXIMETRY 1: CPT

## 2021-08-21 PROCEDURE — 94640 AIRWAY INHALATION TREATMENT: CPT

## 2021-08-21 PROCEDURE — 94660 CPAP INITIATION&MGMT: CPT

## 2021-08-21 PROCEDURE — 80053 COMPREHEN METABOLIC PANEL: CPT | Performed by: INTERNAL MEDICINE

## 2021-08-21 PROCEDURE — 82948 REAGENT STRIP/BLOOD GLUCOSE: CPT

## 2021-08-21 PROCEDURE — 85025 COMPLETE CBC W/AUTO DIFF WBC: CPT | Performed by: INTERNAL MEDICINE

## 2021-08-21 PROCEDURE — 99232 SBSQ HOSP IP/OBS MODERATE 35: CPT | Performed by: INTERNAL MEDICINE

## 2021-08-21 RX ORDER — LEVALBUTEROL INHALATION SOLUTION 0.63 MG/3ML
0.63 SOLUTION RESPIRATORY (INHALATION)
Status: DISCONTINUED | OUTPATIENT
Start: 2021-08-21 | End: 2021-08-26

## 2021-08-21 RX ORDER — INSULIN GLARGINE 100 [IU]/ML
20 INJECTION, SOLUTION SUBCUTANEOUS
Status: DISCONTINUED | OUTPATIENT
Start: 2021-08-21 | End: 2021-08-22

## 2021-08-21 RX ADMIN — TRAMADOL HYDROCHLORIDE 50 MG: 50 TABLET, FILM COATED ORAL at 22:31

## 2021-08-21 RX ADMIN — AMLODIPINE BESYLATE 10 MG: 10 TABLET ORAL at 08:20

## 2021-08-21 RX ADMIN — INSULIN LISPRO 12 UNITS: 100 INJECTION, SOLUTION INTRAVENOUS; SUBCUTANEOUS at 11:39

## 2021-08-21 RX ADMIN — INSULIN GLARGINE 20 UNITS: 100 INJECTION, SOLUTION SUBCUTANEOUS at 22:00

## 2021-08-21 RX ADMIN — GLYCERIN, PETROLATUM, PHENYLEPHRINE HCL, PRAMOXINE HCL: 144; 2.5; 10; 15 CREAM TOPICAL at 15:22

## 2021-08-21 RX ADMIN — IPRATROPIUM BROMIDE 0.5 MG: 0.5 SOLUTION RESPIRATORY (INHALATION) at 07:28

## 2021-08-21 RX ADMIN — MAGNESIUM OXIDE TAB 400 MG (241.3 MG ELEMENTAL MG) 800 MG: 400 (241.3 MG) TAB at 17:10

## 2021-08-21 RX ADMIN — LIDOCAINE 5% 1 PATCH: 700 PATCH TOPICAL at 08:20

## 2021-08-21 RX ADMIN — BENZONATATE 100 MG: 100 CAPSULE ORAL at 22:31

## 2021-08-21 RX ADMIN — DEXAMETHASONE SODIUM PHOSPHATE 11 MG: 10 INJECTION, SOLUTION INTRAMUSCULAR; INTRAVENOUS at 11:39

## 2021-08-21 RX ADMIN — ENOXAPARIN SODIUM 105 MG: 120 INJECTION SUBCUTANEOUS at 08:21

## 2021-08-21 RX ADMIN — INSULIN LISPRO 12 UNITS: 100 INJECTION, SOLUTION INTRAVENOUS; SUBCUTANEOUS at 17:10

## 2021-08-21 RX ADMIN — PANTOPRAZOLE SODIUM 20 MG: 20 TABLET, DELAYED RELEASE ORAL at 05:42

## 2021-08-21 RX ADMIN — LEVALBUTEROL HYDROCHLORIDE 0.63 MG: 0.63 SOLUTION RESPIRATORY (INHALATION) at 19:28

## 2021-08-21 RX ADMIN — LEVALBUTEROL HYDROCHLORIDE 0.63 MG: 0.63 SOLUTION RESPIRATORY (INHALATION) at 13:37

## 2021-08-21 RX ADMIN — INSULIN LISPRO 8 UNITS: 100 INJECTION, SOLUTION INTRAVENOUS; SUBCUTANEOUS at 08:20

## 2021-08-21 RX ADMIN — ATORVASTATIN CALCIUM 40 MG: 40 TABLET, FILM COATED ORAL at 21:59

## 2021-08-21 RX ADMIN — ENOXAPARIN SODIUM 105 MG: 120 INJECTION SUBCUTANEOUS at 21:59

## 2021-08-21 RX ADMIN — MAGNESIUM OXIDE TAB 400 MG (241.3 MG ELEMENTAL MG) 800 MG: 400 (241.3 MG) TAB at 08:20

## 2021-08-21 NOTE — ASSESSMENT & PLAN NOTE
· AEB hypoxia, SpO2 85% in ED, tachypnea with RR 25 and HERNANDEZ necessitating placement of midflow O2 to 10L  COVID +in ED 8/13  · Symptom onset Sunday 8/8 with fatigue, poor appetite, cold sweats  Sick contact w/significant other who was COVID + 2 weeks prior  · Developed diarrhea 8/10, cough and dyspnea with coughing fits  · CT C/A/P with bibasilar pulmonary groundglass infiltrates consistent with Covid pneumonia  · Cont  on moderate protocol  · Admission labs include Ferritin 608, CRP 6 3, D dimer 0 97 and procal 0 09  Repeat levels w/CRP 67 and procal 0 08  Plan  · Antibiotics discontinued as procal neg x2   · Atorvastatin 40mg qHS  · Dexamethasone escalated to 0 1mg/kg BID on 8/19, continue through 8/24   · Monitor fever curve, CBC  · Not a candidate for convalescent plasma as symptom onset -6D  · Remdesivir per protocol, completed   · Heparin drip for full AC on admission, transitioned to therapeutic lovenox due to improved renal function   · Currently on HFNC 70%/30L stable last 48 hours sat low 90    · Encouraged self proning, however patient refusing   · Pulmonary hygiene

## 2021-08-21 NOTE — QUICK NOTE
I updated the patient's significant other, Didi Mercer  No major changes since he was updated yesterday  All questions were answered at this time

## 2021-08-21 NOTE — ASSESSMENT & PLAN NOTE
Lab Results   Component Value Date    HGBA1C 7 8 (H) 05/11/2021       Recent Labs     08/20/21  0658 08/20/21  1152 08/20/21  1603 08/20/21 2112   POCGLU 215* 281* 237* 311*       Blood Sugar Average: Last 72 hrs:  · (P) 288 [id number]   · Has tried to control BG with diet  · BG AC/HS, increased to Algorithm 5 and  Lantus 20   · May require further increase to achieve goal BG<180 in setting of increased steroid admin

## 2021-08-21 NOTE — ASSESSMENT & PLAN NOTE
· History of HTN on losartan/metoprolol/dyazide  Was on hold secondary to ANTHONY/bradycardia  ANTHONY improved but remains bradycardic  · Cardiac monitoring/VS per protocol  · Consider PRN antihypertensives if SBP persistently >180   Currently 110-120's  · Amlodipine 10mg added

## 2021-08-21 NOTE — PROGRESS NOTES
[address]  Progress Note Inna Jaramillo 1960, 63 y o  female MRN: [id number]  Unit/Bed#: ICU 05 Encounter: [id number]  Primary Care Provider: Jenae Garay MD   Date and time admitted to hospital: 8/14/2021  1:01 AM    * Acute respiratory failure with hypoxia due to COVID 19 pneumonia  Assessment & Plan  · AEB hypoxia, SpO2 85% in ED, tachypnea with RR 25 and HERNANDEZ necessitating placement of midflow O2 to 10L  COVID +in ED 8/13  · Symptom onset Sunday 8/8 with fatigue, poor appetite, cold sweats  Sick contact w/significant other who was COVID + 2 weeks prior  · Developed diarrhea 8/10, cough and dyspnea with coughing fits  · CT C/A/P with bibasilar pulmonary groundglass infiltrates consistent with Covid pneumonia  · Cont  on moderate protocol  · Admission labs include Ferritin 608, CRP 6 3, D dimer 0 97 and procal 0 09  Repeat levels w/CRP 67 and procal 0 08  Plan  · Antibiotics discontinued as procal neg x2   · Atorvastatin 40mg qHS  · Dexamethasone escalated to 0 1mg/kg BID on 8/19, continue through 8/24   · Monitor fever curve, CBC  · Not a candidate for convalescent plasma as symptom onset -6D  · Remdesivir per protocol, completed   · Heparin drip for full AC on admission, transitioned to therapeutic lovenox due to improved renal function   · Currently on HFNC 70%/30L stable last 48 hours sat low 90  · Encouraged self proning, however patient refusing   · Pulmonary hygiene       Acute kidney injury (HCC)-resolved as of 8/20/2021  Assessment & Plan  · Improving 2 03-->1 5-->1 3  · Baseline creat 0 94-1  28   Was 5 56 on arrival to ED in setting of 6 days of poor appetite, diarrhea, and decreased PO intake  · Close I&O monitoring   · trend BMP  · avoid nephrotoxins    Type 2 diabetes mellitus without complication, without long-term current use of insulin [facility])  Assessment & Plan  Lab Results   Component Value Date    HGBA1C 7 8 (H) 05/11/2021       Recent Labs     08/20/21  9055 08/20/21  1152 08/20/21  1603 08/20/21  2112   POCGLU 215* 281* 237* 311*       Blood Sugar Average: Last 72 hrs:  · (P) 089 [id number]   · Has tried to control BG with diet  · BG AC/HS, increased to Algorithm 5 and  Lantus 20   · May require further increase to achieve goal BG<180 in setting of increased steroid admin  Essential hypertension  Assessment & Plan  · History of HTN on losartan/metoprolol/dyazide  Was on hold secondary to ANTHONY/bradycardia  ANTHONY improved but remains bradycardic  · Cardiac monitoring/VS per protocol  · Consider PRN antihypertensives if SBP persistently >180  Currently 110-120's  · Amlodipine 10mg added        BMI 39 0-39 9,adult  Assessment & Plan  · Nutrition and exercise counseling when appropriate  · Habitus likely contributing to clinical picture      Gastroesophageal reflux disease without esophagitis  Assessment & Plan  · Takes esomeprazole  · Replaced with Protonix 20mg PO daily      Mixed hyperlipidemia  Assessment & Plan  · Replaced home simvastatin with atorvastatin per COVID protocol    Pain in both feet  Assessment & Plan  · History of R  Foot pain s/p surgery x2  Unable to locate surgical documents in care everywhere  · Takes Tramadol 50mg PO TID PRN for foot pain  Will continue for now and consider d/c if oxygenation status declines    ----------------------------------------------------------------------------------------  HPI/24hr events:  No major events overnight  Patient remains on HFNC  Disposition: Continue Stepdown Level 1 level of care   Code Status: Level 1 - Full Code  ---------------------------------------------------------------------------------------  SUBJECTIVE  The patient states that her breathing feels easier today  She continues to have coughing fits with chest tightness  She denies chest pain  Diarrhea resolved  Review of Systems   Constitutional: Positive for activity change and fatigue   Negative for appetite change, chills, diaphoresis and fever  HENT: Negative for congestion, rhinorrhea and sore throat  Respiratory: Positive for cough, chest tightness and shortness of breath  Negative for choking, wheezing and stridor  Cardiovascular: Negative for chest pain, palpitations and leg swelling  Gastrointestinal: Positive for diarrhea (Now resolved)  Negative for abdominal distention, abdominal pain, constipation, rectal pain and vomiting  Genitourinary: Negative for decreased urine volume, dysuria, hematuria and urgency  Skin: Negative for color change, pallor, rash and wound  Neurological: Negative for dizziness, tremors, seizures, syncope, facial asymmetry, speech difficulty, weakness, light-headedness, numbness and headaches  All other systems reviewed and are negative     ---------------------------------------------------------------------------------------  OBJECTIVE    Vitals   Vitals:    21 0422 21 0555 21 0731 21 0800   BP:    137/65   BP Location:    Left arm   Pulse:    71   Resp:   19 (!) 23   Temp:    97 9 °F (36 6 °C)   TempSrc:    Temporal   SpO2: 92%  93% 90%   Weight:  105 kg (231 lb 7 7 oz)     Height:         Temp (24hrs), Av 9 °F (36 6 °C), Min:97 6 °F (36 4 °C), Max:98 °F (36 7 °C)  Current: Temperature: 97 9 °F (36 6 °C)          Respiratory:  SpO2: SpO2: 90 %, SpO2 Activity: SpO2 Activity: At Rest, SpO2 Device: O2 Device: High flow nasal cannula  Nasal Cannula O2 Flow Rate (L/min): 30 L/min    Invasive/non-invasive ventilation settings   Respiratory    Lab Data (Last 4 hours)    None         O2/Vent Data (Last 4 hours)       0731          Non-Invasive Ventilation Mode HFNC (High flow)                   Physical Exam  Vitals and nursing note reviewed  Constitutional:       General: She is not in acute distress  Appearance: Normal appearance  She is obese  She is ill-appearing  She is not toxic-appearing or diaphoretic     HENT:      Head: Normocephalic and atraumatic  Nose: Nose normal  No congestion or rhinorrhea  Mouth/Throat:      Mouth: Mucous membranes are moist    Eyes:      General: No scleral icterus  Right eye: No discharge  Left eye: No discharge  Extraocular Movements: Extraocular movements intact  Conjunctiva/sclera: Conjunctivae normal       Pupils: Pupils are equal, round, and reactive to light  Cardiovascular:      Rate and Rhythm: Normal rate and regular rhythm  Pulses: Normal pulses  Heart sounds: Normal heart sounds  No murmur heard  No friction rub  No gallop  Pulmonary:      Breath sounds: No stridor  No wheezing, rhonchi or rales  Comments: Decreased breath sounds at bases  Chest:      Chest wall: No tenderness  Abdominal:      General: Bowel sounds are normal  There is distension (Obese)  Palpations: Abdomen is soft  Tenderness: There is no abdominal tenderness  There is no right CVA tenderness, left CVA tenderness, guarding or rebound  Musculoskeletal:         General: No swelling, tenderness, deformity or signs of injury  Normal range of motion  Right lower leg: No edema  Left lower leg: No edema  Skin:     General: Skin is warm and dry  Capillary Refill: Capillary refill takes less than 2 seconds  Coloration: Skin is not jaundiced or pale  Findings: No bruising, erythema, lesion or rash  Neurological:      General: No focal deficit present  Mental Status: She is alert and oriented to person, place, and time  Cranial Nerves: No cranial nerve deficit  Sensory: No sensory deficit     Psychiatric:         Mood and Affect: Mood normal          Behavior: Behavior normal          Laboratory and Diagnostics:  Results from last 7 days   Lab Units 08/21/21  0555 08/19/21  0706 08/18/21  0333 08/17/21  0455 08/16/21  0506 08/15/21  0645   WBC Thousand/uL 10 26* 9 33 8 57 11 82* 12 11* 12 95*   HEMOGLOBIN g/dL 12 4 12 3 12 0 13 2 12 4 12 9 HEMATOCRIT % 36 7 37 6 36 4 40 6 37 6 38 2   PLATELETS Thousands/uL 400* 360 314 318 273 242   NEUTROS PCT % 82* 83* 78* 83* 82* 77*   MONOS PCT % 6 4 5 5 7 7     Results from last 7 days   Lab Units 08/21/21  0555 08/19/21  0706 08/18/21  0333 08/17/21  0455 08/16/21  0505 08/15/21  0645 08/14/21  1400   SODIUM mmol/L 136 142 139 140 143 143 139   POTASSIUM mmol/L 4 9 4 8 4 1 4 5 4 6 3 1* 3 7   CHLORIDE mmol/L 105 108 108 108 110* 107 104   CO2 mmol/L 21 25 23 23 20* 20* 21   ANION GAP mmol/L 10 9 8 9 13 16* 14*   BUN mg/dL 32* 27* 30* 33* 45* 56* 55*   CREATININE mg/dL 1 15 1 38* 1 51* 1 57* 2 03* 2 93* 4 26*   CALCIUM mg/dL 7 6* 8 0* 7 5* 7 8* 7 4* 7 2* 6 9*   GLUCOSE RANDOM mg/dL 216* 194* 208* 163* 224* 207* 262*   ALT U/L 26 29  --  36  --  30  --    AST U/L 27 27  --  40  --  40  --    ALK PHOS U/L 63 71  --  70  --  61  --    ALBUMIN g/dL 2 0* 2 2*  --  2 4*  --  2 4*  --    TOTAL BILIRUBIN mg/dL 0 71 0 65  --  0 59  --  0 34  --      Results from last 7 days   Lab Units 08/16/21  0505 08/15/21  0645   MAGNESIUM mg/dL 2 8* 1 7   PHOSPHORUS mg/dL 2 0* 2 3      Results from last 7 days   Lab Units 08/18/21  1020 08/18/21  0333 08/17/21  1950 08/17/21  1112 08/17/21  0454 08/16/21  2150 08/16/21  1411   PTT seconds 44* 150* 37 188* 71* 185* 28          Results from last 7 days   Lab Units 08/14/21  1400   LACTIC ACID mmol/L 1 0     ABG:    VBG:    Results from last 7 days   Lab Units 08/19/21  0706 08/18/21  0333 08/15/21  0645 08/14/21  1400   PROCALCITONIN ng/ml <0 05 <0 05 <0 05 0 08       Micro  Results from last 7 days   Lab Units 08/15/21  1310   C DIFF TOXIN B BY PCR  Negative       EKG:  Reviewed  Imaging: I have personally reviewed pertinent reports  and I have personally reviewed pertinent films in PACS    Intake and Output  I/O       08/19 0701 - 08/20 0700 08/20 0701 - 08/21 0700 08/21 0701 - 08/22 0700    P  O  1020      I V  (mL/kg) 30 (0 3)      IV Piggyback  150     Total Intake(mL/kg) 1050 (10) 150 (1 4)     Urine (mL/kg/hr) 1600 (0 6) 1000 (0 4)     Stool  0     Total Output 1600 1000     Net -550 -850            Unmeasured Stool Occurrence  1 x           Height and Weights   Height: 5' 3" (160 cm)  IBW (Ideal Body Weight): 52 4 kg  Body mass index is 41 01 kg/m²  Weight (last 2 days)     Date/Time   Weight    08/21/21 0555   105 (231 48)    08/20/21 0500   105 (231 48)    08/19/21 0600   106 (233 91)                Nutrition       Diet Orders   (From admission, onward)             Start     Ordered    08/20/21 1113  Dietary nutrition supplements  Once     Question Answer Comment   Select Supplement: Glucerna-Chocolate    Frequency Breakfast, Lunch, Dinner        08/20/21 1112    08/16/21 1045  Room Service  Once     Question:  Type of Service  Answer:  Room Service - Appropriate with Assistance    08/16/21 1044    08/14/21 0113  Diet Zaki/CHO Controlled; Consistent Carbohydrate Diet Level 2 (5 carb servings/75 grams CHO/meal)  Diet effective now     Question Answer Comment   Diet Type Zaki/CHO Controlled    Zaki/CHO Controlled Consistent Carbohydrate Diet Level 2 (5 carb servings/75 grams CHO/meal)    RD to adjust diet per protocol?  Yes        08/14/21 0141                  Active Medications  Scheduled Meds:  Current Facility-Administered Medications   Medication Dose Route Frequency Provider Last Rate    amLODIPine  10 mg Oral Daily Kelin Samuel MD      atorvastatin  40 mg Oral HS MARCK Arzate      benzonatate  100 mg Oral TID PRN Lonnie Martinez PA-C      dexamethasone  0 1 mg/kg Intravenous Q12H MARCK Diaz Stopped (08/21/21 0010)    enoxaparin  1 mg/kg Subcutaneous Q12H [facility] Kelin Samuel MD      insulin glargine  20 Units Subcutaneous HS Jacki Grove PA-C      insulin lispro  4-20 Units Subcutaneous TID AC Jacki Escobar PA-C      levalbuterol  0 63 mg Nebulization TID Elina Jean PA-C      lidocaine  1 patch Topical Daily MD Shyanne Workman magnesium oxide  800 mg Oral BID Benjamin Rodrigues PA-C      pantoprazole  20 mg Oral Early Morning Jessica Portal, CRNP      Pramox-PE-Glycerin-Petrolatum   Rectal BID PRN Benjamin Rodrigues PA-C      traMADol  50 mg Oral Q8H PRN Jessica Portal, CRNP       Continuous Infusions:     PRN Meds:   benzonatate, 100 mg, TID PRN  Pramox-PE-Glycerin-Petrolatum, , BID PRN  traMADol, 50 mg, Q8H PRN        Invasive Devices Review  Invasive Devices     Peripheral Intravenous Line            Peripheral IV 08/17/21 Right Forearm 3 days    Peripheral IV 08/18/21 Right Forearm 3 days                Rationale for remaining devices:   ---------------------------------------------------------------------------------------  Advance Directive and Living Will:      Power of :    POLST:    ---------------------------------------------------------------------------------------  Care Time Delivered:   No Critical Care time spent       [facility] TERE CHEN      Portions of the record may have been created with voice recognition software  Occasional wrong word or "sound a like" substitutions may have occurred due to the inherent limitations of voice recognition software    Read the chart carefully and recognize, using context, where substitutions have occurred

## 2021-08-22 LAB
ANION GAP SERPL CALCULATED.3IONS-SCNC: 8 MMOL/L (ref 4–13)
BASOPHILS # BLD MANUAL: 0 THOUSAND/UL (ref 0–0.1)
BASOPHILS NFR MAR MANUAL: 0 % (ref 0–1)
BUN SERPL-MCNC: 32 MG/DL (ref 5–25)
CALCIUM SERPL-MCNC: 7.7 MG/DL (ref 8.3–10.1)
CHLORIDE SERPL-SCNC: 105 MMOL/L (ref 100–108)
CO2 SERPL-SCNC: 23 MMOL/L (ref 21–32)
CREAT SERPL-MCNC: 1.18 MG/DL (ref 0.6–1.3)
EOSINOPHIL # BLD MANUAL: 0 THOUSAND/UL (ref 0–0.4)
EOSINOPHIL NFR BLD MANUAL: 0 % (ref 0–6)
ERYTHROCYTE [DISTWIDTH] IN BLOOD BY AUTOMATED COUNT: 12.3 % (ref 11.6–15.1)
GFR SERPL CREATININE-BSD FRML MDRD: 50 ML/MIN/1.73SQ M
GLUCOSE SERPL-MCNC: 243 MG/DL (ref 65–140)
GLUCOSE SERPL-MCNC: 247 MG/DL (ref 65–140)
GLUCOSE SERPL-MCNC: 273 MG/DL (ref 65–140)
GLUCOSE SERPL-MCNC: 313 MG/DL (ref 65–140)
GLUCOSE SERPL-MCNC: 329 MG/DL (ref 65–140)
HCT VFR BLD AUTO: 34.9 % (ref 34.8–46.1)
HGB BLD-MCNC: 11.8 G/DL (ref 11.5–15.4)
LYMPHOCYTES # BLD AUTO: 0.84 THOUSAND/UL (ref 0.6–4.47)
LYMPHOCYTES # BLD AUTO: 8 % (ref 14–44)
MCH RBC QN AUTO: 29 PG (ref 26.8–34.3)
MCHC RBC AUTO-ENTMCNC: 33.8 G/DL (ref 31.4–37.4)
MCV RBC AUTO: 86 FL (ref 82–98)
MONOCYTES # BLD AUTO: 0.84 THOUSAND/UL (ref 0–1.22)
MONOCYTES NFR BLD: 8 % (ref 4–12)
NEUTROPHILS # BLD MANUAL: 8.83 THOUSAND/UL (ref 1.85–7.62)
NEUTS SEG NFR BLD AUTO: 84 % (ref 43–75)
PLATELET # BLD AUTO: 373 THOUSANDS/UL (ref 149–390)
PLATELET BLD QL SMEAR: ADEQUATE
PLATELET CLUMP BLD QL SMEAR: PRESENT
PMV BLD AUTO: 9.9 FL (ref 8.9–12.7)
POTASSIUM SERPL-SCNC: 4.6 MMOL/L (ref 3.5–5.3)
RBC # BLD AUTO: 4.07 MILLION/UL (ref 3.81–5.12)
RBC MORPH BLD: NORMAL
SODIUM SERPL-SCNC: 136 MMOL/L (ref 136–145)
WBC # BLD AUTO: 10.51 THOUSAND/UL (ref 4.31–10.16)

## 2021-08-22 PROCEDURE — 82948 REAGENT STRIP/BLOOD GLUCOSE: CPT

## 2021-08-22 PROCEDURE — 80048 BASIC METABOLIC PNL TOTAL CA: CPT | Performed by: PHYSICIAN ASSISTANT

## 2021-08-22 PROCEDURE — 94640 AIRWAY INHALATION TREATMENT: CPT

## 2021-08-22 PROCEDURE — 94660 CPAP INITIATION&MGMT: CPT

## 2021-08-22 PROCEDURE — 85027 COMPLETE CBC AUTOMATED: CPT | Performed by: PHYSICIAN ASSISTANT

## 2021-08-22 PROCEDURE — 94760 N-INVAS EAR/PLS OXIMETRY 1: CPT

## 2021-08-22 PROCEDURE — 85007 BL SMEAR W/DIFF WBC COUNT: CPT | Performed by: PHYSICIAN ASSISTANT

## 2021-08-22 PROCEDURE — 99232 SBSQ HOSP IP/OBS MODERATE 35: CPT | Performed by: INTERNAL MEDICINE

## 2021-08-22 RX ORDER — INSULIN GLARGINE 100 [IU]/ML
21 INJECTION, SOLUTION SUBCUTANEOUS EVERY 12 HOURS SCHEDULED
Status: DISCONTINUED | OUTPATIENT
Start: 2021-08-22 | End: 2021-08-23

## 2021-08-22 RX ADMIN — INSULIN LISPRO 8 UNITS: 100 INJECTION, SOLUTION INTRAVENOUS; SUBCUTANEOUS at 08:16

## 2021-08-22 RX ADMIN — DEXAMETHASONE SODIUM PHOSPHATE 11 MG: 10 INJECTION, SOLUTION INTRAMUSCULAR; INTRAVENOUS at 00:07

## 2021-08-22 RX ADMIN — INSULIN LISPRO 12 UNITS: 100 INJECTION, SOLUTION INTRAVENOUS; SUBCUTANEOUS at 16:40

## 2021-08-22 RX ADMIN — INSULIN LISPRO 12 UNITS: 100 INJECTION, SOLUTION INTRAVENOUS; SUBCUTANEOUS at 12:10

## 2021-08-22 RX ADMIN — ENOXAPARIN SODIUM 105 MG: 120 INJECTION SUBCUTANEOUS at 08:15

## 2021-08-22 RX ADMIN — PANTOPRAZOLE SODIUM 20 MG: 20 TABLET, DELAYED RELEASE ORAL at 05:29

## 2021-08-22 RX ADMIN — LEVALBUTEROL HYDROCHLORIDE 0.63 MG: 0.63 SOLUTION RESPIRATORY (INHALATION) at 19:58

## 2021-08-22 RX ADMIN — INSULIN GLARGINE 21 UNITS: 100 INJECTION, SOLUTION SUBCUTANEOUS at 21:38

## 2021-08-22 RX ADMIN — ATORVASTATIN CALCIUM 40 MG: 40 TABLET, FILM COATED ORAL at 21:38

## 2021-08-22 RX ADMIN — ENOXAPARIN SODIUM 105 MG: 120 INJECTION SUBCUTANEOUS at 21:38

## 2021-08-22 RX ADMIN — LEVALBUTEROL HYDROCHLORIDE 0.63 MG: 0.63 SOLUTION RESPIRATORY (INHALATION) at 07:40

## 2021-08-22 RX ADMIN — LEVALBUTEROL HYDROCHLORIDE 0.63 MG: 0.63 SOLUTION RESPIRATORY (INHALATION) at 13:40

## 2021-08-22 RX ADMIN — INSULIN LISPRO 14 UNITS: 100 INJECTION, SOLUTION INTRAVENOUS; SUBCUTANEOUS at 16:40

## 2021-08-22 RX ADMIN — INSULIN LISPRO 14 UNITS: 100 INJECTION, SOLUTION INTRAVENOUS; SUBCUTANEOUS at 12:10

## 2021-08-22 RX ADMIN — DEXAMETHASONE SODIUM PHOSPHATE 11 MG: 10 INJECTION, SOLUTION INTRAMUSCULAR; INTRAVENOUS at 12:10

## 2021-08-22 RX ADMIN — INSULIN GLARGINE 21 UNITS: 100 INJECTION, SOLUTION SUBCUTANEOUS at 08:15

## 2021-08-22 RX ADMIN — LIDOCAINE 5% 1 PATCH: 700 PATCH TOPICAL at 08:15

## 2021-08-22 RX ADMIN — AMLODIPINE BESYLATE 10 MG: 10 TABLET ORAL at 08:15

## 2021-08-22 RX ADMIN — MAGNESIUM OXIDE TAB 400 MG (241.3 MG ELEMENTAL MG) 800 MG: 400 (241.3 MG) TAB at 08:15

## 2021-08-22 RX ADMIN — MAGNESIUM OXIDE TAB 400 MG (241.3 MG ELEMENTAL MG) 800 MG: 400 (241.3 MG) TAB at 16:39

## 2021-08-22 RX ADMIN — BENZONATATE 100 MG: 100 CAPSULE ORAL at 21:38

## 2021-08-22 RX ADMIN — INSULIN LISPRO 14 UNITS: 100 INJECTION, SOLUTION INTRAVENOUS; SUBCUTANEOUS at 08:16

## 2021-08-22 NOTE — ASSESSMENT & PLAN NOTE
· AEB hypoxia, SpO2 85% in ED, tachypnea with RR 25 and HERNANDEZ necessitating placement of midflow O2 to 10L  COVID +in ED 8/13  · Symptom onset Sunday 8/8 with fatigue, poor appetite, cold sweats  Sick contact w/significant other who was COVID + 2 weeks prior  · Developed diarrhea 8/10, cough and dyspnea with coughing fits  · CT C/A/P with bibasilar pulmonary groundglass infiltrates consistent with Covid pneumonia  · Cont  on moderate protocol  · Admission labs include Ferritin 608, CRP 6 3, D dimer 0 97 and procal 0 09  Repeat levels w/CRP 67 and procal 0 08    Plan  · Antibiotics discontinued as procal neg x2   · Atorvastatin 40mg qHS  · Dexamethasone escalated to 0 1mg/kg BID on 8/19, continue through 8/24   · Monitor fever curve, CBC  · Not a candidate for convalescent plasma as symptom onset -6D  · Remdesivir per protocol, completed   · Heparin drip for full AC on admission, transitioned to therapeutic lovenox due to improved renal function   · Currently on HFNC 30% 30L, decreased oxygen requirements since yesterday  · Encouraged self proning, however patient refusing   · Pulmonary hygiene

## 2021-08-22 NOTE — ASSESSMENT & PLAN NOTE
· Improving 2 03-->1 5-->1 3  · Baseline creat 0 94-1  28   Was 5 56 on arrival to ED in setting of 6 days of poor appetite, diarrhea, and decreased PO intake  · Close I&O monitoring   · trend BMP  · avoid nephrotoxins I will SWITCH the dose or number of times a day I take the medications listed below when I get home from the hospital:  None

## 2021-08-22 NOTE — ASSESSMENT & PLAN NOTE
Lab Results   Component Value Date    HGBA1C 7 8 (H) 05/11/2021       Recent Labs     08/21/21  0819 08/21/21  1138 08/21/21  1709 08/21/21  2204   POCGLU 225* 317* 295* 302*       Blood Sugar Average: Last 72 hrs:  · (P) 234 [id number]   · Has tried to control BG with diet in past  · Persistent hyperglycemia while on steroids    -add mealtime coverage 21 units with meals, increase Lantus 21 units b i d  If there is no improvement she may require an insulin GTT

## 2021-08-22 NOTE — PROGRESS NOTES
[address]  [facility]  Progress Note Benji Kincaid 1960, 63 y o  female MRN: [id number]  Unit/Bed#: ICU 05 Encounter: [id number]  Primary Care Provider: Fortino Villela MD   Date and time admitted to hospital: 8/14/2021  1:01 AM    * Acute respiratory failure with hypoxia due to COVID 19 pneumonia  Assessment & Plan  · AEB hypoxia, SpO2 85% in ED, tachypnea with RR 25 and HERNANDEZ necessitating placement of midflow O2 to 10L  COVID +in ED 8/13  · Symptom onset Sunday 8/8 with fatigue, poor appetite, cold sweats  Sick contact w/significant other who was COVID + 2 weeks prior  · Developed diarrhea 8/10, cough and dyspnea with coughing fits  · CT C/A/P with bibasilar pulmonary groundglass infiltrates consistent with Covid pneumonia  · Cont  on moderate protocol  · Admission labs include Ferritin 608, CRP 6 3, D dimer 0 97 and procal 0 09  Repeat levels w/CRP 67 and procal 0 08  Plan  · Antibiotics discontinued as procal neg x2   · Atorvastatin 40mg qHS  · Dexamethasone escalated to 0 1mg/kg BID on 8/19, continue through 8/24   · Monitor fever curve, CBC  · Not a candidate for convalescent plasma as symptom onset -6D  · Remdesivir per protocol, completed   · Heparin drip for full AC on admission, transitioned to therapeutic lovenox due to improved renal function   · Currently on HFNC 30% 30L, decreased oxygen requirements since yesterday  · Encouraged self proning, however patient refusing   · Pulmonary hygiene       Acute kidney injury (HCC)-resolved as of 8/20/2021  Assessment & Plan  · Improving 2 03-->1 5-->1 3  · Baseline creat 0 94-1  28   Was 5 56 on arrival to ED in setting of 6 days of poor appetite, diarrhea, and decreased PO intake  · Close I&O monitoring   · trend BMP  · avoid nephrotoxins    Type 2 diabetes mellitus without complication, without long-term current use of insulin [facility])  Assessment & Plan  Lab Results   Component Value Date    HGBA1C 7 8 (H) 05/11/2021       Recent Labs 08/21/21  0819 08/21/21  1138 08/21/21  1709 08/21/21  2204   POCGLU 225* 317* 295* 302*       Blood Sugar Average: Last 72 hrs:  · (P) 464 [id number]   · Has tried to control BG with diet in past  · Persistent hyperglycemia while on steroids    -add mealtime coverage 21 units with meals, increase Lantus 21 units b i d  If there is no improvement she may require an insulin GTT  Essential hypertension  Assessment & Plan  · History of HTN on losartan/metoprolol/dyazide  Was on hold secondary to ANTHONY/bradycardia  ANTHONY improved but remains bradycardic  · Cardiac monitoring/VS per protocol  · Consider PRN antihypertensives if SBP persistently >180  Currently 110-120's  · Amlodipine 10mg added        BMI 39 0-39 9,adult  Assessment & Plan  · Nutrition and exercise counseling when appropriate  · Habitus likely contributing to clinical picture      Gastroesophageal reflux disease without esophagitis  Assessment & Plan  · Takes esomeprazole  · Replaced with Protonix 20mg PO daily      Mixed hyperlipidemia  Assessment & Plan  · Replaced home simvastatin with atorvastatin per COVID protocol    Pain in both feet  Assessment & Plan  · History of R  Foot pain s/p surgery x2  Unable to locate surgical documents in care everywhere  · Takes Tramadol 50mg PO TID PRN for foot pain  Will continue for now and consider d/c if oxygenation status declines    ----------------------------------------------------------------------------------------  HPI/24hr events:  No major events overnight  Decreased oxygen requirements  Hyperglycemia  Disposition: Continue Stepdown Level 1 level of care   Code Status: Level 1 - Full Code  ---------------------------------------------------------------------------------------  SUBJECTIVE  "I feel better every day"  Review of Systems   Constitutional: Positive for activity change and fatigue  Negative for appetite change, chills and fever  HENT: Positive for rhinorrhea      Eyes: Negative for photophobia and visual disturbance  Respiratory: Positive for cough, chest tightness and shortness of breath  Negative for apnea, choking, wheezing and stridor  Cardiovascular: Negative for chest pain, palpitations and leg swelling  Gastrointestinal: Negative for abdominal distention, abdominal pain, constipation, diarrhea, nausea and vomiting  Genitourinary: Negative for difficulty urinating, dysuria and hematuria  Skin: Negative for color change, pallor, rash and wound  Neurological: Negative for dizziness, tremors, seizures, syncope, facial asymmetry, speech difficulty, weakness, light-headedness, numbness and headaches  All other systems reviewed and are negative       ---------------------------------------------------------------------------------------  OBJECTIVE    Vitals   Vitals:    21 0400 21 0555 21 0746 21 0800   BP: 141/65   103/65   BP Location:    Left arm   Pulse: (!) 107   94   Resp: (!) 35   (!) 29   Temp: 98 4 °F (36 9 °C)   97 6 °F (36 4 °C)   TempSrc: Temporal   Temporal   SpO2: 98%  90% 90%   Weight:  105 kg (231 lb 7 7 oz)     Height:         Temp (24hrs), Av 1 °F (36 7 °C), Min:97 6 °F (36 4 °C), Max:98 4 °F (36 9 °C)  Current: Temperature: 97 6 °F (36 4 °C)          Respiratory:  SpO2: SpO2: 90 %, SpO2 Activity: SpO2 Activity: At Rest, SpO2 Device: O2 Device: High flow nasal cannula  Nasal Cannula O2 Flow Rate (L/min): 30 L/min    Invasive/non-invasive ventilation settings   Respiratory    Lab Data (Last 4 hours)    None         O2/Vent Data (Last 4 hours)       0746          Non-Invasive Ventilation Mode HFNC (High flow)                   Physical Exam  Vitals and nursing note reviewed  Constitutional:       General: She is not in acute distress  Appearance: She is obese  She is ill-appearing  She is not toxic-appearing or diaphoretic  HENT:      Head: Normocephalic and atraumatic        Nose: Congestion and rhinorrhea present  Mouth/Throat:      Mouth: Mucous membranes are moist    Eyes:      General: No scleral icterus  Right eye: No discharge  Left eye: No discharge  Extraocular Movements: Extraocular movements intact  Conjunctiva/sclera: Conjunctivae normal       Pupils: Pupils are equal, round, and reactive to light  Cardiovascular:      Rate and Rhythm: Normal rate and regular rhythm  Pulses: Normal pulses  Heart sounds: Normal heart sounds  No murmur heard  No friction rub  No gallop  Pulmonary:      Effort: No respiratory distress  Breath sounds: No stridor  No wheezing, rhonchi or rales  Comments: Decreased BS at bases  Chest:      Chest wall: No tenderness  Abdominal:      General: Abdomen is flat  Bowel sounds are normal  There is no distension  Palpations: Abdomen is soft  Tenderness: There is no abdominal tenderness  There is no right CVA tenderness, left CVA tenderness, guarding or rebound  Musculoskeletal:         General: No swelling, tenderness, deformity or signs of injury  Normal range of motion  Right lower leg: No edema  Left lower leg: No edema  Skin:     General: Skin is warm and dry  Capillary Refill: Capillary refill takes less than 2 seconds  Coloration: Skin is not jaundiced or pale  Findings: No bruising, erythema, lesion or rash  Neurological:      General: No focal deficit present  Mental Status: She is alert and oriented to person, place, and time     Psychiatric:         Mood and Affect: Mood normal          Behavior: Behavior normal          Laboratory and Diagnostics:  Results from last 7 days   Lab Units 08/22/21  0530 08/21/21  0555 08/19/21  0706 08/18/21  0333 08/17/21  0455 08/16/21  0506   WBC Thousand/uL 10 51* 10 26* 9 33 8 57 11 82* 12 11*   HEMOGLOBIN g/dL 11 8 12 4 12 3 12 0 13 2 12 4   HEMATOCRIT % 34 9 36 7 37 6 36 4 40 6 37 6   PLATELETS Thousands/uL 373 400* 360 314 318 273 NEUTROS PCT %  --  82* 83* 78* 83* 82*   MONOS PCT %  --  6 4 5 5 7   MONO PCT % 8  --   --   --   --   --      Results from last 7 days   Lab Units 08/22/21  0530 08/21/21  0555 08/19/21  0706 08/18/21  0333 08/17/21  0455 08/16/21  0505   SODIUM mmol/L 136 136 142 139 140 143   POTASSIUM mmol/L 4 6 4 9 4 8 4 1 4 5 4 6   CHLORIDE mmol/L 105 105 108 108 108 110*   CO2 mmol/L 23 21 25 23 23 20*   ANION GAP mmol/L 8 10 9 8 9 13   BUN mg/dL 32* 32* 27* 30* 33* 45*   CREATININE mg/dL 1 18 1 15 1 38* 1 51* 1 57* 2 03*   CALCIUM mg/dL 7 7* 7 6* 8 0* 7 5* 7 8* 7 4*   GLUCOSE RANDOM mg/dL 243* 216* 194* 208* 163* 224*   ALT U/L  --  26 29  --  36  --    AST U/L  --  27 27  --  40  --    ALK PHOS U/L  --  63 71  --  70  --    ALBUMIN g/dL  --  2 0* 2 2*  --  2 4*  --    TOTAL BILIRUBIN mg/dL  --  0 71 0 65  --  0 59  --      Results from last 7 days   Lab Units 08/16/21  0505   MAGNESIUM mg/dL 2 8*   PHOSPHORUS mg/dL 2 0*      Results from last 7 days   Lab Units 08/18/21  1020 08/18/21  0333 08/17/21  1950 08/17/21  1112 08/17/21  0454 08/16/21  2150 08/16/21  1411   PTT seconds 44* 150* 37 188* 71* 185* 28              ABG:    VBG:    Results from last 7 days   Lab Units 08/19/21  0706 08/18/21  0333   PROCALCITONIN ng/ml <0 05 <0 05       Micro  Results from last 7 days   Lab Units 08/15/21  1310   C DIFF TOXIN B BY PCR  Negative       EKG: reviewed  Imaging: I have personally reviewed pertinent reports  and I have personally reviewed pertinent films in PACS    Intake and Output  I/O       08/20 0701 - 08/21 0700 08/21 0701 - 08/22 0700    P  O   740    IV Piggyback 150 100    Total Intake(mL/kg) 150 (1 4) 840 (8)    Urine (mL/kg/hr) 1000 (0 4) 2000 (0 8)    Stool 0 0    Total Output 1000 2000    Net -850 -1160          Unmeasured Stool Occurrence 1 x 2 x          Height and Weights   Height: 5' 3" (160 cm)  IBW (Ideal Body Weight): 52 4 kg  Body mass index is 41 01 kg/m²    Weight (last 2 days)     Date/Time   Weight 08/22/21 0555   105 (231 48)    08/21/21 0555   105 (231 48)    08/20/21 0500   105 (231 48)                Nutrition       Diet Orders   (From admission, onward)             Start     Ordered    08/20/21 1113  Dietary nutrition supplements  Once     Question Answer Comment   Select Supplement: Glucerna-Chocolate    Frequency Breakfast, Lunch, Dinner        08/20/21 1112    08/16/21 1045  Room Service  Once     Question:  Type of Service  Answer:  Room Service - Appropriate with Assistance    08/16/21 1044    08/14/21 0113  Diet Zaki/CHO Controlled; Consistent Carbohydrate Diet Level 2 (5 carb servings/75 grams CHO/meal)  Diet effective now     Question Answer Comment   Diet Type Zaki/CHO Controlled    Zaki/CHO Controlled Consistent Carbohydrate Diet Level 2 (5 carb servings/75 grams CHO/meal)    RD to adjust diet per protocol?  Yes        08/14/21 0141                  Active Medications  Scheduled Meds:  Current Facility-Administered Medications   Medication Dose Route Frequency Provider Last Rate    amLODIPine  10 mg Oral Daily Loki Wang MD      atorvastatin  40 mg Oral HS MARCK Holly      benzonatate  100 mg Oral TID PRN Anna Lemus PA-C      dexamethasone  0 1 mg/kg Intravenous Q12H MARCK Dubon Stopped (08/22/21 0030)    enoxaparin  1 mg/kg Subcutaneous Q12H [address] Loki Wang MD      insulin glargine  21 Units Subcutaneous Q12H [address] [place], [place]      insulin lispro  14 Units Subcutaneous Daily With Breakfast TERE York      insulin lispro  14 Units Subcutaneous Daily With [address]      insulin lispro  14 Units Subcutaneous Daily With [place], [place]      insulin lispro  4-20 Units Subcutaneous TID AC Jacki Escobar PA-C      levalbuterol  0 63 mg Nebulization TID TERE York      lidocaine  1 patch Topical Daily Loki Wang MD      magnesium oxide  800 mg Oral BID TERE Han pantoprazole  20 mg Oral Early Morning MARCK Hutchison      Pramox-PE-Glycerin-Petrolatum   Rectal BID PRN Marlene Maravilla PA-C      traMADol  50 mg Oral Q8H PRN MARCK Hutchison       Continuous Infusions:     PRN Meds:   benzonatate, 100 mg, TID PRN  Pramox-PE-Glycerin-Petrolatum, , BID PRN  traMADol, 50 mg, Q8H PRN        Invasive Devices Review  Invasive Devices     Peripheral Intravenous Line            Peripheral IV 08/18/21 Right Forearm 4 days    Long-Dwell Peripheral IV (Midline) 59/30/27 Right Basilic <1 day                Rationale for remaining devices:   ---------------------------------------------------------------------------------------  Advance Directive and Living Will:      Power of :    POLST:    ---------------------------------------------------------------------------------------  Care Time Delivered:   No Critical Care time spent       Joyce Hernandez PA-C      Portions of the record may have been created with voice recognition software  Occasional wrong word or "sound a like" substitutions may have occurred due to the inherent limitations of voice recognition software    Read the chart carefully and recognize, using context, where substitutions have occurred

## 2021-08-23 PROBLEM — S80.811A ABRASION OF RIGHT LEG: Status: RESOLVED | Noted: 2021-03-19 | Resolved: 2021-08-23

## 2021-08-23 PROBLEM — E66.01 OBESITY, MORBID (HCC): Chronic | Status: ACTIVE | Noted: 2021-01-26

## 2021-08-23 PROBLEM — E87.6 HYPOKALEMIA: Status: RESOLVED | Noted: 2021-04-20 | Resolved: 2021-08-23

## 2021-08-23 PROBLEM — M62.9 HAMSTRING TIGHTNESS OF BOTH LOWER EXTREMITIES: Status: RESOLVED | Noted: 2019-08-26 | Resolved: 2021-08-23

## 2021-08-23 LAB
ANION GAP SERPL CALCULATED.3IONS-SCNC: 7 MMOL/L (ref 4–13)
BUN SERPL-MCNC: 28 MG/DL (ref 5–25)
CALCIUM SERPL-MCNC: 7.9 MG/DL (ref 8.3–10.1)
CHLORIDE SERPL-SCNC: 105 MMOL/L (ref 100–108)
CO2 SERPL-SCNC: 24 MMOL/L (ref 21–32)
CREAT SERPL-MCNC: 1.25 MG/DL (ref 0.6–1.3)
CRP SERPL QL: 2 MG/L
ERYTHROCYTE [DISTWIDTH] IN BLOOD BY AUTOMATED COUNT: 12.5 % (ref 11.6–15.1)
GFR SERPL CREATININE-BSD FRML MDRD: 46 ML/MIN/1.73SQ M
GLUCOSE SERPL-MCNC: 110 MG/DL (ref 65–140)
GLUCOSE SERPL-MCNC: 165 MG/DL (ref 65–140)
GLUCOSE SERPL-MCNC: 188 MG/DL (ref 65–140)
GLUCOSE SERPL-MCNC: 200 MG/DL (ref 65–140)
GLUCOSE SERPL-MCNC: 216 MG/DL (ref 65–140)
GLUCOSE SERPL-MCNC: 218 MG/DL (ref 65–140)
GLUCOSE SERPL-MCNC: 232 MG/DL (ref 65–140)
GLUCOSE SERPL-MCNC: 233 MG/DL (ref 65–140)
GLUCOSE SERPL-MCNC: 244 MG/DL (ref 65–140)
GLUCOSE SERPL-MCNC: 274 MG/DL (ref 65–140)
GLUCOSE SERPL-MCNC: 301 MG/DL (ref 65–140)
HCT VFR BLD AUTO: 34.4 % (ref 34.8–46.1)
HGB BLD-MCNC: 11.4 G/DL (ref 11.5–15.4)
MCH RBC QN AUTO: 28.6 PG (ref 26.8–34.3)
MCHC RBC AUTO-ENTMCNC: 33.1 G/DL (ref 31.4–37.4)
MCV RBC AUTO: 86 FL (ref 82–98)
NRBC BLD AUTO-RTO: 0 /100 WBCS
PLATELET # BLD AUTO: 355 THOUSANDS/UL (ref 149–390)
PMV BLD AUTO: 10.2 FL (ref 8.9–12.7)
POTASSIUM SERPL-SCNC: 4.6 MMOL/L (ref 3.5–5.3)
RBC # BLD AUTO: 3.98 MILLION/UL (ref 3.81–5.12)
SODIUM SERPL-SCNC: 136 MMOL/L (ref 136–145)
WBC # BLD AUTO: 11.6 THOUSAND/UL (ref 4.31–10.16)

## 2021-08-23 PROCEDURE — 94640 AIRWAY INHALATION TREATMENT: CPT

## 2021-08-23 PROCEDURE — 80048 BASIC METABOLIC PNL TOTAL CA: CPT | Performed by: PHYSICIAN ASSISTANT

## 2021-08-23 PROCEDURE — 82948 REAGENT STRIP/BLOOD GLUCOSE: CPT

## 2021-08-23 PROCEDURE — 85027 COMPLETE CBC AUTOMATED: CPT | Performed by: PHYSICIAN ASSISTANT

## 2021-08-23 PROCEDURE — 86140 C-REACTIVE PROTEIN: CPT | Performed by: PHYSICIAN ASSISTANT

## 2021-08-23 PROCEDURE — 94760 N-INVAS EAR/PLS OXIMETRY 1: CPT

## 2021-08-23 PROCEDURE — 99232 SBSQ HOSP IP/OBS MODERATE 35: CPT | Performed by: ANESTHESIOLOGY

## 2021-08-23 PROCEDURE — 94660 CPAP INITIATION&MGMT: CPT

## 2021-08-23 RX ADMIN — PANTOPRAZOLE SODIUM 20 MG: 20 TABLET, DELAYED RELEASE ORAL at 06:40

## 2021-08-23 RX ADMIN — LEVALBUTEROL HYDROCHLORIDE 0.63 MG: 0.63 SOLUTION RESPIRATORY (INHALATION) at 19:57

## 2021-08-23 RX ADMIN — ENOXAPARIN SODIUM 105 MG: 120 INJECTION SUBCUTANEOUS at 20:31

## 2021-08-23 RX ADMIN — AMLODIPINE BESYLATE 10 MG: 10 TABLET ORAL at 10:00

## 2021-08-23 RX ADMIN — LEVALBUTEROL HYDROCHLORIDE 0.63 MG: 0.63 SOLUTION RESPIRATORY (INHALATION) at 13:32

## 2021-08-23 RX ADMIN — DEXAMETHASONE SODIUM PHOSPHATE 11 MG: 10 INJECTION, SOLUTION INTRAMUSCULAR; INTRAVENOUS at 00:31

## 2021-08-23 RX ADMIN — DEXAMETHASONE SODIUM PHOSPHATE 11 MG: 10 INJECTION, SOLUTION INTRAMUSCULAR; INTRAVENOUS at 23:47

## 2021-08-23 RX ADMIN — SODIUM CHLORIDE 11 UNITS/HR: 9 INJECTION, SOLUTION INTRAVENOUS at 20:39

## 2021-08-23 RX ADMIN — ENOXAPARIN SODIUM 105 MG: 120 INJECTION SUBCUTANEOUS at 10:00

## 2021-08-23 RX ADMIN — ATORVASTATIN CALCIUM 40 MG: 40 TABLET, FILM COATED ORAL at 21:59

## 2021-08-23 RX ADMIN — MAGNESIUM OXIDE TAB 400 MG (241.3 MG ELEMENTAL MG) 800 MG: 400 (241.3 MG) TAB at 10:00

## 2021-08-23 RX ADMIN — LEVALBUTEROL HYDROCHLORIDE 0.63 MG: 0.63 SOLUTION RESPIRATORY (INHALATION) at 08:27

## 2021-08-23 RX ADMIN — LIDOCAINE 5% 1 PATCH: 700 PATCH TOPICAL at 10:00

## 2021-08-23 RX ADMIN — DEXAMETHASONE SODIUM PHOSPHATE 11 MG: 10 INJECTION, SOLUTION INTRAMUSCULAR; INTRAVENOUS at 12:45

## 2021-08-23 RX ADMIN — METOPROLOL TARTRATE 25 MG: 25 TABLET, FILM COATED ORAL at 10:00

## 2021-08-23 RX ADMIN — MAGNESIUM OXIDE TAB 400 MG (241.3 MG ELEMENTAL MG) 800 MG: 400 (241.3 MG) TAB at 18:00

## 2021-08-23 RX ADMIN — METOPROLOL TARTRATE 25 MG: 25 TABLET, FILM COATED ORAL at 20:31

## 2021-08-23 RX ADMIN — SODIUM CHLORIDE 3 UNITS/HR: 9 INJECTION, SOLUTION INTRAVENOUS at 08:04

## 2021-08-23 NOTE — ASSESSMENT & PLAN NOTE
Lab Results   Component Value Date    HGBA1C 7 8 (H) 05/11/2021       Recent Labs     08/22/21  0814 08/22/21  1207 08/22/21  1515 08/22/21 2042   POCGLU 247* 313* 329* 273*       Blood Sugar Average: Last 72 hrs:  · (P) 278 75   · Has tried to control BG with diet in past  · Persistent hyperglycemia while on steroids    -add mealtime coverage 14 units with meals, increase Lantus 21 units b i d  If there is no improvement she may require an insulin GTT

## 2021-08-23 NOTE — PLAN OF CARE
Problem: Potential for Falls  Goal: Patient will remain free of falls  Description: INTERVENTIONS:  - Educate patient/family on patient safety including physical limitations  - Instruct patient to call for assistance with activity   - Consult OT/PT to assist with strengthening/mobility   - Keep Call bell within reach  - Keep bed low and locked with side rails adjusted as appropriate  - Keep care items and personal belongings within reach  - Initiate and maintain comfort rounds  - Make Fall Risk Sign visible to staff  - Offer Toileting every 2 Hours, in advance of need  - Initiate/Maintain bed alarm  - Apply yellow socks and bracelet for high fall risk patients  - Consider moving patient to room near nurses station  Outcome: Progressing     Problem: PAIN - ADULT  Goal: Verbalizes/displays adequate comfort level or baseline comfort level  Description: Interventions:  - Encourage patient to monitor pain and request assistance  - Assess pain using appropriate pain scale  - Administer analgesics based on type and severity of pain and evaluate response  - Implement non-pharmacological measures as appropriate and evaluate response  - Consider cultural and social influences on pain and pain management  - Notify physician/advanced practitioner if interventions unsuccessful or patient reports new pain  Outcome: Progressing     Problem: INFECTION - ADULT  Goal: Absence or prevention of progression during hospitalization  Description: INTERVENTIONS:  - Assess and monitor for signs and symptoms of infection  - Monitor lab/diagnostic results  - Monitor all insertion sites, i e  indwelling lines, tubes, and drains  - [place] appropriate cooling/warming therapies per order  - Administer medications as ordered  - Instruct and encourage patient and family to use good hand hygiene technique  - Identify and instruct in appropriate isolation precautions for identified infection/condition  Outcome: Progressing     Problem: DISCHARGE PLANNING  Goal: Discharge to home or other facility with appropriate resources  Description: INTERVENTIONS:  - Identify barriers to discharge w/patient and caregiver  - Arrange for needed discharge resources and transportation as appropriate  - Identify discharge learning needs (meds, wound care, etc )  - Arrange for interpretive services to assist at discharge as needed  - Refer to Case Management Department for coordinating discharge planning if the patient needs post-hospital services based on physician/advanced practitioner order or complex needs related to functional status, cognitive ability, or social support system  Outcome: Progressing     Problem: Knowledge Deficit  Goal: Patient/family/caregiver demonstrates understanding of disease process, treatment plan, medications, and discharge instructions  Description: Complete learning assessment and assess knowledge base    Interventions:  - Provide teaching at level of understanding  - Provide teaching via preferred learning methods  Outcome: Progressing     Problem: RESPIRATORY - ADULT  Goal: Achieves optimal ventilation and oxygenation  Description: INTERVENTIONS:  - Assess for changes in respiratory status  - Assess for changes in mentation and behavior  - Position to facilitate oxygenation and minimize respiratory effort  - Oxygen administered by appropriate delivery if ordered  - Initiate smoking cessation education as indicated  - Encourage broncho-pulmonary hygiene including cough, deep breathe, Incentive Spirometry  - Assess the need for suctioning and aspirate as needed  - Assess and instruct to report SOB or any respiratory difficulty  - Respiratory Therapy support as indicated  Outcome: Progressing     Problem: MOBILITY - ADULT  Goal: Maintain or return to baseline ADL function  Description: INTERVENTIONS:  -  Assess patient's ability to carry out ADLs; assess patient's baseline for ADL function and identify physical deficits which impact ability to perform ADLs (bathing, care of mouth/teeth, toileting, grooming, dressing, etc )  - Assess/evaluate cause of self-care deficits   - Assess range of motion  - Assess patient's mobility; develop plan if impaired  - Assess patient's need for assistive devices and provide as appropriate  - Encourage maximum independence but intervene and supervise when necessary  - Involve family in performance of ADLs  - Assess for home care needs following discharge   - Consider OT consult to assist with ADL evaluation and planning for discharge  - Provide patient education as appropriate  Outcome: Progressing  Goal: Maintains/Returns to pre admission functional level  Description: INTERVENTIONS:  - Perform BMAT or MOVE assessment daily    - Set and communicate daily mobility goal to care team and patient/family/caregiver  - Collaborate with rehabilitation services on mobility goals if consulted  - Perform Range of Motion 4 times a day  - Reposition patient every 3 hours    - Dangle patient 4 times a day  - Stand patient 4 times a day  - Ambulate patient 4 times a day  - Out of bed to chair 2 times a day   - Out of bed for meals 2 times a day  - Out of bed for toileting  - Record patient progress and toleration of activity level   Outcome: Progressing     Problem: Prexisting or High Potential for Compromised Skin Integrity  Goal: Skin integrity is maintained or improved  Description: INTERVENTIONS:  - Identify patients at risk for skin breakdown  - Assess and monitor skin integrity  - Assess and monitor nutrition and hydration status  - Monitor labs   - Assess for incontinence   - Turn and reposition patient  - Assist with mobility/ambulation  - Relieve pressure over bony prominences  - Avoid friction and shearing  - Provide appropriate hygiene as needed including keeping skin clean and dry  - Evaluate need for skin moisturizer/barrier cream  - Collaborate with interdisciplinary team   - Patient/family teaching  - Consider wound care consult   Outcome: Progressing     Problem: Nutrition/Hydration-ADULT  Goal: Nutrient/Hydration intake appropriate for improving, restoring or maintaining nutritional needs  Description: Monitor and assess patient's nutrition/hydration status for malnutrition  Collaborate with interdisciplinary team and initiate plan and interventions as ordered  Monitor patient's weight and dietary intake as ordered or per policy  Utilize nutrition screening tool and intervene as necessary  Determine patient's food preferences and provide high-protein, high-caloric foods as appropriate       INTERVENTIONS:  - Monitor oral intake, urinary output, labs, and treatment plans  - Assess nutrition and hydration status and recommend course of action  - Evaluate amount of meals eaten  - Assist patient with eating if necessary   - Allow adequate time for meals  - Recommend/ encourage appropriate diets, oral nutritional supplements, and vitamin/mineral supplements  - Order, calculate, and assess calorie counts as needed  - Recommend, monitor, and adjust tube feedings and TPN/PPN based on assessed needs  - Assess need for intravenous fluids  - Provide specific nutrition/hydration education as appropriate  - Include patient/family/caregiver in decisions related to nutrition  Outcome: Progressing

## 2021-08-23 NOTE — ASSESSMENT & PLAN NOTE
· History of HTN on losartan/metoprolol/dyazide  Was on hold secondary to ANTHONY/bradycardia  ANTHONY improved but as well as the  bradycardia  · Cardiac monitoring/VS per protocol  · Consider PRN antihypertensives if SBP persistently >180   Currently 110-120's  · Amlodipine 10mg added  · Consider restart home metoprolol/losartan

## 2021-08-23 NOTE — PLAN OF CARE
Problem: Potential for Falls  Goal: Patient will remain free of falls  Description: INTERVENTIONS:  - Educate patient/family on patient safety including physical limitations  - Instruct patient to call for assistance with activity   - Consult OT/PT to assist with strengthening/mobility   - Keep Call bell within reach  - Keep bed low and locked with side rails adjusted as appropriate  - Keep care items and personal belongings within reach  - Initiate and maintain comfort rounds  - Make Fall Risk Sign visible to staff  - Offer Toileting every 2 Hours, in advance of need  - Initiate/Maintain bedalarm  - Obtain necessary fall risk management equipment: 0  - Apply yellow socks and bracelet for high fall risk patients  - Consider moving patient to room near nurses station  Outcome: Progressing     Problem: PAIN - ADULT  Goal: Verbalizes/displays adequate comfort level or baseline comfort level  Description: Interventions:  - Encourage patient to monitor pain and request assistance  - Assess pain using appropriate pain scale  - Administer analgesics based on type and severity of pain and evaluate response  - Implement non-pharmacological measures as appropriate and evaluate response  - Consider cultural and social influences on pain and pain management  - Notify physician/advanced practitioner if interventions unsuccessful or patient reports new pain  Outcome: Progressing     Problem: INFECTION - ADULT  Goal: Absence or prevention of progression during hospitalization  Description: INTERVENTIONS:  - Assess and monitor for signs and symptoms of infection  - Monitor lab/diagnostic results  - Monitor all insertion sites, i e  indwelling lines, tubes, and drains  - [place] appropriate cooling/warming therapies per order  - Administer medications as ordered  - Instruct and encourage patient and family to use good hand hygiene technique  - Identify and instruct in appropriate isolation precautions for identified infection/condition  Outcome: Progressing     Problem: DISCHARGE PLANNING  Goal: Discharge to home or other facility with appropriate resources  Description: INTERVENTIONS:  - Identify barriers to discharge w/patient and caregiver  - Arrange for needed discharge resources and transportation as appropriate  - Identify discharge learning needs (meds, wound care, etc )  - Arrange for interpretive services to assist at discharge as needed  - Refer to Case Management Department for coordinating discharge planning if the patient needs post-hospital services based on physician/advanced practitioner order or complex needs related to functional status, cognitive ability, or social support system  Outcome: Progressing     Problem: Knowledge Deficit  Goal: Patient/family/caregiver demonstrates understanding of disease process, treatment plan, medications, and discharge instructions  Description: Complete learning assessment and assess knowledge base    Interventions:  - Provide teaching at level of understanding  - Provide teaching via preferred learning methods  Outcome: Progressing     Problem: RESPIRATORY - ADULT  Goal: Achieves optimal ventilation and oxygenation  Description: INTERVENTIONS:  - Assess for changes in respiratory status  - Assess for changes in mentation and behavior  - Position to facilitate oxygenation and minimize respiratory effort  - Oxygen administered by appropriate delivery if ordered  - Initiate smoking cessation education as indicated  - Encourage broncho-pulmonary hygiene including cough, deep breathe, Incentive Spirometry  - Assess the need for suctioning and aspirate as needed  - Assess and instruct to report SOB or any respiratory difficulty  - Respiratory Therapy support as indicated  Outcome: Progressing     Problem: MOBILITY - ADULT  Goal: Maintain or return to baseline ADL function  Description: INTERVENTIONS:  -  Assess patient's ability to carry out ADLs; assess patient's baseline for ADL function and identify physical deficits which impact ability to perform ADLs (bathing, care of mouth/teeth, toileting, grooming, dressing, etc )  - Assess/evaluate cause of self-care deficits   - Assess range of motion  - Assess patient's mobility; develop plan if impaired  - Assess patient's need for assistive devices and provide as appropriate  - Encourage maximum independence but intervene and supervise when necessary  - Involve family in performance of ADLs  - Assess for home care needs following discharge   - Consider OT consult to assist with ADL evaluation and planning for discharge  - Provide patient education as appropriate  Outcome: Progressing  Goal: Maintains/Returns to pre admission functional level  Description: INTERVENTIONS:  - Perform BMAT or MOVE assessment daily    - Set and communicate daily mobility goal to care team and patient/family/caregiver  - Collaborate with rehabilitation services on mobility goals if consulted  - Perform Range of Motion 4 times a day  - Reposition patient every 2 hours    - Dangle patient 3 times a day  - Stand patient 3 times a day  - Ambulate patient 3 times a day  - Out of bed to chair 3 times a day   - Out of bed for meals 3 times a day  - Out of bed for toileting  - Record patient progress and toleration of activity level   Outcome: Progressing     Problem: Prexisting or High Potential for Compromised Skin Integrity  Goal: Skin integrity is maintained or improved  Description: INTERVENTIONS:  - Identify patients at risk for skin breakdown  - Assess and monitor skin integrity  - Assess and monitor nutrition and hydration status  - Monitor labs   - Assess for incontinence   - Turn and reposition patient  - Assist with mobility/ambulation  - Relieve pressure over bony prominences  - Avoid friction and shearing  - Provide appropriate hygiene as needed including keeping skin clean and dry  - Evaluate need for skin moisturizer/barrier cream  - Collaborate with interdisciplinary team   - Patient/family teaching  - Consider wound care consult   Outcome: Progressing     Problem: Nutrition/Hydration-ADULT  Goal: Nutrient/Hydration intake appropriate for improving, restoring or maintaining nutritional needs  Description: Monitor and assess patient's nutrition/hydration status for malnutrition  Collaborate with interdisciplinary team and initiate plan and interventions as ordered  Monitor patient's weight and dietary intake as ordered or per policy  Utilize nutrition screening tool and intervene as necessary  Determine patient's food preferences and provide high-protein, high-caloric foods as appropriate       INTERVENTIONS:  - Monitor oral intake, urinary output, labs, and treatment plans  - Assess nutrition and hydration status and recommend course of action  - Evaluate amount of meals eaten  - Assist patient with eating if necessary   - Allow adequate time for meals  - Recommend/ encourage appropriate diets, oral nutritional supplements, and vitamin/mineral supplements  - Order, calculate, and assess calorie counts as needed  - Recommend, monitor, and adjust tube feedings and TPN/PPN based on assessed needs  - Assess need for intravenous fluids  - Provide specific nutrition/hydration education as appropriate  - Include patient/family/caregiver in decisions related to nutrition  Outcome: Progressing

## 2021-08-23 NOTE — ASSESSMENT & PLAN NOTE
· AEB hypoxia, SpO2 85% in ED, tachypnea with RR 25 and HERNANDEZ necessitating placement of midflow O2 to 10L  COVID +in ED 8/13  · Symptom onset Sunday 8/8 with fatigue, poor appetite, cold sweats  Sick contact w/significant other who was COVID + 2 weeks prior  · Developed diarrhea 8/10, cough and dyspnea with coughing fits  · CT C/A/P with bibasilar pulmonary groundglass infiltrates consistent with Covid pneumonia  · Admission labs include Ferritin 608, CRP 6 3, D dimer 0 97 and procal 0 09  All trending down    Plan  · Antibiotics discontinued as procal neg x2   · Atorvastatin 40mg qHS  · Dexamethasone escalated to 0 1mg/kg BID on 8/19, continue through 8/24   · Monitor fever curve, CBC  · Not a candidate for convalescent plasma as symptom onset -6D  · Remdesivir per protocol, completed   · on therapeutic lovenox due to improved renal function   · Currently on HFNC 70% 30L, stable over weekend  · Encouraged self proning, however patient refusing   · Pulmonary hygiene

## 2021-08-23 NOTE — PROGRESS NOTES
[address]  Progress Note Daniel Mccauley 1960, 57 y o  female MRN: [id number]  Unit/Bed#: ICU 05 Encounter: [id number]  Primary Care Provider: June Gutierrez MD   Date and time admitted to hospital: 8/14/2021  1:01 AM    * Acute respiratory failure with hypoxia due to COVID 19 pneumonia  Assessment & Plan  · AEB hypoxia, SpO2 85% in ED, tachypnea with RR 25 and HERNANDEZ necessitating placement of midflow O2 to 10L  COVID +in ED 8/13  · Symptom onset Sunday 8/8 with fatigue, poor appetite, cold sweats  Sick contact w/significant other who was COVID + 2 weeks prior  · Developed diarrhea 8/10, cough and dyspnea with coughing fits  · CT C/A/P with bibasilar pulmonary groundglass infiltrates consistent with Covid pneumonia  · Admission labs include Ferritin 608, CRP 6 3, D dimer 0 97 and procal 0 09  All trending down  Plan  · Antibiotics discontinued as procal neg x2   · Atorvastatin 40mg qHS  · Dexamethasone escalated to 0 1mg/kg BID on 8/19, continue through 8/24   · Monitor fever curve, CBC  · Not a candidate for convalescent plasma as symptom onset -6D  · Remdesivir per protocol, completed   · on therapeutic lovenox due to improved renal function   · Currently on HFNC 70% 30L, stable over weekend  · Encouraged self proning, however patient refusing   · Pulmonary hygiene       BMI 39 0-39 9,adult  Assessment & Plan  · Nutrition and exercise counseling when appropriate  · Habitus likely contributing to clinical picture      Pain in both feet  Assessment & Plan  · History of R  Foot pain s/p surgery x2  Unable to locate surgical documents in care everywhere  · Takes Tramadol 50mg PO TID PRN for foot pain   Will continue for now and consider d/c if oxygenation status declines    Gastroesophageal reflux disease without esophagitis  Assessment & Plan  · Takes esomeprazole  · Replaced with Protonix 20mg PO daily      Type 2 diabetes mellitus without complication, without long-term current use of insulin [facility])  Assessment & Plan  Lab Results   Component Value Date    HGBA1C 7 8 (H) 05/11/2021       Recent Labs     08/22/21  0814 08/22/21  1207 08/22/21  1515 08/22/21 2042   POCGLU 247* 313* 329* 273*       Blood Sugar Average: Last 72 hrs:  · (P) 278 75   · Has tried to control BG with diet in past  · Persistent hyperglycemia while on steroids    -add mealtime coverage 14 units with meals, increase Lantus 21 units b i d  If there is no improvement she may require an insulin GTT  Mixed hyperlipidemia  Assessment & Plan  · Replaced home simvastatin with atorvastatin per COVID protocol    Essential hypertension  Assessment & Plan  · History of HTN on losartan/metoprolol/dyazide  Was on hold secondary to ANTHONY/bradycardia  ANTHONY improved but as well as the  bradycardia  · Cardiac monitoring/VS per protocol  · Consider PRN antihypertensives if SBP persistently >180  Currently 110-120's  · Amlodipine 10mg added  · Consider restart home metoprolol/losartan          ----------------------------------------------------------------------------------------  HPI/24hr events: None, sable overnight  Refused CPAP  Disposition: Continue Stepdown Level 1 level of care   Code Status: Level 1 - Full Code  ---------------------------------------------------------------------------------------  SUBJECTIVE  No acute complains> overall feeling better , no yet at baseline  Review of Systems   Constitutional: Negative for fatigue and fever  HENT: Negative for congestion  Eyes: Negative for visual disturbance  Respiratory: Positive for cough and shortness of breath  Cardiovascular: Negative for chest pain, palpitations and leg swelling  Gastrointestinal: Negative for constipation, diarrhea, nausea and vomiting  Genitourinary: Negative for dysuria and urgency  Musculoskeletal: Positive for arthralgias and back pain  Skin: Negative for color change  Neurological: Negative for weakness and numbness  Psychiatric/Behavioral: Negative for confusion  Review of systems was reviewed and negative unless stated above in HPI/24-hour events   ---------------------------------------------------------------------------------------  OBJECTIVE    Vitals   Vitals:    21 0032 21 0247 21 0400 21 042   BP:  124/70 141/61    BP Location:  Left arm     Pulse:  101 82    Resp:  22 21    Temp:  98 2 °F (36 8 °C)     TempSrc:  Temporal     SpO2: 92% (!) 86% 94% 93%   Weight:       Height:         Temp (24hrs), Av 8 °F (36 6 °C), Min:97 3 °F (36 3 °C), Max:98 2 °F (36 8 °C)  Current: Temperature: 98 2 °F (36 8 °C)          Respiratory:  SpO2: SpO2: 93 %  Nasal Cannula O2 Flow Rate (L/min): 30 L/min    Invasive/non-invasive ventilation settings   Respiratory    Lab Data (Last 4 hours)    None         O2/Vent Data (Last 4 hours)      427          Non-Invasive Ventilation Mode HFNC (High flow)                   Physical Exam  Constitutional:       General: She is not in acute distress  Appearance: She is obese  HENT:      Head: Normocephalic and atraumatic  Eyes:      General:         Right eye: No discharge  Left eye: No discharge  Conjunctiva/sclera: Conjunctivae normal    Cardiovascular:      Rate and Rhythm: Normal rate and regular rhythm  Pulses: Normal pulses  Heart sounds: Normal heart sounds  No murmur heard  Pulmonary:      Effort: Pulmonary effort is normal  No respiratory distress  Breath sounds: Normal breath sounds  Abdominal:      General: Abdomen is flat  Bowel sounds are normal  There is no distension  Palpations: Abdomen is soft  Tenderness: There is no abdominal tenderness  There is no guarding  Musculoskeletal:         General: Normal range of motion  Cervical back: Normal range of motion  Right lower leg: No edema  Left lower leg: No edema  Skin:     General: Skin is warm        Capillary Refill: Capillary refill takes less than 2 seconds  Neurological:      General: No focal deficit present  Mental Status: She is alert and oriented to person, place, and time  Mental status is at baseline  Psychiatric:         Mood and Affect: Mood normal          Laboratory and Diagnostics:  Results from last 7 days   Lab Units 08/22/21  0530 08/21/21  0555 08/19/21  0706 08/18/21  0333 08/17/21  0455   WBC Thousand/uL 10 51* 10 26* 9 33 8 57 11 82*   HEMOGLOBIN g/dL 11 8 12 4 12 3 12 0 13 2   HEMATOCRIT % 34 9 36 7 37 6 36 4 40 6   PLATELETS Thousands/uL 373 400* 360 314 318   NEUTROS PCT %  --  82* 83* 78* 83*   MONOS PCT %  --  6 4 5 5   MONO PCT % 8  --   --   --   --      Results from last 7 days   Lab Units 08/22/21  0530 08/21/21  0555 08/19/21  0706 08/18/21  0333 08/17/21  0455   SODIUM mmol/L 136 136 142 139 140   POTASSIUM mmol/L 4 6 4 9 4 8 4 1 4 5   CHLORIDE mmol/L 105 105 108 108 108   CO2 mmol/L 23 21 25 23 23   ANION GAP mmol/L 8 10 9 8 9   BUN mg/dL 32* 32* 27* 30* 33*   CREATININE mg/dL 1 18 1 15 1 38* 1 51* 1 57*   CALCIUM mg/dL 7 7* 7 6* 8 0* 7 5* 7 8*   GLUCOSE RANDOM mg/dL 243* 216* 194* 208* 163*   ALT U/L  --  26 29  --  36   AST U/L  --  27 27  --  40   ALK PHOS U/L  --  63 71  --  70   ALBUMIN g/dL  --  2 0* 2 2*  --  2 4*   TOTAL BILIRUBIN mg/dL  --  0 71 0 65  --  0 59          Results from last 7 days   Lab Units 08/18/21  1020 08/18/21  0333 08/17/21  1950 08/17/21  1112 08/17/21  0454 08/16/21  2150 08/16/21  1411   PTT seconds 44* 150* 37 188* 71* 185* 28              ABG:    VBG:    Results from last 7 days   Lab Units 08/19/21  0706 08/18/21  0333   PROCALCITONIN ng/ml <0 05 <0 05       Intake and Output  I/O       08/21 0701 - 08/22 0700 08/22 0701 - 08/23 0700    P  O  740 540    IV Piggyback 100 50    Total Intake(mL/kg) 840 (8) 590 (5 6)    Urine (mL/kg/hr) 2000 (0 8) 1300 (0 5)    Stool 0     Total Output 2000 1300    Net -1160 -710          Unmeasured Urine Occurrence  2 x    Unmeasured Stool Occurrence 2 x 1 x          Height and Weights   Height: 5' 3" (160 cm)  IBW (Ideal Body Weight): 52 4 kg  Body mass index is 41 01 kg/m²  Weight (last 2 days)     Date/Time   Weight    08/22/21 0555   105 (231 48)    08/21/21 0555   105 (231 48)                Nutrition       Diet Orders   (From admission, onward)             Start     Ordered    08/20/21 1113  Dietary nutrition supplements  Once     Question Answer Comment   Select Supplement: Glucerna-Chocolate    Frequency Breakfast, Lunch, Dinner        08/20/21 1112    08/16/21 1045  Room Service  Once     Question:  Type of Service  Answer:  Room Service - Appropriate with Assistance    08/16/21 1044    08/14/21 0113  Diet Zaki/CHO Controlled; Consistent Carbohydrate Diet Level 2 (5 carb servings/75 grams CHO/meal)  Diet effective now     Question Answer Comment   Diet Type Zaki/CHO Controlled    Zaki/CHO Controlled Consistent Carbohydrate Diet Level 2 (5 carb servings/75 grams CHO/meal)    RD to adjust diet per protocol?  Yes        08/14/21 0141                  Active Medications  Scheduled Meds:  Current Facility-Administered Medications   Medication Dose Route Frequency Provider Last Rate    amLODIPine  10 mg Oral Daily Yesi Mcwilliams MD      atorvastatin  40 mg Oral HS MARCK Wright      benzonatate  100 mg Oral TID PRN Miguel Odom PA-C      dexamethasone  0 1 mg/kg Intravenous Q12H MARCK Goff 11 mg (08/23/21 0031)    enoxaparin  1 mg/kg Subcutaneous Q12H [address] Yesi Mcwilliams MD      insulin glargine  21 Units Subcutaneous Q12H [address] Jacki [place], [place]      insulin lispro  14 Units Subcutaneous Daily With Breakfast TERE York      insulin lispro  14 Units Subcutaneous Daily With Lunch [place], [place]      insulin lispro  14 Units Subcutaneous Daily With [address] [place], [place]      insulin lispro  4-20 Units Subcutaneous TID AC Jacki Grove PA-C      levalbuterol  0 63 mg Nebulization TID Caryhali Vera PA-C      lidocaine  1 patch Topical Daily Beltran Clemente MD      magnesium oxide  800 mg Oral BID Leopold Barman, PA-C      pantoprazole  20 mg Oral Early Morning MARCK Freeman      Pramox-PE-Glycerin-Petrolatum   Rectal BID PRN Leopold Barman, PA-C      traMADol  50 mg Oral Q8H PRN MARCK Freeman       Continuous Infusions:     PRN Meds:   benzonatate, 100 mg, TID PRN  Pramox-PE-Glycerin-Petrolatum, , BID PRN  traMADol, 50 mg, Q8H PRN        Invasive Devices Review  Invasive Devices     Peripheral Intravenous Line            Long-Dwell Peripheral IV (Midline) 99/01/59 Right Basilic 1 day                  Beltran Clemente MD      Portions of the record may have been created with voice recognition software  Occasional wrong word or "sound a like" substitutions may have occurred due to the inherent limitations of voice recognition software    Read the chart carefully and recognize, using context, where substitutions have occurred

## 2021-08-23 NOTE — CASE MANAGEMENT
LOS: 9 GMLOS: 5 4  Bundle: NA  Readmission: NA  Unplanned Readmission Score: 13    CM to introduce CM, review role, complete initial assessment and discuss DCP  Lives where: [place]  Lives with: PADMINI and her brother  Home Type & Entry: 1/2 double house; 1 RUBIN from rear; Full bath w/ shower on 1st floor, 2nd floor bedroom & bath  DME: RW possibly   ADLs: Independent with all ADLs w/o AD  VNA history: NA  STR history: NA  Pharmacy Preference & Coverage: [facility] on [address] [id number], [place]; No issues with coverage or OOP cost  Mental Health/Drug/ETOH history: Hx of smoking, No MH dx   Dialysis history: NA  POA/AD/LW: NA  Income/Employment: SSI/SSD-permanent disability due to leg injury   Transportation: Sig Other   PCP: Dr Jannie De Anda: SO     DCP: TBD pending progress    No questions or concerns from pt's SO at this time  SW will continue to update him with discharge needs  CM reviewed discharge planning process including the following: identifying caregivers at home, preference for d/c planning needs,   availability of [facility] Meds to Bed program, availability of treatment team to discuss questions or concerns patient and/or family may have regarding diagnosis, plan of care, old or new medications and discharge planning   CM will continue to follow for care coordination and update assessment as appropriate

## 2021-08-24 ENCOUNTER — APPOINTMENT (INPATIENT)
Dept: RADIOLOGY | Facility: HOSPITAL | Age: 61
DRG: 177 | End: 2021-08-24
Payer: MEDICARE

## 2021-08-24 LAB
ANION GAP SERPL CALCULATED.3IONS-SCNC: 9 MMOL/L (ref 4–13)
BASOPHILS # BLD MANUAL: 0 THOUSAND/UL (ref 0–0.1)
BASOPHILS NFR MAR MANUAL: 0 % (ref 0–1)
BUN SERPL-MCNC: 26 MG/DL (ref 5–25)
CALCIUM SERPL-MCNC: 8.1 MG/DL (ref 8.3–10.1)
CHLORIDE SERPL-SCNC: 106 MMOL/L (ref 100–108)
CO2 SERPL-SCNC: 24 MMOL/L (ref 21–32)
CREAT SERPL-MCNC: 1.09 MG/DL (ref 0.6–1.3)
EOSINOPHIL # BLD MANUAL: 0 THOUSAND/UL (ref 0–0.4)
EOSINOPHIL NFR BLD MANUAL: 0 % (ref 0–6)
ERYTHROCYTE [DISTWIDTH] IN BLOOD BY AUTOMATED COUNT: 12.7 % (ref 11.6–15.1)
GFR SERPL CREATININE-BSD FRML MDRD: 55 ML/MIN/1.73SQ M
GLUCOSE SERPL-MCNC: 115 MG/DL (ref 65–140)
GLUCOSE SERPL-MCNC: 120 MG/DL (ref 65–140)
GLUCOSE SERPL-MCNC: 123 MG/DL (ref 65–140)
GLUCOSE SERPL-MCNC: 137 MG/DL (ref 65–140)
GLUCOSE SERPL-MCNC: 142 MG/DL (ref 65–140)
GLUCOSE SERPL-MCNC: 189 MG/DL (ref 65–140)
GLUCOSE SERPL-MCNC: 199 MG/DL (ref 65–140)
GLUCOSE SERPL-MCNC: 206 MG/DL (ref 65–140)
GLUCOSE SERPL-MCNC: 242 MG/DL (ref 65–140)
GLUCOSE SERPL-MCNC: 250 MG/DL (ref 65–140)
GLUCOSE SERPL-MCNC: 277 MG/DL (ref 65–140)
GLUCOSE SERPL-MCNC: 94 MG/DL (ref 65–140)
HCT VFR BLD AUTO: 34.6 % (ref 34.8–46.1)
HGB BLD-MCNC: 11.6 G/DL (ref 11.5–15.4)
LYMPHOCYTES # BLD AUTO: 1.05 THOUSAND/UL (ref 0.6–4.47)
LYMPHOCYTES # BLD AUTO: 9 % (ref 14–44)
MAGNESIUM SERPL-MCNC: 2 MG/DL (ref 1.6–2.6)
MCH RBC QN AUTO: 29.1 PG (ref 26.8–34.3)
MCHC RBC AUTO-ENTMCNC: 33.5 G/DL (ref 31.4–37.4)
MCV RBC AUTO: 87 FL (ref 82–98)
MONOCYTES # BLD AUTO: 0.23 THOUSAND/UL (ref 0–1.22)
MONOCYTES NFR BLD: 2 % (ref 4–12)
NEUTROPHILS # BLD MANUAL: 10.36 THOUSAND/UL (ref 1.85–7.62)
NEUTS BAND NFR BLD MANUAL: 1 % (ref 0–8)
NEUTS SEG NFR BLD AUTO: 88 % (ref 43–75)
PLATELET # BLD AUTO: 341 THOUSANDS/UL (ref 149–390)
PLATELET BLD QL SMEAR: ADEQUATE
PMV BLD AUTO: 10.5 FL (ref 8.9–12.7)
POTASSIUM SERPL-SCNC: 4.8 MMOL/L (ref 3.5–5.3)
RBC # BLD AUTO: 3.99 MILLION/UL (ref 3.81–5.12)
RBC MORPH BLD: NORMAL
SODIUM SERPL-SCNC: 139 MMOL/L (ref 136–145)
WBC # BLD AUTO: 11.64 THOUSAND/UL (ref 4.31–10.16)

## 2021-08-24 PROCEDURE — 85007 BL SMEAR W/DIFF WBC COUNT: CPT | Performed by: INTERNAL MEDICINE

## 2021-08-24 PROCEDURE — 94660 CPAP INITIATION&MGMT: CPT

## 2021-08-24 PROCEDURE — 94640 AIRWAY INHALATION TREATMENT: CPT

## 2021-08-24 PROCEDURE — 80048 BASIC METABOLIC PNL TOTAL CA: CPT | Performed by: INTERNAL MEDICINE

## 2021-08-24 PROCEDURE — 83735 ASSAY OF MAGNESIUM: CPT | Performed by: NURSE PRACTITIONER

## 2021-08-24 PROCEDURE — 97163 PT EVAL HIGH COMPLEX 45 MIN: CPT

## 2021-08-24 PROCEDURE — 85027 COMPLETE CBC AUTOMATED: CPT | Performed by: INTERNAL MEDICINE

## 2021-08-24 PROCEDURE — 99232 SBSQ HOSP IP/OBS MODERATE 35: CPT | Performed by: ANESTHESIOLOGY

## 2021-08-24 PROCEDURE — 94760 N-INVAS EAR/PLS OXIMETRY 1: CPT

## 2021-08-24 PROCEDURE — 93005 ELECTROCARDIOGRAM TRACING: CPT

## 2021-08-24 PROCEDURE — 71045 X-RAY EXAM CHEST 1 VIEW: CPT

## 2021-08-24 PROCEDURE — 82948 REAGENT STRIP/BLOOD GLUCOSE: CPT

## 2021-08-24 RX ORDER — METOPROLOL TARTRATE 5 MG/5ML
5 INJECTION INTRAVENOUS ONCE
Status: COMPLETED | OUTPATIENT
Start: 2021-08-24 | End: 2021-08-24

## 2021-08-24 RX ADMIN — LEVALBUTEROL HYDROCHLORIDE 0.63 MG: 0.63 SOLUTION RESPIRATORY (INHALATION) at 08:12

## 2021-08-24 RX ADMIN — ENOXAPARIN SODIUM 105 MG: 120 INJECTION SUBCUTANEOUS at 08:10

## 2021-08-24 RX ADMIN — AMLODIPINE BESYLATE 10 MG: 10 TABLET ORAL at 08:14

## 2021-08-24 RX ADMIN — METOPROLOL TARTRATE 25 MG: 25 TABLET, FILM COATED ORAL at 20:37

## 2021-08-24 RX ADMIN — LEVALBUTEROL HYDROCHLORIDE 0.63 MG: 0.63 SOLUTION RESPIRATORY (INHALATION) at 20:59

## 2021-08-24 RX ADMIN — METOPROLOL TARTRATE 25 MG: 25 TABLET, FILM COATED ORAL at 08:14

## 2021-08-24 RX ADMIN — LIDOCAINE 5% 1 PATCH: 700 PATCH TOPICAL at 08:15

## 2021-08-24 RX ADMIN — PANTOPRAZOLE SODIUM 20 MG: 20 TABLET, DELAYED RELEASE ORAL at 06:09

## 2021-08-24 RX ADMIN — MAGNESIUM OXIDE TAB 400 MG (241.3 MG ELEMENTAL MG) 800 MG: 400 (241.3 MG) TAB at 18:40

## 2021-08-24 RX ADMIN — LEVALBUTEROL HYDROCHLORIDE 0.63 MG: 0.63 SOLUTION RESPIRATORY (INHALATION) at 14:12

## 2021-08-24 RX ADMIN — MAGNESIUM OXIDE TAB 400 MG (241.3 MG ELEMENTAL MG) 800 MG: 400 (241.3 MG) TAB at 08:12

## 2021-08-24 RX ADMIN — ENOXAPARIN SODIUM 105 MG: 120 INJECTION SUBCUTANEOUS at 20:36

## 2021-08-24 RX ADMIN — SODIUM CHLORIDE 6 UNITS/HR: 9 INJECTION, SOLUTION INTRAVENOUS at 14:31

## 2021-08-24 RX ADMIN — METOPROLOL TARTRATE 5 MG: 5 INJECTION INTRAVENOUS at 23:59

## 2021-08-24 RX ADMIN — DEXAMETHASONE SODIUM PHOSPHATE 11 MG: 10 INJECTION, SOLUTION INTRAMUSCULAR; INTRAVENOUS at 23:59

## 2021-08-24 RX ADMIN — ATORVASTATIN CALCIUM 40 MG: 40 TABLET, FILM COATED ORAL at 21:55

## 2021-08-24 RX ADMIN — DEXAMETHASONE SODIUM PHOSPHATE 11 MG: 10 INJECTION, SOLUTION INTRAMUSCULAR; INTRAVENOUS at 12:22

## 2021-08-24 NOTE — ASSESSMENT & PLAN NOTE
· AEB hypoxia, SpO2 85% in ED, tachypnea with RR 25 and HERNANDEZ necessitating placement of midflow O2 to 10L  [place] +in ED 8/13  Symptom onset Sunday 8/8 with fatigue, poor appetite, cold sweats  Sick contact w/significant other who was COVID + 2 weeks prior  Developed diarrhea 8/10, cough and dyspnea with coughing fits  · CT C/A/P with bibasilar pulmonary groundglass infiltrates consistent with Covid pneumonia  · Admission labs include Ferritin 608, CRP 6 3, D dimer 0 97 and procal 0 09  All trending down    · CXR 24/8 stable  Plan  · Antibiotics discontinued as procal neg x2   · Atorvastatin 40mg qHS  · Dexamethasone escalated to 0 1mg/kg BID on 8/19, continue through 8/24   · Monitor fever curve, CBC  · Not a candidate for convalescent plasma as symptom onset -6D  · Remdesivir per protocol, completed   · on therapeutic lovenox due to improved renal function   · Currently on HFNC 70% 30L, stable   · Encouraged self proning, however patient refusing   · Pulmonary hygiene   · LAB holiday 25/8

## 2021-08-24 NOTE — PHYSICAL THERAPY NOTE
PT EVALUATION       08/24/21 1500   PT Last Visit   PT Visit Date 08/24/21   Note Type   Note type Evaluation   Pain Assessment   Pain Assessment Tool 0-10   Pain Score No Pain   Home Living   Type of Home House  (4 RUBIN)   Home Layout Two level;Bed/bath upstairs;Stairs to enter with rails   [address] [facility],[address]   Additional Comments Pt states mao she has a walker but does not use  Prior Function   Level of [place] Independent with ADLs and functional mobility   Lives With Significant other   Receives Help From Family   ADL Assistance Independent   IADLs Independent   Comments - ; significant other drives her to go shopping or he does the shopping   Restrictions/Precautions   Weight Bearing Precautions Per Order No   Other Precautions Multiple lines; Fall Risk;Contact/isolation; Airborne/isolation   General   Additional Pertinent History Admitted with COVID, ANTHONY   Family/Caregiver Present No   Cognition   Overall Cognitive Status WFL   Arousal/Participation Cooperative   Orientation Level Oriented X4   Following Commands Follows all commands and directions without difficulty   RUE Assessment   RUE Assessment WFL   LUE Assessment   LUE Assessment WFL   RLE Assessment   RLE Assessment WFL  (at least 3+/5)   LLE Assessment   LLE Assessment WFL  (at least 3+/5)   Bed Mobility   Additional Comments Pt presents in recliner chair with legs elevated; on Hi flow oxygen at 50%, 30 LPM   O2 sats 92%    Transfers   Additional Comments Pt declined transfers secondary to just transferring from chair <> commode : pt is now fatigued  This PT did observe pt from outside room being transferred back for commode > chair with Min / Mod A of RN and CNA  Limited by multiple lines and fatigue       Ambulation/Elevation   Gait Assistance Not tested   Balance   Static Sitting Good   Dynamic Sitting Fair +   Static Standing Zero   Activity Tolerance   Activity Tolerance Patient limited by fatigue;Treatment limited secondary to medical complications (Comment)   Nurse Made Aware yes: Katerin   Assessment   Prognosis Good   Problem List Decreased strength;Decreased endurance; Impaired balance;Decreased mobility; Decreased coordination;Obesity   Assessment Patient seen for Physical Therapy evaluation  Patient admitted with Acute respiratory failure with hypoxia ([facility] [address] )  Comorbidities affecting patient's physical performance include: HTN, DM, Obesity, SILVIA  Personal factors affecting patient at time of initial evaluation include: lives in two story house, stairs to enter home, inability to ambulate household distances, inability to navigate community distances, inability to navigate level surfaces without external assistance, limited home support, inability to perform ADLS and inability to perform IADLS   Prior to admission, patient was independent with functional mobility without assistive device, independent with ADLS, independent with IADLS, living with significant other in a two level home with 4 steps to enter, ambulating household distance, ambulating community distances and on disability  Please find objective findings from Physical Therapy assessment regarding body systems outlined above with impairments and limitations including weakness, impaired balance, decreased endurance, impaired coordination, decreased activity tolerance, decreased functional mobility tolerance, decreased safety awareness, impaired judgement, fall risk and SOB upon exertion  The Barthel Index was used as a functional outcome tool presenting with a score of 45 today indicating marked limitations of functional mobility and ADLS  Patient's clinical presentation is currently unstable/unpredictable as seen in patient's presentation of vital sign response, increased fall risk, new onset of impairment of functional mobility, decreased endurance and new onset of weakness   Pt would benefit from continued Physical Therapy treatment to address deficits as defined above and maximize level of functional mobility  As demonstrated by objective findings, the assigned level of complexity for this evaluation is high  The patient's AM-[facility] Basic Mobility Inpatient Short Form Raw Score is 15, Standardized Score is 36 97  A standardized score less than 42 9 suggests the patient may benefit from discharge to post-acute rehabilitation services, however expect pt to progress with PT to allow pt to return home with family support and Home PT  Please also refer to the recommendation of the Physical Therapist for safe discharge planning  Goals   Patient Goals to take it a day at a time to rest and get better  STG Expiration Date 08/31/21   Short Term Goal #1 Supervision with all transfers supine <> short sit and sit <> stand with fair + balance   Short Term Goal #2 Supervision with amb  with/without AD for functional household distances with fair + balance and no SOB  (sats WFLs)   LTG Expiration Date 09/07/21   Long Term Goal #1 Indep with all transfers sit <> stand with good balance   Long Term Goal #2 Indep amb  with / without AD for functional household distances with good balance and no decrease in O2 sats below functional limits  Indep navigating 4 stairs to allow pt to enter/exit her home  Plan   Treatment/Interventions ADL retraining;Functional transfer training;LE strengthening/ROM; Elevations; Therapeutic exercise; Endurance training;Patient/family training;Equipment eval/education; Bed mobility;Gait training;Spoke to nursing;Spoke to case management;OT   PT Frequency 5x/wk   Recommendation   PT Discharge Recommendation Home with home health rehabilitation   Additional Comments Pt is limited in ICU for mobility due to multiple lines and pt has decreased endurance   Will progress as tolerated toward getting pt back to her baseline of function to allow pt to return home with possible home PT, pending progress     AM-[facility] Basic Mobility Inpatient   Turning in Bed Without Bedrails 3   Lying on Back to Sitting on Edge of Flat Bed 3   Moving Bed to Chair 3   Standing Up From Chair 3   Walk in Room 2   Climb 3-5 Stairs 1   Basic Mobility Inpatient Raw Score 15   Basic Mobility Standardized Score 36 97   Barthel Index   Feeding 10   Bathing 0   Grooming Score 0   Dressing Score 5   Bladder Score 10   Bowels Score 10   Toilet Use Score 5   Transfers (Bed/Chair) Score 5   Mobility (Level Surface) Score 0   Stairs Score 0   Barthel Index Score 45   Licensure   [place] License Number  Tracy Frohlich  [address] [facility]  [id number]     Pt declined transfers and gait assessment due to just getting back into chair after using the commode  Pt is fatigued  This PT did observe pt going back into chair after using the commode  Will continue to assess functional mobility further as pt tolerates    CKF

## 2021-08-24 NOTE — PLAN OF CARE
Problem: Potential for Falls  Goal: Patient will remain free of falls  Description: INTERVENTIONS:  - Educate patient/family on patient safety including physical limitations  - Instruct patient to call for assistance with activity   - Consult OT/PT to assist with strengthening/mobility   - Keep Call bell within reach  - Keep bed low and locked with side rails adjusted as appropriate  - Keep care items and personal belongings within reach  - Initiate and maintain comfort rounds  - Make Fall Risk Sign visible to staff  - Offer Toileting every 2 Hours, in advance of need  - Initiate/Maintain bedalarm  - Obtain necessary fall risk management equipment: siderails  - Apply yellow socks and bracelet for high fall risk patients  - Consider moving patient to room near nurses station  Outcome: Progressing

## 2021-08-24 NOTE — PROGRESS NOTES
Bill [address]  Progress Note Abril Better 1960, 57 y o  female MRN: [id number]  Unit/Bed#: ICU 05 Encounter: [id number]  Primary Care Provider: Aniceto Cornelius MD   Date and time admitted to hospital: 8/14/2021  1:01 AM    * Acute respiratory failure with hypoxia due to COVID 19 pneumonia  Assessment & Plan  · AEB hypoxia, SpO2 85% in ED, tachypnea with RR 25 and HERNANDEZ necessitating placement of midflow O2 to 10L  [place] +in ED 8/13  Symptom onset Sunday 8/8 with fatigue, poor appetite, cold sweats  Sick contact w/significant other who was COVID + 2 weeks prior  Developed diarrhea 8/10, cough and dyspnea with coughing fits  · CT C/A/P with bibasilar pulmonary groundglass infiltrates consistent with Covid pneumonia  · Admission labs include Ferritin 608, CRP 6 3, D dimer 0 97 and procal 0 09  All trending down  · CXR 24/8 stable  Plan  · Antibiotics discontinued as procal neg x2   · Atorvastatin 40mg qHS  · Dexamethasone escalated to 0 1mg/kg BID on 8/19, continue through 8/24   · Monitor fever curve, CBC  · Not a candidate for convalescent plasma as symptom onset -6D  · Remdesivir per protocol, completed   · on therapeutic lovenox due to improved renal function   · Currently on HFNC 70% 30L, stable   · Encouraged self proning, however patient refusing   · Pulmonary hygiene   · LAB holiday 25/8      BMI 39 0-39 9,adult  Assessment & Plan  · Nutrition and exercise counseling when appropriate  · Habitus likely contributing to clinical picture      Pain in both feet  Assessment & Plan  · History of R  Foot pain s/p surgery x2  Unable to locate surgical documents in care everywhere  · Takes Tramadol 50mg PO TID PRN for foot pain   Will continue for now and consider d/c if oxygenation status declines    Gastroesophageal reflux disease without esophagitis  Assessment & Plan  · Takes esomeprazole  · Replaced with Protonix 20mg PO daily      Type 2 diabetes mellitus without complication, without long-term current use of insulin [facility])  Assessment & Plan  Lab Results   Component Value Date    HGBA1C 7 8 (H) 05/11/2021       Recent Labs     08/23/21  2201 08/23/21  2351 08/24/21  0211 08/24/21  0415   POCGLU 165* 110 206* 94       Blood Sugar Average: Last 72 hrs:  · (P) 237 45   · Has tried to control BG with diet in past  · Persistent hyperglycemia while on steroids  · Continue insulin GTT due to difficulty controlling BS while on steroids      Mixed hyperlipidemia  Assessment & Plan  · Replaced home simvastatin with atorvastatin per COVID protocol    Essential hypertension  Assessment & Plan  · History of HTN on losartan/dyazide  · Cardiac monitoring/VS per protocol  · Consider PRN antihypertensives if SBP persistently >180  Currently 110-120's  · Amlodipine 10mg added  · Restart home metoprolol            ----------------------------------------------------------------------------------------  HPI/24hr events: None  Patient appropriate for transfer out of the ICU today?: No  Disposition: Continue Stepdown Level 1 level of care   Code Status: Level 1 - Full Code  ---------------------------------------------------------------------------------------  SUBJECTIVE  No acute complains  Feels better than last week, diarrhea improving  Review of Systems   Constitutional: Negative for fatigue and fever  HENT: Negative for congestion  Eyes: Negative for visual disturbance  Respiratory: Positive for cough  Negative for shortness of breath  Cardiovascular: Negative for chest pain, palpitations and leg swelling  Gastrointestinal: Negative for constipation, diarrhea, nausea and vomiting  Genitourinary: Negative for dysuria and urgency  Musculoskeletal: Positive for arthralgias and back pain  Skin: Negative for color change  Neurological: Negative for headaches  Psychiatric/Behavioral: Negative for confusion       Review of systems was reviewed and negative unless stated above in HPI/24-hour events   ---------------------------------------------------------------------------------------  OBJECTIVE    Vitals   Vitals:    21 0000 21 0200 21 0400 21 0414   BP: 102/58 137/62 135/64    BP Location:       Pulse: 94 87 80    Resp: (!)  18    Temp: 97 9 °F (36 6 °C)  (!) 97 4 °F (36 3 °C)    TempSrc: Temporal  Temporal    SpO2: 92% 91% 91% (!) 89%   Weight:       Height:         Temp (24hrs), Av 6 °F (36 4 °C), Min:97 °F (36 1 °C), Max:97 9 °F (36 6 °C)  Current: Temperature: (!) 97 4 °F (36 3 °C)          Respiratory:  SpO2: SpO2: (!) 89 %    Nasal Cannula O2 Flow Rate (L/min): 30 L/min    Invasive/non-invasive ventilation settings   Respiratory    Lab Data (Last 4 hours)    None         O2/Vent Data (Last 4 hours)      414          Non-Invasive Ventilation Mode HFNC (High flow)                   Physical Exam  Constitutional:       Appearance: She is obese  HENT:      Head: Normocephalic and atraumatic  Eyes:      General:         Right eye: No discharge  Left eye: No discharge  Conjunctiva/sclera: Conjunctivae normal    Cardiovascular:      Rate and Rhythm: Normal rate and regular rhythm  Pulmonary:      Effort: Pulmonary effort is normal  No respiratory distress  Breath sounds: Rhonchi present  Abdominal:      General: Abdomen is flat  Bowel sounds are normal  There is no distension  Palpations: Abdomen is soft  Tenderness: There is no abdominal tenderness  There is no guarding  Musculoskeletal:         General: Normal range of motion  Cervical back: Normal range of motion  Right lower leg: Edema present  Left lower leg: Edema present  Skin:     General: Skin is warm  Capillary Refill: Capillary refill takes less than 2 seconds  Neurological:      General: No focal deficit present  Mental Status: She is alert and oriented to person, place, and time  Mental status is at baseline     Psychiatric: Mood and Affect: Mood normal          Laboratory and Diagnostics:  Results from last 7 days   Lab Units 08/23/21  0646 08/22/21  0530 08/21/21  0555 08/19/21  0706 08/18/21  0333   WBC Thousand/uL 11 60* 10 51* 10 26* 9 33 8 57   HEMOGLOBIN g/dL 11 4* 11 8 12 4 12 3 12 0   HEMATOCRIT % 34 4* 34 9 36 7 37 6 36 4   PLATELETS Thousands/uL 355 373 400* 360 314   NEUTROS PCT %  --   --  82* 83* 78*   MONOS PCT %  --   --  6 4 5   MONO PCT %  --  8  --   --   --      Results from last 7 days   Lab Units 08/23/21  0646 08/22/21  0530 08/21/21  0555 08/19/21  0706 08/18/21  0333   SODIUM mmol/L 136 136 136 142 139   POTASSIUM mmol/L 4 6 4 6 4 9 4 8 4 1   CHLORIDE mmol/L 105 105 105 108 108   CO2 mmol/L 24 23 21 25 23   ANION GAP mmol/L 7 8 10 9 8   BUN mg/dL 28* 32* 32* 27* 30*   CREATININE mg/dL 1 25 1 18 1 15 1 38* 1 51*   CALCIUM mg/dL 7 9* 7 7* 7 6* 8 0* 7 5*   GLUCOSE RANDOM mg/dL 233* 243* 216* 194* 208*   ALT U/L  --   --  26 29  --    AST U/L  --   --  27 27  --    ALK PHOS U/L  --   --  63 71  --    ALBUMIN g/dL  --   --  2 0* 2 2*  --    TOTAL BILIRUBIN mg/dL  --   --  0 71 0 65  --           Results from last 7 days   Lab Units 08/18/21  1020 08/18/21  0333 08/17/21  1950 08/17/21  1112   PTT seconds 44* 150* 37 188*              ABG:    VBG:    Results from last 7 days   Lab Units 08/19/21  0706 08/18/21  0333   PROCALCITONIN ng/ml <0 05 <0 05   Imaging: I have personally reviewed pertinent reports  Intake and Output  I/O       08/22 0701 - 08/23 0700 08/23 0701 - 08/24 0700    P  O  540     I V  (mL/kg)  175 9 (1 7)    IV Piggyback 50     Total Intake(mL/kg) 590 (5 6) 175 9 (1 7)    Urine (mL/kg/hr) 1300 (0 5) 1350 (0 5)    Stool  0    Total Output 1300 1350    Net -710 1178 1          Unmeasured Urine Occurrence 2 x     Unmeasured Stool Occurrence 1 x 1 x          Height and Weights   Height: 5' 3" (160 cm)  IBW (Ideal Body Weight): 52 4 kg  Body mass index is 41 32 kg/m²    Weight (last 2 days) Date/Time   Weight    08/23/21 0600   106 (233 25)    08/22/21 0555   105 (231 48)                Nutrition       Diet Orders   (From admission, onward)             Start     Ordered    08/20/21 1113  Dietary nutrition supplements  Once     Question Answer Comment   Select Supplement: Glucerna-Chocolate    Frequency Breakfast, Lunch, Dinner        08/20/21 1112    08/16/21 1045  Room Service  Once     Question:  Type of Service  Answer:  Room Service - Appropriate with Assistance    08/16/21 1044    08/14/21 0113  Diet Zaki/CHO Controlled; Consistent Carbohydrate Diet Level 2 (5 carb servings/75 grams CHO/meal)  Diet effective now     Question Answer Comment   Diet Type Zaki/CHO Controlled    Zaki/CHO Controlled Consistent Carbohydrate Diet Level 2 (5 carb servings/75 grams CHO/meal)    RD to adjust diet per protocol?  Yes        08/14/21 0141                  Active Medications  Scheduled Meds:  Current Facility-Administered Medications   Medication Dose Route Frequency Provider Last Rate    amLODIPine  10 mg Oral Daily Vickey Zamorano MD      atorvastatin  40 mg Oral HS MARCK Lynn      benzonatate  100 mg Oral TID PRN Fariba Thompson PA-C      dexamethasone  0 1 mg/kg Intravenous Q12H MARCK Stroud 11 mg (08/23/21 2347)    enoxaparin  1 mg/kg Subcutaneous Q12H [address] Vickey Zamorano MD      insulin regular (HumuLIN R,NovoLIN R) infusion  0 3-21 Units/hr Intravenous Titrated Vickey Zamorano MD 1 Units/hr (08/24/21 0416)   [facility] levalbuterol  0 63 mg Nebulization TID Nicole Dempsey PA-C      lidocaine  1 patch Topical Daily Vickey Zamorano MD      magnesium oxide  800 mg Oral BID Fariba Thompson PA-C      metoprolol tartrate  25 mg Oral Q12H 304 Bharat Echevarria MD      pantoprazole  20 mg Oral Early Morning MARCK Lynn      Pramox-PE-Glycerin-Petrolatum   Rectal BID PRN Fariba Thompson PA-C      traMADol  50 mg Oral Q8H PRN MARCK Lynn       Continuous Infusions:  insulin regular (HumuLIN R,NovoLIN R) infusion, 0 3-21 Units/hr, Last Rate: 1 Units/hr (08/24/21 0416)      PRN Meds:   benzonatate, 100 mg, TID PRN  Pramox-PE-Glycerin-Petrolatum, , BID PRN  traMADol, 50 mg, Q8H PRN        Invasive Devices Review  Invasive Devices     Peripheral Intravenous Line            Long-Dwell Peripheral IV (Midline) 86/91/84 Right Basilic 2 days    Peripheral IV 08/23/21 Left Antecubital <1 day                Yarely Terrell MD      Portions of the record may have been created with voice recognition software  Occasional wrong word or "sound a like" substitutions may have occurred due to the inherent limitations of voice recognition software    Read the chart carefully and recognize, using context, where substitutions have occurred

## 2021-08-24 NOTE — ASSESSMENT & PLAN NOTE
Lab Results   Component Value Date    HGBA1C 7 8 (H) 05/11/2021       Recent Labs     08/23/21  2201 08/23/21  2351 08/24/21  0211 08/24/21  0415   POCGLU 165* 110 206* 94       Blood Sugar Average: Last 72 hrs:  · (P) 237 45   · Has tried to control BG with diet in past  · Persistent hyperglycemia while on steroids    · Continue insulin GTT due to difficulty controlling BS while on steroids

## 2021-08-25 PROBLEM — I48.91 ATRIAL FIBRILLATION (HCC): Status: ACTIVE | Noted: 2021-08-25

## 2021-08-25 LAB
GLUCOSE SERPL-MCNC: 111 MG/DL (ref 65–140)
GLUCOSE SERPL-MCNC: 111 MG/DL (ref 65–140)
GLUCOSE SERPL-MCNC: 113 MG/DL (ref 65–140)
GLUCOSE SERPL-MCNC: 124 MG/DL (ref 65–140)
GLUCOSE SERPL-MCNC: 125 MG/DL (ref 65–140)
GLUCOSE SERPL-MCNC: 128 MG/DL (ref 65–140)
GLUCOSE SERPL-MCNC: 155 MG/DL (ref 65–140)
GLUCOSE SERPL-MCNC: 157 MG/DL (ref 65–140)
GLUCOSE SERPL-MCNC: 164 MG/DL (ref 65–140)
GLUCOSE SERPL-MCNC: 177 MG/DL (ref 65–140)
GLUCOSE SERPL-MCNC: 179 MG/DL (ref 65–140)
GLUCOSE SERPL-MCNC: 189 MG/DL (ref 65–140)
GLUCOSE SERPL-MCNC: 66 MG/DL (ref 65–140)
GLUCOSE SERPL-MCNC: 84 MG/DL (ref 65–140)

## 2021-08-25 PROCEDURE — 94760 N-INVAS EAR/PLS OXIMETRY 1: CPT

## 2021-08-25 PROCEDURE — 94640 AIRWAY INHALATION TREATMENT: CPT

## 2021-08-25 PROCEDURE — 93005 ELECTROCARDIOGRAM TRACING: CPT

## 2021-08-25 PROCEDURE — 82948 REAGENT STRIP/BLOOD GLUCOSE: CPT

## 2021-08-25 PROCEDURE — 99232 SBSQ HOSP IP/OBS MODERATE 35: CPT | Performed by: ANESTHESIOLOGY

## 2021-08-25 PROCEDURE — 97110 THERAPEUTIC EXERCISES: CPT

## 2021-08-25 RX ORDER — METOPROLOL TARTRATE 50 MG/1
50 TABLET, FILM COATED ORAL EVERY 12 HOURS SCHEDULED
Status: DISCONTINUED | OUTPATIENT
Start: 2021-08-25 | End: 2021-08-31 | Stop reason: HOSPADM

## 2021-08-25 RX ADMIN — AMLODIPINE BESYLATE 10 MG: 10 TABLET ORAL at 08:57

## 2021-08-25 RX ADMIN — MAGNESIUM OXIDE TAB 400 MG (241.3 MG ELEMENTAL MG) 800 MG: 400 (241.3 MG) TAB at 08:57

## 2021-08-25 RX ADMIN — ATORVASTATIN CALCIUM 40 MG: 40 TABLET, FILM COATED ORAL at 22:00

## 2021-08-25 RX ADMIN — MAGNESIUM OXIDE TAB 400 MG (241.3 MG ELEMENTAL MG) 800 MG: 400 (241.3 MG) TAB at 18:18

## 2021-08-25 RX ADMIN — ENOXAPARIN SODIUM 105 MG: 120 INJECTION SUBCUTANEOUS at 08:57

## 2021-08-25 RX ADMIN — GLYCERIN, PETROLATUM, PHENYLEPHRINE HCL, PRAMOXINE HCL: 144; 2.5; 10; 15 CREAM TOPICAL at 10:36

## 2021-08-25 RX ADMIN — METOPROLOL TARTRATE 50 MG: 50 TABLET, FILM COATED ORAL at 20:20

## 2021-08-25 RX ADMIN — LIDOCAINE 5% 1 PATCH: 700 PATCH TOPICAL at 10:36

## 2021-08-25 RX ADMIN — PANTOPRAZOLE SODIUM 20 MG: 20 TABLET, DELAYED RELEASE ORAL at 06:28

## 2021-08-25 RX ADMIN — METOPROLOL TARTRATE 25 MG: 25 TABLET, FILM COATED ORAL at 08:57

## 2021-08-25 RX ADMIN — LEVALBUTEROL HYDROCHLORIDE 0.63 MG: 0.63 SOLUTION RESPIRATORY (INHALATION) at 15:46

## 2021-08-25 RX ADMIN — ENOXAPARIN SODIUM 105 MG: 120 INJECTION SUBCUTANEOUS at 20:20

## 2021-08-25 RX ADMIN — SODIUM CHLORIDE 6 UNITS/HR: 9 INJECTION, SOLUTION INTRAVENOUS at 12:35

## 2021-08-25 RX ADMIN — LEVALBUTEROL HYDROCHLORIDE 0.63 MG: 0.63 SOLUTION RESPIRATORY (INHALATION) at 08:00

## 2021-08-25 RX ADMIN — DEXAMETHASONE SODIUM PHOSPHATE 11 MG: 10 INJECTION, SOLUTION INTRAMUSCULAR; INTRAVENOUS at 12:34

## 2021-08-25 NOTE — ASSESSMENT & PLAN NOTE
· History of HTN on losartan/dyazide  · Cardiac monitoring/VS per protocol  · Consider PRN antihypertensives if SBP persistently >180   Currently 110-120's  · Amlodipine 5 mg might decreased from 10  · Restart home metoprolol might need to increased dose to 50 bid

## 2021-08-25 NOTE — PROGRESS NOTES
[address]  Progress Note Inna Jaramillo 1960, 57 y o  female MRN: [id number]  Unit/Bed#: ICU 05 Encounter: [id number]  Primary Care Provider: Jenae Garay MD   Date and time admitted to hospital: 8/14/2021  1:01 AM    * Acute respiratory failure with hypoxia due to COVID 19 pneumonia  Assessment & Plan  · AEB hypoxia, SpO2 85% in ED, tachypnea with RR 25 and HERNANDEZ necessitating placement of midflow O2 to 10L  [place] +in ED 8/13  Symptom onset Sunday 8/8 with fatigue, poor appetite, cold sweats  Sick contact w/significant other who was COVID + 2 weeks prior  Developed diarrhea 8/10, cough and dyspnea with coughing fits  · CT C/A/P with bibasilar pulmonary groundglass infiltrates consistent with Covid pneumonia  · Admission labs include Ferritin 608, CRP 6 3, D dimer 0 97 and procal 0 09  All trending down  · CXR 24/8 stable  Plan  · Antibiotics discontinued as procal neg x2   · Atorvastatin 40mg qHS  · Dexamethasone escalated to 0 1mg/kg BID on 8/19, continue through 8/28-->prednisone 40 mg   · Monitor fever curve, CBC  · Not a candidate for convalescent plasma as symptom onset -6D  · Remdesivir per protocol, completed   · on therapeutic lovenox due to improved renal function   · Currently on HFNC 50% 25L, stable   · Encouraged self proning, however patient refusing   · Pulmonary hygiene   · LAB holiday 25/8      Atrial fibrillation ([facility] [address] )  Assessment & Plan  -new onset over night  -given metoprolol  -patient is on lovenox  -on telemetry    BMI 39 0-39 9,adult  Assessment & Plan  · Nutrition and exercise counseling when appropriate  · Habitus likely contributing to clinical picture      Pain in both feet  Assessment & Plan  · History of R  Foot pain s/p surgery x2  Unable to locate surgical documents in care everywhere  · Takes Tramadol 50mg PO TID PRN for foot pain   Will continue for now and consider d/c if oxygenation status declines    Gastroesophageal reflux disease without esophagitis  Assessment & Plan  · Takes esomeprazole  · Replaced with Protonix 20mg PO daily      Type 2 diabetes mellitus without complication, without long-term current use of insulin [facility])  Assessment & Plan  Lab Results   Component Value Date    HGBA1C 7 8 (H) 05/11/2021       Recent Labs     08/25/21  0005 08/25/21  0146 08/25/21  0416 08/25/21  0530   POCGLU 155* 84 124 125       Blood Sugar Average: Last 72 hrs:  · (P) 999 [id number]   · Has tried to control BG with diet in past  · Persistent hyperglycemia while on steroids  · Continue insulin GTT due to difficulty controlling BS while on steroids      Mixed hyperlipidemia  Assessment & Plan  · Replaced home simvastatin with atorvastatin per COVID protocol    Essential hypertension  Assessment & Plan  · History of HTN on losartan/dyazide  · Cardiac monitoring/VS per protocol  · Consider PRN antihypertensives if SBP persistently >180  Currently 110-120's  · Amlodipine 5 mg might decreased from 10  · Restart home metoprolol might need to increased dose to 50 bid        ----------------------------------------------------------------------------------------  HPI/24hr events: patient was having a  Fib rvr  given one time metoprolol  Stable after that on NSR    Patient appropriate for transfer out of the ICU today?: No  Disposition: Continue Stepdown Level 1 level of care   Code Status: Level 1 - Full Code  ---------------------------------------------------------------------------------------  SUBJECTIVE  No acute complains  Patient reports feeling almost at baseline  Review of Systems   Constitutional: Negative for fatigue and fever  HENT: Negative for congestion  Eyes: Negative for visual disturbance  Respiratory: Positive for cough and shortness of breath  Cardiovascular: Negative for chest pain, palpitations and leg swelling  Gastrointestinal: Negative for constipation, diarrhea, nausea and vomiting     Genitourinary: Negative for dysuria and urgency  Musculoskeletal: Positive for arthralgias and back pain  Skin: Negative for color change  Neurological: Negative for weakness, numbness and headaches  Psychiatric/Behavioral: Negative for confusion  Review of systems was reviewed and negative unless stated above in HPI/24-hour events   ---------------------------------------------------------------------------------------  OBJECTIVE    Vitals   Vitals:    21 0100 21 0200 21 0300 21 0415   BP:  107/57     BP Location:       Pulse: 102 105 (!) 106    Resp: 21  (!) 24    Temp:       TempSrc:       SpO2: 91% 91% (!) 87% (!) 88%   Weight:       Height:         Temp (24hrs), Av 5 °F (36 4 °C), Min:97 °F (36 1 °C), Max:98 1 °F (36 7 °C)  Current: Temperature: 98 1 °F (36 7 °C)          Respiratory:  SpO2: SpO2: (!) 88 %    Nasal Cannula O2 Flow Rate (L/min): 30 L/min    Invasive/non-invasive ventilation settings   Respiratory    Lab Data (Last 4 hours)    None         O2/Vent Data (Last 4 hours)      415          Non-Invasive Ventilation Mode HFNC (High flow)                   Physical Exam  Constitutional:       General: She is not in acute distress  Appearance: She is obese  HENT:      Head: Normocephalic and atraumatic  Eyes:      General: No scleral icterus  Right eye: No discharge  Left eye: No discharge  Conjunctiva/sclera: Conjunctivae normal    Cardiovascular:      Rate and Rhythm: Regular rhythm  Tachycardia present  Pulses: Normal pulses  Heart sounds: Normal heart sounds  No murmur heard  Pulmonary:      Effort: Pulmonary effort is normal  No respiratory distress  Breath sounds: Normal breath sounds  Abdominal:      General: Abdomen is flat  Bowel sounds are normal  There is no distension  Palpations: Abdomen is soft  Tenderness: There is no abdominal tenderness  There is no guarding  Musculoskeletal:         General: Normal range of motion  Cervical back: Normal range of motion  Right lower leg: No edema  Left lower leg: No edema  Skin:     General: Skin is warm  Capillary Refill: Capillary refill takes less than 2 seconds  Neurological:      General: No focal deficit present  Mental Status: She is alert and oriented to person, place, and time  Mental status is at baseline  Psychiatric:         Mood and Affect: Mood normal          Laboratory and Diagnostics:  Results from last 7 days   Lab Units 08/24/21  0616 08/23/21  0646 08/22/21  0530 08/21/21  0555 08/19/21  0706   WBC Thousand/uL 11 64* 11 60* 10 51* 10 26* 9 33   HEMOGLOBIN g/dL 11 6 11 4* 11 8 12 4 12 3   HEMATOCRIT % 34 6* 34 4* 34 9 36 7 37 6   PLATELETS Thousands/uL 341 355 373 400* 360   NEUTROS PCT %  --   --   --  82* 83*   BANDS PCT % 1  --   --   --   --    MONOS PCT %  --   --   --  6 4   MONO PCT % 2*  --  8  --   --      Results from last 7 days   Lab Units 08/24/21  0616 08/23/21  0646 08/22/21  0530 08/21/21  0555 08/19/21  0706   SODIUM mmol/L 139 136 136 136 142   POTASSIUM mmol/L 4 8 4 6 4 6 4 9 4 8   CHLORIDE mmol/L 106 105 105 105 108   CO2 mmol/L 24 24 23 21 25   ANION GAP mmol/L 9 7 8 10 9   BUN mg/dL 26* 28* 32* 32* 27*   CREATININE mg/dL 1 09 1 25 1 18 1 15 1 38*   CALCIUM mg/dL 8 1* 7 9* 7 7* 7 6* 8 0*   GLUCOSE RANDOM mg/dL 123 233* 243* 216* 194*   ALT U/L  --   --   --  26 29   AST U/L  --   --   --  27 27   ALK PHOS U/L  --   --   --  63 71   ALBUMIN g/dL  --   --   --  2 0* 2 2*   TOTAL BILIRUBIN mg/dL  --   --   --  0 71 0 65     Results from last 7 days   Lab Units 08/24/21  0616   MAGNESIUM mg/dL 2 0      Results from last 7 days   Lab Units 08/18/21  1020   PTT seconds 44*              ABG:    VBG:    Results from last 7 days   Lab Units 08/19/21  0706   PROCALCITONIN ng/ml <0 05     Imaging: I have personally reviewed pertinent reports  Intake and Output  I/O       08/23 0701 - 08/24 0700 08/24 0701 - 08/25 0700    P  O  400 240    I V  (mL/kg) 247 1 (2 3)     IV Piggyback 50     Total Intake(mL/kg) 697 1 (6 4) 240 (2 2)    Urine (mL/kg/hr) 1700 (0 6) 1350 (0 5)    Stool 0 0    Total Output 1700 1350    Net -1002 9 -1110          Unmeasured Stool Occurrence 1 x 1 x          Height and Weights   Height: 5' 3" (160 cm)  IBW (Ideal Body Weight): 52 4 kg  Body mass index is 42 76 kg/m²  Weight (last 2 days)     Date/Time   Weight    08/24/21 0600   110 (241 4)    08/23/21 0600   106 (233 25)                Nutrition       Diet Orders   (From admission, onward)             Start     Ordered    08/20/21 1113  Dietary nutrition supplements  Once     Question Answer Comment   Select Supplement: Glucerna-Chocolate    Frequency Breakfast, Lunch, Dinner        08/20/21 1112    08/16/21 1045  Room Service  Once     Question:  Type of Service  Answer:  Room Service - Appropriate with Assistance    08/16/21 1044    08/14/21 0113  Diet Zaki/CHO Controlled; Consistent Carbohydrate Diet Level 2 (5 carb servings/75 grams CHO/meal)  Diet effective now     Question Answer Comment   Diet Type Zaki/CHO Controlled    Zaki/CHO Controlled Consistent Carbohydrate Diet Level 2 (5 carb servings/75 grams CHO/meal)    RD to adjust diet per protocol?  Yes        08/14/21 0141                  Active Medications  Scheduled Meds:  Current Facility-Administered Medications   Medication Dose Route Frequency Provider Last Rate    amLODIPine  10 mg Oral Daily Thu Kincaid MD      atorvastatin  40 mg Oral HS MARCK Hackett      benzonatate  100 mg Oral TID PRN Ayaka Olivarez PA-C      dexamethasone  0 1 mg/kg Intravenous Q12H MARCK Moody 11 mg (08/24/21 9549)    enoxaparin  1 mg/kg Subcutaneous Q12H [address] Thu Kincaid MD      insulin regular (HumuLIN R,NovoLIN R) infusion  0 3-21 Units/hr Intravenous Titrated Thu Kincaid MD 3 Units/hr (08/25/21 3981)    levalbuterol  0 63 mg Nebulization TID Fausto Reese PA-C      lidocaine  1 patch Topical Daily Ken Manjarrez MD      magnesium oxide  800 mg Oral BID Mara Abernathy PA-C      metoprolol tartrate  25 mg Oral Q12H [address] Maria Teresa Hess MD      pantoprazole  20 mg Oral Early Morning [employer], CRNP      Pramox-PE-Glycerin-Petrolatum   Rectal BID PRN Mara Abernathy PA-C      traMADol  50 mg Oral Q8H PRN Brooks Waterproof, CRNP       Continuous Infusions:  insulin regular (HumuLIN R,NovoLIN R) infusion, 0 3-21 Units/hr, Last Rate: 3 Units/hr (08/25/21 0421)      PRN Meds:   benzonatate, 100 mg, TID PRN  Pramox-PE-Glycerin-Petrolatum, , BID PRN  traMADol, 50 mg, Q8H PRN        Invasive Devices Review  Invasive Devices     Peripheral Intravenous Line            Long-Dwell Peripheral IV (Midline) 63/58/40 Right Basilic 3 days    Peripheral IV 08/23/21 Left Antecubital 1 day                  Ken Manjarrez MD      Portions of the record may have been created with voice recognition software  Occasional wrong word or "sound a like" substitutions may have occurred due to the inherent limitations of voice recognition software    Read the chart carefully and recognize, using context, where substitutions have occurred

## 2021-08-25 NOTE — ASSESSMENT & PLAN NOTE
Lab Results   Component Value Date    HGBA1C 7 8 (H) 05/11/2021       Recent Labs     08/25/21  0005 08/25/21  0146 08/25/21  0416 08/25/21  0530   POCGLU 155* 84 124 125       Blood Sugar Average: Last 72 hrs:  · (P) 825 [id number]   · Has tried to control BG with diet in past  · Persistent hyperglycemia while on steroids    · Continue insulin GTT due to difficulty controlling BS while on steroids

## 2021-08-25 NOTE — PLAN OF CARE
Problem: Potential for Falls  Goal: Patient will remain free of falls  Description: INTERVENTIONS:  - Educate patient/family on patient safety including physical limitations  - Instruct patient to call for assistance with activity   - Consult OT/PT to assist with strengthening/mobility   - Keep Call bell within reach  - Keep bed low and locked with side rails adjusted as appropriate  - Keep care items and personal belongings within reach  - Initiate and maintain comfort rounds  - Make Fall Risk Sign visible to staff  - Offer Toileting every 2  Hours, in advance of need  - Initiate/Maintain bed/chair alarm  - Obtain necessary fall risk management equipment: bed/chair alarm   - Apply yellow socks and bracelet for high fall risk patients  - Consider moving patient to room near nurses station  Outcome: Progressing     Problem: PAIN - ADULT  Goal: Verbalizes/displays adequate comfort level or baseline comfort level  Description: Interventions:  - Encourage patient to monitor pain and request assistance  - Assess pain using appropriate pain scale  - Administer analgesics based on type and severity of pain and evaluate response  - Implement non-pharmacological measures as appropriate and evaluate response  - Consider cultural and social influences on pain and pain management  - Notify physician/advanced practitioner if interventions unsuccessful or patient reports new pain  Outcome: Progressing     Problem: INFECTION - ADULT  Goal: Absence or prevention of progression during hospitalization  Description: INTERVENTIONS:  - Assess and monitor for signs and symptoms of infection  - Monitor lab/diagnostic results  - Monitor all insertion sites, i e  indwelling lines, tubes, and drains  - [place] appropriate cooling/warming therapies per order  - Administer medications as ordered  - Instruct and encourage patient and family to use good hand hygiene technique  - Identify and instruct in appropriate isolation precautions for identified infection/condition  Outcome: Progressing     Problem: DISCHARGE PLANNING  Goal: Discharge to home or other facility with appropriate resources  Description: INTERVENTIONS:  - Identify barriers to discharge w/patient and caregiver  - Arrange for needed discharge resources and transportation as appropriate  - Identify discharge learning needs (meds, wound care, etc )  - Arrange for interpretive services to assist at discharge as needed  - Refer to Case Management Department for coordinating discharge planning if the patient needs post-hospital services based on physician/advanced practitioner order or complex needs related to functional status, cognitive ability, or social support system  Outcome: Progressing     Problem: Knowledge Deficit  Goal: Patient/family/caregiver demonstrates understanding of disease process, treatment plan, medications, and discharge instructions  Description: Complete learning assessment and assess knowledge base    Interventions:  - Provide teaching at level of understanding  - Provide teaching via preferred learning methods  Outcome: Progressing     Problem: RESPIRATORY - ADULT  Goal: Achieves optimal ventilation and oxygenation  Description: INTERVENTIONS:  - Assess for changes in respiratory status  - Assess for changes in mentation and behavior  - Position to facilitate oxygenation and minimize respiratory effort  - Oxygen administered by appropriate delivery if ordered  - Initiate smoking cessation education as indicated  - Encourage broncho-pulmonary hygiene including cough, deep breathe, Incentive Spirometry  - Assess the need for suctioning and aspirate as needed  - Assess and instruct to report SOB or any respiratory difficulty  - Respiratory Therapy support as indicated  Outcome: Progressing     Problem: MOBILITY - ADULT  Goal: Maintain or return to baseline ADL function  Description: INTERVENTIONS:  -  Assess patient's ability to carry out ADLs; assess patient's baseline for ADL function and identify physical deficits which impact ability to perform ADLs (bathing, care of mouth/teeth, toileting, grooming, dressing, etc )  - Assess/evaluate cause of self-care deficits   - Assess range of motion  - Assess patient's mobility; develop plan if impaired  - Assess patient's need for assistive devices and provide as appropriate  - Encourage maximum independence but intervene and supervise when necessary  - Involve family in performance of ADLs  - Assess for home care needs following discharge   - Consider OT consult to assist with ADL evaluation and planning for discharge  - Provide patient education as appropriate  Outcome: Progressing  Goal: Maintains/Returns to pre admission functional level  Description: INTERVENTIONS:  - Perform BMAT or MOVE assessment daily    - Set and communicate daily mobility goal to care team and patient/family/caregiver  - Collaborate with rehabilitation services on mobility goals if consulted  - Perform Range of Motion 3 times a day  - Reposition patient every 2  hours    - Dangle patient  3 times a day  - Stand patient 3 times a day  - Ambulate patient 3 times a day  - Out of bed to chair 3 times a day   - Out of bed for meals 3 times a day  - Out of bed for toileting  - Record patient progress and toleration of activity level   Outcome: Progressing     Problem: Prexisting or High Potential for Compromised Skin Integrity  Goal: Skin integrity is maintained or improved  Description: INTERVENTIONS:  - Identify patients at risk for skin breakdown  - Assess and monitor skin integrity  - Assess and monitor nutrition and hydration status  - Monitor labs   - Assess for incontinence   - Turn and reposition patient  - Assist with mobility/ambulation  - Relieve pressure over bony prominences  - Avoid friction and shearing  - Provide appropriate hygiene as needed including keeping skin clean and dry  - Evaluate need for skin moisturizer/barrier cream  - Collaborate with interdisciplinary team   - Patient/family teaching  - Consider wound care consult   Outcome: Progressing     Problem: Nutrition/Hydration-ADULT  Goal: Nutrient/Hydration intake appropriate for improving, restoring or maintaining nutritional needs  Description: Monitor and assess patient's nutrition/hydration status for malnutrition  Collaborate with interdisciplinary team and initiate plan and interventions as ordered  Monitor patient's weight and dietary intake as ordered or per policy  Utilize nutrition screening tool and intervene as necessary  Determine patient's food preferences and provide high-protein, high-caloric foods as appropriate       INTERVENTIONS:  - Monitor oral intake, urinary output, labs, and treatment plans  - Assess nutrition and hydration status and recommend course of action  - Evaluate amount of meals eaten  - Assist patient with eating if necessary   - Allow adequate time for meals  - Recommend/ encourage appropriate diets, oral nutritional supplements, and vitamin/mineral supplements  - Order, calculate, and assess calorie counts as needed  - Recommend, monitor, and adjust tube feedings and TPN/PPN based on assessed needs  - Assess need for intravenous fluids  - Provide specific nutrition/hydration education as appropriate  - Include patient/family/caregiver in decisions related to nutrition  Outcome: Progressing

## 2021-08-25 NOTE — ASSESSMENT & PLAN NOTE
Lab Results   Component Value Date    HGBA1C 7 8 (H) 05/11/2021       Recent Labs     08/25/21  0005 08/25/21  0146 08/25/21  0416 08/25/21  0530   POCGLU 155* 84 124 125       Blood Sugar Average: Last 72 hrs:  · (P) 760 [id number]   · Has tried to control BG with diet in past  · Persistent hyperglycemia while on steroids    · Continue insulin GTT due to difficulty controlling BS while on steroids

## 2021-08-25 NOTE — ASSESSMENT & PLAN NOTE
· AEB hypoxia, SpO2 85% in ED, tachypnea with RR 25 and HERNANDEZ necessitating placement of midflow O2 to 10L  [place] +in ED 8/13  Symptom onset Sunday 8/8 with fatigue, poor appetite, cold sweats  Sick contact w/significant other who was COVID + 2 weeks prior  Developed diarrhea 8/10, cough and dyspnea with coughing fits  · CT C/A/P with bibasilar pulmonary groundglass infiltrates consistent with Covid pneumonia  · Admission labs include Ferritin 608, CRP 6 3, D dimer 0 97 and procal 0 09  All trending down    · CXR 24/8 stable  Plan  · Antibiotics discontinued as procal neg x2   · Atorvastatin 40mg qHS  · Dexamethasone escalated to 0 1mg/kg BID on 8/19, continue through 8/28  · Monitor fever curve, CBC  · Not a candidate for convalescent plasma as symptom onset -6D  · Remdesivir per protocol, completed   · on therapeutic lovenox due to improved renal function   · Currently  mid flown 8L sat 93% over 24 hours  · Encouraged self proning, however patient refusing   · Pulmonary hygiene

## 2021-08-25 NOTE — PLAN OF CARE
Problem: RESPIRATORY - ADULT  Goal: Achieves optimal ventilation and oxygenation  Description: INTERVENTIONS:  - Assess for changes in respiratory status  - Assess for changes in mentation and behavior  - Position to facilitate oxygenation and minimize respiratory effort  - Oxygen administered by appropriate delivery if ordered  - Initiate smoking cessation education as indicated  - Encourage broncho-pulmonary hygiene including cough, deep breathe, Incentive Spirometry  - Assess the need for suctioning and aspirate as needed  - Assess and instruct to report SOB or any respiratory difficulty  - Respiratory Therapy support as indicated  8/25/2021 1808 by Shanda Beth, RT  Outcome: Progressing  8/25/2021 1808 by Shanda Beth, RT  Outcome: Progressing

## 2021-08-25 NOTE — PHYSICAL THERAPY NOTE
PT TREATMENT     08/25/21 1405   Note Type   Note Type Treatment   Pain Assessment   Pain Assessment Tool Pain Assessment not indicated - pt denies pain   Restrictions/Precautions   Other Precautions 10 LO2  (Sp02 drops with activity)   General   Chart Reviewed Yes   Cognition   Overall Cognitive Status WFL   Subjective   Subjective agreeable to activity  "I try to exercise in the chair throughout the day"   Transfers   Sit to Stand 5  Supervision   Stand to Sit 5  Supervision   Additional Comments pt scoots back in the chair with supervision   Balance   Static Sitting Good   Dynamic Sitting Fair +   Static Standing Fair +   Dynamic Standing Fair   Ambulatory Fair   Activity Tolerance   Activity Tolerance Patient limited by fatigue;Patient tolerated treatment well   Exercises   Neuro re-ed x 10 each ankle circles and  ankle pumps, x 5  heel slides, x 7 LAQ, c 5 hip abd/add in beach chair position all with extended seated rest breaks    Balance training  Pt stood 1x 20 seconds with supervision    Assessment   Prognosis Good   Problem List Decreased strength;Decreased endurance;Decreased mobility   Assessment Pt demonstrates improving endurance and tolerance to functional activity today  Pt was able to tolerate LE exercises and standing with many rest breaks due to SOB and low Sp02  Pt's Sp02 drops to low 80s with activity at times  Pt is capable of pacing activity and  demonstrates good ability to purse lip breathe  Plan to progress reps of LE exercises and possibly try walking a short distance with a walker over the next few sessions  The patient's AM-PAC Basic Mobility Inpatient Short Form Raw Score is 14, Standardized Score is 35 55  A standardized score less than 42 9 suggests the patient may benefit from discharge to post-acute rehabilitation services, however anticipate pt will demonstrate progressive endurance and function so recommend DC home with home PT          Plan   Treatment/Interventions ADL retraining;LE strengthening/ROM; Therapeutic exercise; Endurance training;Gait training;Equipment eval/education   Recommendation   PT Discharge Recommendation Home with home health rehabilitation   AM-PAC Basic Mobility Inpatient   Turning in Bed Without Bedrails 3   Lying on Back to Sitting on Edge of Flat Bed 3   Moving Bed to Chair 3   Standing Up From Chair 3   Walk in Room 1   Climb 3-5 Stairs 1   Basic Mobility Inpatient Raw Score 14   Basic Mobility Standardized Score 63 03   Licensure   [place] License Number  Nir [id number] [id number]

## 2021-08-25 NOTE — ASSESSMENT & PLAN NOTE
· AEB hypoxia, SpO2 85% in ED, tachypnea with RR 25 and HERNANDEZ necessitating placement of midflow O2 to 10L  [place] +in ED 8/13  Symptom onset Sunday 8/8 with fatigue, poor appetite, cold sweats  Sick contact w/significant other who was COVID + 2 weeks prior  Developed diarrhea 8/10, cough and dyspnea with coughing fits  · CT C/A/P with bibasilar pulmonary groundglass infiltrates consistent with Covid pneumonia  · Admission labs include Ferritin 608, CRP 6 3, D dimer 0 97 and procal 0 09  All trending down    · CXR 24/8 stable  Plan  · Antibiotics discontinued as procal neg x2   · Atorvastatin 40mg qHS  · Dexamethasone escalated to 0 1mg/kg BID on 8/19, continue through 8/24-->prednisone 60 mg   · Monitor fever curve, CBC  · Not a candidate for convalescent plasma as symptom onset -6D  · Remdesivir per protocol, completed   · on therapeutic lovenox due to improved renal function   · Currently on HFNC 50% 25L, stable   · Encouraged self proning, however patient refusing   · Pulmonary hygiene   · LAB holiday 25/8

## 2021-08-25 NOTE — ASSESSMENT & PLAN NOTE
· History of HTN on losartan/dyazide  On hold  · Cardiac monitoring/VS per protocol  · Consider PRN antihypertensives if SBP persistently >180   Currently 110-120's  · Restart home metoprolol increased to  50 bid

## 2021-08-26 ENCOUNTER — APPOINTMENT (INPATIENT)
Dept: NON INVASIVE DIAGNOSTICS | Facility: HOSPITAL | Age: 61
DRG: 177 | End: 2021-08-26
Payer: MEDICARE

## 2021-08-26 LAB
ANION GAP SERPL CALCULATED.3IONS-SCNC: 11 MMOL/L (ref 4–13)
ATRIAL RATE: 101 BPM
ATRIAL RATE: 170 BPM
BASOPHILS # BLD AUTO: 0.02 THOUSANDS/ΜL (ref 0–0.1)
BASOPHILS NFR BLD AUTO: 0 % (ref 0–1)
BUN SERPL-MCNC: 33 MG/DL (ref 5–25)
CALCIUM SERPL-MCNC: 8 MG/DL (ref 8.3–10.1)
CHLORIDE SERPL-SCNC: 104 MMOL/L (ref 100–108)
CO2 SERPL-SCNC: 23 MMOL/L (ref 21–32)
CREAT SERPL-MCNC: 1.11 MG/DL (ref 0.6–1.3)
EOSINOPHIL # BLD AUTO: 0.01 THOUSAND/ΜL (ref 0–0.61)
EOSINOPHIL NFR BLD AUTO: 0 % (ref 0–6)
ERYTHROCYTE [DISTWIDTH] IN BLOOD BY AUTOMATED COUNT: 13 % (ref 11.6–15.1)
GFR SERPL CREATININE-BSD FRML MDRD: 53 ML/MIN/1.73SQ M
GLUCOSE SERPL-MCNC: 107 MG/DL (ref 65–140)
GLUCOSE SERPL-MCNC: 112 MG/DL (ref 65–140)
GLUCOSE SERPL-MCNC: 116 MG/DL (ref 65–140)
GLUCOSE SERPL-MCNC: 143 MG/DL (ref 65–140)
GLUCOSE SERPL-MCNC: 152 MG/DL (ref 65–140)
GLUCOSE SERPL-MCNC: 152 MG/DL (ref 65–140)
GLUCOSE SERPL-MCNC: 164 MG/DL (ref 65–140)
GLUCOSE SERPL-MCNC: 199 MG/DL (ref 65–140)
GLUCOSE SERPL-MCNC: 208 MG/DL (ref 65–140)
GLUCOSE SERPL-MCNC: 234 MG/DL (ref 65–140)
GLUCOSE SERPL-MCNC: 235 MG/DL (ref 65–140)
GLUCOSE SERPL-MCNC: 89 MG/DL (ref 65–140)
GLUCOSE SERPL-MCNC: 97 MG/DL (ref 65–140)
GLUCOSE SERPL-MCNC: 99 MG/DL (ref 65–140)
HCT VFR BLD AUTO: 40.7 % (ref 34.8–46.1)
HGB BLD-MCNC: 13.7 G/DL (ref 11.5–15.4)
IMM GRANULOCYTES # BLD AUTO: 0.28 THOUSAND/UL (ref 0–0.2)
IMM GRANULOCYTES NFR BLD AUTO: 2 % (ref 0–2)
LYMPHOCYTES # BLD AUTO: 0.78 THOUSANDS/ΜL (ref 0.6–4.47)
LYMPHOCYTES NFR BLD AUTO: 6 % (ref 14–44)
MCH RBC QN AUTO: 29.1 PG (ref 26.8–34.3)
MCHC RBC AUTO-ENTMCNC: 33.7 G/DL (ref 31.4–37.4)
MCV RBC AUTO: 86 FL (ref 82–98)
MONOCYTES # BLD AUTO: 0.6 THOUSAND/ΜL (ref 0.17–1.22)
MONOCYTES NFR BLD AUTO: 5 % (ref 4–12)
NEUTROPHILS # BLD AUTO: 10.63 THOUSANDS/ΜL (ref 1.85–7.62)
NEUTS SEG NFR BLD AUTO: 87 % (ref 43–75)
NRBC BLD AUTO-RTO: 0 /100 WBCS
PLATELET # BLD AUTO: 299 THOUSANDS/UL (ref 149–390)
PMV BLD AUTO: 11.2 FL (ref 8.9–12.7)
POTASSIUM SERPL-SCNC: 4.3 MMOL/L (ref 3.5–5.3)
QRS AXIS: -2 DEGREES
QRS AXIS: 11 DEGREES
QRSD INTERVAL: 74 MS
QRSD INTERVAL: 82 MS
QT INTERVAL: 318 MS
QT INTERVAL: 336 MS
QTC INTERVAL: 439 MS
QTC INTERVAL: 444 MS
RBC # BLD AUTO: 4.71 MILLION/UL (ref 3.81–5.12)
SODIUM SERPL-SCNC: 138 MMOL/L (ref 136–145)
T WAVE AXIS: -10 DEGREES
T WAVE AXIS: -50 DEGREES
VENTRICULAR RATE: 105 BPM
VENTRICULAR RATE: 115 BPM
WBC # BLD AUTO: 12.32 THOUSAND/UL (ref 4.31–10.16)

## 2021-08-26 PROCEDURE — 99232 SBSQ HOSP IP/OBS MODERATE 35: CPT | Performed by: ANESTHESIOLOGY

## 2021-08-26 PROCEDURE — 82948 REAGENT STRIP/BLOOD GLUCOSE: CPT

## 2021-08-26 PROCEDURE — 94760 N-INVAS EAR/PLS OXIMETRY 1: CPT

## 2021-08-26 PROCEDURE — 93010 ELECTROCARDIOGRAM REPORT: CPT | Performed by: INTERNAL MEDICINE

## 2021-08-26 PROCEDURE — 80048 BASIC METABOLIC PNL TOTAL CA: CPT | Performed by: INTERNAL MEDICINE

## 2021-08-26 PROCEDURE — 93306 TTE W/DOPPLER COMPLETE: CPT

## 2021-08-26 PROCEDURE — 85025 COMPLETE CBC W/AUTO DIFF WBC: CPT | Performed by: INTERNAL MEDICINE

## 2021-08-26 RX ADMIN — ENOXAPARIN SODIUM 105 MG: 120 INJECTION SUBCUTANEOUS at 20:38

## 2021-08-26 RX ADMIN — ENOXAPARIN SODIUM 105 MG: 120 INJECTION SUBCUTANEOUS at 08:25

## 2021-08-26 RX ADMIN — SODIUM CHLORIDE 1 UNITS/HR: 9 INJECTION, SOLUTION INTRAVENOUS at 08:36

## 2021-08-26 RX ADMIN — SODIUM CHLORIDE 4 UNITS/HR: 9 INJECTION, SOLUTION INTRAVENOUS at 20:25

## 2021-08-26 RX ADMIN — ATORVASTATIN CALCIUM 40 MG: 40 TABLET, FILM COATED ORAL at 21:00

## 2021-08-26 RX ADMIN — METOPROLOL TARTRATE 50 MG: 50 TABLET, FILM COATED ORAL at 20:38

## 2021-08-26 RX ADMIN — DEXAMETHASONE SODIUM PHOSPHATE 11 MG: 10 INJECTION, SOLUTION INTRAMUSCULAR; INTRAVENOUS at 00:19

## 2021-08-26 RX ADMIN — DEXAMETHASONE SODIUM PHOSPHATE 11 MG: 10 INJECTION, SOLUTION INTRAMUSCULAR; INTRAVENOUS at 12:23

## 2021-08-26 RX ADMIN — METOPROLOL TARTRATE 50 MG: 50 TABLET, FILM COATED ORAL at 08:26

## 2021-08-26 RX ADMIN — LIDOCAINE 5% 1 PATCH: 700 PATCH TOPICAL at 08:26

## 2021-08-26 RX ADMIN — MAGNESIUM OXIDE TAB 400 MG (241.3 MG ELEMENTAL MG) 800 MG: 400 (241.3 MG) TAB at 08:25

## 2021-08-26 RX ADMIN — PANTOPRAZOLE SODIUM 20 MG: 20 TABLET, DELAYED RELEASE ORAL at 06:21

## 2021-08-26 NOTE — PROGRESS NOTES
[address]  Progress Note Henry Desai 1960, 57 y o  female MRN: [id number]  Unit/Bed#: ICU 05 Encounter: [id number]  Primary Care Provider: Hipolito Blanton MD   Date and time admitted to hospital: 8/14/2021  1:01 AM    * Acute respiratory failure with hypoxia due to COVID 19 pneumonia  Assessment & Plan  · AEB hypoxia, SpO2 85% in ED, tachypnea with RR 25 and HERNANDEZ necessitating placement of midflow O2 to 10L  [place] +in ED 8/13  Symptom onset Sunday 8/8 with fatigue, poor appetite, cold sweats  Sick contact w/significant other who was COVID + 2 weeks prior  Developed diarrhea 8/10, cough and dyspnea with coughing fits  · CT C/A/P with bibasilar pulmonary groundglass infiltrates consistent with Covid pneumonia  · Admission labs include Ferritin 608, CRP 6 3, D dimer 0 97 and procal 0 09  All trending down  · CXR 24/8 stable  Plan  · Antibiotics discontinued as procal neg x2   · Atorvastatin 40mg qHS  · Dexamethasone escalated to 0 1mg/kg BID on 8/19, continue through 8/28  · Monitor fever curve, CBC  · Not a candidate for convalescent plasma as symptom onset -6D  · Remdesivir per protocol, completed   · on therapeutic lovenox due to improved renal function   · Currently  mid flown 8L sat 93% over 24 hours  · Encouraged self proning, however patient refusing   · Pulmonary hygiene         Atrial fibrillation ([address] )  Assessment & Plan  -new onset over night  -given metoprolol 50 bid  -on telemetry once, now on  NSR     BMI 39 0-39 9,adult  Assessment & Plan  · Nutrition and exercise counseling when appropriate  · Habitus likely contributing to clinical picture      Pain in both feet  Assessment & Plan  · History of R  Foot pain s/p surgery x2  Unable to locate surgical documents in care everywhere  · Takes Tramadol 50mg PO TID PRN for foot pain   Will continue for now and consider d/c if oxygenation status declines    Gastroesophageal reflux disease without esophagitis  Assessment & Plan  · Takes esomeprazole  · Replaced with Protonix 20mg PO daily      Type 2 diabetes mellitus without complication, without long-term current use of insulin [facility])  Assessment & Plan  Lab Results   Component Value Date    HGBA1C 7 8 (H) 05/11/2021       Recent Labs     08/25/21  0005 08/25/21  0146 08/25/21  0416 08/25/21  0530   POCGLU 155* 84 124 125       Blood Sugar Average: Last 72 hrs:  · (P) 597 [id number]   · Has tried to control BG with diet in past  · Persistent hyperglycemia while on steroids  · Continue insulin GTT due to difficulty controlling BS while on steroids      Mixed hyperlipidemia  Assessment & Plan  · Replaced home simvastatin with atorvastatin per COVID protocol    Essential hypertension  Assessment & Plan  · History of HTN on losartan/dyazide  On hold  · Cardiac monitoring/VS per protocol  · Consider PRN antihypertensives if SBP persistently >180  Currently 110-120's  · Restart home metoprolol increased to  50 bid            ----------------------------------------------------------------------------------------  HPI/24hr events: None  Patient appropriate for transfer out of the ICU today?: Patient meets criteria for referral to the ICU Follow-up Clinic; referral has been made  Disposition: Transfer to Med-Surg   Code Status: Level 1 - Full Code  ---------------------------------------------------------------------------------------  SUBJECTIVE  No acute complains  She feels at baseline  Review of Systems   Constitutional: Negative for fatigue and fever  HENT: Negative for congestion  Eyes: Negative for visual disturbance  Respiratory: Positive for cough and shortness of breath  Negative for wheezing  Cardiovascular: Negative for chest pain, palpitations and leg swelling  Gastrointestinal: Negative for blood in stool, constipation, diarrhea, nausea and vomiting  Endocrine: Negative for polyuria  Genitourinary: Negative for urgency     Musculoskeletal: Positive for back pain  Negative for arthralgias  Skin: Negative for color change  Neurological: Negative for light-headedness and headaches  Psychiatric/Behavioral: Negative for confusion  Review of systems was reviewed and negative unless stated above in HPI/24-hour events   ---------------------------------------------------------------------------------------  OBJECTIVE    Vitals   Vitals:    21 0000 21 0200 21 0427 21 0600   BP: 109/63 120/84     Pulse: 94 89     Resp: 18 20     Temp: 99 °F (37 2 °C)      TempSrc: Temporal      SpO2: 92% 92% (!) 89%    Weight:    109 kg (240 lb 15 4 oz)   Height:         Temp (24hrs), Av 9 °F (36 6 °C), Min:97 3 °F (36 3 °C), Max:99 °F (37 2 °C)  Current: Temperature: 99 °F (37 2 °C)          Respiratory:  SpO2: SpO2: (!) 89 %    Nasal Cannula O2 Flow Rate (L/min): 9 L/min    Invasive/non-invasive ventilation settings   Respiratory    Lab Data (Last 4 hours)    None         O2/Vent Data (Last 4 hours)      427          Non-Invasive Ventilation Mode MFNC (Mid flow)                   Physical Exam  Constitutional:       General: She is not in acute distress  Appearance: She is obese  HENT:      Head: Normocephalic and atraumatic  Eyes:      Pupils: Pupils are equal, round, and reactive to light  Cardiovascular:      Rate and Rhythm: Regular rhythm  Tachycardia present  Pulses: Normal pulses  Heart sounds: Normal heart sounds  No murmur heard  Pulmonary:      Effort: Pulmonary effort is normal  No respiratory distress  Breath sounds: Normal breath sounds  Abdominal:      General: Abdomen is flat  Bowel sounds are normal  There is no distension  Palpations: Abdomen is soft  Musculoskeletal:         General: Normal range of motion  Cervical back: Normal range of motion  Skin:     General: Skin is warm  Capillary Refill: Capillary refill takes less than 2 seconds     Neurological:      General: No focal deficit present  Mental Status: She is alert and oriented to person, place, and time  Mental status is at baseline  Psychiatric:         Mood and Affect: Mood normal          Laboratory and Diagnostics:  Results from last 7 days   Lab Units 08/24/21  0616 08/23/21  0646 08/22/21  0530 08/21/21  0555 08/19/21  0706   WBC Thousand/uL 11 64* 11 60* 10 51* 10 26* 9 33   HEMOGLOBIN g/dL 11 6 11 4* 11 8 12 4 12 3   HEMATOCRIT % 34 6* 34 4* 34 9 36 7 37 6   PLATELETS Thousands/uL 341 355 373 400* 360   NEUTROS PCT %  --   --   --  82* 83*   BANDS PCT % 1  --   --   --   --    MONOS PCT %  --   --   --  6 4   MONO PCT % 2*  --  8  --   --      Results from last 7 days   Lab Units 08/24/21  0616 08/23/21  0646 08/22/21  0530 08/21/21  0555 08/19/21  0706   SODIUM mmol/L 139 136 136 136 142   POTASSIUM mmol/L 4 8 4 6 4 6 4 9 4 8   CHLORIDE mmol/L 106 105 105 105 108   CO2 mmol/L 24 24 23 21 25   ANION GAP mmol/L 9 7 8 10 9   BUN mg/dL 26* 28* 32* 32* 27*   CREATININE mg/dL 1 09 1 25 1 18 1 15 1 38*   CALCIUM mg/dL 8 1* 7 9* 7 7* 7 6* 8 0*   GLUCOSE RANDOM mg/dL 123 233* 243* 216* 194*   ALT U/L  --   --   --  26 29   AST U/L  --   --   --  27 27   ALK PHOS U/L  --   --   --  63 71   ALBUMIN g/dL  --   --   --  2 0* 2 2*   TOTAL BILIRUBIN mg/dL  --   --   --  0 71 0 65     Results from last 7 days   Lab Units 08/24/21  0616   MAGNESIUM mg/dL 2 0                   ABG:    VBG:    Results from last 7 days   Lab Units 08/19/21  0706   PROCALCITONIN ng/ml <0 05     Intake and Output  I/O       08/24 0701 - 08/25 0700 08/25 0701 - 08/26 0700    P  O  240 200    I V  (mL/kg)  203 2 (1 9)    Total Intake(mL/kg) 240 (2 2) 403 2 (3 7)    Urine (mL/kg/hr) 1750 (0 7) 400 (0 2)    Stool 0     Total Output 1750 400    Net -1510 +3 2          Unmeasured Stool Occurrence 1 x           Height and Weights   Height: 5' 3" (160 cm)  IBW (Ideal Body Weight): 52 4 kg  Body mass index is 42 68 kg/m²    Weight (last 2 days)     Date/Time Weight    08/26/21 0600   109 (240 96)    08/25/21 0600   109 (241 18)    08/24/21 0600   110 (241 4)                Nutrition       Diet Orders   (From admission, onward)             Start     Ordered    08/20/21 1113  Dietary nutrition supplements  Once     Question Answer Comment   Select Supplement: Glucerna-Chocolate    Frequency Breakfast, Lunch, Dinner        08/20/21 1112    08/16/21 1045  Room Service  Once     Question:  Type of Service  Answer:  Room Service - Appropriate with Assistance    08/16/21 1044    08/14/21 0113  Diet Zaki/CHO Controlled; Consistent Carbohydrate Diet Level 2 (5 carb servings/75 grams CHO/meal)  Diet effective now     Question Answer Comment   Diet Type Zaki/CHO Controlled    Zaki/CHO Controlled Consistent Carbohydrate Diet Level 2 (5 carb servings/75 grams CHO/meal)    RD to adjust diet per protocol?  Yes        08/14/21 0141                  Active Medications  Scheduled Meds:  Current Facility-Administered Medications   Medication Dose Route Frequency Provider Last Rate    atorvastatin  40 mg Oral HS KenishaSHERRY SierraNP      benzonatate  100 mg Oral TID PRN Sky Cisneros PA-C      dexamethasone  0 1 mg/kg Intravenous Q12H MARCK Stover 11 mg (08/26/21 0019)    enoxaparin  1 mg/kg Subcutaneous Q12H [address] Kellen Dewitt MD      insulin regular (HumuLIN R,NovoLIN R) infusion  0 3-21 Units/hr Intravenous Titrated Kellen Dewitt MD 0 3 Units/hr (08/26/21 0622)    lidocaine  1 patch Topical Daily Kellen Dewitt MD      magnesium oxide  800 mg Oral BID Sky Cisneros PA-C      metoprolol tartrate  50 mg Oral Q12H [address] Grace Byrd MD      pantoprazole  20 mg Oral Early Morning Kenishasudha Emeryvais, SHERRYNP      Pramox-PE-Glycerin-Petrolatum   Rectal BID PRN Sky Cisneros PA-C      traMADol  50 mg Oral Q8H PRN Kenisha Emeryvais CRNP       Continuous Infusions:  insulin regular (HumuLIN R,NovoLIN R) infusion, 0 3-21 Units/hr, Last Rate: 0 3 Units/hr (08/26/21 Pricila Garcia      PRN Meds:   benzonatate, 100 mg, TID PRN  Pramox-PE-Glycerin-Petrolatum, , BID PRN  traMADol, 50 mg, Q8H PRN        Invasive Devices Review  Invasive Devices     Peripheral Intravenous Line            Long-Dwell Peripheral IV (Midline) 26/78/25 Right Basilic 4 days    Peripheral IV 08/23/21 Left Antecubital 2 days                Johnnie Moe MD      Portions of the record may have been created with voice recognition software  Occasional wrong word or "sound a like" substitutions may have occurred due to the inherent limitations of voice recognition software    Read the chart carefully and recognize, using context, where substitutions have occurred

## 2021-08-27 LAB
GLUCOSE SERPL-MCNC: 108 MG/DL (ref 65–140)
GLUCOSE SERPL-MCNC: 110 MG/DL (ref 65–140)
GLUCOSE SERPL-MCNC: 116 MG/DL (ref 65–140)
GLUCOSE SERPL-MCNC: 143 MG/DL (ref 65–140)
GLUCOSE SERPL-MCNC: 223 MG/DL (ref 65–140)
GLUCOSE SERPL-MCNC: 281 MG/DL (ref 65–140)
GLUCOSE SERPL-MCNC: 320 MG/DL (ref 65–140)
GLUCOSE SERPL-MCNC: 97 MG/DL (ref 65–140)

## 2021-08-27 PROCEDURE — 94760 N-INVAS EAR/PLS OXIMETRY 1: CPT

## 2021-08-27 PROCEDURE — 99232 SBSQ HOSP IP/OBS MODERATE 35: CPT | Performed by: FAMILY MEDICINE

## 2021-08-27 PROCEDURE — 99232 SBSQ HOSP IP/OBS MODERATE 35: CPT | Performed by: INTERNAL MEDICINE

## 2021-08-27 PROCEDURE — 82948 REAGENT STRIP/BLOOD GLUCOSE: CPT

## 2021-08-27 RX ORDER — FUROSEMIDE 10 MG/ML
20 INJECTION INTRAMUSCULAR; INTRAVENOUS ONCE
Status: COMPLETED | OUTPATIENT
Start: 2021-08-27 | End: 2021-08-27

## 2021-08-27 RX ADMIN — DEXAMETHASONE SODIUM PHOSPHATE 11 MG: 10 INJECTION, SOLUTION INTRAMUSCULAR; INTRAVENOUS at 12:05

## 2021-08-27 RX ADMIN — ENOXAPARIN SODIUM 105 MG: 120 INJECTION SUBCUTANEOUS at 08:14

## 2021-08-27 RX ADMIN — INSULIN LISPRO 5 UNITS: 100 INJECTION, SOLUTION INTRAVENOUS; SUBCUTANEOUS at 17:29

## 2021-08-27 RX ADMIN — MAGNESIUM OXIDE TAB 400 MG (241.3 MG ELEMENTAL MG) 800 MG: 400 (241.3 MG) TAB at 08:15

## 2021-08-27 RX ADMIN — INSULIN LISPRO 1 UNITS: 100 INJECTION, SOLUTION INTRAVENOUS; SUBCUTANEOUS at 12:05

## 2021-08-27 RX ADMIN — FUROSEMIDE 20 MG: 10 INJECTION, SOLUTION INTRAMUSCULAR; INTRAVENOUS at 10:32

## 2021-08-27 RX ADMIN — LIDOCAINE 5% 1 PATCH: 700 PATCH TOPICAL at 08:15

## 2021-08-27 RX ADMIN — MAGNESIUM OXIDE TAB 400 MG (241.3 MG ELEMENTAL MG) 800 MG: 400 (241.3 MG) TAB at 17:30

## 2021-08-27 RX ADMIN — ENOXAPARIN SODIUM 105 MG: 120 INJECTION SUBCUTANEOUS at 21:03

## 2021-08-27 RX ADMIN — INSULIN LISPRO 3 UNITS: 100 INJECTION, SOLUTION INTRAVENOUS; SUBCUTANEOUS at 21:08

## 2021-08-27 RX ADMIN — METOPROLOL TARTRATE 50 MG: 50 TABLET, FILM COATED ORAL at 08:15

## 2021-08-27 RX ADMIN — ATORVASTATIN CALCIUM 40 MG: 40 TABLET, FILM COATED ORAL at 21:03

## 2021-08-27 RX ADMIN — PANTOPRAZOLE SODIUM 20 MG: 20 TABLET, DELAYED RELEASE ORAL at 06:18

## 2021-08-27 RX ADMIN — DEXAMETHASONE SODIUM PHOSPHATE 11 MG: 10 INJECTION, SOLUTION INTRAMUSCULAR; INTRAVENOUS at 00:10

## 2021-08-27 NOTE — PLAN OF CARE
Problem: Potential for Falls  Goal: Patient will remain free of falls  Description: INTERVENTIONS:  - Educate patient/family on patient safety including physical limitations  - Instruct patient to call for assistance with activity   - Consult OT/PT to assist with strengthening/mobility   - Keep Call bell within reach  - Keep bed low and locked with side rails adjusted as appropriate  - Keep care items and personal belongings within reach  - Initiate and maintain comfort rounds  - Make Fall Risk Sign visible to staff  - Apply yellow socks and bracelet for high fall risk patients  - Consider moving patient to room near nurses station  Outcome: Progressing     Problem: PAIN - ADULT  Goal: Verbalizes/displays adequate comfort level or baseline comfort level  Description: Interventions:  - Encourage patient to monitor pain and request assistance  - Assess pain using appropriate pain scale  - Administer analgesics based on type and severity of pain and evaluate response  - Implement non-pharmacological measures as appropriate and evaluate response  - Consider cultural and social influences on pain and pain management  - Notify physician/advanced practitioner if interventions unsuccessful or patient reports new pain  Outcome: Progressing     Problem: INFECTION - ADULT  Goal: Absence or prevention of progression during hospitalization  Description: INTERVENTIONS:  - Assess and monitor for signs and symptoms of infection  - Monitor lab/diagnostic results  - Monitor all insertion sites, i e  indwelling lines, tubes, and drains  - [place] appropriate cooling/warming therapies per order  - Administer medications as ordered  - Instruct and encourage patient and family to use good hand hygiene technique  - Identify and instruct in appropriate isolation precautions for identified infection/condition  Outcome: Progressing     Problem: DISCHARGE PLANNING  Goal: Discharge to home or other facility with appropriate resources  Description: INTERVENTIONS:  - Identify barriers to discharge w/patient and caregiver  - Arrange for needed discharge resources and transportation as appropriate  - Identify discharge learning needs (meds, wound care, etc )  - Arrange for interpretive services to assist at discharge as needed  - Refer to Case Management Department for coordinating discharge planning if the patient needs post-hospital services based on physician/advanced practitioner order or complex needs related to functional status, cognitive ability, or social support system  Outcome: Progressing     Problem: Knowledge Deficit  Goal: Patient/family/caregiver demonstrates understanding of disease process, treatment plan, medications, and discharge instructions  Description: Complete learning assessment and assess knowledge base    Interventions:  - Provide teaching at level of understanding  - Provide teaching via preferred learning methods  Outcome: Progressing     Problem: RESPIRATORY - ADULT  Goal: Achieves optimal ventilation and oxygenation  Description: INTERVENTIONS:  - Assess for changes in respiratory status  - Assess for changes in mentation and behavior  - Position to facilitate oxygenation and minimize respiratory effort  - Oxygen administered by appropriate delivery if ordered  - Initiate smoking cessation education as indicated  - Encourage broncho-pulmonary hygiene including cough, deep breathe, Incentive Spirometry  - Assess the need for suctioning and aspirate as needed  - Assess and instruct to report SOB or any respiratory difficulty  - Respiratory Therapy support as indicated  Outcome: Progressing     Problem: MOBILITY - ADULT  Goal: Maintain or return to baseline ADL function  Description: INTERVENTIONS:  -  Assess patient's ability to carry out ADLs; assess patient's baseline for ADL function and identify physical deficits which impact ability to perform ADLs (bathing, care of mouth/teeth, toileting, grooming, dressing, etc )  - Assess/evaluate cause of self-care deficits   - Assess range of motion  - Assess patient's mobility; develop plan if impaired  - Assess patient's need for assistive devices and provide as appropriate  - Encourage maximum independence but intervene and supervise when necessary  - Involve family in performance of ADLs  - Assess for home care needs following discharge   - Consider OT consult to assist with ADL evaluation and planning for discharge  - Provide patient education as appropriate  Outcome: Progressing  Goal: Maintains/Returns to pre admission functional level  Description: INTERVENTIONS:  - Perform BMAT or MOVE assessment daily    - Set and communicate daily mobility goal to care team and patient/family/caregiver  - Collaborate with rehabilitation services on mobility goals if consulted  - Out of bed for toileting  - Record patient progress and toleration of activity level   Outcome: Progressing     Problem: Prexisting or High Potential for Compromised Skin Integrity  Goal: Skin integrity is maintained or improved  Description: INTERVENTIONS:  - Identify patients at risk for skin breakdown  - Assess and monitor skin integrity  - Assess and monitor nutrition and hydration status  - Monitor labs   - Assess for incontinence   - Turn and reposition patient  - Assist with mobility/ambulation  - Relieve pressure over bony prominences  - Avoid friction and shearing  - Provide appropriate hygiene as needed including keeping skin clean and dry  - Evaluate need for skin moisturizer/barrier cream  - Collaborate with interdisciplinary team   - Patient/family teaching  - Consider wound care consult   Outcome: Progressing     Problem: Nutrition/Hydration-ADULT  Goal: Nutrient/Hydration intake appropriate for improving, restoring or maintaining nutritional needs  Description: Monitor and assess patient's nutrition/hydration status for malnutrition   Collaborate with interdisciplinary team and initiate plan and interventions as ordered  Monitor patient's weight and dietary intake as ordered or per policy  Utilize nutrition screening tool and intervene as necessary  Determine patient's food preferences and provide high-protein, high-caloric foods as appropriate       INTERVENTIONS:  - Monitor oral intake, urinary output, labs, and treatment plans  - Assess nutrition and hydration status and recommend course of action  - Evaluate amount of meals eaten  - Assist patient with eating if necessary   - Allow adequate time for meals  - Recommend/ encourage appropriate diets, oral nutritional supplements, and vitamin/mineral supplements  - Order, calculate, and assess calorie counts as needed  - Recommend, monitor, and adjust tube feedings and TPN/PPN based on assessed needs  - Assess need for intravenous fluids  - Provide specific nutrition/hydration education as appropriate  - Include patient/family/caregiver in decisions related to nutrition  Outcome: Progressing

## 2021-08-27 NOTE — NURSING NOTE
[facility] notified this rn at [phone] of patients desating o2 levels  rn entered patients room where aid was seen assisting patient in ADLS (brushing teeth, ambulating etc)  rn educated patient to self prone upon completion of adls  Currently patient is on 10L midflow NC sating at 96  Will continue to monitor

## 2021-08-27 NOTE — PROGRESS NOTES
Progress Note - Pulmonary   Diana Butler 57 y o  female MRN: [id number]  Unit/Bed#: [address] [place] [address] Encounter: [id number]  Code Status: Level 1 - Full Code    Froylan Damon is a 57 y o  Past Medical History:   Diagnosis Date    Abrasion of right leg 3/19/2021    BMI 45 0-49 9, adult ([facility] ) 3/19/2021    Dyslipidemia     Edema of both lower legs 3/19/2021    Essential hypertension 1/23/2021    Gastroesophageal reflux disease without esophagitis 1/23/2021    GERD (gastroesophageal reflux disease)     HTN (hypertension)     Hyperlipemia     Hypertension     Hypertensive arterionephrosclerosis of transplanted kidney 1/23/2021    Hypokalemia 4/20/2021    Insomnia     Leukocytosis     Mixed hyperlipidemia 1/23/2021    Numbness     Obstructive sleep apnea syndrome 1/23/2021    Pain in ankle 1/23/2021    Pain in both feet 1/23/2021    Pain in both knees 1/26/2021    Skin lesions     Type 2 diabetes mellitus without complication, without long-term current use of insulin ([facility] ) 1/23/2021       Assessment/Plan:   69-year-old female past medical history HTN, hyperlipidemia, uncontrolled type 2 diabetes with A1c 7 8, GERD, morbid obesity BMI 43 0 / history of untreated SILVIA with COVID-19 pneumonia symptomatic as of 8/8 and ultimately diagnosed in 8/13 she has persistent hypoxemic respiratory failure continued to require 10 L nasal cannula  She is initially admitted to the ICU on 08/14 due to acute hypoxemic respiratory failure requiring 15 L nasal cannula acute renal failure with serum creatinine as high as 5 56 improved with hydration  Did attempt to use CPAP in the intensive care unit the patient refused  Currently remains on steroid therapy  Has recovered from her kidney injury and overall is -8 0 9 L to date  No prior CT PE study  Peak D-dimer was 1 21 and subsequently down trended  Current remaining on 10 L       Was previously followed by pulmonary critical care in the intensive care unit by   Muralijaniecandida  Covid Treatment Plan:    [id number] Pneumonia  -see plan as detailed below  -Has a moderate Severity Range infection /  Currently on 10 L NC   Acute Hypoxemic Respiratory Failure  -titrate for oxygen saturation 98%  -currently on 10 L cannula  -attempt to diurese today with 20 mg IV Lasix  Abnormal Imaging:  -chest x-ray shows worsening pulmonary infiltrates  -consideration for CT AP study considering did have prior elevating D-dimer on anticoagulation and renal function for occlusive for CT PE study at that time  -has been on therapeutic Lovenox to hospital admission  Positive COVID 19 Date :                    Results from last 7 days   Lab Units 08/23/21  0646   CRP mg/L 2 0     Steroid day # 9 of high-dose steroids at 0 1 mix per kg  Overall day 13 of steroids  Remdesivir:  Complete a 5 day course treatment therapy  VTE PPX:  Lovenox 1 milligram/kilogram      Screen for Convalescent Plasma:  Was out of window and did not receive  Actemra: Out of window did not receive  Morbid obesity:  Recommendations for weight loss to improve respiratory symptoms  SILVIA:    -recommend outpatient sleep study and compliance with CPAP therapy  Overall need outpatient follow-up              _________________________________________________    Subjective: Pt seen and examined at bedside  Doing well  Improving dyspnea    Oxygen desaturates sharply with any activity        Smoking history:   Social History     Tobacco Use   Smoking Status Former Smoker   Smokeless Tobacco Former User   Tobacco Comment    15 YEARS      Respiratory History:  No prior respiratory history  Oxygen Therapy:  No prior oxygen therapy  PAP Therapy:  History of SILVIA not on Pap therapy  PFT:  Not applicable  PSG:  Not applicable  Labs Reviewed:  Resolved kidney injury  Overall -8 0 9 L  Imaging Reviewed:  Chest x-ray 8/24 to 8/20 comparisons    Collaborative Discussion:  Discussed with internal medicine team Dr Liliya Sutherland Events: Vitals:   Vitals:    21 0600 21 0628 21 0815 21 0856   BP:  107/57 118/70    BP Location:  Right arm     Pulse:  75 72    Resp:  20     Temp:  97 8 °F (36 6 °C)     TempSrc:  Oral     SpO2:  92%  93%   Weight: 111 kg (245 lb 9 5 oz)      Height:         Weight (last 2 days)     Date/Time   Weight    21 0600   111 (245 59)    21 0600   109 (240 96)    21 0600   109 (241 18)            Temp (24hrs), Av 1 °F (36 7 °C), Min:97 3 °F (36 3 °C), Max:99 1 °F (37 3 °C)  Current: Temperature: 97 8 °F (36 6 °C)          IV Infusions:       Nutrition:        Diet Orders   (From admission, onward)             Start     Ordered    21 1113  Dietary nutrition supplements  Once     Question Answer Comment   Select Supplement: Glucerna-Chocolate    Frequency Breakfast, Lunch, Dinner        21 1112    21 1045  Room Service  Once     Question:  Type of Service  Answer:  Room Service - Appropriate with Assistance    21 1044    21 0113  Diet Zaki/CHO Controlled; Consistent Carbohydrate Diet Level 2 (5 carb servings/75 grams CHO/meal)  Diet effective now     Question Answer Comment   Diet Type Zaki/CHO Controlled    Zaki/CHO Controlled Consistent Carbohydrate Diet Level 2 (5 carb servings/75 grams CHO/meal)    RD to adjust diet per protocol? Yes        21 0141                Ins/Outs:   I/O       701 -  0700  07 -  0700  07 -  0700    P  O  200 240     I V  (mL/kg) 245 9 (2 3) 20 (0 2)     Total Intake(mL/kg) 445 9 (4 1) 260 (2 3)     Urine (mL/kg/hr) 650 (0 2) 590 (0 2)     Stool 0 0     Total Output 650 590     Net -204 1 -330            Unmeasured Urine Occurrence  1 x 1 x    Unmeasured Stool Occurrence 1 x 1 x           Lines/Drains:  Invasive Devices     Peripheral Intravenous Line            Long-Dwell Peripheral IV (Midline)  Right Basilic 5 days    Peripheral IV 21 Left Antecubital 4 days Active medications:  Scheduled Meds:  Current Facility-Administered Medications   Medication Dose Route Frequency Provider Last Rate    atorvastatin  40 mg Oral HS Nolan Mcclellan MD      benzonatate  100 mg Oral TID PRN Nolan Mcclellan MD      dexamethasone  0 1 mg/kg Intravenous Q12H Nolan Mcclellan MD 11 mg (08/27/21 0010)    enoxaparin  1 mg/kg Subcutaneous Q12H [address] Nolan Mcclellan MD      insulin lispro  1-5 Units Subcutaneous TID AC Nahed Revankar, DO      insulin lispro  1-5 Units Subcutaneous HS Nahed Revankar, DO      lidocaine  1 patch Topical Daily Nolan Mcclellan MD      magnesium oxide  800 mg Oral BID Nolan Mcclellan MD      metoprolol tartrate  50 mg Oral Q12H Shemar Bashir MD      pantoprazole  20 mg Oral Early Morning Nolan Mcclellan MD      Pramox-PE-Glycerin-Petrolatum   Rectal BID PRN Nolan Mcclellan MD      traMADol  50 mg Oral Q8H PRN Nolan Mcclellan MD       PRN Meds:benzonatate, 100 mg, TID PRN  Pramox-PE-Glycerin-Petrolatum, , BID PRN  traMADol, 50 mg, Q8H PRN      ____________________________________________________________________    Physical Exam  Constitutional:       Appearance: She is well-developed  She is morbidly obese  HENT:      Head: Normocephalic and atraumatic  Eyes:      Conjunctiva/sclera: Conjunctivae normal       Pupils: Pupils are equal, round, and reactive to light  Cardiovascular:      Rate and Rhythm: Normal rate and regular rhythm  Heart sounds: Normal heart sounds  Pulmonary:      Effort: Pulmonary effort is normal  No respiratory distress  Breath sounds: Normal breath sounds  No wheezing or rales  Comments: 94% on 10 L resting    Sharply dissect rates in the 70s with exertion getting to the commode  Chest:      Chest wall: No tenderness  Abdominal:      General: Bowel sounds are normal       Palpations: Abdomen is soft  Musculoskeletal:         General: Normal range of motion        Cervical back: Normal range of motion and neck supple  Skin:     General: Skin is warm and dry  Neurological:      Mental Status: She is alert and oriented to person, place, and time  ____________________________________________________________________    Invasive/non-invasive ventilation settings   Respiratory    Lab Data (Last 4 hours)    None         O2/Vent Data (Last 4 hours)    None                Laboratory and Diagnostics:  Results from last 7 days   Lab Units 08/26/21  0623 08/24/21  0616 08/23/21  0646 08/22/21  0530 08/21/21  0555   WBC Thousand/uL 12 32* 11 64* 11 60* 10 51* 10 26*   HEMOGLOBIN g/dL 13 7 11 6 11 4* 11 8 12 4   HEMATOCRIT % 40 7 34 6* 34 4* 34 9 36 7   PLATELETS Thousands/uL 299 341 355 373 400*   NEUTROS PCT % 87*  --   --   --  82*   BANDS PCT %  --  1  --   --   --    MONOS PCT % 5  --   --   --  6   MONO PCT %  --  2*  --  8  --      Results from last 7 days   Lab Units 08/26/21 0623 08/24/21  0616 08/23/21  0646 08/22/21  0530 08/21/21  0555   SODIUM mmol/L 138 139 136 136 136   POTASSIUM mmol/L 4 3 4 8 4 6 4 6 4 9   CHLORIDE mmol/L 104 106 105 105 105   CO2 mmol/L 23 24 24 23 21   ANION GAP mmol/L 11 9 7 8 10   BUN mg/dL 33* 26* 28* 32* 32*   CREATININE mg/dL 1 11 1 09 1 25 1 18 1 15   CALCIUM mg/dL 8 0* 8 1* 7 9* 7 7* 7 6*   GLUCOSE RANDOM mg/dL 89 123 233* 243* 216*   ALT U/L  --   --   --   --  26   AST U/L  --   --   --   --  27   ALK PHOS U/L  --   --   --   --  63   ALBUMIN g/dL  --   --   --   --  2 0*   TOTAL BILIRUBIN mg/dL  --   --   --   --  0 71     Results from last 7 days   Lab Units 08/24/21  0616   MAGNESIUM mg/dL 2 0                   ABG:    VBG:          Micro        Imaging:   XR chest portable ICU   Final Result by Abel Hein MD (08/24 1610)      No change in moderate left and mild right peripheral consolidation due to Covid 19 pneumonia                    Workstation performed: [id number]         XR chest portable ICU   Final Result by Rosalinda Chacon MD (08/23 3930)      Stable Covid-19 pneumonia with bilateral peripheral airspace opacities  Workstation performed: [id number]         XR chest portable ICU   Final Result by Ayesha Harada, MD (08/18 1706)      Peripheral patchy opacities, in both lower lobes  In the setting of clinically suspected/proven COVID-19, this plain film appearance while nonspecific, can be seen in cases of viral pneumonia such as COVID-19  The findings have progressed since 8/13/2021  Workstation performed: [id number]             Micro:   Lab Results   Component Value Date    BLOODCX No Growth After 5 Days  08/13/2021    BLOODCX No Growth After 5 Days   08/13/2021    MRSACULTURE  08/14/2021     No Methicillin Resistant Staphlyococcus aureus (MRSA) isolated            Invalid input(s): Lenore Smallwood

## 2021-08-27 NOTE — ASSESSMENT & PLAN NOTE
Initially hypoxia, acute chronic  COVID positive  Symptom onset 8/8  Significant other was COVID (+) 2 weeks prior  On imaging, noted to have findings consistent with COVID pneumonia  CRP initially elevated but has normalized  Procalcitonin negative  Last D-dimer 1 19  Continue statin  Patient on Decadron 0 1 mg/kg IV Q 12 hrs  Not a candidate for convalescent plasma  Completed remdesivir    Continue therapeutic Lovenox  Currently on 10 liters mid flow  20 milligrams of IV Lasix x1 given today

## 2021-08-27 NOTE — PROGRESS NOTES
Bill [address]  Progress Note Donaldo Garcia 1960, 57 y o  female MRN: [id number]  Unit/Bed#: [address] [place] [address] Encounter: [id number]  Primary Care Provider: Kelly Caicedo MD   Date and time admitted to hospital: 8/14/2021  1:01 AM    * Acute respiratory failure with hypoxia due to COVID 19 pneumonia  Assessment & Plan  Initially hypoxia, acute chronic  COVID positive  Symptom onset 8/8  Significant other was COVID (+) 2 weeks prior  On imaging, noted to have findings consistent with COVID pneumonia  CRP initially elevated but has normalized  Procalcitonin negative  Last D-dimer 1 19  Continue statin  Patient on Decadron 0 1 mg/kg IV Q 12 hrs  Not a candidate for convalescent plasma  Completed remdesivir  Continue therapeutic Lovenox  Currently on 10 liters mid flow  20 milligrams of IV Lasix x1 given today    Atrial fibrillation [facility])  Assessment & Plan  New onset AFib  Remains in AFib on tele  Continue Lopressor  On Therapeutic Lovenox  Echo results pending    Type 2 diabetes mellitus without complication, without long-term current use of insulin [facility])  Assessment & Plan  Lab Results   Component Value Date    HGBA1C 7 8 (H) 05/11/2021       Recent Labs     08/27/21  0345 08/27/21  0557 08/27/21  0728 08/27/21  1125   POCGLU 143* 116 97 223*     Patient had persistent hyperglycemia while on steroids and was started on insulin drip which has been discontinued today  Patient placed on Accu-Cheks with sliding scale insulin  Check A1c    BMI 40 0-44 9, adult (HCC)  Assessment & Plan  Body mass index is 43 5 kg/m²  Pain in both feet  Assessment & Plan  Continue tramadol p r n      Gastroesophageal reflux disease without esophagitis  Assessment & Plan  On Protonix here    Mixed hyperlipidemia  Assessment & Plan  Continue statin    Essential hypertension  Assessment & Plan  Continue Lopressor           VTE Pharmacologic Prophylaxis: VTE Score: 4 Moderate Risk (Score 3-4) - Pharmacological DVT Prophylaxis Ordered: enoxaparin (Lovenox)  Patient Centered Rounds: I performed bedside rounds with nursing staff today  Discussions with Specialists or Other Care Team Provider: yes - pulm    Education and Discussions with Family / Patient: Patient declined call to   Time Spent for Care: 30 minutes  More than 50% of total time spent on counseling and coordination of care as described above  Current Length of Stay: 13 day(s)  Current Patient Status: Inpatient   Certification Statement: The patient will continue to require additional inpatient hospital stay due to Respiratory failure with hypoxia due to COVID infection    Code Status: Level 1 - Full Code    Subjective:   Reports slow improvement daily  Still with shortness of breath with minimal exertion    Objective:     Vitals:   Temp (24hrs), Av °F (36 7 °C), Min:97 7 °F (36 5 °C), Max:98 6 °F (37 °C)    Temp:  [97 7 °F (36 5 °C)-98 6 °F (37 °C)] 97 8 °F (36 6 °C)  HR:  [] 72  Resp:  [20-21] 20  BP: (107-130)/(57-70) 118/70  SpO2:  [90 %-95 %] 92 %  Body mass index is 43 5 kg/m²  Input and Output Summary (last 24 hours): Intake/Output Summary (Last 24 hours) at 2021 1522  Last data filed at 2021 1205  Gross per 24 hour   Intake 260 ml   Output 1040 ml   Net -780 ml       Physical Exam:   Physical Exam  Vitals reviewed  Constitutional:       General: She is not in acute distress  Appearance: She is well-developed  HENT:      Head: Normocephalic and atraumatic  Eyes:      Extraocular Movements: Extraocular movements intact  Cardiovascular:      Rate and Rhythm: Normal rate  Rhythm irregular  Pulmonary:      Effort: Pulmonary effort is normal  No respiratory distress  Breath sounds: No wheezing or rales  Comments: Decreased breath sounds bilaterally  Abdominal:      General: Bowel sounds are normal  There is no distension  Palpations: Abdomen is soft  Tenderness: There is no abdominal tenderness  Musculoskeletal:      Right lower leg: No edema  Left lower leg: No edema  Neurological:      Mental Status: She is alert and oriented to person, place, and time            Additional Data:     Labs:  Results from last 7 days   Lab Units 08/26/21  0623 08/24/21  0616   WBC Thousand/uL 12 32* 11 64*   HEMOGLOBIN g/dL 13 7 11 6   HEMATOCRIT % 40 7 34 6*   PLATELETS Thousands/uL 299 341   BANDS PCT %  --  1   NEUTROS PCT % 87*  --    LYMPHS PCT % 6*  --    LYMPHO PCT %  --  9*   MONOS PCT % 5  --    MONO PCT %  --  2*   EOS PCT % 0 0     Results from last 7 days   Lab Units 08/26/21  0623 08/21/21  0555   SODIUM mmol/L 138 136   POTASSIUM mmol/L 4 3 4 9   CHLORIDE mmol/L 104 105   CO2 mmol/L 23 21   BUN mg/dL 33* 32*   CREATININE mg/dL 1 11 1 15   ANION GAP mmol/L 11 10   CALCIUM mg/dL 8 0* 7 6*   ALBUMIN g/dL  --  2 0*   TOTAL BILIRUBIN mg/dL  --  0 71   ALK PHOS U/L  --  63   ALT U/L  --  26   AST U/L  --  27   GLUCOSE RANDOM mg/dL 89 216*         Results from last 7 days   Lab Units 08/27/21  1125 08/27/21  0728 08/27/21  0557 08/27/21  0345 08/27/21  0150 08/27/21  0005 08/26/21  2216 08/26/21  2020 08/26/21  1800 08/26/21  1555 08/26/21  1414 08/26/21  1214   POC GLUCOSE mg/dl 223* 97 116 143* 108 110 112 152* 234* 199* 116 208*               Lines/Drains:  Invasive Devices     Peripheral Intravenous Line            Long-Dwell Peripheral IV (Midline) 64/25/52 Right Basilic 5 days    Peripheral IV 08/23/21 Left Antecubital 4 days          Drain            External Urinary Catheter <1 day                  Telemetry:  Telemetry Orders (From admission, onward)             48 Hour Telemetry Monitoring  Continuous x 48 hours     Question:  Reason for 48 Hour Telemetry  Answer:  Arrhythmias Requiring Medical Therapy (eg  SVT, Vtach/fib, Bradycardia, Uncontrolled A-fib)                 Telemetry Reviewed:  Atrial fibrillation  Indication for Continued Telemetry Use: Arrthymias requiring medical therapy           Imaging: Reviewed radiology reports from this admission including: chest xray    Recent Cultures (last 7 days):         Last 24 Hours Medication List:   Current Facility-Administered Medications   Medication Dose Route Frequency Provider Last Rate    atorvastatin  40 mg Oral HS Odalis Miller MD      benzonatate  100 mg Oral TID PRN Odalis Miller MD      dexamethasone  0 1 mg/kg Intravenous Q12H Odalis Miller MD 11 mg (08/27/21 1205)    enoxaparin  1 mg/kg Subcutaneous Q12H [address] Odalis Miller MD      insulin lispro  1-5 Units Subcutaneous HS Nahed Rea DO      insulin lispro  1-6 Units Subcutaneous TID AC Nahed Rea DO      lidocaine  1 patch Topical Daily Odalis Miller MD      magnesium oxide  800 mg Oral BID Odalis Miller MD      metoprolol tartrate  50 mg Oral Q12H Kalyan Torres MD      pantoprazole  20 mg Oral Early Morning Odalis Miller MD      Pramox-PE-Glycerin-Petrolatum   Rectal BID PRN Odalis Miller MD      traMADol  50 mg Oral Q8H PRN Odalis Miller MD          Today, Patient Was Seen By: Aram Rodrigues DO    **Please Note: This note may have been constructed using a voice recognition system  **

## 2021-08-27 NOTE — ASSESSMENT & PLAN NOTE
New onset AFib  Remains in AFib on tele  Continue Lopressor    On Therapeutic Lovenox  Echo results pending

## 2021-08-27 NOTE — ASSESSMENT & PLAN NOTE
Lab Results   Component Value Date    HGBA1C 7 8 (H) 05/11/2021       Recent Labs     08/27/21  0345 08/27/21  0557 08/27/21  0728 08/27/21  1125   POCGLU 143* 116 97 223*     Patient had persistent hyperglycemia while on steroids and was started on insulin drip which has been discontinued today  Patient placed on Accu-Cheks with sliding scale insulin  Check A1c

## 2021-08-28 LAB
ANION GAP SERPL CALCULATED.3IONS-SCNC: 7 MMOL/L (ref 4–13)
BUN SERPL-MCNC: 36 MG/DL (ref 5–25)
CALCIUM SERPL-MCNC: 7.6 MG/DL (ref 8.3–10.1)
CHLORIDE SERPL-SCNC: 103 MMOL/L (ref 100–108)
CO2 SERPL-SCNC: 25 MMOL/L (ref 21–32)
CREAT SERPL-MCNC: 1.15 MG/DL (ref 0.6–1.3)
D DIMER PPP FEU-MCNC: <0.27 UG/ML FEU
EST. AVERAGE GLUCOSE BLD GHB EST-MCNC: 183 MG/DL
GFR SERPL CREATININE-BSD FRML MDRD: 51 ML/MIN/1.73SQ M
GLUCOSE SERPL-MCNC: 236 MG/DL (ref 65–140)
GLUCOSE SERPL-MCNC: 241 MG/DL (ref 65–140)
GLUCOSE SERPL-MCNC: 315 MG/DL (ref 65–140)
GLUCOSE SERPL-MCNC: 332 MG/DL (ref 65–140)
GLUCOSE SERPL-MCNC: 340 MG/DL (ref 65–140)
HBA1C MFR BLD: 8 %
MAGNESIUM SERPL-MCNC: 1.8 MG/DL (ref 1.6–2.6)
POTASSIUM SERPL-SCNC: 4.5 MMOL/L (ref 3.5–5.3)
SODIUM SERPL-SCNC: 135 MMOL/L (ref 136–145)

## 2021-08-28 PROCEDURE — 85379 FIBRIN DEGRADATION QUANT: CPT | Performed by: FAMILY MEDICINE

## 2021-08-28 PROCEDURE — 80048 BASIC METABOLIC PNL TOTAL CA: CPT | Performed by: FAMILY MEDICINE

## 2021-08-28 PROCEDURE — 99232 SBSQ HOSP IP/OBS MODERATE 35: CPT | Performed by: FAMILY MEDICINE

## 2021-08-28 PROCEDURE — 97110 THERAPEUTIC EXERCISES: CPT

## 2021-08-28 PROCEDURE — 99232 SBSQ HOSP IP/OBS MODERATE 35: CPT | Performed by: NURSE PRACTITIONER

## 2021-08-28 PROCEDURE — 94760 N-INVAS EAR/PLS OXIMETRY 1: CPT

## 2021-08-28 PROCEDURE — 97530 THERAPEUTIC ACTIVITIES: CPT

## 2021-08-28 PROCEDURE — 82948 REAGENT STRIP/BLOOD GLUCOSE: CPT

## 2021-08-28 PROCEDURE — 83036 HEMOGLOBIN GLYCOSYLATED A1C: CPT | Performed by: FAMILY MEDICINE

## 2021-08-28 PROCEDURE — 83735 ASSAY OF MAGNESIUM: CPT | Performed by: FAMILY MEDICINE

## 2021-08-28 RX ADMIN — DEXAMETHASONE SODIUM PHOSPHATE 11 MG: 10 INJECTION, SOLUTION INTRAMUSCULAR; INTRAVENOUS at 01:00

## 2021-08-28 RX ADMIN — INSULIN LISPRO 5 UNITS: 100 INJECTION, SOLUTION INTRAVENOUS; SUBCUTANEOUS at 11:50

## 2021-08-28 RX ADMIN — INSULIN LISPRO 5 UNITS: 100 INJECTION, SOLUTION INTRAVENOUS; SUBCUTANEOUS at 17:32

## 2021-08-28 RX ADMIN — MAGNESIUM OXIDE TAB 400 MG (241.3 MG ELEMENTAL MG) 800 MG: 400 (241.3 MG) TAB at 17:32

## 2021-08-28 RX ADMIN — METOPROLOL TARTRATE 50 MG: 50 TABLET, FILM COATED ORAL at 20:43

## 2021-08-28 RX ADMIN — METOPROLOL TARTRATE 50 MG: 50 TABLET, FILM COATED ORAL at 08:41

## 2021-08-28 RX ADMIN — DEXAMETHASONE SODIUM PHOSPHATE 11 MG: 10 INJECTION, SOLUTION INTRAMUSCULAR; INTRAVENOUS at 11:50

## 2021-08-28 RX ADMIN — LIDOCAINE 5% 1 PATCH: 700 PATCH TOPICAL at 08:41

## 2021-08-28 RX ADMIN — MAGNESIUM OXIDE TAB 400 MG (241.3 MG ELEMENTAL MG) 800 MG: 400 (241.3 MG) TAB at 08:41

## 2021-08-28 RX ADMIN — PANTOPRAZOLE SODIUM 20 MG: 20 TABLET, DELAYED RELEASE ORAL at 06:29

## 2021-08-28 RX ADMIN — ATORVASTATIN CALCIUM 40 MG: 40 TABLET, FILM COATED ORAL at 22:11

## 2021-08-28 RX ADMIN — ENOXAPARIN SODIUM 105 MG: 120 INJECTION SUBCUTANEOUS at 20:43

## 2021-08-28 RX ADMIN — INSULIN LISPRO 3 UNITS: 100 INJECTION, SOLUTION INTRAVENOUS; SUBCUTANEOUS at 07:34

## 2021-08-28 RX ADMIN — ENOXAPARIN SODIUM 105 MG: 120 INJECTION SUBCUTANEOUS at 08:41

## 2021-08-28 RX ADMIN — INSULIN LISPRO 4 UNITS: 100 INJECTION, SOLUTION INTRAVENOUS; SUBCUTANEOUS at 22:10

## 2021-08-28 NOTE — PHYSICAL THERAPY NOTE
PT TREATMENT     08/28/21 1040   Note Type   Note Type Treatment   Pain Assessment   Pain Assessment Tool Pain Assessment not indicated - pt denies pain   Restrictions/Precautions   Other Precautions O2  (Sp02 drops with activity )   General   Chart Reviewed Yes   Cognition   Overall Cognitive Status WFL   Subjective   Subjective "feeling better"   Transfers   Sit to Stand 5  Supervision   Stand to Sit 5  Supervision   Toilet transfer 5  Supervision   Additional items   (dependent to wipe)   Balance   Static Sitting Good   Dynamic Sitting Fair +   Static Standing Fair +   Dynamic Standing Fair   Ambulatory Fair   Endurance Deficit   Endurance Deficit   (Sp02 90% at rest, as low as 85% with activity  8-10L 02 with)   Activity Tolerance   Activity Tolerance Patient tolerated treatment well   Exercises   Neuro re-ed x 10 each ankle pumps, LAQ, marching, shoulder elevation/lowering paired with inhale/exhale  Sit to stand x 2 trials, 1x 20 seconds, 1x 40 seconds  Extended rest breaks between each activity  Assessment   Prognosis Good   Problem List Decreased endurance;Decreased strength;Decreased mobility; Impaired balance;Obesity   Assessment Pt demonstrates slowly improving activity tolerance  Activity is limited by low SP02  The patient's [facility] Mobility Inpatient Short Form Raw Score is 16, Standardized Score is 38 32  A standardized score less than 42 9 suggests the patient may benefit from discharge to [facility], however anticipate pt's function will improve prior to DC so recommend home PT  Plan   Treatment/Interventions ADL retraining;LE strengthening/ROM; Functional transfer training;Elevations; Therapeutic exercise; Endurance training;Gait training;Equipment eval/education;Patient/family training   Progress Progressing toward goals   PT Frequency 5x/wk   Recommendation   PT Discharge Recommendation Home with home health rehabilitation   Equipment Recommended   (walker trial)   AM-PAC Basic Mobility Inpatient   Turning in Bed Without Bedrails 4   Lying on Back to Sitting on Edge of Flat Bed 4   Moving Bed to Chair 3   Standing Up From Chair 3   Walk in Room 1   Climb 3-5 Stairs 1   Basic Mobility Inpatient Raw Score 16   Basic Mobility Standardized Score 28 96   Licensure   [place] License Number  [employer]  [id number]

## 2021-08-28 NOTE — ASSESSMENT & PLAN NOTE
Initially hypoxic, tachypneic  COVID positive  Symptom onset 8/8  Significant other was COVID (+) 2 weeks prior  On imaging, noted to have findings consistent with COVID pneumonia  CRP initially elevated but has normalized  Procalcitonin negative  D-dimer less than 0 27  Continue statin  Completed course of Decadron 0 1 mg/kg IV Q 12 hrs today  Not a candidate for convalescent plasma  Completed remdesivir    Continue therapeutic Lovenox  Currently on 7 liters mid flow  20 milligrams of IV Lasix x1 given 8/27

## 2021-08-28 NOTE — PLAN OF CARE
Problem: Potential for Falls  Goal: Patient will remain free of falls  Description: INTERVENTIONS:  - Educate patient/family on patient safety including physical limitations  - Instruct patient to call for assistance with activity   - Consult OT/PT to assist with strengthening/mobility   - Keep Call bell within reach  - Keep bed low and locked with side rails adjusted as appropriate  - Keep care items and personal belongings within reach  - Initiate and maintain comfort rounds  - Make Fall Risk Sign visible to staff  - Offer Toileting every 2 Hours, in advance of need  - Initiate/Maintain bed alarm  - Obtain necessary fall risk management equipment:   - Apply yellow socks and bracelet for high fall risk patients  - Consider moving patient to room near nurses station  Outcome: Progressing     Problem: PAIN - ADULT  Goal: Verbalizes/displays adequate comfort level or baseline comfort level  Description: Interventions:  - Encourage patient to monitor pain and request assistance  - Assess pain using appropriate pain scale  - Administer analgesics based on type and severity of pain and evaluate response  - Implement non-pharmacological measures as appropriate and evaluate response  - Consider cultural and social influences on pain and pain management  - Notify physician/advanced practitioner if interventions unsuccessful or patient reports new pain  Outcome: Progressing     Problem: INFECTION - ADULT  Goal: Absence or prevention of progression during hospitalization  Description: INTERVENTIONS:  - Assess and monitor for signs and symptoms of infection  - Monitor lab/diagnostic results  - Monitor all insertion sites, i e  indwelling lines, tubes, and drains  - [place] appropriate cooling/warming therapies per order  - Administer medications as ordered  - Instruct and encourage patient and family to use good hand hygiene technique  - Identify and instruct in appropriate isolation precautions for identified infection/condition  Outcome: Progressing     Problem: DISCHARGE PLANNING  Goal: Discharge to home or other facility with appropriate resources  Description: INTERVENTIONS:  - Identify barriers to discharge w/patient and caregiver  - Arrange for needed discharge resources and transportation as appropriate  - Identify discharge learning needs (meds, wound care, etc )  - Arrange for interpretive services to assist at discharge as needed  - Refer to Case Management Department for coordinating discharge planning if the patient needs post-hospital services based on physician/advanced practitioner order or complex needs related to functional status, cognitive ability, or social support system  Outcome: Progressing     Problem: Knowledge Deficit  Goal: Patient/family/caregiver demonstrates understanding of disease process, treatment plan, medications, and discharge instructions  Description: Complete learning assessment and assess knowledge base    Interventions:  - Provide teaching at level of understanding  - Provide teaching via preferred learning methods  Outcome: Progressing     Problem: RESPIRATORY - ADULT  Goal: Achieves optimal ventilation and oxygenation  Description: INTERVENTIONS:  - Assess for changes in respiratory status  - Assess for changes in mentation and behavior  - Position to facilitate oxygenation and minimize respiratory effort  - Oxygen administered by appropriate delivery if ordered  - Initiate smoking cessation education as indicated  - Encourage broncho-pulmonary hygiene including cough, deep breathe, Incentive Spirometry  - Assess the need for suctioning and aspirate as needed  - Assess and instruct to report SOB or any respiratory difficulty  - Respiratory Therapy support as indicated  Outcome: Progressing     Problem: MOBILITY - ADULT  Goal: Maintain or return to baseline ADL function  Description: INTERVENTIONS:  -  Assess patient's ability to carry out ADLs; assess patient's baseline for ADL function and identify physical deficits which impact ability to perform ADLs (bathing, care of mouth/teeth, toileting, grooming, dressing, etc )  - Assess/evaluate cause of self-care deficits   - Assess range of motion  - Assess patient's mobility; develop plan if impaired  - Assess patient's need for assistive devices and provide as appropriate  - Encourage maximum independence but intervene and supervise when necessary  - Involve family in performance of ADLs  - Assess for home care needs following discharge   - Consider OT consult to assist with ADL evaluation and planning for discharge  - Provide patient education as appropriate  Outcome: Progressing  Goal: Maintains/Returns to pre admission functional level  Description: INTERVENTIONS:  - Perform BMAT or MOVE assessment daily    - Set and communicate daily mobility goal to care team and patient/family/caregiver  - Collaborate with rehabilitation services on mobility goals if consulted  - Perform Range of Motion 3  times a day    - Out of bed to chair 3 times a day   - Out of bed for meals 3 times a day  - Record patient progress and toleration of activity level   Outcome: Progressing     Problem: Prexisting or High Potential for Compromised Skin Integrity  Goal: Skin integrity is maintained or improved  Description: INTERVENTIONS:  - Identify patients at risk for skin breakdown  - Assess and monitor skin integrity  - Assess and monitor nutrition and hydration status  - Monitor labs   - Assess for incontinence   - Turn and reposition patient  - Assist with mobility/ambulation  - Relieve pressure over bony prominences  - Avoid friction and shearing  - Provide appropriate hygiene as needed including keeping skin clean and dry  - Evaluate need for skin moisturizer/barrier cream  - Collaborate with interdisciplinary team   - Patient/family teaching  - Consider wound care consult   Outcome: Progressing     Problem: Nutrition/Hydration-ADULT  Goal: Nutrient/Hydration intake appropriate for improving, restoring or maintaining nutritional needs  Description: Monitor and assess patient's nutrition/hydration status for malnutrition  Collaborate with interdisciplinary team and initiate plan and interventions as ordered  Monitor patient's weight and dietary intake as ordered or per policy  Utilize nutrition screening tool and intervene as necessary  Determine patient's food preferences and provide high-protein, high-caloric foods as appropriate       INTERVENTIONS:  - Monitor oral intake, urinary output, labs, and treatment plans  - Assess nutrition and hydration status and recommend course of action  - Evaluate amount of meals eaten  - Assist patient with eating if necessary   - Allow adequate time for meals  - Recommend/ encourage appropriate diets, oral nutritional supplements, and vitamin/mineral supplements  - Order, calculate, and assess calorie counts as needed  - Recommend, monitor, and adjust tube feedings and TPN/PPN based on assessed needs  - Assess need for intravenous fluids  - Provide specific nutrition/hydration education as appropriate  - Include patient/family/caregiver in decisions related to nutrition  Outcome: Progressing

## 2021-08-28 NOTE — PROGRESS NOTES
Progress Note - Pulmonary   Eric Ogden 57 y o  female MRN: [id number]  Unit/Bed#: [address] [place] [address] Encounter: [id number]    Assessment/Plan:    Acute hypoxic respiratory failure due to abnormal chest x-ray consistent with COVID-19 pneumonia   Titrate oxygen to maintain saturations greater than or equal to 89%  Continue COVID-19 pathway as below  Positive Test:  08/13/2021   Continue atorvastatin   Continue famotidine   Dexamethasone:  Completing Decadron course as previously ordered   Remdesivir:  Completed five day course   IL-6 therapy:  Did not receive   Anticoagulation/DVT prophylaxis:  On therapeutic Lovenox per primary team    Monitor CRP   Activity, self-proning, and incentive spirometry as able  Mild volume overload   Received 20 milligrams IV Lasix yesterday  Further diuresis per primary team    Monitor I/O and daily weights  Morbid obesity with suspected SILVIA   Lifestyle modifications  Reportedly refused CPAP in the ICU  May benefit from outpatient sleep study pending course  Outpatient follow-up pending course  Will see again 08/30/2021  Please call with questions or concerns in the interim  Chief Complaint:    I feel better      Subjective:    Jeannette Bautista is sitting in bed eating breakfast   She reports she is feeling better  She has occasional cough, but no hemoptysis  No chest pain  Swelling is improved  She is diuresing  No nausea, vomiting, or diarrhea  Objective:    Vitals: Blood pressure 127/69, pulse 73, temperature 98 4 °F (36 9 °C), resp  rate 22, height 5' 3" (1 6 m), weight 111 kg (244 lb 11 4 oz), SpO2 91 %  8L NC,Body mass index is 43 35 kg/m²        Intake/Output Summary (Last 24 hours) at 8/28/2021 0846  Last data filed at 8/28/2021 0630  Gross per 24 hour   Intake 630 ml   Output 1900 ml   Net -1270 ml       Invasive Devices     Peripheral Intravenous Line            Long-Dwell Peripheral IV (Midline) 99/92/42 Right Basilic 6 days    Peripheral IV 08/23/21 Left Antecubital 5 days          Drain            External Urinary Catheter <1 day                Physical Exam:     Physical Exam  Vitals reviewed  Constitutional:       General: She is not in acute distress  Appearance: She is well-developed  She is not toxic-appearing or diaphoretic  Interventions: Nasal cannula in place  HENT:      Head: Normocephalic and atraumatic  Eyes:      General: No scleral icterus  Neck:      Trachea: No tracheal deviation  Cardiovascular:      Rate and Rhythm: Normal rate and regular rhythm  Heart sounds: No murmur heard  No friction rub  No gallop  Pulmonary:      Effort: Pulmonary effort is normal  No tachypnea, accessory muscle usage or respiratory distress  Breath sounds: Normal breath sounds  No stridor  Chest:      Chest wall: No tenderness  Abdominal:      General: There is no distension  Musculoskeletal:      Cervical back: Neck supple  Comments: Trace lower extremity edema  Skin:     General: Skin is warm and dry  Findings: No rash  Neurological:      Mental Status: She is alert and oriented to person, place, and time  GCS: GCS eye subscore is 4  GCS verbal subscore is 5  GCS motor subscore is 6  Psychiatric:         Speech: Speech normal          Behavior: Behavior normal  Behavior is cooperative         Labs:   CMP:   Lab Results   Component Value Date    SODIUM 135 (L) 08/28/2021    K 4 5 08/28/2021     08/28/2021    CO2 25 08/28/2021    BUN 36 (H) 08/28/2021    CREATININE 1 15 08/28/2021    CALCIUM 7 6 (L) 08/28/2021    EGFR 51 08/28/2021     < 0 27    Imaging and other studies: None today

## 2021-08-28 NOTE — ASSESSMENT & PLAN NOTE
Lab Results   Component Value Date    HGBA1C 8 0 (H) 08/28/2021       Recent Labs     08/27/21  1658 08/27/21  2108 08/28/21  0732 08/28/21  1053   POCGLU 320* 281* 236* 315*     Patient had persistent hyperglycemia while on steroids and was started on insulin drip which was discontinued yesterday sugars were stable  Continue Patient on Accu-Cheks with sliding scale insulin

## 2021-08-29 LAB
GLUCOSE SERPL-MCNC: 127 MG/DL (ref 65–140)
GLUCOSE SERPL-MCNC: 182 MG/DL (ref 65–140)
GLUCOSE SERPL-MCNC: 215 MG/DL (ref 65–140)
GLUCOSE SERPL-MCNC: 216 MG/DL (ref 65–140)

## 2021-08-29 PROCEDURE — 93306 TTE W/DOPPLER COMPLETE: CPT | Performed by: INTERNAL MEDICINE

## 2021-08-29 PROCEDURE — 82948 REAGENT STRIP/BLOOD GLUCOSE: CPT

## 2021-08-29 PROCEDURE — 99232 SBSQ HOSP IP/OBS MODERATE 35: CPT | Performed by: FAMILY MEDICINE

## 2021-08-29 PROCEDURE — 94760 N-INVAS EAR/PLS OXIMETRY 1: CPT

## 2021-08-29 RX ORDER — LOSARTAN POTASSIUM 50 MG/1
50 TABLET ORAL DAILY
Status: DISCONTINUED | OUTPATIENT
Start: 2021-08-30 | End: 2021-08-31 | Stop reason: HOSPADM

## 2021-08-29 RX ADMIN — METOPROLOL TARTRATE 50 MG: 50 TABLET, FILM COATED ORAL at 08:58

## 2021-08-29 RX ADMIN — MAGNESIUM OXIDE TAB 400 MG (241.3 MG ELEMENTAL MG) 800 MG: 400 (241.3 MG) TAB at 08:58

## 2021-08-29 RX ADMIN — INSULIN LISPRO 2 UNITS: 100 INJECTION, SOLUTION INTRAVENOUS; SUBCUTANEOUS at 12:06

## 2021-08-29 RX ADMIN — INSULIN LISPRO 1 UNITS: 100 INJECTION, SOLUTION INTRAVENOUS; SUBCUTANEOUS at 21:15

## 2021-08-29 RX ADMIN — ENOXAPARIN SODIUM 105 MG: 120 INJECTION SUBCUTANEOUS at 08:58

## 2021-08-29 RX ADMIN — METOPROLOL TARTRATE 50 MG: 50 TABLET, FILM COATED ORAL at 21:15

## 2021-08-29 RX ADMIN — LIDOCAINE 5% 1 PATCH: 700 PATCH TOPICAL at 08:58

## 2021-08-29 RX ADMIN — ATORVASTATIN CALCIUM 40 MG: 40 TABLET, FILM COATED ORAL at 21:15

## 2021-08-29 RX ADMIN — INSULIN LISPRO 2 UNITS: 100 INJECTION, SOLUTION INTRAVENOUS; SUBCUTANEOUS at 17:00

## 2021-08-29 RX ADMIN — MAGNESIUM OXIDE TAB 400 MG (241.3 MG ELEMENTAL MG) 800 MG: 400 (241.3 MG) TAB at 17:00

## 2021-08-29 RX ADMIN — ENOXAPARIN SODIUM 105 MG: 120 INJECTION SUBCUTANEOUS at 21:14

## 2021-08-29 RX ADMIN — PANTOPRAZOLE SODIUM 20 MG: 20 TABLET, DELAYED RELEASE ORAL at 06:23

## 2021-08-29 NOTE — PROGRESS NOTES
Bill [address]  Progress Note Marty Luna 1960, 57 y o  female MRN: [id number]  Unit/Bed#: [address] [place] [address] Encounter: [id number]  Primary Care Provider: Bernardo Biswas MD   Date and time admitted to hospital: 8/14/2021  1:01 AM    * Acute respiratory failure with hypoxia due to COVID 19 pneumonia  Assessment & Plan  Initially hypoxic, tachypneic  COVID positive  Symptom onset 8/8  Significant other was COVID (+) 2 weeks prior  On imaging, noted to have findings consistent with COVID pneumonia  CRP initially elevated but has normalized  Procalcitonin negative  D-dimer less than 0 27  Continue statin  Completed course of Decadron 0 1 mg/kg IV Q 12 hrs on 8/28  Not a candidate for convalescent plasma  Completed remdesivir  Continue therapeutic Lovenox  Currently on 4 liters mid flow  20 milligrams of IV Lasix x1 given 8/27    Atrial fibrillation [facility])  Assessment & Plan  New onset AFib  Continue Lopressor  On Therapeutic Lovenox  Echo showed normal EF    Type 2 diabetes mellitus without complication, without long-term current use of insulin [facility])  Assessment & Plan  Lab Results   Component Value Date    HGBA1C 8 0 (H) 08/28/2021       Recent Labs     08/28/21  1638 08/28/21  2031 08/29/21  0803 08/29/21  1052   POCGLU 340* 332* 127 215*     Patient had persistent hyperglycemia while on steroids and was started on insulin drip which was discontinued  Continue Patient on Accu-Cheks with sliding scale insulin  Sugar should improve as patient is no longer on steroids  Discussed with patient and she is agreeable with p o  Medication on discharge for further management of her diabetes    BMI 40 0-44 9, adult [facility])  Assessment & Plan  Body mass index is 42 96 kg/m²  Pain in both feet  Assessment & Plan  Continue tramadol p r n      Gastroesophageal reflux disease without esophagitis  Assessment & Plan  On Protonix here    Mixed hyperlipidemia  Assessment & Plan  Continue statin    Essential hypertension  Assessment & Plan  Continue Lopressor      VTE Pharmacologic Prophylaxis: VTE Score: 4 Moderate Risk (Score 3-4) - Pharmacological DVT Prophylaxis Ordered: enoxaparin (Lovenox)  Patient Centered Rounds: I performed bedside rounds with nursing staff today  Discussions with Specialists or Other Care Team Provider: yes - pulm    Education and Discussions with Family / Patient: Patient declined call to   Time Spent for Care: 30 minutes  More than 50% of total time spent on counseling and coordination of care as described above  Current Length of Stay: 15 day(s)  Current Patient Status: Inpatient   Certification Statement: The patient will continue to require additional inpatient hospital stay due to Acute respiratory failure with hypoxia  Discharge Plan: home    Code Status: Level 1 - Full Code    Subjective:     Still with exertional shortness of breath but breathing at rest is improving    Objective:     Vitals:   Temp (24hrs), Av 6 °F (37 °C), Min:97 3 °F (36 3 °C), Max:99 7 °F (37 6 °C)    Temp:  [97 3 °F (36 3 °C)-99 7 °F (37 6 °C)] 97 3 °F (36 3 °C)  HR:  [73-87] 87  Resp:  [18-22] 22  BP: (125-153)/(71-76) 125/71  SpO2:  [90 %-95 %] 92 %  Body mass index is 42 96 kg/m²  Input and Output Summary (last 24 hours): Intake/Output Summary (Last 24 hours) at 2021 1628  Last data filed at 2021 0600  Gross per 24 hour   Intake 10 ml   Output 700 ml   Net -690 ml       Physical Exam:   Physical Exam  Vitals reviewed  Constitutional:       General: She is not in acute distress  Appearance: She is well-developed  HENT:      Head: Normocephalic and atraumatic  Eyes:      Extraocular Movements: Extraocular movements intact  Conjunctiva/sclera: Conjunctivae normal    Cardiovascular:      Rate and Rhythm: Normal rate and regular rhythm  Pulmonary:      Effort: Pulmonary effort is normal  No respiratory distress        Breath sounds: Normal breath sounds  No wheezing or rales  Abdominal:      General: There is no distension  Palpations: Abdomen is soft  Tenderness: There is no abdominal tenderness  Neurological:      Mental Status: She is alert and oriented to person, place, and time            Additional Data:     Labs:  Results from last 7 days   Lab Units 08/26/21  0623 08/24/21  0616   WBC Thousand/uL 12 32* 11 64*   HEMOGLOBIN g/dL 13 7 11 6   HEMATOCRIT % 40 7 34 6*   PLATELETS Thousands/uL 299 341   BANDS PCT %  --  1   NEUTROS PCT % 87*  --    LYMPHS PCT % 6*  --    LYMPHO PCT %  --  9*   MONOS PCT % 5  --    MONO PCT %  --  2*   EOS PCT % 0 0     Results from last 7 days   Lab Units 08/28/21  0652   SODIUM mmol/L 135*   POTASSIUM mmol/L 4 5   CHLORIDE mmol/L 103   CO2 mmol/L 25   BUN mg/dL 36*   CREATININE mg/dL 1 15   ANION GAP mmol/L 7   CALCIUM mg/dL 7 6*   GLUCOSE RANDOM mg/dL 241*         Results from last 7 days   Lab Units 08/29/21  1052 08/29/21  0803 08/28/21  2031 08/28/21  1638 08/28/21  1053 08/28/21  0732 08/27/21  2108 08/27/21  1658 08/27/21  1125 08/27/21  0728 08/27/21  0557 08/27/21  0345   POC GLUCOSE mg/dl 215* 127 332* 340* 315* 236* 281* 320* 223* 97 116 143*     Results from last 7 days   Lab Units 08/28/21  0652   HEMOGLOBIN A1C % 8 0*           Lines/Drains:  Invasive Devices     Peripheral Intravenous Line            Long-Dwell Peripheral IV (Midline) 14/24/01 Right Basilic 7 days          Drain            External Urinary Catheter 2 days                  Telemetry:  Telemetry Orders (From admission, onward)             48 Hour Telemetry Monitoring  Continuous x 48 hours     Question:  Reason for 48 Hour Telemetry  Answer:  Arrhythmias Requiring Medical Therapy (eg  SVT, Vtach/fib, Bradycardia, Uncontrolled A-fib)                          Imaging: Reviewed radiology reports from this admission including: ECHO    Recent Cultures (last 7 days):         Last 24 Hours Medication List:   Current Facility-Administered Medications   Medication Dose Route Frequency Provider Last Rate    atorvastatin  40 mg Oral HS Digna Calderon MD      benzonatate  100 mg Oral TID PRN Digna Calderon MD      enoxaparin  1 mg/kg Subcutaneous Q12H [address] Digna Calderon MD      insulin lispro  1-5 Units Subcutaneous HS Nahed Revankar, DO      insulin lispro  1-6 Units Subcutaneous TID AC Nahed Rea DO      lidocaine  1 patch Topical Daily Digna Calderon MD      magnesium oxide  800 mg Oral BID Digna Calderon MD      metoprolol tartrate  50 mg Oral Q12H Marta Grimes MD      pantoprazole  20 mg Oral Early Morning Digna Calderon MD      Pramox-PE-Glycerin-Petrolatum   Rectal BID PRN Digna Calderon MD      traMADol  50 mg Oral Q8H PRN Digna Calderon MD          Today, Patient Was Seen By: Daniele Lopez DO    **Please Note: This note may have been constructed using a voice recognition system  **

## 2021-08-29 NOTE — ASSESSMENT & PLAN NOTE
Lab Results   Component Value Date    HGBA1C 8 0 (H) 08/28/2021       Recent Labs     08/30/21  1637 08/30/21  2104 08/31/21  0713 08/31/21  1134   POCGLU 192* 164* 125 249*     Patient had persistent hyperglycemia while on steroids and was started on insulin drip which was discontinued  Sugars are improving as patient is no longer on steroids  Discussed with patient and she is agreeable with p o  Medication on discharge for further management of her diabetes    Started metformin on discharge

## 2021-08-29 NOTE — ASSESSMENT & PLAN NOTE
Initially hypoxic, tachypneic  COVID positive  Symptom onset 8/8  Significant other was COVID (+), 2 weeks prior  On imaging, noted to have findings consistent with COVID pneumonia  CRP initially elevated but has normalized  Procalcitonin negative  D-dimer less than 0 27  Continue statin  Completed course of Decadron 0 1 mg/kg IV Q 12 hrs on 8/28  Not a candidate for convalescent plasma  Completed remdesivir  Was on therapeutic Lovenox  Low-dose Eliquis b i d   For 30 days for DVT prophylaxis post COVID  Home O2 eval done and patient needs 2 L at rest with 12 L of oxygen with exertion to maintain sat above 88%  CTA chest negative for PE  20 milligrams of IV Lasix x1 given 8/27

## 2021-08-29 NOTE — PLAN OF CARE
Problem: Potential for Falls  Goal: Patient will remain free of falls  Description: INTERVENTIONS:  - Educate patient/family on patient safety including physical limitations  - Instruct patient to call for assistance with activity   - Consult OT/PT to assist with strengthening/mobility   - Keep Call bell within reach  - Keep bed low and locked with side rails adjusted as appropriate  - Keep care items and personal belongings within reach  - Initiate and maintain comfort rounds  - Make Fall Risk Sign visible to staff  - Offer Toileting every  Hours, in advance of need  - Initiate/Maintain alarm  - Obtain necessary fall risk management equipment:   - Apply yellow socks and bracelet for high fall risk patients  - Consider moving patient to room near nurses station  Outcome: Progressing     Problem: PAIN - ADULT  Goal: Verbalizes/displays adequate comfort level or baseline comfort level  Description: Interventions:  - Encourage patient to monitor pain and request assistance  - Assess pain using appropriate pain scale  - Administer analgesics based on type and severity of pain and evaluate response  - Implement non-pharmacological measures as appropriate and evaluate response  - Consider cultural and social influences on pain and pain management  - Notify physician/advanced practitioner if interventions unsuccessful or patient reports new pain  Outcome: Progressing     Problem: INFECTION - ADULT  Goal: Absence or prevention of progression during hospitalization  Description: INTERVENTIONS:  - Assess and monitor for signs and symptoms of infection  - Monitor lab/diagnostic results  - Monitor all insertion sites, i e  indwelling lines, tubes, and drains  - [place] appropriate cooling/warming therapies per order  - Administer medications as ordered  - Instruct and encourage patient and family to use good hand hygiene technique  - Identify and instruct in appropriate isolation precautions for identified infection/condition  Outcome: Progressing     Problem: DISCHARGE PLANNING  Goal: Discharge to home or other facility with appropriate resources  Description: INTERVENTIONS:  - Identify barriers to discharge w/patient and caregiver  - Arrange for needed discharge resources and transportation as appropriate  - Identify discharge learning needs (meds, wound care, etc )  - Arrange for interpretive services to assist at discharge as needed  - Refer to Case Management Department for coordinating discharge planning if the patient needs post-hospital services based on physician/advanced practitioner order or complex needs related to functional status, cognitive ability, or social support system  Outcome: Progressing     Problem: Knowledge Deficit  Goal: Patient/family/caregiver demonstrates understanding of disease process, treatment plan, medications, and discharge instructions  Description: Complete learning assessment and assess knowledge base    Interventions:  - Provide teaching at level of understanding  - Provide teaching via preferred learning methods  Outcome: Progressing     Problem: RESPIRATORY - ADULT  Goal: Achieves optimal ventilation and oxygenation  Description: INTERVENTIONS:  - Assess for changes in respiratory status  - Assess for changes in mentation and behavior  - Position to facilitate oxygenation and minimize respiratory effort  - Oxygen administered by appropriate delivery if ordered  - Initiate smoking cessation education as indicated  - Encourage broncho-pulmonary hygiene including cough, deep breathe, Incentive Spirometry  - Assess the need for suctioning and aspirate as needed  - Assess and instruct to report SOB or any respiratory difficulty  - Respiratory Therapy support as indicated  Outcome: Progressing     Problem: MOBILITY - ADULT  Goal: Maintain or return to baseline ADL function  Description: INTERVENTIONS:  -  Assess patient's ability to carry out ADLs; assess patient's baseline for ADL function and identify physical deficits which impact ability to perform ADLs (bathing, care of mouth/teeth, toileting, grooming, dressing, etc )  - Assess/evaluate cause of self-care deficits   - Assess range of motion  - Assess patient's mobility; develop plan if impaired  - Assess patient's need for assistive devices and provide as appropriate  - Encourage maximum independence but intervene and supervise when necessary  - Involve family in performance of ADLs  - Assess for home care needs following discharge   - Consider OT consult to assist with ADL evaluation and planning for discharge  - Provide patient education as appropriate  Outcome: Progressing  Goal: Maintains/Returns to pre admission functional level  Description: INTERVENTIONS:  - Perform BMAT or MOVE assessment daily    - Set and communicate daily mobility goal to care team and patient/family/caregiver  - Collaborate with rehabilitation services on mobility goals if consulted  - Perform Range of Motion  times a day  - Reposition patient every  hours    - Dangle patient  times a day  - Stand patient  times a day  - Ambulate patient  times a day  - Out of bed to chair  times a day   - Out of bed for meals  times a day  - Out of bed for toileting  - Record patient progress and toleration of activity level   Outcome: Progressing     Problem: Prexisting or High Potential for Compromised Skin Integrity  Goal: Skin integrity is maintained or improved  Description: INTERVENTIONS:  - Identify patients at risk for skin breakdown  - Assess and monitor skin integrity  - Assess and monitor nutrition and hydration status  - Monitor labs   - Assess for incontinence   - Turn and reposition patient  - Assist with mobility/ambulation  - Relieve pressure over bony prominences  - Avoid friction and shearing  - Provide appropriate hygiene as needed including keeping skin clean and dry  - Evaluate need for skin moisturizer/barrier cream  - Collaborate with interdisciplinary team   - Patient/family teaching  - Consider wound care consult   Outcome: Progressing     Problem: Nutrition/Hydration-ADULT  Goal: Nutrient/Hydration intake appropriate for improving, restoring or maintaining nutritional needs  Description: Monitor and assess patient's nutrition/hydration status for malnutrition  Collaborate with interdisciplinary team and initiate plan and interventions as ordered  Monitor patient's weight and dietary intake as ordered or per policy  Utilize nutrition screening tool and intervene as necessary  Determine patient's food preferences and provide high-protein, high-caloric foods as appropriate       INTERVENTIONS:  - Monitor oral intake, urinary output, labs, and treatment plans  - Assess nutrition and hydration status and recommend course of action  - Evaluate amount of meals eaten  - Assist patient with eating if necessary   - Allow adequate time for meals  - Recommend/ encourage appropriate diets, oral nutritional supplements, and vitamin/mineral supplements  - Order, calculate, and assess calorie counts as needed  - Recommend, monitor, and adjust tube feedings and TPN/PPN based on assessed needs  - Assess need for intravenous fluids  - Provide specific nutrition/hydration education as appropriate  - Include patient/family/caregiver in decisions related to nutrition  Outcome: Progressing

## 2021-08-29 NOTE — ASSESSMENT & PLAN NOTE
New onset AFib  Currently in sinus rhythm  Continue Lopressor    Cardiology follow-up after discharge  Echo showed normal EF

## 2021-08-30 ENCOUNTER — APPOINTMENT (INPATIENT)
Dept: RADIOLOGY | Facility: HOSPITAL | Age: 61
DRG: 177 | End: 2021-08-30
Payer: MEDICARE

## 2021-08-30 LAB
GLUCOSE SERPL-MCNC: 102 MG/DL (ref 65–140)
GLUCOSE SERPL-MCNC: 164 MG/DL (ref 65–140)
GLUCOSE SERPL-MCNC: 191 MG/DL (ref 65–140)
GLUCOSE SERPL-MCNC: 192 MG/DL (ref 65–140)

## 2021-08-30 PROCEDURE — 94760 N-INVAS EAR/PLS OXIMETRY 1: CPT

## 2021-08-30 PROCEDURE — 99232 SBSQ HOSP IP/OBS MODERATE 35: CPT | Performed by: FAMILY MEDICINE

## 2021-08-30 PROCEDURE — 97110 THERAPEUTIC EXERCISES: CPT

## 2021-08-30 PROCEDURE — 71275 CT ANGIOGRAPHY CHEST: CPT

## 2021-08-30 PROCEDURE — 82948 REAGENT STRIP/BLOOD GLUCOSE: CPT

## 2021-08-30 PROCEDURE — G1004 CDSM NDSC: HCPCS

## 2021-08-30 PROCEDURE — 99232 SBSQ HOSP IP/OBS MODERATE 35: CPT | Performed by: INTERNAL MEDICINE

## 2021-08-30 PROCEDURE — 97530 THERAPEUTIC ACTIVITIES: CPT

## 2021-08-30 PROCEDURE — 94761 N-INVAS EAR/PLS OXIMETRY MLT: CPT

## 2021-08-30 RX ORDER — POLYETHYLENE GLYCOL 3350 17 G/17G
17 POWDER, FOR SOLUTION ORAL DAILY
Status: DISCONTINUED | OUTPATIENT
Start: 2021-08-30 | End: 2021-08-31 | Stop reason: HOSPADM

## 2021-08-30 RX ADMIN — ENOXAPARIN SODIUM 105 MG: 120 INJECTION SUBCUTANEOUS at 09:24

## 2021-08-30 RX ADMIN — INSULIN LISPRO 2 UNITS: 100 INJECTION, SOLUTION INTRAVENOUS; SUBCUTANEOUS at 11:09

## 2021-08-30 RX ADMIN — MAGNESIUM OXIDE TAB 400 MG (241.3 MG ELEMENTAL MG) 800 MG: 400 (241.3 MG) TAB at 09:24

## 2021-08-30 RX ADMIN — PANTOPRAZOLE SODIUM 20 MG: 20 TABLET, DELAYED RELEASE ORAL at 06:30

## 2021-08-30 RX ADMIN — MAGNESIUM OXIDE TAB 400 MG (241.3 MG ELEMENTAL MG) 800 MG: 400 (241.3 MG) TAB at 17:22

## 2021-08-30 RX ADMIN — ATORVASTATIN CALCIUM 40 MG: 40 TABLET, FILM COATED ORAL at 21:28

## 2021-08-30 RX ADMIN — LIDOCAINE 5% 1 PATCH: 700 PATCH TOPICAL at 09:23

## 2021-08-30 RX ADMIN — IOHEXOL 100 ML: 350 INJECTION, SOLUTION INTRAVENOUS at 18:23

## 2021-08-30 RX ADMIN — INSULIN LISPRO 1 UNITS: 100 INJECTION, SOLUTION INTRAVENOUS; SUBCUTANEOUS at 17:22

## 2021-08-30 RX ADMIN — INSULIN LISPRO 1 UNITS: 100 INJECTION, SOLUTION INTRAVENOUS; SUBCUTANEOUS at 21:29

## 2021-08-30 RX ADMIN — LOSARTAN POTASSIUM 50 MG: 50 TABLET, FILM COATED ORAL at 09:37

## 2021-08-30 RX ADMIN — METOPROLOL TARTRATE 50 MG: 50 TABLET, FILM COATED ORAL at 09:24

## 2021-08-30 RX ADMIN — ENOXAPARIN SODIUM 105 MG: 120 INJECTION SUBCUTANEOUS at 20:55

## 2021-08-30 NOTE — PLAN OF CARE
Problem: Potential for Falls  Goal: Patient will remain free of falls  Description: INTERVENTIONS:  - Educate patient/family on patient safety including physical limitations  - Instruct patient to call for assistance with activity   - Consult OT/PT to assist with strengthening/mobility   - Keep Call bell within reach  - Keep bed low and locked with side rails adjusted as appropriate  - Keep care items and personal belongings within reach  - Initiate and maintain comfort rounds  - Make Fall Risk Sign visible to staff  - Offer Toileting every 2 Hours, in advance of need  - Initiate/Maintain alarm  - Obtain necessary fall risk management equipment:   - Apply yellow socks and bracelet for high fall risk patients  - Consider moving patient to room near nurses station  Outcome: Progressing     Problem: PAIN - ADULT  Goal: Verbalizes/displays adequate comfort level or baseline comfort level  Description: Interventions:  - Encourage patient to monitor pain and request assistance  - Assess pain using appropriate pain scale  - Administer analgesics based on type and severity of pain and evaluate response  - Implement non-pharmacological measures as appropriate and evaluate response  - Consider cultural and social influences on pain and pain management  - Notify physician/advanced practitioner if interventions unsuccessful or patient reports new pain  Outcome: Progressing     Problem: INFECTION - ADULT  Goal: Absence or prevention of progression during hospitalization  Description: INTERVENTIONS:  - Assess and monitor for signs and symptoms of infection  - Monitor lab/diagnostic results  - Monitor all insertion sites, i e  indwelling lines, tubes, and drains  - [place] appropriate cooling/warming therapies per order  - Administer medications as ordered  - Instruct and encourage patient and family to use good hand hygiene technique  - Identify and instruct in appropriate isolation precautions for identified infection/condition  Outcome: Progressing     Problem: DISCHARGE PLANNING  Goal: Discharge to home or other facility with appropriate resources  Description: INTERVENTIONS:  - Identify barriers to discharge w/patient and caregiver  - Arrange for needed discharge resources and transportation as appropriate  - Identify discharge learning needs (meds, wound care, etc )  - Arrange for interpretive services to assist at discharge as needed  - Refer to Case Management Department for coordinating discharge planning if the patient needs post-hospital services based on physician/advanced practitioner order or complex needs related to functional status, cognitive ability, or social support system  Outcome: Progressing     Problem: Knowledge Deficit  Goal: Patient/family/caregiver demonstrates understanding of disease process, treatment plan, medications, and discharge instructions  Description: Complete learning assessment and assess knowledge base    Interventions:  - Provide teaching at level of understanding  - Provide teaching via preferred learning methods  Outcome: Progressing     Problem: RESPIRATORY - ADULT  Goal: Achieves optimal ventilation and oxygenation  Description: INTERVENTIONS:  - Assess for changes in respiratory status  - Assess for changes in mentation and behavior  - Position to facilitate oxygenation and minimize respiratory effort  - Oxygen administered by appropriate delivery if ordered  - Initiate smoking cessation education as indicated  - Encourage broncho-pulmonary hygiene including cough, deep breathe, Incentive Spirometry  - Assess the need for suctioning and aspirate as needed  - Assess and instruct to report SOB or any respiratory difficulty  - Respiratory Therapy support as indicated  Outcome: Progressing     Problem: MOBILITY - ADULT  Goal: Maintain or return to baseline ADL function  Description: INTERVENTIONS:  -  Assess patient's ability to carry out ADLs; assess patient's baseline for ADL function and identify physical deficits which impact ability to perform ADLs (bathing, care of mouth/teeth, toileting, grooming, dressing, etc )  - Assess/evaluate cause of self-care deficits   - Assess range of motion  - Assess patient's mobility; develop plan if impaired  - Assess patient's need for assistive devices and provide as appropriate  - Encourage maximum independence but intervene and supervise when necessary  - Involve family in performance of ADLs  - Assess for home care needs following discharge   - Consider OT consult to assist with ADL evaluation and planning for discharge  - Provide patient education as appropriate  Outcome: Progressing  Goal: Maintains/Returns to pre admission functional level  Description: INTERVENTIONS:  - Perform BMAT or MOVE assessment daily    - Set and communicate daily mobility goal to care team and patient/family/caregiver  - Collaborate with rehabilitation services on mobility goals if consulted  - Perform Range of Motion 4 times a day  - Reposition patient every 2 hours    - Dangle patient 2 times a day  - Stand patient 2 times a day  - Ambulate patient 2 times a day  - Out of bed to chair 2 times a day   - Out of bed for meals 3 times a day  - Out of bed for toileting  - Record patient progress and toleration of activity level   Outcome: Progressing     Problem: Prexisting or High Potential for Compromised Skin Integrity  Goal: Skin integrity is maintained or improved  Description: INTERVENTIONS:  - Identify patients at risk for skin breakdown  - Assess and monitor skin integrity  - Assess and monitor nutrition and hydration status  - Monitor labs   - Assess for incontinence   - Turn and reposition patient  - Assist with mobility/ambulation  - Relieve pressure over bony prominences  - Avoid friction and shearing  - Provide appropriate hygiene as needed including keeping skin clean and dry  - Evaluate need for skin moisturizer/barrier cream  - Collaborate with interdisciplinary team   - Patient/family teaching  - Consider wound care consult   Outcome: Progressing     Problem: Nutrition/Hydration-ADULT  Goal: Nutrient/Hydration intake appropriate for improving, restoring or maintaining nutritional needs  Description: Monitor and assess patient's nutrition/hydration status for malnutrition  Collaborate with interdisciplinary team and initiate plan and interventions as ordered  Monitor patient's weight and dietary intake as ordered or per policy  Utilize nutrition screening tool and intervene as necessary  Determine patient's food preferences and provide high-protein, high-caloric foods as appropriate       INTERVENTIONS:  - Monitor oral intake, urinary output, labs, and treatment plans  - Assess nutrition and hydration status and recommend course of action  - Evaluate amount of meals eaten  - Assist patient with eating if necessary   - Allow adequate time for meals  - Recommend/ encourage appropriate diets, oral nutritional supplements, and vitamin/mineral supplements  - Order, calculate, and assess calorie counts as needed  - Recommend, monitor, and adjust tube feedings and TPN/PPN based on assessed needs  - Assess need for intravenous fluids  - Provide specific nutrition/hydration education as appropriate  - Include patient/family/caregiver in decisions related to nutrition  Outcome: Progressing

## 2021-08-30 NOTE — RESPIRATORY THERAPY NOTE
Home Oxygen Qualifying Test       Patient name: Judd Haynes        : 1960   Date of Test:  2021  Diagnosis:      Home Oxygen Test:    **Medicare Guidelines require item(s) 1-5 on all ambulatory patients or 1 and 2 on non-ambulatory patients  1   Baseline SPO2 on Room Air at rest <86 %  2   SPO2 during exercise on Room Air <80 %  During exercise monitor SpO2  If SPO2 increases >=89% with ambulation do not add supplemental             oxygen  If <= 88% on room air add O2 via NC and titrate patient  Patient must be ambulated with O2 and titrated to > 88% with exertion  3   SPO2 on Oxygen at rest 89 % 2 lpm     4   SPO2 during exercise on Oxygen  89 % a liter flow of 12 lpm     5   Exercise performed:   Patient was walked on supplemental O2 and Oximizer  On O2 patient required 12 L to maintain SpO2 above 88%  Patient was then walked on [address]  O2 on Oximizer was titrated all the way up to 15L, but patient still desaturated down to 80%  [x]  Supplemental Home Oxygen is indicated  []  Client does not qualify for home oxygen        Respiratory Additional Notes-     Danette Parada, RT

## 2021-08-30 NOTE — PHYSICAL THERAPY NOTE
PT TREATMENT     08/30/21 1518   PT Last Visit   PT Visit Date 08/30/21   Note Type   Note Type Treatment   Pain Assessment   Pain Assessment Tool Pain Assessment not indicated - pt denies pain   Restrictions/Precautions   Other Precautions Fall Risk;O2;Contact/isolation; Airborne/isolation   General   Chart Reviewed Yes   Family/Caregiver Present No   Subjective   Subjective "I might go home tomorrow"   Transfers   Sit to Stand 5  Supervision   Additional items Verbal cues   Stand to Sit 5  Supervision   Additional items Verbal cues   Additional Comments Pt performs sit to stand transfer x3 reps; pt stood with RW for 10 seconds, 20 seconds, and 30 seconds respectively with S; on 4th attempt pt was able to ambulate 2 steps forward with a walker and 2 steps back with S; on 5th attempt, pt sit to stand transfer with S and stood with a walker times 10 seconds; on all attempts pt reports lightheadedness; pt on 7-8 L of O2 and with activity decreases from 92% to 82% and is moderately SOB  Pt needs increased rest time between all activities  Balance   Static Sitting Good   Static Standing Fair +   Dynamic Standing Fair   Ambulatory Fair   Activity Tolerance   Activity Tolerance Patient limited by fatigue;Treatment limited secondary to medical complications (Comment)  (Respiratory status; O2 needs)   Exercises   Knee AROM Long Arc Quad 10 reps;Bilateral   Ankle Pumps 10 reps;Bilateral   Assessment   Assessment Pt with limited tolerance to BLE exercises, transfers, standing with a RW and few step ambulation with a RW due to respiratory status and decreasing SaO2  Pt with good participation in PT session and wants to work to improve her strength and endurance but is limited by respiratory status and subjective reports of lightheadedness  Pt will continue to benefit from skilled physical therapy services to increase her endurance, strength, gait and functional mobility      The patient's AM-PAC Basic Mobility Inpatient Short Form Raw Score is 17, Standardized Score is 39 67  A standardized score less than 42 9 suggests the patient may benefit from discharge to post-acute rehabilitation services  Please also refer to the recommendation of the Physical Therapist for safe discharge planning  Despite ampac score, pt is safe for DC home with family support, continued ambulation with a RW and home PT  Plan   Treatment/Interventions ADL retraining;Functional transfer training;LE strengthening/ROM; Elevations; Therapeutic exercise; Endurance training;Patient/family training;Equipment eval/education; Bed mobility;Gait training; Compensatory technique education   PT Frequency 5x/wk   Recommendation   PT Discharge Recommendation Home with home health rehabilitation   [address] [place] [address] Mobility Inpatient   Turning in Bed Without Bedrails 3   Lying on Back to Sitting on Edge of Flat Bed 3   Moving Bed to Chair 3   Standing Up From Chair 3   Walk in Room 3   Climb 3-5 Stairs 2   Basic Mobility Inpatient Raw Score 17   Basic Mobility Standardized Score 97 24   Licensure   [place] License Number  Laverne Jaskaran PT [id number]       Portions of the documentation may have been created using voice recognition software  Occasional wrong word or sound alike substitutions may have occurred due to the inherent limitation of the voice recognition software  Read the chart carefully and recognize, using context, where substitutions have occurred

## 2021-08-30 NOTE — PROGRESS NOTES
Bill [address]  Progress Note Henry Desai 1960, 57 y o  female MRN: [id number]  Unit/Bed#: [address] [place] [address] Encounter: [id number]  Primary Care Provider: Hipolito Blanton MD   Date and time admitted to hospital: 8/14/2021  1:01 AM    * Acute respiratory failure with hypoxia due to COVID 19 pneumonia  Assessment & Plan  Initially hypoxic, tachypneic  COVID positive  Symptom onset 8/8  Significant other was COVID (+) 2 weeks prior  On imaging, noted to have findings consistent with COVID pneumonia  CRP initially elevated but has normalized  Procalcitonin negative  D-dimer less than 0 27  Continue statin  Completed course of Decadron 0 1 mg/kg IV Q 12 hrs on 8/28  Not a candidate for convalescent plasma  Completed remdesivir  Continue therapeutic Lovenox  Currently on 5 liters but with exertion needs about 12 L of oxygen to maintain sat above 88%  Will obtain CTA chest as well  20 milligrams of IV Lasix x1 given 8/27    Atrial fibrillation [facility])  Assessment & Plan  New onset AFib  Currently in sinus rhythm  Continue Lopressor  On Therapeutic Lovenox  Echo showed normal EF    Type 2 diabetes mellitus without complication, without long-term current use of insulin [facility])  Assessment & Plan  Lab Results   Component Value Date    HGBA1C 8 0 (H) 08/28/2021       Recent Labs     08/29/21  2036 08/30/21  0827 08/30/21  1107 08/30/21  1637   POCGLU 182* 102 191* 192*     Patient had persistent hyperglycemia while on steroids and was started on insulin drip which was discontinued  Continue Patient on Accu-Cheks with sliding scale insulin  Sugars are improving as patient is no longer on steroids  Discussed with patient and she is agreeable with p o  Medication on discharge for further management of her diabetes  Discussed with her about starting metformin on discharge    BMI 40 0-44 9, adult [facility])  Assessment & Plan  Body mass index is 42 96 kg/m²      Pain in both feet  Assessment & Plan  Continue tramadol p r n    Gastroesophageal reflux disease without esophagitis  Assessment & Plan  On Protonix here    Mixed hyperlipidemia  Assessment & Plan  Continue statin  Essential hypertension  Assessment & Plan  Continue Lopressor  Losartan started at half the dose      VTE Pharmacologic Prophylaxis: VTE Score: 4 Moderate Risk (Score 3-4) - Pharmacological DVT Prophylaxis Ordered: enoxaparin (Lovenox)  Patient Centered Rounds: I performed bedside rounds with nursing staff today  Discussions with Specialists or Other Care Team Provider: yes - pulmonary    Education and Discussions with Family / Patient: Patient declined call to   Time Spent for Care: 30 minutes  More than 50% of total time spent on counseling and coordination of care as described above  Current Length of Stay: 16 day(s)  Current Patient Status: Inpatient   Certification Statement: The patient will continue to require additional inpatient hospital stay due to Acute respiratory failure with hypoxia still with high oxygen requirements with ambulation  Discharge Plan: home    Code Status: Level 1 - Full Code    Subjective:     Continues to report shortness of breath when she exerts herself  At rest breathing has improved    Objective:     Vitals:   Temp (24hrs), Av 5 °F (36 4 °C), Min:97 1 °F (36 2 °C), Max:98 3 °F (36 8 °C)    Temp:  [97 1 °F (36 2 °C)-98 3 °F (36 8 °C)] 98 3 °F (36 8 °C)  HR:  [73-98] 89  Resp:  [18-20] 20  BP: (114-135)/() 135/108  SpO2:  [84 %-97 %] 94 %  Body mass index is 42 96 kg/m²  Input and Output Summary (last 24 hours): Intake/Output Summary (Last 24 hours) at 2021 1921  Last data filed at 2021 1101  Gross per 24 hour   Intake 540 ml   Output 700 ml   Net -160 ml       Physical Exam:   Physical Exam  Vitals reviewed  Constitutional:       General: She is not in acute distress  Appearance: She is well-developed     HENT:      Head: Normocephalic and atraumatic  Eyes:      Conjunctiva/sclera: Conjunctivae normal    Cardiovascular:      Rate and Rhythm: Normal rate and regular rhythm  Pulmonary:      Effort: Pulmonary effort is normal  No respiratory distress  Breath sounds: No wheezing or rales  Comments: Decreased breath sounds bilaterally  Abdominal:      General: Bowel sounds are normal  There is no distension  Palpations: Abdomen is soft  Tenderness: There is no abdominal tenderness  Skin:     General: Skin is warm and dry  Neurological:      Mental Status: She is alert            Additional Data:     Labs:  Results from last 7 days   Lab Units 08/26/21  0623 08/24/21  0616   WBC Thousand/uL 12 32* 11 64*   HEMOGLOBIN g/dL 13 7 11 6   HEMATOCRIT % 40 7 34 6*   PLATELETS Thousands/uL 299 341   BANDS PCT %  --  1   NEUTROS PCT % 87*  --    LYMPHS PCT % 6*  --    LYMPHO PCT %  --  9*   MONOS PCT % 5  --    MONO PCT %  --  2*   EOS PCT % 0 0     Results from last 7 days   Lab Units 08/28/21  0652   SODIUM mmol/L 135*   POTASSIUM mmol/L 4 5   CHLORIDE mmol/L 103   CO2 mmol/L 25   BUN mg/dL 36*   CREATININE mg/dL 1 15   ANION GAP mmol/L 7   CALCIUM mg/dL 7 6*   GLUCOSE RANDOM mg/dL 241*         Results from last 7 days   Lab Units 08/30/21  1637 08/30/21  1107 08/30/21  0827 08/29/21  2036 08/29/21  1653 08/29/21  1052 08/29/21  0803 08/28/21  2031 08/28/21  1638 08/28/21  1053 08/28/21  0732 08/27/21  2108   POC GLUCOSE mg/dl 192* 191* 102 182* 216* 215* 127 332* 340* 315* 236* 281*     Results from last 7 days   Lab Units 08/28/21  0652   HEMOGLOBIN A1C % 8 0*           Lines/Drains:  Invasive Devices     Peripheral Intravenous Line            Long-Dwell Peripheral IV (Midline) 00/00/09 Right Basilic 8 days          Drain            External Urinary Catheter 3 days                  Telemetry:  Telemetry Orders (From admission, onward)             48 Hour Telemetry Monitoring  Continuous x 48 hours     Question:  Reason for 48 Hour Telemetry  Answer:  Arrhythmias Requiring Medical Therapy (eg  SVT, Vtach/fib, Bradycardia, Uncontrolled A-fib)                          Imaging: No pertinent imaging reviewed  Recent Cultures (last 7 days):         Last 24 Hours Medication List:   Current Facility-Administered Medications   Medication Dose Route Frequency Provider Last Rate    atorvastatin  40 mg Oral HS Elian Rodgers MD      benzonatate  100 mg Oral TID PRN Elian Rodgers MD      enoxaparin  1 mg/kg Subcutaneous Q12H [facility] Elian Rodgers MD      insulin lispro  1-5 Units Subcutaneous HS Nahed Revankar, DO      insulin lispro  1-6 Units Subcutaneous TID AC Nahed Revankar, DO      lidocaine  1 patch Topical Daily Elian Rodgers MD      losartan  50 mg Oral Daily Nahed Revankar, DO      magnesium oxide  800 mg Oral BID Elian Rodgers MD      metoprolol tartrate  50 mg Oral Q12H Phyllistine MD Saeid      pantoprazole  20 mg Oral Early Morning Elian Rodgers MD      polyethylene glycol  17 g Oral Daily Nahed Revbrenda, DO      Pramox-PE-Glycerin-Petrolatum   Rectal BID PRN Elian Rodgers MD      traMADol  50 mg Oral Q8H PRN Elian Rodgers MD          Today, Patient Was Seen By: Kamryn Vega DO    **Please Note: This note may have been constructed using a voice recognition system  **

## 2021-08-30 NOTE — CASE MANAGEMENT
Patient aware of difficulty obtaining a home health agency due to Andre and agreed to additional referrals in [place]  Additional referrals were sent to [facility] [address]

## 2021-08-30 NOTE — CASE MANAGEMENT
CM spoke with Dr Arron Posey and dc will be tomorrow morning  Home O2 test has still not be done and REHAN is unable to place order for O2 at this time

## 2021-08-30 NOTE — QUICK NOTE
Chart reviewed along with telemetry  Patient had an episode of atrial fibrillation in the ICU - has remained in sinus rhythm since then  No events on telemetry   Echocardiogram showed no structural heart disease or effusion  Cause of atrial fibrillation is likely critical illness/hypoxia from COVID pneumonia  May continue to monitor on telemetry  Call if any change in status

## 2021-08-30 NOTE — CASE MANAGEMENT
REHAN called patient in her room and discussed the need for home O2 at dc  She states she is aware  Patient states they came in to do her O2 test but she sent them away because she is constipated and needs medicine first  REHAN also discussed with patient that PT is recommending HHS  She states she is agreeable to both O2 and HHS  She was provided with choice for DME company and she has no preference  A referral needs to be sent to [facility] once ready for dc  She was also provided with choice of [address] [place] [address] and her preference is  [facility]  A referral was sent to them in [facility]  REHAN to follow

## 2021-08-30 NOTE — PROGRESS NOTES
Progress Note - Pulmonary   Taryn [place] 57 y o  female MRN: [id number]  Unit/Bed#: [address] [place] [address] Encounter: [id number]  Code Status: Level 1 - Full Code    Filipe Vizcaino is a 57 y o  Past Medical History:   Diagnosis Date    Abrasion of right leg 3/19/2021    BMI 45 0-49 9, adult ([facility] [address] ) 3/19/2021    Dyslipidemia     Edema of both lower legs 3/19/2021    Essential hypertension 1/23/2021    Gastroesophageal reflux disease without esophagitis 1/23/2021    GERD (gastroesophageal reflux disease)     HTN (hypertension)     Hyperlipemia     Hypertension     Hypertensive arterionephrosclerosis of transplanted kidney 1/23/2021    Hypokalemia 4/20/2021    Insomnia     Leukocytosis     Mixed hyperlipidemia 1/23/2021    Numbness     Obstructive sleep apnea syndrome 1/23/2021    Pain in ankle 1/23/2021    Pain in both feet 1/23/2021    Pain in both knees 1/26/2021    Skin lesions     Type 2 diabetes mellitus without complication, without long-term current use of insulin ([facility] ) 1/23/2021       Assessment/Plan:     78-year-old female past medical history HTN, hyperlipidemia, uncontrolled type 2 diabetes with A1c 7 8, GERD, morbid obesity BMI 43 0 / history of untreated SILVIA with COVID-19 pneumonia symptomatic as of 8/8 and ultimately diagnosed in 8/13 she has persistent hypoxemic respiratory failure continued to require 10 L nasal cannula  She is initially admitted to the ICU on 08/14 due to acute hypoxemic respiratory failure requiring 15 L nasal cannula acute renal failure with serum creatinine as high as 5 56 improved with hydration  Did attempt to use CPAP in the intensive care unit the patient refused  Had full 10 days of steroid therapy now complete  Was previously followed by pulmonary critical care in the intensive care unit by Dr Rhiannon George    Currently on 7 L NC     Covid Treatment Plan:    COVID-19 Pneumonia  -see plan as detailed below  -completed 10 days of steroids  -completed 5 days remdesivir  -did not qualify for Actemra or Convalescent plasma  -Has a moderate Severity Range infection /  Currently on 7 L NC   Acute Hypoxemic Respiratory Failure  -titrate for oxygen saturation 90%  -currently on 7 L cannula  -has been diuresed and is - 10 4 L negative  -home 02 eval today w/ oxymizer  Prior study required 12 L w/ activity  Abnormal Imaging:  -chest x-ray shows persistent pulmonary infiltrates  -has been on therapeutic Lovenox to hospital admission  Positive COVID 19 Date :        Results from last 7 days   Lab Units 08/28/21  3141   D-DIMER QUANTITATIVE ug/ml FEU <0 27     VTE PPX:  Lovenox 1 milligram/kilogram      Screen for Convalescent Plasma:  Was out of window and did not receive  Actemra: Out of window did not receive  Morbid obesity:  Recommendations for weight loss to improve respiratory symptoms  SILVIA:    -recommend outpatient sleep study and compliance with CPAP therapy  Overall need outpatient follow-up  _________________________________________________    Subjective: Pt seen and examined at bedside  Improving  Still w/ high ambulatory 02 requirements    With plan to trial home 02 eval w/ oxymizer      Smoking history:   Social History     Tobacco Use   Smoking Status Former Smoker   Smokeless Tobacco Former User   Tobacco Comment    15 YEARS      Respiratory History:  No prior respiratory history  Oxygen Therapy:  No prior oxygen therapy  PAP Therapy:  History of SILVIA not on Pap therapy  PFT:  Not applicable  PSG:  Not applicable  Labs Reviewed:  Resolved kidney injury  Imaging Reviewed:  Chest x-ray 8/24 to 8/20 comparisons    Collaborative Discussion:  Discussed with internal medicine team Dr Kandi Serrano Events:     Vitals:   Vitals:    08/30/21 0600 08/30/21 0700 08/30/21 0900 08/30/21 0932   BP:    (!) 135/108   Pulse:    98   Resp:    20   Temp:    98 3 °F (36 8 °C)   TempSrc:       SpO2: 92% 93% 92% (!) 84%   Weight:       Height:         Weight (last 2 days) Date/Time   Weight    21 0600   110 (242 51)    21 0600   111 (244 71)            Temp (24hrs), Av 3 °F (36 3 °C), Min:96 4 °F (35 8 °C), Max:98 3 °F (36 8 °C)  Current: Temperature: 98 3 °F (36 8 °C)          IV Infusions:       Nutrition:        Diet Orders   (From admission, onward)             Start     Ordered    21 1113  Dietary nutrition supplements  Once     Question Answer Comment   Select Supplement: Glucerna-Chocolate    Frequency Breakfast, Lunch, Dinner        21 1112    21 1045  Room Service  Once     Question:  Type of Service  Answer:  Room Service - Appropriate with Assistance    21 1044    21 0113  Diet Zaki/CHO Controlled; Consistent Carbohydrate Diet Level 2 (5 carb servings/75 grams CHO/meal)  Diet effective now     Question Answer Comment   Diet Type Zaki/CHO Controlled    Zaki/CHO Controlled Consistent Carbohydrate Diet Level 2 (5 carb servings/75 grams CHO/meal)    RD to adjust diet per protocol? Yes        21 0141                Ins/Outs:   I/O        07 -  0700  07 -  0700  07 -  0700    P  O  200 240     I V  (mL/kg) 245 9 (2 3) 20 (0 2)     Total Intake(mL/kg) 445 9 (4 1) 260 (2 3)     Urine (mL/kg/hr) 650 (0 2) 590 (0 2)     Stool 0 0     Total Output 650 590     Net -204 1 -330            Unmeasured Urine Occurrence  1 x 1 x    Unmeasured Stool Occurrence 1 x 1 x           Lines/Drains:  Invasive Devices     Peripheral Intravenous Line            Long-Dwell Peripheral IV (Midline) 69/50/72 Right Basilic 8 days          Drain            External Urinary Catheter 3 days                 Active medications:  Scheduled Meds:  Current Facility-Administered Medications   Medication Dose Route Frequency Provider Last Rate    atorvastatin  40 mg Oral HS Yamila Rudd MD      benzonatate  100 mg Oral TID PRN Yamila Rudd MD      enoxaparin  1 mg/kg Subcutaneous Q12H Melony Prader, MD Carley Fujita insulin lispro  1-5 Units Subcutaneous HS Nahed Rea, DO      insulin lispro  1-6 Units Subcutaneous TID AC Nahed Rea, DO      lidocaine  1 patch Topical Daily Shayla Britton MD      losartan  50 mg Oral Daily Nahed Rea, DO      magnesium oxide  800 mg Oral BID Shayla Britton MD      metoprolol tartrate  50 mg Oral Q12H [facility] Bharat Echevarria MD      pantoprazole  20 mg Oral Early Morning Shayla Britton MD      Pramox-PE-Glycerin-Petrolatum   Rectal BID PRN Shayla Britton MD      traMADol  50 mg Oral Q8H PRN Shayla Britton MD       PRN Meds:benzonatate, 100 mg, TID PRN  Pramox-PE-Glycerin-Petrolatum, , BID PRN  traMADol, 50 mg, Q8H PRN      ____________________________________________________________________    Physical Exam  Constitutional:       Appearance: She is well-developed  HENT:      Head: Normocephalic and atraumatic  Eyes:      Conjunctiva/sclera: Conjunctivae normal       Pupils: Pupils are equal, round, and reactive to light  Cardiovascular:      Rate and Rhythm: Normal rate and regular rhythm  Heart sounds: Normal heart sounds  Pulmonary:      Effort: Pulmonary effort is normal  No respiratory distress  Breath sounds: Normal breath sounds  No wheezing or rales  Comments: 95% on 7 L NC   Chest:      Chest wall: No tenderness  Abdominal:      General: Bowel sounds are normal       Palpations: Abdomen is soft  Musculoskeletal:         General: Normal range of motion  Cervical back: Normal range of motion and neck supple  Skin:     General: Skin is warm and dry  Neurological:      Mental Status: She is alert and oriented to person, place, and time         ____________________________________________________________________    Invasive/non-invasive ventilation settings   Respiratory    Lab Data (Last 4 hours)    None         O2/Vent Data (Last 4 hours)    None                Laboratory and Diagnostics:  Results from last 7 days   Lab Units 08/26/21  0623 08/24/21  0616   WBC Thousand/uL 12 32* 11 64*   HEMOGLOBIN g/dL 13 7 11 6   HEMATOCRIT % 40 7 34 6*   PLATELETS Thousands/uL 299 341   NEUTROS PCT % 87*  --    BANDS PCT %  --  1   MONOS PCT % 5  --    MONO PCT %  --  2*     Results from last 7 days   Lab Units 08/28/21  0652 08/26/21  0623 08/24/21  0616   SODIUM mmol/L 135* 138 139   POTASSIUM mmol/L 4 5 4 3 4 8   CHLORIDE mmol/L 103 104 106   CO2 mmol/L 25 23 24   ANION GAP mmol/L 7 11 9   BUN mg/dL 36* 33* 26*   CREATININE mg/dL 1 15 1 11 1 09   CALCIUM mg/dL 7 6* 8 0* 8 1*   GLUCOSE RANDOM mg/dL 241* 89 123     Results from last 7 days   Lab Units 08/28/21  0652 08/24/21  0616   MAGNESIUM mg/dL 1 8 2 0                   ABG:    VBG:          Micro        Imaging:   XR chest portable ICU   Final Result by Gita Fernandes MD (08/24 1610)      No change in moderate left and mild right peripheral consolidation due to Covid 19 pneumonia  Workstation performed: [id number]         XR chest portable ICU   Final Result by Rasheeda Mendez MD (08/23 4931)      Stable Covid-19 pneumonia with bilateral peripheral airspace opacities  Workstation performed: [id number]         XR chest portable ICU   Final Result by Kacy Morocho MD (08/18 1706)      Peripheral patchy opacities, in both lower lobes  In the setting of clinically suspected/proven COVID-19, this plain film appearance while nonspecific, can be seen in cases of viral pneumonia such as COVID-19  The findings have progressed since 8/13/2021  Workstation performed: [id number]             Micro:   Lab Results   Component Value Date    BLOODCX No Growth After 5 Days  08/13/2021    BLOODCX No Growth After 5 Days   08/13/2021    MRSACULTURE  08/14/2021     No Methicillin Resistant Staphlyococcus aureus (MRSA) isolated            Invalid input(s): Raj Line

## 2021-08-31 VITALS
DIASTOLIC BLOOD PRESSURE: 65 MMHG | TEMPERATURE: 98.3 F | HEIGHT: 63 IN | RESPIRATION RATE: 18 BRPM | SYSTOLIC BLOOD PRESSURE: 117 MMHG | BODY MASS INDEX: 42.97 KG/M2 | OXYGEN SATURATION: 97 % | WEIGHT: 242.51 LBS | HEART RATE: 92 BPM

## 2021-08-31 LAB
ANION GAP SERPL CALCULATED.3IONS-SCNC: 6 MMOL/L (ref 4–13)
BUN SERPL-MCNC: 23 MG/DL (ref 5–25)
CALCIUM SERPL-MCNC: 7.2 MG/DL (ref 8.3–10.1)
CHLORIDE SERPL-SCNC: 104 MMOL/L (ref 100–108)
CO2 SERPL-SCNC: 27 MMOL/L (ref 21–32)
CREAT SERPL-MCNC: 0.91 MG/DL (ref 0.6–1.3)
GFR SERPL CREATININE-BSD FRML MDRD: 68 ML/MIN/1.73SQ M
GLUCOSE SERPL-MCNC: 125 MG/DL (ref 65–140)
GLUCOSE SERPL-MCNC: 128 MG/DL (ref 65–140)
GLUCOSE SERPL-MCNC: 249 MG/DL (ref 65–140)
POTASSIUM SERPL-SCNC: 4.1 MMOL/L (ref 3.5–5.3)
SARS-COV-2 RNA RESP QL NAA+PROBE: NEGATIVE
SODIUM SERPL-SCNC: 137 MMOL/L (ref 136–145)

## 2021-08-31 PROCEDURE — U0005 INFEC AGEN DETEC AMPLI PROBE: HCPCS | Performed by: FAMILY MEDICINE

## 2021-08-31 PROCEDURE — U0003 INFECTIOUS AGENT DETECTION BY NUCLEIC ACID (DNA OR RNA); SEVERE ACUTE RESPIRATORY SYNDROME CORONAVIRUS 2 (SARS-COV-2) (CORONAVIRUS DISEASE [COVID-19]), AMPLIFIED PROBE TECHNIQUE, MAKING USE OF HIGH THROUGHPUT TECHNOLOGIES AS DESCRIBED BY CMS-2020-01-R: HCPCS | Performed by: FAMILY MEDICINE

## 2021-08-31 PROCEDURE — 99239 HOSP IP/OBS DSCHRG MGMT >30: CPT | Performed by: FAMILY MEDICINE

## 2021-08-31 PROCEDURE — 82948 REAGENT STRIP/BLOOD GLUCOSE: CPT

## 2021-08-31 PROCEDURE — 80048 BASIC METABOLIC PNL TOTAL CA: CPT | Performed by: FAMILY MEDICINE

## 2021-08-31 RX ORDER — BENZONATATE 100 MG/1
100 CAPSULE ORAL 3 TIMES DAILY PRN
Refills: 0
Start: 2021-08-31 | End: 2021-11-09 | Stop reason: ALTCHOICE

## 2021-08-31 RX ORDER — LOSARTAN POTASSIUM 50 MG/1
50 TABLET ORAL DAILY
Refills: 0
Start: 2021-09-01 | End: 2021-10-01 | Stop reason: SDUPTHER

## 2021-08-31 RX ORDER — LIDOCAINE 50 MG/G
1 PATCH TOPICAL DAILY
Refills: 0
Start: 2021-09-01 | End: 2021-11-09 | Stop reason: ALTCHOICE

## 2021-08-31 RX ADMIN — POLYETHYLENE GLYCOL 3350 17 G: 17 POWDER, FOR SOLUTION ORAL at 09:25

## 2021-08-31 RX ADMIN — METOPROLOL TARTRATE 50 MG: 50 TABLET, FILM COATED ORAL at 09:26

## 2021-08-31 RX ADMIN — ENOXAPARIN SODIUM 105 MG: 120 INJECTION SUBCUTANEOUS at 09:25

## 2021-08-31 RX ADMIN — LOSARTAN POTASSIUM 50 MG: 50 TABLET, FILM COATED ORAL at 09:27

## 2021-08-31 RX ADMIN — INSULIN LISPRO 3 UNITS: 100 INJECTION, SOLUTION INTRAVENOUS; SUBCUTANEOUS at 11:59

## 2021-08-31 RX ADMIN — PANTOPRAZOLE SODIUM 20 MG: 20 TABLET, DELAYED RELEASE ORAL at 07:04

## 2021-08-31 RX ADMIN — MAGNESIUM OXIDE TAB 400 MG (241.3 MG ELEMENTAL MG) 800 MG: 400 (241.3 MG) TAB at 09:25

## 2021-08-31 RX ADMIN — LIDOCAINE 5% 1 PATCH: 700 PATCH TOPICAL at 09:25

## 2021-08-31 NOTE — DISCHARGE SUMMARY
Discharge Summary - [facility]    Patient Information: Freya Buckley 57 y o  female MRN: [id number]  Unit/Bed#: [address] [place] [address] Encounter: [id number]    Discharging Physician / Practitioner: Khris Mars DO  PCP: Heavenly Gustafson MD  Admission Date: 8/14/2021  Discharge Date: 08/31/21    Reason for Admission: No chief complaint on file  Discharge Diagnoses:     Principal Problem:    Acute respiratory failure with hypoxia due to COVID 19 pneumonia  Active Problems:    Atrial fibrillation (HCC)    Type 2 diabetes mellitus without complication, without long-term current use of insulin (HCC)    Essential hypertension    Mixed hyperlipidemia    Gastroesophageal reflux disease without esophagitis    Pain in both feet    BMI 40 0-44 9, adult (HCC)  Resolved Problems:    Acute kidney injury ([facility] [address] )        * Acute respiratory failure with hypoxia due to COVID 19 pneumonia  Assessment & Plan  Initially hypoxic, tachypneic  COVID positive  Symptom onset 8/8  Significant other was COVID (+), 2 weeks prior  On imaging, noted to have findings consistent with COVID pneumonia  CRP initially elevated but has normalized  Procalcitonin negative  D-dimer less than 0 27  Continue statin  Completed course of Decadron 0 1 mg/kg IV Q 12 hrs on 8/28  Not a candidate for convalescent plasma  Completed remdesivir  Was on therapeutic Lovenox  Low-dose Eliquis b i d  For 30 days for DVT prophylaxis post COVID  Home O2 eval done and patient needs 2 L at rest with 12 L of oxygen with exertion to maintain sat above 88%  CTA chest negative for PE  20 milligrams of IV Lasix x1 given 8/27    Atrial fibrillation [facility])  Assessment & Plan  New onset AFib  Currently in sinus rhythm  Continue Lopressor    Cardiology follow-up after discharge  Echo showed normal EF    Type 2 diabetes mellitus without complication, without long-term current use of insulin [facility])  Assessment & Plan  Lab Results   Component Value Date    HGBA1C 8 0 (H) 2021       Recent Labs     21  1637 21  2104 21  0713 21  1134   POCGLU 192* 164* 125 249*     Patient had persistent hyperglycemia while on steroids and was started on insulin drip which was discontinued  Sugars are improving as patient is no longer on steroids  Discussed with patient and she is agreeable with p o  Medication on discharge for further management of her diabetes  Started metformin on discharge    BMI 40 0-44 9, adult [facility])  Assessment & Plan  Body mass index is 42 96 kg/m²  Pain in both feet  Assessment & Plan  Continue tramadol p r n  Gastroesophageal reflux disease without esophagitis  Assessment & Plan  On Protonix here  Mixed hyperlipidemia  Assessment & Plan  Continue statin    Essential hypertension  Assessment & Plan  Continue Lopressor  Losartan started at half the dose  Hold Dyazide per Cardiology      Consultations During Hospital Stay:  Noe Mcguire TO CASE MANAGEMENT    Procedures Performed:     · echo    Significant Findings:     · Refer to hospital course and above listed diagnosis related plan for details    Imaging while in hospital:    Echo complete with contrast if indicated    Result Date: 2021  Narrative: [address] [facility], [place] 6 [phone] Transthoracic Echocardiogram Limited 2D, M-mode, Doppler, and Color Doppler Study date:  26-Aug-2021 Patient: Peter Ruelas MR number: [id number] Account number: [de-identified] : 1960 Age: 57 years Gender: Female Status: Inpatient Location: Bedside Height: 63 in Weight: 239 6 lb BP: 129/ 75 mmHg Indications: Acute Respiratory failure due to Covid 19   Diagnoses: I48 0 - Atrial fibrillation Sonographer:  CARLOS Myers Primary Physician:  Duarte Rivera MD Referring Physician:  Davon Lea DO Group:  [facility] Interpreting Physician:  Regina Foley MD SUMMARY PROCEDURE INFORMATION: This was a technically difficult study  LEFT VENTRICLE: Systolic function was normal  Ejection fraction was estimated in the range of 50 % to 55 %  There were no regional wall motion abnormalities  Wall thickness was mildly increased  HISTORY: PRIOR HISTORY: Hyperlipidemia,A Fib ,HTN,Diabetes,obstructive sleep apnea,edema,Covid 19 virus  PROCEDURE: The procedure was performed at the bedside  This was a routine study  The transthoracic approach was used  The study included limited 2D imaging, M-mode, limited spectral Doppler, and color Doppler  The heart rate was 92 bpm, at the start of the study  Images were obtained from the parasternal, apical, subcostal, and suprasternal notch acoustic windows  This was a technically difficult study  LEFT VENTRICLE: Size was normal  Systolic function was normal  Ejection fraction was estimated in the range of 50 % to 55 %  There were no regional wall motion abnormalities  Wall thickness was mildly increased  No evidence of apical thrombus  RIGHT VENTRICLE: The size was normal  Systolic function was normal  Wall thickness was normal  LEFT ATRIUM: Size was at the upper limits of normal  RIGHT ATRIUM: Size was at the upper limits of normal  MITRAL VALVE: Valve structure was normal  There was normal leaflet separation  DOPPLER: The transmitral velocity was within the normal range  There was no evidence for stenosis  There was no significant regurgitation  AORTIC VALVE: The valve was probably trileaflet  Leaflets exhibited normal thickness and normal cuspal separation  DOPPLER: Transaortic velocity was within the normal range  There was no evidence for stenosis  There was no significant regurgitation  TRICUSPID VALVE: The valve structure was normal  There was normal leaflet separation  DOPPLER: The transtricuspid velocity was within the normal range  There was no evidence for stenosis  There was trace regurgitation   PULMONIC VALVE: Leaflets exhibited normal thickness, no calcification, and normal cuspal separation  DOPPLER: The transpulmonic velocity was within the normal range  There was no significant regurgitation  PERICARDIUM: There was no pericardial effusion  The pericardium was normal in appearance  AORTA: The root exhibited normal size  SYSTEMIC VEINS: IVC: The inferior vena cava was not well visualized  SYSTEM MEASUREMENT TABLES 2D %FS: 25 19 % Ao Diam: 3 38 cm EDV(Teich): 91 99 ml EF Biplane: 44 38 % ESV(Teich): 46 07 ml IVSd: 1 15 cm LA Area: 22 09 cm2 LA Diam: 3 12 cm LVEDV MOD A2C: 80 2 ml LVEDV MOD A4C: 98 11 ml LVEDV MOD BP: 92 99 ml LVEDVInd MOD BP: 44 49 ml/m2 LVEF MOD A2C: 26 42 % LVEF MOD A4C: 54 46 % LVESV MOD A2C: 59 01 ml LVESV MOD A4C: 44 68 ml LVESV MOD BP: 51 72 ml LVESVInd MOD BP: 24 75 ml/m2 LVIDd: 4 49 cm LVIDs: 3 36 cm LVLd A2C: 6 93 cm LVLd A4C: 7 65 cm LVLs A2C: 6 03 cm LVLs A4C: 6 19 cm LVPWd: 1 06 cm RA Area: 16 37 cm2 RVIDd: 2 66 cm SV (Teich): 45 92 ml SV MOD A2C: 21 19 ml SV MOD A4C: 53 43 ml [employer] Accredited Echocardiography Laboratory Prepared and electronically signed by Carmelita eRyes MD Signed 29-Aug-2021 15:42:44     CT abdomen pelvis wo contrast    Result Date: 8/13/2021  Narrative: CT ABDOMEN AND PELVIS WITHOUT IV CONTRAST INDICATION:   Hydronephrosis acute renal failure, rule out  anatomical cause  Patient has confirmed COVID-19  COMPARISON:  Right upper quadrant ultrasound 11/2/2020 TECHNIQUE:  CT examination of the abdomen and pelvis was performed without intravenous contrast   Axial, sagittal, and coronal 2D reformatted images were created from the source data and submitted for interpretation  Radiation dose length product (DLP) for this visit:  909 mGy-cm   This examination, like all CT scans performed in the [facility], was performed utilizing techniques to minimize radiation dose exposure, including the use of iterative reconstruction and automated exposure control  Enteric contrast was administered   FINDINGS: ABDOMEN LOWER CHEST:  Bibasilar pulmonary groundglass infiltrates indicating Covid pneumonia in this patient with confirmed Covid 23  Large hiatal hernia measuring over 5 cm  LIVER/BILIARY TREE:  Reidentified hepatomegaly and severe hepatic steatosis  GALLBLADDER:  Gallbladder is surgically absent  SPLEEN:  Unremarkable  PANCREAS:  Unremarkable  ADRENAL GLANDS:  Unremarkable  KIDNEYS/URETERS:  No hydronephrosis  No collecting system calculi  STOMACH AND BOWEL:  Fluid throughout the proximal and mid colon in keeping with a nonspecific diarrheal illness  APPENDIX:  Noninflamed ABDOMINOPELVIC CAVITY:  No ascites  No pneumoperitoneum  No lymphadenopathy  VESSELS:  Unremarkable for patient's age  PELVIS REPRODUCTIVE ORGANS:  Surgical changes of prior hysterectomy  URINARY BLADDER:  Unremarkable  ABDOMINAL WALL/INGUINAL REGIONS:  Unremarkable  OSSEOUS STRUCTURES:  No acute fracture or destructive osseous lesion  Impression: Bibasilar pulmonary groundglass infiltrates indicating Covid pneumonia in this patient with confirmed Covid 19  No hydronephrosis  Fluid throughout the proximal and mid colon in keeping with a nonspecific diarrheal illness  Workstation performed: [id number]     XR chest 1 view portable    Result Date: 8/14/2021  Narrative: CHEST INDICATION:   dyspnea  Patient has confirmed COVID-19  COMPARISON:  None EXAM PERFORMED/VIEWS:  XR CHEST PORTABLE FINDINGS: Cardiomediastinal silhouette appears unremarkable  Basilar and peripheral predominant opacities consistent with given history  No pneumothorax or pleural effusion  Osseous structures appear within normal limits for patient age  Impression: Basilar and peripheral predominant opacities consistent with given history of COVID 19 pneumonia  Workstation performed: [id number]     XR chest portable ICU    Result Date: 8/24/2021  Narrative: CHEST INDICATION:   COVID  Patient has confirmed COVID-19  Positive on 8/13/2021   COMPARISON:  Chest radiograph from 8/20/2021 and abdomen CT from 8/13/2021  EXAM PERFORMED/VIEWS:  XR CHEST PORTABLE ICU FINDINGS: Cardiomediastinal silhouette appears unremarkable  Moderate left and mild right peripheral consolidation, without significant change  No effusion or pneumothorax  Osseous structures appear within normal limits for patient age  Impression: No change in moderate left and mild right peripheral consolidation due to Covid 19 pneumonia  Workstation performed: [id number]     XR chest portable ICU    Result Date: 8/23/2021  Narrative: CHEST INDICATION:   Follow-up Covid-19 pneumonia  COMPARISON:  8/16/2021 EXAM PERFORMED/VIEWS:  XR CHEST PORTABLE ICU FINDINGS: Cardiomediastinal silhouette appears unremarkable  Bilateral peripheral airspace opacity secondary to Covid-19 appear grossly stable  No pneumothorax or pleural effusion  Osseous structures appear within normal limits for patient age  Impression: Stable Covid-19 pneumonia with bilateral peripheral airspace opacities  Workstation performed: [id number]     XR chest portable ICU    Result Date: 8/18/2021  Narrative: CHEST INDICATION:   sob  Patient has confirmed COVID-19  COMPARISON:  August 13, 2021 EXAM PERFORMED/VIEWS:  XR CHEST PORTABLE ICU FINDINGS: Cardiomediastinal silhouette appears unremarkable  Peripheral patchy opacities in both lower lobes, more extensive now than on 8/13/2021  No evidence of pleural effusion or pneumothorax  Osseous structures appear within normal limits for patient age  Impression: Peripheral patchy opacities, in both lower lobes  In the setting of clinically suspected/proven COVID-19, this plain film appearance while nonspecific, can be seen in cases of viral pneumonia such as COVID-19  The findings have progressed since 8/13/2021   Workstation performed: [id number]     CTA chest pe study    Result Date: 8/30/2021  Narrative: CTA - CHEST WITH IV CONTRAST - PULMONARY ANGIOGRAM INDICATION:   Shortness of breath PE suspected, high pretest prob persistent hypoxia with high oxygen requirement with COVID, rule out PE  Patient has confirmed COVID-19  COMPARISON: Chest x-ray 8/24/2021 TECHNIQUE: CTA examination of the chest was performed using angiographic technique according to a protocol specifically tailored to evaluate for pulmonary embolism  Axial, sagittal, and coronal 2D reformatted images were created from the source data and  submitted for interpretation  In addition, coronal 3D MIP postprocessing was performed on the acquisition scanner  Radiation dose length product (DLP) for this visit:  803 mGy-cm   This examination, like all CT scans performed in the [facility], was performed utilizing techniques to minimize radiation dose exposure, including the use of iterative reconstruction and automated exposure control  IV Contrast:  100 mL of iohexol (OMNIPAQUE)  FINDINGS: PULMONARY ARTERIAL TREE:  No pulmonary embolus is seen  LUNGS:  Peripheral areas of consolidation throughout both lungs in keeping with Covid 19 pneumonia without significant change  No endotracheal or endobronchial lesion  PLEURA:  Unremarkable  HEART/GREAT VESSELS:  Unremarkable for patient's age  MEDIASTINUM AND NORA:  Unremarkable  CHEST WALL AND LOWER NECK:   Unremarkable  VISUALIZED STRUCTURES IN THE UPPER ABDOMEN:  Visualized hepatic parenchyma is diffusely decreased in density consistent with hepatic steatosis  Status post cholecystectomy  Small Bochdalek hernia containing fat on the left  Otherwise no clinical significant abnormality identified in the visualized upper abdomen  OSSEOUS STRUCTURES:  No acute fracture or destructive osseous lesion  Impression: No pulmonary embolism  Peripheral areas of consolidation throughout both lungs in keeping with Covid 19 pneumonia without significant change  Fatty liver   Workstation performed: [id number]       Incidental Findings:   · Fatty liver    Test Results Pending at Discharge (will require follow up):   · As per After Visit Summary     Outpatient Tests Requested:  · BMP, mag    Complications:  Refer to hospital course and above listed diagnosis related plan, if any    Hospital Course:     Shayla Rick is a 57 y o  female patient who originally presented to the hospital on 8/14/2021 due to fatigue, exertional dyspnea, dry cough which started on 08/08  Then started with diarrhea  She was seen at [facility] and was noted to be hypoxic on room air and placed on 10 L mid flow  She was then transferred here to step-down unit  Patient received treatment for COVID pneumonia per COVID protocol  Oxygen requirement did improve although still with high oxygen requirement with ambulation  Patient was seen by Pulmonary while here and cleared by Pulmonary prior to discharge  Discharge plan discussed in details with patient  Plan of care was also discussed with significant other Amrit Woodard over the phone    Please see above list of diagnoses and related plan for additional information  Condition at Discharge: stable     Discharge Day Visit / Exam:     Subjective:  States she is slowly improving although still getting short of breath with exertion    Vitals: Blood Pressure: 117/65 (08/31/21 0842)  Pulse: 92 (08/31/21 0842)  Temperature: 98 3 °F (36 8 °C) (08/31/21 0842)  Temp Source: Oral (08/29/21 0806)  Respirations: 18 (08/30/21 2112)  Height: 5' 3" (160 cm) (08/14/21 0100)  Weight - Scale: 110 kg (242 lb 8 1 oz) (08/29/21 0600)  SpO2: 97 % (08/31/21 0842)  Exam:   Physical Exam  Constitutional:       General: She is not in acute distress  Appearance: She is not diaphoretic  HENT:      Head: Normocephalic and atraumatic  Eyes:      General:         Right eye: No discharge  Left eye: No discharge  Cardiovascular:      Rate and Rhythm: Normal rate and regular rhythm  Pulmonary:      Effort: Pulmonary effort is normal  No respiratory distress  Breath sounds:  No wheezing or rales  Comments: Decreased breath sounds bilaterally  Abdominal:      General: Bowel sounds are normal  There is no distension  Palpations: Abdomen is soft  Tenderness: There is no abdominal tenderness  Neurological:      Mental Status: She is alert and oriented to person, place, and time  Discharge instructions/Information to patient and family:(Discharge Medications and Follow up):   See after visit summary for information provided to patient and family  Provisions for Follow-Up Care:  See after visit summary for information related to follow-up care and any pertinent home health orders  Disposition: [facility]    Planned Readmission:  No     Discharge Statement:  I spent > 30 minutes discharging the patient  This time was spent on the day of discharge  I had direct contact with the patient on the day of discharge  Greater than 50% of the total time was spent examining patient, answering all patient questions, arranging and discussing plan of care with patient as well as directly providing post-discharge instructions  Additional time then spent on discharge activities  Discharge Medications:  See after visit summary for reconciled discharge medications provided to patient and family  ** Please Note: "This note has been constructed using a voice recognition system  Therefore there may be syntax, spelling, and/or grammatical errors   Please call if you have any questions  "**

## 2021-08-31 NOTE — CASE MANAGEMENT
CM received a call back from Yordy Frausto with [facility] asking if patient can be ready by 1245-1PM for pick  If not transport will be late in the evening  [facility] is the only co that will transport COVID + patients  CM spoke with patients nurse Gilberto Roberson and she states patient will be ready  Patient was also made aware  CM called  LTAC and made Fifi aware

## 2021-08-31 NOTE — PLAN OF CARE
Problem: Potential for Falls  Goal: Patient will remain free of falls  Description: INTERVENTIONS:  - Educate patient/family on patient safety including physical limitations  - Instruct patient to call for assistance with activity   - Consult OT/PT to assist with strengthening/mobility   - Keep Call bell within reach  - Keep bed low and locked with side rails adjusted as appropriate  - Keep care items and personal belongings within reach  - Initiate and maintain comfort rounds  - Make Fall Risk Sign visible to staff  - Offer Toileting every 2 Hours, in advance of need  - Initiate/Maintain bed/ chair alarm  - Obtain necessary fall risk management equipment: bed/chair alarm  - Apply yellow socks and bracelet for high fall risk patients  - Consider moving patient to room near nurses station  Outcome: Progressing     Problem: PAIN - ADULT  Goal: Verbalizes/displays adequate comfort level or baseline comfort level  Description: Interventions:  - Encourage patient to monitor pain and request assistance  - Assess pain using appropriate pain scale  - Administer analgesics based on type and severity of pain and evaluate response  - Implement non-pharmacological measures as appropriate and evaluate response  - Consider cultural and social influences on pain and pain management  - Notify physician/advanced practitioner if interventions unsuccessful or patient reports new pain  Outcome: Progressing     Problem: INFECTION - ADULT  Goal: Absence or prevention of progression during hospitalization  Description: INTERVENTIONS:  - Assess and monitor for signs and symptoms of infection  - Monitor lab/diagnostic results  - Monitor all insertion sites, i e  indwelling lines, tubes, and drains  - Warren appropriate cooling/warming therapies per order  - Administer medications as ordered  - Instruct and encourage patient and family to use good hand hygiene technique  - Identify and instruct in appropriate isolation precautions for identified infection/condition  Outcome: Progressing     Problem: DISCHARGE PLANNING  Goal: Discharge to home or other facility with appropriate resources  Description: INTERVENTIONS:  - Identify barriers to discharge w/patient and caregiver  - Arrange for needed discharge resources and transportation as appropriate  - Identify discharge learning needs (meds, wound care, etc )  - Arrange for interpretive services to assist at discharge as needed  - Refer to Case Management Department for coordinating discharge planning if the patient needs post-hospital services based on physician/advanced practitioner order or complex needs related to functional status, cognitive ability, or social support system  Outcome: Progressing     Problem: Knowledge Deficit  Goal: Patient/family/caregiver demonstrates understanding of disease process, treatment plan, medications, and discharge instructions  Description: Complete learning assessment and assess knowledge base    Interventions:  - Provide teaching at level of understanding  - Provide teaching via preferred learning methods  Outcome: Progressing     Problem: RESPIRATORY - ADULT  Goal: Achieves optimal ventilation and oxygenation  Description: INTERVENTIONS:  - Assess for changes in respiratory status  - Assess for changes in mentation and behavior  - Position to facilitate oxygenation and minimize respiratory effort  - Oxygen administered by appropriate delivery if ordered  - Initiate smoking cessation education as indicated  - Encourage broncho-pulmonary hygiene including cough, deep breathe, Incentive Spirometry  - Assess the need for suctioning and aspirate as needed  - Assess and instruct to report SOB or any respiratory difficulty  - Respiratory Therapy support as indicated  Outcome: Progressing     Problem: Prexisting or High Potential for Compromised Skin Integrity  Goal: Skin integrity is maintained or improved  Description: INTERVENTIONS:  - Identify patients at risk for skin breakdown  - Assess and monitor skin integrity  - Assess and monitor nutrition and hydration status  - Monitor labs   - Assess for incontinence   - Turn and reposition patient  - Assist with mobility/ambulation  - Relieve pressure over bony prominences  - Avoid friction and shearing  - Provide appropriate hygiene as needed including keeping skin clean and dry  - Evaluate need for skin moisturizer/barrier cream  - Collaborate with interdisciplinary team   - Patient/family teaching  - Consider wound care consult   Outcome: Progressing     Problem: Nutrition/Hydration-ADULT  Goal: Nutrient/Hydration intake appropriate for improving, restoring or maintaining nutritional needs  Description: Monitor and assess patient's nutrition/hydration status for malnutrition  Collaborate with interdisciplinary team and initiate plan and interventions as ordered  Monitor patient's weight and dietary intake as ordered or per policy  Utilize nutrition screening tool and intervene as necessary  Determine patient's food preferences and provide high-protein, high-caloric foods as appropriate       INTERVENTIONS:  - Monitor oral intake, urinary output, labs, and treatment plans  - Assess nutrition and hydration status and recommend course of action  - Evaluate amount of meals eaten  - Assist patient with eating if necessary   - Allow adequate time for meals  - Recommend/ encourage appropriate diets, oral nutritional supplements, and vitamin/mineral supplements  - Order, calculate, and assess calorie counts as needed  - Recommend, monitor, and adjust tube feedings and TPN/PPN based on assessed needs  - Assess need for intravenous fluids  - Provide specific nutrition/hydration education as appropriate  - Include patient/family/caregiver in decisions related to nutrition  Outcome: Progressing

## 2021-08-31 NOTE — CASE MANAGEMENT
REHAN received a call back from Marbella at [facility] and patient is accepted at their facility and transport can be set up  REHAN called patient and made her aware  Confirmed she wants to go to [facility]  CM offered to call her family but she states she will call her   He is already aware  Patients nurse Quita Crigler and Dr Zo Cheney are aware of acceptance

## 2021-08-31 NOTE — INCIDENTAL FINDINGS
The following findings require follow up:  Radiographic finding   Finding: Fatty liver    Please notify the following clinician to assist with the follow up:   Your primary care doctor

## 2021-08-31 NOTE — CASE MANAGEMENT
CM reviewed chart  Patient is ready for dc today  Home O2 test was done and needs 12L O2 w/Oxymizer on exertion  This was discussed with Dr Daisy Issa and Dr Lizz Rea  Patient would benefit from LTAC if she qualifies  Oxygen was ordered through [address] in [place]  CM spoke with patient and discussed the above  She understands the benefits of this option and is requesting CM send referrals  SWhe was provided with choice and her first preference is [facility], then [facility] and [facility]  Referrals were sent in [facility]  CM called [phone] and spoke with Naif Kirk in admissions  She states they have COVID beds  She will review referral and call back  Awaiting cb

## 2021-08-31 NOTE — CASE MANAGEMENT
CM received cb from [email or web address] at [address] and they have a bed and can take patient today if [facility] cannot

## 2021-09-24 ENCOUNTER — TELEPHONE (OUTPATIENT)
Dept: INTERNAL MEDICINE CLINIC | Facility: CLINIC | Age: 61
End: 2021-09-24

## 2021-09-24 NOTE — TELEPHONE ENCOUNTER
Good delarosa called to let you know that the patient missed her nursing visit today and it is there policy to let the  know

## 2021-09-28 ENCOUNTER — TELEPHONE (OUTPATIENT)
Dept: INTERNAL MEDICINE CLINIC | Facility: CLINIC | Age: 61
End: 2021-09-28

## 2021-09-28 DIAGNOSIS — E78.2 MIXED HYPERLIPIDEMIA: ICD-10-CM

## 2021-09-28 DIAGNOSIS — E11.9 TYPE 2 DIABETES MELLITUS WITHOUT COMPLICATION, WITHOUT LONG-TERM CURRENT USE OF INSULIN (HCC): Primary | ICD-10-CM

## 2021-09-28 DIAGNOSIS — E83.51 HYPOCALCEMIA: ICD-10-CM

## 2021-09-28 NOTE — TELEPHONE ENCOUNTER
Pt advised -  HARRIETI she doesn't have a BP monitor - visiting nurse is coming again on Thursday  She usually goes to [facility] to check it on her own

## 2021-09-28 NOTE — TELEPHONE ENCOUNTER
Just got out of [facility] 1 week ago scheduled a f/u for 10/1, does she need to have labs done prior? She said  took a lot of blood while she was there  - I faxed a request to [address] [place] [address] for them to send us her labs

## 2021-09-28 NOTE — TELEPHONE ENCOUNTER
Please call her that I ordered the blood tests in computer    She can get it done if possible tomorrow or Thursday ,it is fasting blood test   If she cannot get it done I will do prescription to her when I see her on October 1st   For blood pressure observe and keep log of blood pressure if persistently elevated will need to restart blood pressure medicine

## 2021-09-28 NOTE — TELEPHONE ENCOUNTER
Her visiting nurse also called to say that her BP was elevated today - 180's/90's when she got there then it did come down to 140's/ 80's  She also said that [facility] took her off her BP meds

## 2021-09-29 ENCOUNTER — APPOINTMENT (OUTPATIENT)
Dept: LAB | Facility: HOSPITAL | Age: 61
End: 2021-09-29
Attending: INTERNAL MEDICINE
Payer: MEDICARE

## 2021-09-29 DIAGNOSIS — E11.9 TYPE 2 DIABETES MELLITUS WITHOUT COMPLICATION, WITHOUT LONG-TERM CURRENT USE OF INSULIN (HCC): Chronic | ICD-10-CM

## 2021-09-29 DIAGNOSIS — E78.2 MIXED HYPERLIPIDEMIA: ICD-10-CM

## 2021-09-29 DIAGNOSIS — E83.51 HYPOCALCEMIA: ICD-10-CM

## 2021-09-29 DIAGNOSIS — I10 ESSENTIAL HYPERTENSION: ICD-10-CM

## 2021-09-29 LAB
25(OH)D3 SERPL-MCNC: 37.6 NG/ML (ref 30–100)
ALBUMIN SERPL BCP-MCNC: 3.6 G/DL (ref 3.4–4.8)
ALP SERPL-CCNC: 64.5 U/L (ref 35–140)
ALT SERPL W P-5'-P-CCNC: 13 U/L (ref 5–54)
ANION GAP SERPL CALCULATED.3IONS-SCNC: 10 MMOL/L (ref 4–13)
AST SERPL W P-5'-P-CCNC: 15 U/L (ref 15–41)
BACTERIA UR QL AUTO: ABNORMAL /HPF
BASOPHILS # BLD MANUAL: 0 THOUSAND/UL (ref 0–0.1)
BASOPHILS NFR MAR MANUAL: 0 % (ref 0–1)
BILIRUB SERPL-MCNC: 0.6 MG/DL (ref 0.3–1.2)
BILIRUB UR QL STRIP: NEGATIVE
BUN SERPL-MCNC: 9 MG/DL (ref 6–20)
CALCIUM SERPL-MCNC: 8.8 MG/DL (ref 8.4–10.2)
CHLORIDE SERPL-SCNC: 107 MMOL/L (ref 96–108)
CHOLEST SERPL-MCNC: 146 MG/DL
CLARITY UR: CLEAR
CO2 SERPL-SCNC: 25 MMOL/L (ref 22–33)
COLOR UR: YELLOW
CREAT SERPL-MCNC: 0.91 MG/DL (ref 0.4–1.1)
CREAT UR-MCNC: 163 MG/DL
EOSINOPHIL # BLD MANUAL: 0 THOUSAND/UL (ref 0–0.4)
EOSINOPHIL NFR BLD MANUAL: 0 % (ref 0–6)
ERYTHROCYTE [DISTWIDTH] IN BLOOD BY AUTOMATED COUNT: 15.8 % (ref 11.6–15.1)
EST. AVERAGE GLUCOSE BLD GHB EST-MCNC: 137 MG/DL
GFR SERPL CREATININE-BSD FRML MDRD: 68 ML/MIN/1.73SQ M
GLUCOSE P FAST SERPL-MCNC: 150 MG/DL (ref 70–105)
GLUCOSE UR STRIP-MCNC: NEGATIVE MG/DL
HBA1C MFR BLD: 6.4 %
HCT VFR BLD AUTO: 37 % (ref 34.8–46.1)
HDLC SERPL-MCNC: 39 MG/DL
HGB BLD-MCNC: 12.1 G/DL (ref 11.5–15.4)
HGB UR QL STRIP.AUTO: NEGATIVE
KETONES UR STRIP-MCNC: NEGATIVE MG/DL
LDLC SERPL CALC-MCNC: 76 MG/DL (ref 0–100)
LEUKOCYTE ESTERASE UR QL STRIP: ABNORMAL
LYMPHOCYTES # BLD AUTO: 3.38 THOUSAND/UL (ref 0.6–4.47)
LYMPHOCYTES # BLD AUTO: 37 % (ref 14–44)
MCH RBC QN AUTO: 29.2 PG (ref 26.8–34.3)
MCHC RBC AUTO-ENTMCNC: 32.7 G/DL (ref 31.4–37.4)
MCV RBC AUTO: 89 FL (ref 82–98)
MICROALBUMIN UR-MCNC: 14.4 MG/L (ref 0–20)
MICROALBUMIN/CREAT 24H UR: 9 MG/G CREATININE (ref 0–30)
MONOCYTES # BLD AUTO: 0.46 THOUSAND/UL (ref 0–1.22)
MONOCYTES NFR BLD: 5 % (ref 4–12)
MUCOUS THREADS UR QL AUTO: ABNORMAL
NEUTROPHILS # BLD MANUAL: 5.21 THOUSAND/UL (ref 1.85–7.62)
NEUTS BAND NFR BLD MANUAL: 1 % (ref 0–8)
NEUTS SEG NFR BLD AUTO: 56 % (ref 43–75)
NITRITE UR QL STRIP: NEGATIVE
NON-SQ EPI CELLS URNS QL MICRO: ABNORMAL /HPF
NONHDLC SERPL-MCNC: 107 MG/DL
PH UR STRIP.AUTO: 6 [PH]
PLATELET # BLD AUTO: 335 THOUSANDS/UL (ref 149–390)
PLATELET BLD QL SMEAR: ADEQUATE
PMV BLD AUTO: 10.1 FL (ref 8.9–12.7)
POTASSIUM SERPL-SCNC: 3.5 MMOL/L (ref 3.5–5)
PROT SERPL-MCNC: 6.5 G/DL (ref 6.4–8.3)
PROT UR STRIP-MCNC: NEGATIVE MG/DL
RBC # BLD AUTO: 4.15 MILLION/UL (ref 3.81–5.12)
RBC #/AREA URNS AUTO: ABNORMAL /HPF
RBC MORPH BLD: NORMAL
SODIUM SERPL-SCNC: 142 MMOL/L (ref 133–145)
SP GR UR STRIP.AUTO: 1.02 (ref 1–1.03)
TRIGL SERPL-MCNC: 157.1 MG/DL
UROBILINOGEN UR QL STRIP.AUTO: 0.2 E.U./DL
VARIANT LYMPHS # BLD AUTO: 1 %
WBC # BLD AUTO: 9.14 THOUSAND/UL (ref 4.31–10.16)
WBC #/AREA URNS AUTO: ABNORMAL /HPF

## 2021-09-29 PROCEDURE — 82570 ASSAY OF URINE CREATININE: CPT | Performed by: INTERNAL MEDICINE

## 2021-09-29 PROCEDURE — 82306 VITAMIN D 25 HYDROXY: CPT

## 2021-09-29 PROCEDURE — 85007 BL SMEAR W/DIFF WBC COUNT: CPT

## 2021-09-29 PROCEDURE — 36415 COLL VENOUS BLD VENIPUNCTURE: CPT

## 2021-09-29 PROCEDURE — 80061 LIPID PANEL: CPT

## 2021-09-29 PROCEDURE — 83036 HEMOGLOBIN GLYCOSYLATED A1C: CPT

## 2021-09-29 PROCEDURE — 80053 COMPREHEN METABOLIC PANEL: CPT

## 2021-09-29 PROCEDURE — 82043 UR ALBUMIN QUANTITATIVE: CPT | Performed by: INTERNAL MEDICINE

## 2021-09-29 PROCEDURE — 81001 URINALYSIS AUTO W/SCOPE: CPT | Performed by: INTERNAL MEDICINE

## 2021-09-29 PROCEDURE — 85027 COMPLETE CBC AUTOMATED: CPT

## 2021-10-01 ENCOUNTER — OFFICE VISIT (OUTPATIENT)
Dept: INTERNAL MEDICINE CLINIC | Facility: CLINIC | Age: 61
End: 2021-10-01
Payer: MEDICARE

## 2021-10-01 VITALS
HEART RATE: 74 BPM | SYSTOLIC BLOOD PRESSURE: 152 MMHG | HEIGHT: 63 IN | OXYGEN SATURATION: 97 % | WEIGHT: 241 LBS | BODY MASS INDEX: 42.7 KG/M2 | TEMPERATURE: 98.4 F | DIASTOLIC BLOOD PRESSURE: 80 MMHG

## 2021-10-01 DIAGNOSIS — E11.9 TYPE 2 DIABETES MELLITUS WITHOUT COMPLICATION, WITHOUT LONG-TERM CURRENT USE OF INSULIN (HCC): Chronic | ICD-10-CM

## 2021-10-01 DIAGNOSIS — E83.42 HYPOMAGNESEMIA: ICD-10-CM

## 2021-10-01 DIAGNOSIS — I10 ESSENTIAL HYPERTENSION: Chronic | ICD-10-CM

## 2021-10-01 DIAGNOSIS — E78.2 MIXED HYPERLIPIDEMIA: Chronic | ICD-10-CM

## 2021-10-01 DIAGNOSIS — J96.01 ACUTE RESPIRATORY FAILURE WITH HYPOXIA (HCC): Primary | ICD-10-CM

## 2021-10-01 DIAGNOSIS — K21.9 GASTROESOPHAGEAL REFLUX DISEASE WITHOUT ESOPHAGITIS: Chronic | ICD-10-CM

## 2021-10-01 DIAGNOSIS — I48.0 PAROXYSMAL ATRIAL FIBRILLATION (HCC): ICD-10-CM

## 2021-10-01 PROBLEM — J96.00 ACUTE RESPIRATORY FAILURE DUE TO COVID-19 (HCC): Status: ACTIVE | Noted: 2021-10-01

## 2021-10-01 PROBLEM — J96.00 ACUTE RESPIRATORY FAILURE DUE TO COVID-19 (HCC): Status: RESOLVED | Noted: 2021-10-01 | Resolved: 2021-10-01

## 2021-10-01 PROBLEM — U07.1 ACUTE RESPIRATORY FAILURE DUE TO COVID-19 (HCC): Status: RESOLVED | Noted: 2021-10-01 | Resolved: 2021-10-01

## 2021-10-01 PROBLEM — U07.1 ACUTE RESPIRATORY FAILURE DUE TO COVID-19 (HCC): Status: ACTIVE | Noted: 2021-10-01

## 2021-10-01 PROCEDURE — 99215 OFFICE O/P EST HI 40 MIN: CPT | Performed by: INTERNAL MEDICINE

## 2021-10-01 RX ORDER — LOSARTAN POTASSIUM AND HYDROCHLOROTHIAZIDE 12.5; 1 MG/1; MG/1
1 TABLET ORAL DAILY
Qty: 90 TABLET | Refills: 1 | Status: SHIPPED | OUTPATIENT
Start: 2021-10-01 | End: 2021-12-07 | Stop reason: SDUPTHER

## 2021-10-01 RX ORDER — PHENOL 1.4 %
600 AEROSOL, SPRAY (ML) MUCOUS MEMBRANE 2 TIMES DAILY WITH MEALS
COMMUNITY

## 2021-10-01 RX ORDER — LANCETS 28 GAUGE
EACH MISCELLANEOUS 2 TIMES DAILY
Qty: 100 EACH | Refills: 5 | Status: SHIPPED | OUTPATIENT
Start: 2021-10-01 | End: 2021-12-08 | Stop reason: SDUPTHER

## 2021-10-01 RX ORDER — LOSARTAN POTASSIUM 100 MG/1
1 TABLET ORAL DAILY
COMMUNITY
End: 2021-12-07 | Stop reason: SDUPTHER

## 2021-10-01 RX ORDER — SIMVASTATIN 20 MG
2 TABLET ORAL
COMMUNITY
End: 2021-11-09 | Stop reason: SDUPTHER

## 2021-10-11 ENCOUNTER — OFFICE VISIT (OUTPATIENT)
Dept: PULMONOLOGY | Facility: CLINIC | Age: 61
End: 2021-10-11
Payer: MEDICARE

## 2021-10-11 VITALS
TEMPERATURE: 97.6 F | SYSTOLIC BLOOD PRESSURE: 132 MMHG | HEART RATE: 77 BPM | DIASTOLIC BLOOD PRESSURE: 78 MMHG | WEIGHT: 234.38 LBS | BODY MASS INDEX: 41.53 KG/M2 | OXYGEN SATURATION: 90 % | HEIGHT: 63 IN

## 2021-10-11 DIAGNOSIS — U09.9 COVID-19 LONG HAULER: Primary | ICD-10-CM

## 2021-10-11 PROCEDURE — 99213 OFFICE O/P EST LOW 20 MIN: CPT | Performed by: INTERNAL MEDICINE

## 2021-10-18 ENCOUNTER — HOSPITAL ENCOUNTER (OUTPATIENT)
Dept: RADIOLOGY | Facility: HOSPITAL | Age: 61
Discharge: HOME | End: 2021-10-18
Attending: INTERNAL MEDICINE
Payer: MEDICARE

## 2021-10-18 ENCOUNTER — CONSULT (OUTPATIENT)
Dept: CARDIOLOGY CLINIC | Facility: CLINIC | Age: 61
End: 2021-10-18
Payer: MEDICARE

## 2021-10-18 VITALS
TEMPERATURE: 97.6 F | BODY MASS INDEX: 41.29 KG/M2 | WEIGHT: 233 LBS | SYSTOLIC BLOOD PRESSURE: 132 MMHG | HEART RATE: 68 BPM | HEIGHT: 63 IN | OXYGEN SATURATION: 94 % | DIASTOLIC BLOOD PRESSURE: 64 MMHG

## 2021-10-18 DIAGNOSIS — U09.9 COVID-19 LONG HAULER: ICD-10-CM

## 2021-10-18 DIAGNOSIS — I48.0 PAROXYSMAL ATRIAL FIBRILLATION (HCC): Primary | ICD-10-CM

## 2021-10-18 PROCEDURE — 93000 ELECTROCARDIOGRAM COMPLETE: CPT | Performed by: INTERNAL MEDICINE

## 2021-10-18 PROCEDURE — 71046 X-RAY EXAM CHEST 2 VIEWS: CPT

## 2021-10-18 PROCEDURE — 99215 OFFICE O/P EST HI 40 MIN: CPT | Performed by: INTERNAL MEDICINE

## 2021-10-18 RX ORDER — LANOLIN ALCOHOL/MO/W.PET/CERES
1 CREAM (GRAM) TOPICAL 3 TIMES DAILY
COMMUNITY

## 2021-10-20 DIAGNOSIS — U09.9 COVID-19 LONG HAULER: Primary | ICD-10-CM

## 2021-10-26 ENCOUNTER — HOSPITAL ENCOUNTER (OUTPATIENT)
Dept: NON INVASIVE DIAGNOSTICS | Facility: CLINIC | Age: 61
Discharge: HOME | End: 2021-10-26
Payer: MEDICARE

## 2021-10-26 DIAGNOSIS — I48.0 PAROXYSMAL ATRIAL FIBRILLATION (HCC): ICD-10-CM

## 2021-10-26 PROCEDURE — 93226 XTRNL ECG REC<48 HR SCAN A/R: CPT

## 2021-10-26 PROCEDURE — 93225 XTRNL ECG REC<48 HRS REC: CPT

## 2021-10-29 PROCEDURE — 93227 XTRNL ECG REC<48 HR R&I: CPT | Performed by: INTERNAL MEDICINE

## 2021-11-01 ENCOUNTER — APPOINTMENT (OUTPATIENT)
Dept: LAB | Facility: HOSPITAL | Age: 61
End: 2021-11-01
Attending: INTERNAL MEDICINE
Payer: MEDICARE

## 2021-11-01 ENCOUNTER — TELEPHONE (OUTPATIENT)
Dept: CARDIOLOGY CLINIC | Facility: CLINIC | Age: 61
End: 2021-11-01

## 2021-11-01 DIAGNOSIS — E83.42 HYPOMAGNESEMIA: ICD-10-CM

## 2021-11-01 DIAGNOSIS — E11.9 TYPE 2 DIABETES MELLITUS WITHOUT COMPLICATION, WITHOUT LONG-TERM CURRENT USE OF INSULIN (HCC): Chronic | ICD-10-CM

## 2021-11-01 DIAGNOSIS — E78.2 MIXED HYPERLIPIDEMIA: Chronic | ICD-10-CM

## 2021-11-01 DIAGNOSIS — I10 ESSENTIAL HYPERTENSION: Chronic | ICD-10-CM

## 2021-11-01 LAB
ALBUMIN SERPL BCP-MCNC: 4 G/DL (ref 3.4–4.8)
ALP SERPL-CCNC: 74 U/L (ref 35–140)
ALT SERPL W P-5'-P-CCNC: 15 U/L (ref 5–54)
ANION GAP SERPL CALCULATED.3IONS-SCNC: 11 MMOL/L (ref 4–13)
AST SERPL W P-5'-P-CCNC: 20 U/L (ref 15–41)
BILIRUB SERPL-MCNC: 0.41 MG/DL (ref 0.3–1.2)
BUN SERPL-MCNC: 16 MG/DL (ref 6–20)
CALCIUM SERPL-MCNC: 9.4 MG/DL (ref 8.4–10.2)
CHLORIDE SERPL-SCNC: 100 MMOL/L (ref 96–108)
CO2 SERPL-SCNC: 29 MMOL/L (ref 22–33)
CREAT SERPL-MCNC: 1.04 MG/DL (ref 0.4–1.1)
GFR SERPL CREATININE-BSD FRML MDRD: 58 ML/MIN/1.73SQ M
GLUCOSE SERPL-MCNC: 144 MG/DL (ref 65–140)
MAGNESIUM SERPL-MCNC: 1.6 MG/DL (ref 1.6–2.6)
POTASSIUM SERPL-SCNC: 3.7 MMOL/L (ref 3.5–5)
PROT SERPL-MCNC: 7.6 G/DL (ref 6.4–8.3)
SODIUM SERPL-SCNC: 140 MMOL/L (ref 133–145)

## 2021-11-01 PROCEDURE — 36415 COLL VENOUS BLD VENIPUNCTURE: CPT

## 2021-11-01 PROCEDURE — 83735 ASSAY OF MAGNESIUM: CPT

## 2021-11-01 PROCEDURE — 80053 COMPREHEN METABOLIC PANEL: CPT

## 2021-11-02 ENCOUNTER — HOSPITAL ENCOUNTER (OUTPATIENT)
Dept: PULMONOLOGY | Facility: HOSPITAL | Age: 61
Discharge: HOME | End: 2021-11-02
Attending: INTERNAL MEDICINE
Payer: MEDICARE

## 2021-11-02 DIAGNOSIS — U09.9 COVID-19 LONG HAULER: ICD-10-CM

## 2021-11-02 PROCEDURE — 94760 N-INVAS EAR/PLS OXIMETRY 1: CPT

## 2021-11-02 PROCEDURE — 94726 PLETHYSMOGRAPHY LUNG VOLUMES: CPT

## 2021-11-02 PROCEDURE — 94060 EVALUATION OF WHEEZING: CPT | Performed by: INTERNAL MEDICINE

## 2021-11-02 PROCEDURE — 94726 PLETHYSMOGRAPHY LUNG VOLUMES: CPT | Performed by: INTERNAL MEDICINE

## 2021-11-02 PROCEDURE — 94729 DIFFUSING CAPACITY: CPT | Performed by: INTERNAL MEDICINE

## 2021-11-02 PROCEDURE — 94729 DIFFUSING CAPACITY: CPT

## 2021-11-02 PROCEDURE — 94060 EVALUATION OF WHEEZING: CPT

## 2021-11-02 RX ORDER — ALBUTEROL SULFATE 2.5 MG/3ML
2.5 SOLUTION RESPIRATORY (INHALATION) ONCE
Status: COMPLETED | OUTPATIENT
Start: 2021-11-02 | End: 2021-11-02

## 2021-11-02 RX ADMIN — ALBUTEROL SULFATE 2.5 MG: 2.5 SOLUTION RESPIRATORY (INHALATION) at 12:57

## 2021-11-09 ENCOUNTER — OFFICE VISIT (OUTPATIENT)
Dept: INTERNAL MEDICINE CLINIC | Facility: CLINIC | Age: 61
End: 2021-11-09
Payer: MEDICARE

## 2021-11-09 VITALS
OXYGEN SATURATION: 97 % | HEIGHT: 63 IN | BODY MASS INDEX: 40.75 KG/M2 | WEIGHT: 230 LBS | SYSTOLIC BLOOD PRESSURE: 126 MMHG | TEMPERATURE: 98.9 F | HEART RATE: 70 BPM | DIASTOLIC BLOOD PRESSURE: 78 MMHG

## 2021-11-09 DIAGNOSIS — I10 ESSENTIAL HYPERTENSION: ICD-10-CM

## 2021-11-09 DIAGNOSIS — E78.2 MIXED HYPERLIPIDEMIA: Chronic | ICD-10-CM

## 2021-11-09 DIAGNOSIS — M79.672 PAIN IN BOTH FEET: Chronic | ICD-10-CM

## 2021-11-09 DIAGNOSIS — E66.01 OBESITY, MORBID (HCC): Chronic | ICD-10-CM

## 2021-11-09 DIAGNOSIS — Z00.00 MEDICARE ANNUAL WELLNESS VISIT, SUBSEQUENT: Primary | ICD-10-CM

## 2021-11-09 DIAGNOSIS — U09.9 COVID-19 LONG HAULER: ICD-10-CM

## 2021-11-09 DIAGNOSIS — I48.0 PAROXYSMAL ATRIAL FIBRILLATION (HCC): ICD-10-CM

## 2021-11-09 DIAGNOSIS — M79.671 PAIN IN BOTH FEET: Chronic | ICD-10-CM

## 2021-11-09 DIAGNOSIS — E11.9 TYPE 2 DIABETES MELLITUS WITHOUT COMPLICATION, WITHOUT LONG-TERM CURRENT USE OF INSULIN (HCC): Chronic | ICD-10-CM

## 2021-11-09 DIAGNOSIS — Z12.31 ENCOUNTER FOR SCREENING MAMMOGRAM FOR MALIGNANT NEOPLASM OF BREAST: ICD-10-CM

## 2021-11-09 PROBLEM — I48.91 ATRIAL FIBRILLATION (HCC): Status: RESOLVED | Noted: 2021-08-25 | Resolved: 2021-11-09

## 2021-11-09 PROCEDURE — 99213 OFFICE O/P EST LOW 20 MIN: CPT | Performed by: INTERNAL MEDICINE

## 2021-11-09 PROCEDURE — G0438 PPPS, INITIAL VISIT: HCPCS | Performed by: INTERNAL MEDICINE

## 2021-11-09 RX ORDER — ATORVASTATIN CALCIUM 40 MG/1
40 TABLET, FILM COATED ORAL DAILY
Qty: 90 TABLET | Refills: 1 | Status: SHIPPED | OUTPATIENT
Start: 2021-11-09 | End: 2021-12-07 | Stop reason: SDUPTHER

## 2021-11-09 RX ORDER — ATORVASTATIN CALCIUM 40 MG/1
40 TABLET, FILM COATED ORAL DAILY
COMMUNITY
End: 2021-11-09 | Stop reason: SDUPTHER

## 2021-11-19 DIAGNOSIS — K21.9 GASTROESOPHAGEAL REFLUX DISEASE WITHOUT ESOPHAGITIS: Primary | ICD-10-CM

## 2021-11-19 RX ORDER — PANTOPRAZOLE SODIUM 20 MG/1
20 TABLET, DELAYED RELEASE ORAL DAILY
Qty: 90 TABLET | Refills: 2 | Status: SHIPPED | OUTPATIENT
Start: 2021-11-19 | End: 2021-12-08 | Stop reason: SDUPTHER

## 2021-11-30 ENCOUNTER — TELEPHONE (OUTPATIENT)
Dept: INTERNAL MEDICINE CLINIC | Facility: CLINIC | Age: 61
End: 2021-11-30

## 2021-11-30 DIAGNOSIS — E11.9 TYPE 2 DIABETES MELLITUS WITHOUT COMPLICATION, WITHOUT LONG-TERM CURRENT USE OF INSULIN (HCC): Primary | ICD-10-CM

## 2021-11-30 RX ORDER — BLOOD-GLUCOSE METER
1 KIT MISCELLANEOUS 2 TIMES DAILY
Qty: 100 STRIP | Refills: 5 | Status: SHIPPED | OUTPATIENT
Start: 2021-11-30 | End: 2021-12-08 | Stop reason: SDUPTHER

## 2021-12-03 ENCOUNTER — TELEPHONE (OUTPATIENT)
Dept: INTERNAL MEDICINE CLINIC | Facility: CLINIC | Age: 61
End: 2021-12-03

## 2021-12-06 ENCOUNTER — OFFICE VISIT (OUTPATIENT)
Dept: PULMONOLOGY | Facility: CLINIC | Age: 61
End: 2021-12-06
Payer: MEDICARE

## 2021-12-06 ENCOUNTER — TELEPHONE (OUTPATIENT)
Dept: PULMONOLOGY | Facility: CLINIC | Age: 61
End: 2021-12-06

## 2021-12-06 VITALS
WEIGHT: 238.25 LBS | OXYGEN SATURATION: 97 % | BODY MASS INDEX: 42.21 KG/M2 | HEIGHT: 63 IN | HEART RATE: 83 BPM | TEMPERATURE: 98.2 F | SYSTOLIC BLOOD PRESSURE: 132 MMHG | DIASTOLIC BLOOD PRESSURE: 76 MMHG

## 2021-12-06 DIAGNOSIS — U09.9 COVID-19 LONG HAULER: Primary | ICD-10-CM

## 2021-12-06 DIAGNOSIS — R06.02 SOB (SHORTNESS OF BREATH) ON EXERTION: ICD-10-CM

## 2021-12-06 DIAGNOSIS — J96.01 ACUTE RESPIRATORY FAILURE WITH HYPOXIA (HCC): ICD-10-CM

## 2021-12-06 PROCEDURE — 94010 BREATHING CAPACITY TEST: CPT | Performed by: INTERNAL MEDICINE

## 2021-12-06 PROCEDURE — 99213 OFFICE O/P EST LOW 20 MIN: CPT | Performed by: INTERNAL MEDICINE

## 2021-12-07 DIAGNOSIS — E11.9 TYPE 2 DIABETES MELLITUS WITHOUT COMPLICATION, WITHOUT LONG-TERM CURRENT USE OF INSULIN (HCC): Chronic | ICD-10-CM

## 2021-12-07 DIAGNOSIS — E78.2 MIXED HYPERLIPIDEMIA: Chronic | ICD-10-CM

## 2021-12-07 DIAGNOSIS — I10 ESSENTIAL HYPERTENSION: Chronic | ICD-10-CM

## 2021-12-07 RX ORDER — LOSARTAN POTASSIUM AND HYDROCHLOROTHIAZIDE 12.5; 1 MG/1; MG/1
1 TABLET ORAL DAILY
Qty: 90 TABLET | Refills: 1 | Status: SHIPPED | OUTPATIENT
Start: 2021-12-07 | End: 2021-12-16 | Stop reason: SDUPTHER

## 2021-12-07 RX ORDER — ATORVASTATIN CALCIUM 40 MG/1
40 TABLET, FILM COATED ORAL DAILY
Qty: 90 TABLET | Refills: 1 | Status: SHIPPED | OUTPATIENT
Start: 2021-12-07 | End: 2021-12-16 | Stop reason: SDUPTHER

## 2021-12-07 RX ORDER — METOPROLOL TARTRATE 50 MG/1
50 TABLET, FILM COATED ORAL 2 TIMES DAILY
Qty: 180 TABLET | Refills: 1 | Status: SHIPPED | OUTPATIENT
Start: 2021-12-07 | End: 2021-12-16 | Stop reason: SDUPTHER

## 2021-12-08 DIAGNOSIS — K21.9 GASTROESOPHAGEAL REFLUX DISEASE WITHOUT ESOPHAGITIS: ICD-10-CM

## 2021-12-08 DIAGNOSIS — E11.9 TYPE 2 DIABETES MELLITUS WITHOUT COMPLICATION, WITHOUT LONG-TERM CURRENT USE OF INSULIN (HCC): ICD-10-CM

## 2021-12-08 RX ORDER — PANTOPRAZOLE SODIUM 20 MG/1
20 TABLET, DELAYED RELEASE ORAL DAILY
Qty: 90 TABLET | Refills: 2 | Status: SHIPPED | OUTPATIENT
Start: 2021-12-08 | End: 2022-01-11 | Stop reason: ALTCHOICE

## 2021-12-08 RX ORDER — BLOOD-GLUCOSE METER
1 KIT MISCELLANEOUS 2 TIMES DAILY
Qty: 200 STRIP | Refills: 3 | Status: SHIPPED | OUTPATIENT
Start: 2021-12-08 | End: 2021-12-16 | Stop reason: SDUPTHER

## 2021-12-08 RX ORDER — LANCETS 28 GAUGE
EACH MISCELLANEOUS 2 TIMES DAILY
Qty: 200 EACH | Refills: 3 | Status: SHIPPED | OUTPATIENT
Start: 2021-12-08 | End: 2021-12-16 | Stop reason: SDUPTHER

## 2021-12-08 RX ORDER — BLOOD-GLUCOSE METER
KIT MISCELLANEOUS DAILY
Status: CANCELLED | OUTPATIENT
Start: 2021-12-08

## 2021-12-14 ENCOUNTER — HOSPITAL ENCOUNTER (OUTPATIENT)
Dept: MAMMOGRAPHY | Facility: HOSPITAL | Age: 61
Discharge: HOME | End: 2021-12-14
Attending: INTERNAL MEDICINE
Payer: MEDICARE

## 2021-12-14 ENCOUNTER — TELEPHONE (OUTPATIENT)
Dept: INTERNAL MEDICINE CLINIC | Facility: CLINIC | Age: 61
End: 2021-12-14

## 2021-12-14 VITALS — HEIGHT: 63 IN | WEIGHT: 238 LBS | BODY MASS INDEX: 42.17 KG/M2

## 2021-12-14 DIAGNOSIS — Z12.31 ENCOUNTER FOR SCREENING MAMMOGRAM FOR MALIGNANT NEOPLASM OF BREAST: ICD-10-CM

## 2021-12-14 PROCEDURE — 77067 SCR MAMMO BI INCL CAD: CPT

## 2021-12-14 PROCEDURE — 77063 BREAST TOMOSYNTHESIS BI: CPT

## 2021-12-16 ENCOUNTER — TELEPHONE (OUTPATIENT)
Dept: INTERNAL MEDICINE CLINIC | Facility: CLINIC | Age: 61
End: 2021-12-16

## 2021-12-16 DIAGNOSIS — I10 ESSENTIAL HYPERTENSION: Chronic | ICD-10-CM

## 2021-12-16 DIAGNOSIS — E78.2 MIXED HYPERLIPIDEMIA: Chronic | ICD-10-CM

## 2021-12-16 DIAGNOSIS — E11.9 TYPE 2 DIABETES MELLITUS WITHOUT COMPLICATION, WITHOUT LONG-TERM CURRENT USE OF INSULIN (HCC): ICD-10-CM

## 2021-12-16 DIAGNOSIS — E11.9 TYPE 2 DIABETES MELLITUS WITHOUT COMPLICATION, WITHOUT LONG-TERM CURRENT USE OF INSULIN (HCC): Chronic | ICD-10-CM

## 2021-12-16 RX ORDER — BLOOD-GLUCOSE METER
1 KIT MISCELLANEOUS 2 TIMES DAILY
Qty: 200 STRIP | Refills: 3 | Status: SHIPPED | OUTPATIENT
Start: 2021-12-16 | End: 2021-12-30 | Stop reason: SDUPTHER

## 2021-12-16 RX ORDER — LOSARTAN POTASSIUM AND HYDROCHLOROTHIAZIDE 12.5; 1 MG/1; MG/1
1 TABLET ORAL DAILY
Qty: 90 TABLET | Refills: 1 | Status: SHIPPED | OUTPATIENT
Start: 2021-12-16 | End: 2022-07-12

## 2021-12-16 RX ORDER — ATORVASTATIN CALCIUM 40 MG/1
40 TABLET, FILM COATED ORAL DAILY
Qty: 90 TABLET | Refills: 1 | Status: SHIPPED | OUTPATIENT
Start: 2021-12-16 | End: 2022-06-13

## 2021-12-16 RX ORDER — METOPROLOL TARTRATE 50 MG/1
50 TABLET, FILM COATED ORAL 2 TIMES DAILY
Qty: 180 TABLET | Refills: 1 | Status: SHIPPED | OUTPATIENT
Start: 2021-12-16 | End: 2022-07-12

## 2021-12-16 RX ORDER — LANCETS 28 GAUGE
EACH MISCELLANEOUS 2 TIMES DAILY
Qty: 200 EACH | Refills: 3 | Status: SHIPPED | OUTPATIENT
Start: 2021-12-16 | End: 2021-12-30 | Stop reason: SDUPTHER

## 2021-12-30 DIAGNOSIS — E11.9 TYPE 2 DIABETES MELLITUS WITHOUT COMPLICATION, WITHOUT LONG-TERM CURRENT USE OF INSULIN (HCC): ICD-10-CM

## 2021-12-30 RX ORDER — BLOOD-GLUCOSE METER
KIT MISCELLANEOUS DAILY
Status: CANCELLED | OUTPATIENT
Start: 2021-12-30

## 2021-12-30 NOTE — TELEPHONE ENCOUNTER
Patient requesting test strips and lancets for her Freestyle light machine  She test 2x a day   Please send to [facility] on [address]

## 2022-01-03 RX ORDER — LANCETS 28 GAUGE
EACH MISCELLANEOUS 2 TIMES DAILY
Qty: 200 EACH | Refills: 3 | Status: SHIPPED | OUTPATIENT
Start: 2022-01-03 | End: 2022-02-25 | Stop reason: SDUPTHER

## 2022-01-03 RX ORDER — BLOOD-GLUCOSE METER
1 KIT MISCELLANEOUS 2 TIMES DAILY
Qty: 200 STRIP | Refills: 3 | Status: SHIPPED | OUTPATIENT
Start: 2022-01-03 | End: 2022-02-17 | Stop reason: SDUPTHER

## 2022-01-11 ENCOUNTER — OFFICE VISIT (OUTPATIENT)
Dept: GASTROENTEROLOGY | Facility: CLINIC | Age: 62
End: 2022-01-11
Payer: MEDICARE

## 2022-01-11 VITALS
BODY MASS INDEX: 43.94 KG/M2 | SYSTOLIC BLOOD PRESSURE: 83 MMHG | HEART RATE: 67 BPM | WEIGHT: 248 LBS | DIASTOLIC BLOOD PRESSURE: 62 MMHG | HEIGHT: 63 IN

## 2022-01-11 DIAGNOSIS — K44.9 HIATAL HERNIA: ICD-10-CM

## 2022-01-11 DIAGNOSIS — E66.01 OBESITY, MORBID (HCC): ICD-10-CM

## 2022-01-11 DIAGNOSIS — K21.00 GASTROESOPHAGEAL REFLUX DISEASE WITH ESOPHAGITIS, UNSPECIFIED WHETHER HEMORRHAGE: Primary | ICD-10-CM

## 2022-01-11 DIAGNOSIS — R13.14 PHARYNGOESOPHAGEAL DYSPHAGIA: ICD-10-CM

## 2022-01-11 PROCEDURE — 99214 OFFICE O/P EST MOD 30 MIN: CPT | Performed by: INTERNAL MEDICINE

## 2022-01-11 RX ORDER — PANTOPRAZOLE SODIUM 40 MG/1
40 TABLET, DELAYED RELEASE ORAL DAILY
Qty: 90 TABLET | Refills: 3 | Status: SHIPPED | OUTPATIENT
Start: 2022-01-11

## 2022-01-11 NOTE — PROGRESS NOTES
[facility]  John Lacy 57 y o  female MRN: [id number]  Encounter: [id number]          ASSESSMENT AND PLAN:      1  Gastroesophageal reflux disease with esophagitis, unspecified whether hemorrhage  Patient is complaining of increasing heartburn and reflux symptoms  She has had burning sensation that travels from the epigastrium all the to her throat  She has intermittent difficulty in swallowing  She has to chew her food well  She denies any nausea or vomiting  She denies any chest pain, cough or wheezing  There is no palpitation, orthopnea or exertional dyspnea  She had COVID infection few months ago  She denies any change in bowel habits or rectal bleeding  She had a colonoscopy two years ago  I have reviewed the findings of colonoscopy at [facility] endoscopy center  I will increase the dose of pantoprazole to 40 mg a day  She will be scheduled for upper endoscopy  Further recommendations will be given then  - pantoprazole (PROTONIX) 40 mg tablet; Take 1 tablet (40 mg total) by mouth daily 1/2 hr before breakfast  Dispense: 90 tablet; Refill: 3  - EGD; Future    2  Hiatal hernia  She does have history of hiatal hernia  Possibility of esophagitis and esophageal ulcer cannot be excluded  Patient may need esophageal dilatation depending on findings during endoscopy  Strict reflux precautions explained the patient  - pantoprazole (PROTONIX) 40 mg tablet; Take 1 tablet (40 mg total) by mouth daily 1/2 hr before breakfast  Dispense: 90 tablet; Refill: 3  - EGD; Future    3  Pharyngoesophageal dysphagia  She appears to have food getting stuck in the upper chest area  Endoscopy will be performed and further recommendations will be given  - pantoprazole (PROTONIX) 40 mg tablet; Take 1 tablet (40 mg total) by mouth daily 1/2 hr before breakfast  Dispense: 90 tablet; Refill: 3  - EGD; Future    4   Obesity, morbid [facility])  Patient has morbid obesity  I have advised to lose weight  Patient lost approximately 20 lb since her COVID infection  I have advised patient not to gain it back  I have advised to cut down intake of carbohydrates  ______________________________________________________________________    SUBJECTIVE:  Heartburn, indigestion and epigastric discomfort  Some burning in the throat  REVIEW OF SYSTEMS IS OTHERWISE NEGATIVE        Historical Information   Past Medical History:   Diagnosis Date    Abrasion of right leg 03/19/2021    Acute respiratory failure due to COVID-19 ([facility] ) 10/01/2021    BMI 45 0-49 9, adult ([facility] ) 03/19/2021    Dyslipidemia     Edema of both lower legs 03/19/2021    Essential hypertension 01/23/2021    Gastroesophageal reflux disease without esophagitis 01/23/2021    GERD (gastroesophageal reflux disease)     HTN (hypertension)     Hyperlipemia     Hypertension     Hypertensive arterionephrosclerosis of transplanted kidney 01/23/2021    Hypokalemia 04/20/2021    Insomnia     Leukocytosis     Medicare annual wellness visit, subsequent 11/9/2021    Mixed hyperlipidemia 01/23/2021    Numbness     Obstructive sleep apnea syndrome 01/23/2021    Pain in ankle 01/23/2021    Pain in both feet 01/23/2021    Pain in both knees 01/26/2021    Paroxysmal atrial fibrillation ([facility] ) 11/9/2021    Skin lesions     Sleep apnea     Type 2 diabetes mellitus without complication, without long-term current use of insulin ([facility] ) 01/23/2021     Past Surgical History:   Procedure Laterality Date    ANKLE SURGERY Right 2009/2012    BREAST BIOPSY Right     stereotactic biopsy    CARPAL TUNNEL RELEASE Bilateral 1990    COLONOSCOPY  03/07/2017    Dr Soumya Lee  2004    HYSTERECTOMY  2004    MAMMO (HISTORICAL)  10/02/2018    MAMMO STEREOTACTIC BREAST BIOPSY RIGHT (ALL INC) Right 2014     Social History   Social History     Substance and Sexual Activity   Alcohol Use Yes    Comment: SOCIAL     Social History     Substance and Sexual Activity   Drug Use Not Currently    Comment: No drug use - As per [employer]     Social History     Tobacco Use   Smoking Status Former Smoker    Packs/day: 1 50    Types: Cigarettes    Quit date: 2004    Years since quittin 3   Smokeless Tobacco Former User   Tobacco Comment    13 YEARS      Family History   Problem Relation Age of Onset    Cancer Mother     Diabetes Mother     Hyperlipidemia Mother     Breast cancer Mother     Breast cancer Maternal Grandmother     Lung cancer Father     No Known Problems Sister     No Known Problems Daughter     No Known Problems Maternal Grandfather     No Known Problems Sister     No Known Problems Son     No Known Problems Maternal Aunt        Meds/Allergies       Current Outpatient Medications:     atorvastatin (LIPITOR) 40 mg tablet    Blood Glucose Monitoring Suppl (FreeStyle Lite) TOYIN    FREESTYLE LITE test strip    glucosamine-chondroitin 500-400 MG tablet    Lancets (freestyle) lancets    losartan-hydrochlorothiazide (HYZAAR) 100-12 5 MG per tablet    metFORMIN (GLUCOPHAGE) 500 mg tablet    metoprolol tartrate (LOPRESSOR) 50 mg tablet    Multiple Vitamin (MULTIVITAMIN) capsule    traMADol (ULTRAM) 50 mg tablet    calcium carbonate (OS-AMEE) 600 MG tablet    pantoprazole (PROTONIX) 40 mg tablet    predniSONE 20 mg tablet    Allergies   Allergen Reactions    Aspirin GI Intolerance    Lisinopril Fatigue           Objective     Blood pressure (!) 83/62, pulse 67, height 5' 3" (1 6 m), weight 112 kg (248 lb)  Body mass index is 43 93 kg/m²  PHYSICAL EXAM:      General Appearance:   Alert, cooperative, no distress   HEENT:   Normocephalic, atraumatic, anicteric      Neck:  Supple, symmetrical, trachea midline   Lungs:   Clear to auscultation bilaterally; no rales, rhonchi or wheezing; respirations unlabored    Heart[de-identified]   Regular rate and rhythm; no murmur, rub, or gallop  Abdomen:   Soft, non-tender, non-distended; normal bowel sounds; no masses, no organomegaly    Genitalia:   Deferred    Rectal:   Deferred    Extremities:  No cyanosis, clubbing or edema    Pulses:  2+ and symmetric    Skin:  No jaundice, rashes, or lesions    Lymph nodes:  No palpable cervical lymphadenopathy        Lab Results:   No visits with results within 1 Day(s) from this visit  Latest known visit with results is:   Appointment on 11/01/2021   Component Date Value    Sodium 11/01/2021 140     Potassium 11/01/2021 3 7     Chloride 11/01/2021 100     CO2 11/01/2021 29     ANION GAP 11/01/2021 11     BUN 11/01/2021 16     Creatinine 11/01/2021 1 04     Glucose 11/01/2021 144*    Calcium 11/01/2021 9 4     AST 11/01/2021 20     ALT 11/01/2021 15     Alkaline Phosphatase 11/01/2021 74 0     Total Protein 11/01/2021 7 6     Albumin 11/01/2021 4 0     Total Bilirubin 11/01/2021 0 41     eGFR 11/01/2021 58     Magnesium 11/01/2021 1 6          Radiology Results:   Mammo screening bilateral w 3d & cad    Result Date: 12/15/2021  Narrative: DIAGNOSIS: Encounter for screening mammogram for malignant neoplasm of breast TECHNIQUE: Digital screening mammography was performed  Computer Aided Detection (CAD) analyzed all applicable images  COMPARISONS: Prior breast imaging dated: 10/02/2018, 06/12/2017, 01/26/2016, 04/18/2014, and 12/07/2012 RELEVANT HISTORY: Family Breast Cancer History: History of breast cancer in Mother, Maternal Grandmother  Family Medical History: Family medical history includes breast cancer in 2 relatives (maternal grandmother, mother)  Personal History: No known relevant hormone history  Surgical history includes breast biopsy and hysterectomy  No known relevant medical history  The patient is scheduled in a reminder system for screening mammography  8-10% of cancers will be missed on mammography  Management of a palpable abnormality must be based on clinical grounds    Patients will be notified of their results via letter from our facility  Accredited by [employer] of [employer] and FDA  RISK ASSESSMENT: 5 Year Tyrer-Cuzick: 2 53 % 10 Year Tyrer-Cuzick: 4 87 % Lifetime Tyrer-Cuzick: 11 21 % TISSUE DENSITY: The breasts are almost entirely fatty  INDICATION: Karen Garcia is a 57 y o  female presenting for screening mammography  FINDINGS: There are no suspicious masses, grouped microcalcifications or areas of architectural distortion  The skin and nipple areolar complex are unremarkable  Impression: No mammographic evidence of malignancy  ASSESSMENT/BI-RADS CATEGORY: Left: 1 - Negative Right: 1 - Negative Overall: 1 - Negative RECOMMENDATION:      - Routine screening mammogram in 1 year for both breasts   Workstation ID: [id number]

## 2022-02-01 ENCOUNTER — APPOINTMENT (OUTPATIENT)
Dept: LAB | Facility: HOSPITAL | Age: 62
End: 2022-02-01
Attending: INTERNAL MEDICINE
Payer: MEDICARE

## 2022-02-01 DIAGNOSIS — I10 ESSENTIAL HYPERTENSION: ICD-10-CM

## 2022-02-01 DIAGNOSIS — I48.0 PAROXYSMAL ATRIAL FIBRILLATION (HCC): ICD-10-CM

## 2022-02-01 DIAGNOSIS — E11.9 TYPE 2 DIABETES MELLITUS WITHOUT COMPLICATION, WITHOUT LONG-TERM CURRENT USE OF INSULIN (HCC): Chronic | ICD-10-CM

## 2022-02-01 LAB
ALBUMIN SERPL BCP-MCNC: 3.8 G/DL (ref 3.4–4.8)
ALP SERPL-CCNC: 87.2 U/L (ref 35–140)
ALT SERPL W P-5'-P-CCNC: 16 U/L (ref 5–54)
ANION GAP SERPL CALCULATED.3IONS-SCNC: 7 MMOL/L (ref 4–13)
AST SERPL W P-5'-P-CCNC: 17 U/L (ref 15–41)
BILIRUB SERPL-MCNC: 0.61 MG/DL (ref 0.3–1.2)
BUN SERPL-MCNC: 18 MG/DL (ref 6–20)
CALCIUM SERPL-MCNC: 9.2 MG/DL (ref 8.4–10.2)
CHLORIDE SERPL-SCNC: 101 MMOL/L (ref 96–108)
CO2 SERPL-SCNC: 31 MMOL/L (ref 22–33)
CREAT SERPL-MCNC: 1.09 MG/DL (ref 0.4–1.1)
EST. AVERAGE GLUCOSE BLD GHB EST-MCNC: 163 MG/DL
GFR SERPL CREATININE-BSD FRML MDRD: 54 ML/MIN/1.73SQ M
GLUCOSE P FAST SERPL-MCNC: 145 MG/DL (ref 70–105)
HBA1C MFR BLD: 7.3 %
POTASSIUM SERPL-SCNC: 3.9 MMOL/L (ref 3.5–5)
PROT SERPL-MCNC: 7.1 G/DL (ref 6.4–8.3)
SODIUM SERPL-SCNC: 139 MMOL/L (ref 133–145)

## 2022-02-01 PROCEDURE — 80053 COMPREHEN METABOLIC PANEL: CPT

## 2022-02-01 PROCEDURE — 36415 COLL VENOUS BLD VENIPUNCTURE: CPT

## 2022-02-01 PROCEDURE — 83036 HEMOGLOBIN GLYCOSYLATED A1C: CPT

## 2022-02-07 ENCOUNTER — OFFICE VISIT (OUTPATIENT)
Dept: CARDIOLOGY CLINIC | Facility: CLINIC | Age: 62
End: 2022-02-07
Payer: MEDICARE

## 2022-02-07 VITALS
HEIGHT: 63 IN | TEMPERATURE: 98.7 F | SYSTOLIC BLOOD PRESSURE: 118 MMHG | BODY MASS INDEX: 40.57 KG/M2 | DIASTOLIC BLOOD PRESSURE: 64 MMHG | HEART RATE: 61 BPM | WEIGHT: 229 LBS | OXYGEN SATURATION: 95 %

## 2022-02-07 DIAGNOSIS — I48.0 PAROXYSMAL ATRIAL FIBRILLATION (HCC): Primary | ICD-10-CM

## 2022-02-07 DIAGNOSIS — I10 ESSENTIAL HYPERTENSION: ICD-10-CM

## 2022-02-07 PROCEDURE — 93000 ELECTROCARDIOGRAM COMPLETE: CPT | Performed by: INTERNAL MEDICINE

## 2022-02-07 PROCEDURE — 99214 OFFICE O/P EST MOD 30 MIN: CPT | Performed by: INTERNAL MEDICINE

## 2022-02-07 NOTE — PROGRESS NOTES
Progress Note - Cardiology Office  [facility]    Scott Jarrell 57 y o  female MRN: [id number]  : 1960  Encounter: [id number]    ASSESSMENT:  COVID-19 long debra  patient had COVID-19 pneumonia and hospitalized for 16 days with significant hypoxemia   Did not require mechanical ventilation   She was admitted from  to 2021  Thereafter she went to [facility]   Generally feels better and has had no recurrence of symptoms     PAF:  During hospitalization patient had episode of atrial fibrillation and was put on Eliquis which was subsequently discontinued when she went back to  normal sinus rhythm  Has not had any recurrence of symptoms     TTE, 2021  EF 50-55%    48 hour Holter monitor:  10/26/2021  Sinus rhythm with average heart rate of 63 per minute   0 PVCs, 72 PACs, no arrhythmias     Obesity:  BMI is 40 57     Type 2 diabetes mellitus     Hypertension  BP today is 118/64 mmHg with heart rate of 61/Min  On losartan hydrochlorothiazide, 100-12 5 mg daily and Metoprolol 50 mg bid     Mixed hyperlipidemia  LDL 76, triglycerides 157, HDL 39  Currently on atorvastatin 40 mg daily        RECOMMENDATIONS:  Continue atorvastatin, losartan hydrochlorothiazide and metoprolol  Regular cardiovascular exercise, as much as tolerated aiming for 30 minutes daily eventually  Advised on better management of blood sugar since A1c is elevated  Advised on weight loss          Please call [phone] if any questions  HPI :     Scott Jarrell is a 57y o  year old female who came for follow up  She generally feeling well and has had no episodes of palpitations/tachycardia, chest pain or unusual dyspnea      REVIEW OF SYSTEMS:  Denies any new or acute cardiac symptoms    Denies chest pain, unusual palpitations, dyspnea syncope    Historical Information   Past Medical History:   Diagnosis Date    Abrasion of right leg 2021    Acute respiratory failure due to COVID-19 ([facility] ) 10/01/2021    BMI 45 0-49 9, adult ([facility] ) 2021    Dyslipidemia     Edema of both lower legs 2021    Essential hypertension 2021    Gastroesophageal reflux disease without esophagitis 2021    GERD (gastroesophageal reflux disease)     HTN (hypertension)     Hyperlipemia     Hypertension     Hypertensive arterionephrosclerosis of transplanted kidney 2021    Hypokalemia 2021    Insomnia     Leukocytosis     Medicare annual wellness visit, subsequent 2021    Mixed hyperlipidemia 2021    Numbness     Obstructive sleep apnea syndrome 2021    Pain in ankle 2021    Pain in both feet 2021    Pain in both knees 2021    Paroxysmal atrial fibrillation ([facility] ) 2021    Skin lesions     Sleep apnea     Type 2 diabetes mellitus without complication, without long-term current use of insulin ([place] [id number] ) 2021     Past Surgical History:   Procedure Laterality Date    ANKLE SURGERY Right     BREAST BIOPSY Right     stereotactic biopsy    CARPAL TUNNEL RELEASE Bilateral     COLONOSCOPY  2017    Dr Mak Story  2004    HYSTERECTOMY  2004    MAMMO (HISTORICAL)  10/02/2018    MAMMO STEREOTACTIC BREAST BIOPSY RIGHT (ALL INC) Right      Social History     Substance and Sexual Activity   Alcohol Use Yes    Comment: SOCIAL     Social History     Substance and Sexual Activity   Drug Use Not Currently    Comment: No drug use - As per [employer]     Social History     Tobacco Use   Smoking Status Former Smoker    Packs/day: 1 50    Types: Cigarettes    Quit date: 2004    Years since quittin 4   Smokeless Tobacco Former User   Tobacco Comment    13 YEARS      Family History:   Family History   Problem Relation Age of Onset    Cancer Mother     Diabetes Mother     Hyperlipidemia Mother     Breast cancer Mother     Breast cancer Maternal Grandmother     Lung cancer Father     No Known Problems Sister     No Known Problems Daughter     No Known Problems Maternal Grandfather     No Known Problems Sister     No Known Problems Son     No Known Problems Maternal Aunt        Meds/Allergies     Allergies   Allergen Reactions    Aspirin GI Intolerance    Lisinopril Fatigue       Current Outpatient Medications:     atorvastatin (LIPITOR) 40 mg tablet, Take 1 tablet (40 mg total) by mouth daily, Disp: 90 tablet, Rfl: 1    Blood Glucose Monitoring Suppl (FreeStyle Lite) TOYIN, daily, Disp: , Rfl:     calcium carbonate (OS-AMEE) 600 MG tablet, Take 600 mg by mouth 2 (two) times a day with meals (Patient not taking: Reported on 1/11/2022 ), Disp: , Rfl:     FREESTYLE LITE test strip, Use 1 each 2 (two) times a day Use as instructed, Disp: 200 strip, Rfl: 3    glucosamine-chondroitin 500-400 MG tablet, Take 1 tablet by mouth 3 (three) times a day, Disp: , Rfl:     Lancets (freestyle) lancets, Use 2 (two) times a day Use as instructed, Disp: 200 each, Rfl: 3    losartan-hydrochlorothiazide (HYZAAR) 100-12 5 MG per tablet, Take 1 tablet by mouth daily, Disp: 90 tablet, Rfl: 1    metFORMIN (GLUCOPHAGE) 500 mg tablet, Take 1 tablet (500 mg total) by mouth 2 (two) times a day with meals, Disp: 360 tablet, Rfl: 1    metoprolol tartrate (LOPRESSOR) 50 mg tablet, Take 1 tablet (50 mg total) by mouth 2 (two) times a day, Disp: 180 tablet, Rfl: 1    Multiple Vitamin (MULTIVITAMIN) capsule, Take 1 capsule by mouth daily, Disp: , Rfl:     pantoprazole (PROTONIX) 40 mg tablet, Take 1 tablet (40 mg total) by mouth daily 1/2 hr before breakfast, Disp: 90 tablet, Rfl: 3    predniSONE 20 mg tablet, Two daily for 2 weeks then 1 daily (Patient not taking: Reported on 12/6/2021 ), Disp: 45 tablet, Rfl: 1    traMADol (ULTRAM) 50 mg tablet, Take 50 mg by mouth every 8 (eight) hours as needed, Disp: , Rfl: 0    Vitals:  /64 mmHg  HR 61/Min  BP Readings from Last 3 Encounters:   01/11/22 (!) 83/62   21 132/76   21 126/78       Physical Exam:    Neurologic:  Alert & oriented x 3, no new focal deficits, Not in any acute distress,  Constitutional:  Obese  Eyes:  Pupil equal and reacting to light, conjunctiva normal,   HENT:  Atraumatic, oropharynx moist, Neck- normal range of motion, no tenderness,  Neck supple, No JVP, No LNP   Respiratory:  Bilateral air entry, mostly clear to auscultation  Cardiovascular: S1-S2 regular with a I/VI ejection systolic murmur   GI:  Soft, nondistended, normal bowel sounds, nontender, no hepatosplenomegaly appreciated  Musculoskeletal: no tenderness, no deformities  Skin:  Well hydrated, no rash   Lymphatic:  No lymphadenopathy noted   Extremities:  Trace edema and distal pulses are present        Diagnostic Studies Review Cardio:      EKG:  Normal sinus rhythm, heart rate 61/Min, poor R-wave progression  Cardiac testing:   Results for orders placed during the hospital encounter of 21    Echo complete with contrast if indicated    [address]  [address] [address]  [phone]    Transthoracic Echocardiogram  Limited 2D, M-mode, Doppler, and Color Doppler    Study date:  26-Aug-2021    Patient: Aracely Drake  MR number: [id number]  Account number: [de-identified]  : 1960  Age: 57 years  Gender: Female  Status: Inpatient  Location: Bedside  Height: 63 in  Weight: 239 6 lb  BP: 129/ 75 mmHg    Indications: Acute Respiratory failure due to Covid 19  Diagnoses: I48 0 - Atrial fibrillation    Sonographer:  CARLOS Bradford  Primary Physician:  Kenyatta Coffey MD  Referring Physician:  [address] [place] [address], DO  Group:  [facility]  Interpreting Physician:  Rhiannon Lowry MD    SUMMARY    PROCEDURE INFORMATION:  This was a technically difficult study  LEFT VENTRICLE:  Systolic function was normal  Ejection fraction was estimated in the range of 50 % to 55 %    There were no regional wall motion abnormalities  Wall thickness was mildly increased  HISTORY: PRIOR HISTORY: Hyperlipidemia,A Fib ,HTN,Diabetes,obstructive sleep apnea,edema,Covid 19 virus  PROCEDURE: The procedure was performed at the bedside  This was a routine study  The transthoracic approach was used  The study included limited 2D imaging, M-mode, limited spectral Doppler, and color Doppler  The heart rate was 92 bpm, at  the start of the study  Images were obtained from the parasternal, apical, subcostal, and suprasternal notch acoustic windows  This was a technically difficult study  LEFT VENTRICLE: Size was normal  Systolic function was normal  Ejection fraction was estimated in the range of 50 % to 55 %  There were no regional wall motion abnormalities  Wall thickness was mildly increased  No evidence of apical  thrombus  RIGHT VENTRICLE: The size was normal  Systolic function was normal  Wall thickness was normal     LEFT ATRIUM: Size was at the upper limits of normal     RIGHT ATRIUM: Size was at the upper limits of normal     MITRAL VALVE: Valve structure was normal  There was normal leaflet separation  DOPPLER: The transmitral velocity was within the normal range  There was no evidence for stenosis  There was no significant regurgitation  AORTIC VALVE: The valve was probably trileaflet  Leaflets exhibited normal thickness and normal cuspal separation  DOPPLER: Transaortic velocity was within the normal range  There was no evidence for stenosis  There was no significant  regurgitation  TRICUSPID VALVE: The valve structure was normal  There was normal leaflet separation  DOPPLER: The transtricuspid velocity was within the normal range  There was no evidence for stenosis  There was trace regurgitation  PULMONIC VALVE: Leaflets exhibited normal thickness, no calcification, and normal cuspal separation  DOPPLER: The transpulmonic velocity was within the normal range   There was no significant regurgitation  PERICARDIUM: There was no pericardial effusion  The pericardium was normal in appearance  AORTA: The root exhibited normal size  SYSTEMIC VEINS: IVC: The inferior vena cava was not well visualized  SYSTEM MEASUREMENT TABLES    2D  %FS: 25 19 %  Ao Diam: 3 38 cm  EDV(Teich): 91 99 ml  EF Biplane: 44 38 %  ESV(Teich): 46 07 ml  IVSd: 1 15 cm  LA Area: 22 09 cm2  LA Diam: 3 12 cm  LVEDV MOD A2C: 80 2 ml  LVEDV MOD A4C: 98 11 ml  LVEDV MOD BP: 92 99 ml  LVEDVInd MOD BP: 44 49 ml/m2  LVEF MOD A2C: 26 42 %  LVEF MOD A4C: 54 46 %  LVESV MOD A2C: 59 01 ml  LVESV MOD A4C: 44 68 ml  LVESV MOD BP: 51 72 ml  LVESVInd MOD BP: 24 75 ml/m2  LVIDd: 4 49 cm  LVIDs: 3 36 cm  LVLd A2C: 6 93 cm  LVLd A4C: 7 65 cm  LVLs A2C: 6 03 cm  LVLs A4C: 6 19 cm  LVPWd: 1 06 cm  RA Area: 16 37 cm2  RVIDd: 2 66 cm  SV (Teich): 45 92 ml  SV MOD A2C: 21 19 ml  SV MOD A4C: 53 43 ml    [facility] Accredited Echocardiography Laboratory    Prepared and electronically signed by    Angelia Agustin MD  Signed 29-Aug-2021 15:42:44      Results for orders placed during the hospital encounter of 10/26/21    Holter monitor    Interpretation Summary  Indication: PAF    The patient was in sinus rhythm throughout the recording  Minimum HR: 57  Average HR: 73  Maximum HR: 124    Premature ventricular contractions: 0  Ventricular runs: 0    Supraventricular contractions: 72  Supraventricular runs: 0    Longest RR: 1 2 sec    Arrhythmias: None    Diary submitted: yes but no symptoms reported      Impression    Normal holter with rare PAC's      Signed by: Honey Mohr MD      Imaging:  Chest X-Ray:   XR chest pa & lateral    Result Date: 10/20/2021  Impression Essentially stable predominantly peripherally based multifocal parenchymal opacities involving both lungs    While findings most likely reflect scarring related to prior Covid pneumonia, given persistence of the opacities, consider further evaluation with  CT of chest for further characterization  The study was marked in EPIC for significant notification  Workstation performed: [id number]       CT-scan of the chest:     CTA chest pe study    Result Date: 8/30/2021  Impression No pulmonary embolism  Peripheral areas of consolidation throughout both lungs in keeping with Covid 19 pneumonia without significant change  Fatty liver   Workstation performed: [id number]     Lab Review   Lab Results   Component Value Date    WBC 9 14 09/29/2021    HGB 12 1 09/29/2021    HCT 37 0 09/29/2021    MCV 89 09/29/2021    RDW 15 8 (H) 09/29/2021     09/29/2021     BMP:  Lab Results   Component Value Date    SODIUM 139 02/01/2022    K 3 9 02/01/2022     02/01/2022    CO2 31 02/01/2022    BUN 18 02/01/2022    CREATININE 1 09 02/01/2022    GLUC 144 (H) 11/01/2021    GLUF 145 (H) 02/01/2022    CALCIUM 9 2 02/01/2022    CORRECTEDCA 9 2 08/21/2021    EGFR 54 02/01/2022    MG 1 6 11/01/2021     LFT:  Lab Results   Component Value Date    AST 17 02/01/2022    ALT 16 02/01/2022    ALKPHOS 87 2 02/01/2022    TP 7 1 02/01/2022    ALB 3 8 02/01/2022      No components found for: TSH3  No results found for: [facility]  Lab Results   Component Value Date    HGBA1C 7 3 (H) 02/01/2022     Lipid Profile:   Lab Results   Component Value Date    CHOLESTEROL 146 09/29/2021    HDL 39 (L) 09/29/2021    LDLCALC 76 09/29/2021    TRIG 157 1 (H) 09/29/2021     Lab Results   Component Value Date    CHOLESTEROL 146 09/29/2021    CHOLESTEROL 149 01/19/2021     Lab Results   Component Value Date    CKTOTAL 341 (H) 08/14/2021    CKMB 1 1 08/14/2021    CKMBINDEX <1 0 08/14/2021    TROPONINI <0 02 08/14/2021     No results found for: NTBNP   Recent Results (from the past 672 hour(s))   Comprehensive metabolic panel    Collection Time: 02/01/22 11:17 AM   Result Value Ref Range    Sodium 139 133 - 145 mmol/L    Potassium 3 9 3 5 - 5 0 mmol/L    Chloride 101 96 - 108 mmol/L    CO2 31 22 - 33 mmol/L    ANION GAP 7 4 - 13 mmol/L    BUN 18 6 - 20 mg/dL    Creatinine 1 09 0 40 - 1 10 mg/dL    Glucose, Fasting 145 (H) 70 - 105 mg/dL    Calcium 9 2 8 4 - 10 2 mg/dL    AST 17 15 - 41 U/L    ALT 16 5 - 54 U/L    Alkaline Phosphatase 87 2 35 - 140 U/L    Total Protein 7 1 6 4 - 8 3 g/dL    Albumin 3 8 3 4 - 4 8 g/dL    Total Bilirubin 0 61 0 30 - 1 20 mg/dL    eGFR 54 ml/min/1 73sq m   Hemoglobin A1C    Collection Time: 02/01/22 11:17 AM   Result Value Ref Range    Hemoglobin A1C 7 3 (H) Normal 3 8-5 6%; PreDiabetic 5 7-6 4%; Diabetic >=6 5%; Glycemic control for adults with diabetes <7 0% %     mg/dl             Dr Katie Jain MD, [facility] [place]      "This note has been constructed using a voice recognition system  Therefore there may be syntax, spelling, and/or grammatical errors   Please call if you have any questions  "

## 2022-02-08 ENCOUNTER — OFFICE VISIT (OUTPATIENT)
Dept: INTERNAL MEDICINE CLINIC | Facility: CLINIC | Age: 62
End: 2022-02-08
Payer: MEDICARE

## 2022-02-08 VITALS
SYSTOLIC BLOOD PRESSURE: 126 MMHG | HEART RATE: 70 BPM | OXYGEN SATURATION: 97 % | BODY MASS INDEX: 40.57 KG/M2 | DIASTOLIC BLOOD PRESSURE: 72 MMHG | TEMPERATURE: 97.9 F | WEIGHT: 229 LBS

## 2022-02-08 DIAGNOSIS — I10 ESSENTIAL HYPERTENSION: Chronic | ICD-10-CM

## 2022-02-08 DIAGNOSIS — M79.672 PAIN IN BOTH FEET: Chronic | ICD-10-CM

## 2022-02-08 DIAGNOSIS — I48.0 PAROXYSMAL ATRIAL FIBRILLATION (HCC): ICD-10-CM

## 2022-02-08 DIAGNOSIS — M79.671 PAIN IN BOTH FEET: Chronic | ICD-10-CM

## 2022-02-08 DIAGNOSIS — E78.2 MIXED HYPERLIPIDEMIA: Chronic | ICD-10-CM

## 2022-02-08 DIAGNOSIS — M25.511 ACUTE PAIN OF RIGHT SHOULDER: ICD-10-CM

## 2022-02-08 DIAGNOSIS — K21.9 GASTROESOPHAGEAL REFLUX DISEASE WITHOUT ESOPHAGITIS: Chronic | ICD-10-CM

## 2022-02-08 DIAGNOSIS — E11.9 TYPE 2 DIABETES MELLITUS WITHOUT COMPLICATION, WITHOUT LONG-TERM CURRENT USE OF INSULIN (HCC): Primary | Chronic | ICD-10-CM

## 2022-02-08 DIAGNOSIS — N18.31 STAGE 3A CHRONIC KIDNEY DISEASE (HCC): ICD-10-CM

## 2022-02-08 PROBLEM — N18.9 CKD (CHRONIC KIDNEY DISEASE): Status: ACTIVE | Noted: 2022-02-08

## 2022-02-08 PROCEDURE — 99214 OFFICE O/P EST MOD 30 MIN: CPT | Performed by: INTERNAL MEDICINE

## 2022-02-08 NOTE — ASSESSMENT & PLAN NOTE
Lab Results   Component Value Date    HGBA1C 7 3 (H) 02/01/2022   Her A1c increased from 6 4-7 3  Morning sugars between 140-150  Evening sugar 90 to 110  No hypoglycemia will increase metformin 500 mg to 1000 mg Q p m  Continue 500 mg q a m  Shasha Cassette Check blood sugar a m  P m  Once a day alternate day  Call if blood sugar less than 70   Continue 1800 calorie ADA diet

## 2022-02-08 NOTE — ASSESSMENT & PLAN NOTE
Lab Results   Component Value Date    EGFR 54 02/01/2022    EGFR 58 11/01/2021    EGFR 68 09/29/2021    CREATININE 1 09 02/01/2022    CREATININE 1 04 11/01/2021    CREATININE 0 91 09/29/2021   Renal function stable advised to maintain hydration will follow renal function continue present medications

## 2022-02-08 NOTE — ASSESSMENT & PLAN NOTE
Patient has been followed by podiatrist Dr Jeri Berry  Takes tramadol 1 tablet twice a day usually  Possible early onset of diabetes neuropathy as she sometimes gets tingling numbness in her bottom of toes

## 2022-02-08 NOTE — ASSESSMENT & PLAN NOTE
Right shoulder pain for last couple weeks  Denies any injury or fall  Possible tendinitis / bursitis or arthritis  Advised to get an x-ray but patient refused  Advised to see an orthopedic but she refused  Advised to take ibuprofen 400 mg p o  B i d  For 7-10 days and then stop  Patient was demonstrated right shoulder exercise

## 2022-02-08 NOTE — ASSESSMENT & PLAN NOTE
Well controlled  Cholesterol 146, triglyceride 157, HDL 39, LDL 76   Advised to continue present medication and low-cholesterol low saturated diet

## 2022-02-08 NOTE — ASSESSMENT & PLAN NOTE
Patient lost about 19 lbs weight in last 6 weeks  Patient has been watching diet  Patient  was advised to decrease portion size  Advised to decrease carb, sugar, cholesterol intake  Advised to exercise 3-5 times per week  Advised to lose weight

## 2022-02-08 NOTE — PROGRESS NOTES
Assessment/Plan:    1  Type 2 diabetes mellitus without complication, without long-term current use of insulin ([facility])  Assessment & Plan:    Lab Results   Component Value Date    HGBA1C 7 3 (H) 02/01/2022   Her A1c increased from 6 4-7 3  Morning sugars between 140-150  Evening sugar 90 to 110  No hypoglycemia will increase metformin 500 mg to 1000 mg Q p m  Continue 500 mg q a m  Rupal Conde Check blood sugar a m  P m  Once a day alternate day  Call if blood sugar less than 70  Continue 1800 calorie ADA diet      Orders:  -     metFORMIN (GLUCOPHAGE) 500 mg tablet; Take 1 tablet (500 mg total) by mouth see administration instructions Take 1 tablet daily morning and 2 tablets daily evening before meals  Total of 3 tablets per day  -     Basic metabolic panel; Future    2  Essential hypertension  Assessment & Plan:  Blood pressure well controlled  Advised to continue present medication  Advised for low-salt diet  Orders:  -     Basic metabolic panel; Future    3  Gastroesophageal reflux disease without esophagitis  Assessment & Plan:  Symptoms are controlled on pantoprazole  Patient has been followed by GI specialist  has been watching diet and reflux precautions  4  Mixed hyperlipidemia  Assessment & Plan:  Well controlled  Cholesterol 146, triglyceride 157, HDL 39, LDL 76  Advised to continue present medication and low-cholesterol low saturated diet     Orders:  -     Lipid panel; Future    5  Pain in both feet  Assessment & Plan:  Patient has been followed by podiatrist Dr Teja Mayen  Takes tramadol 1 tablet twice a day usually  Possible early onset of diabetes neuropathy as she sometimes gets tingling numbness in her bottom of toes  6  Paroxysmal atrial fibrillation (HCC)  Assessment & Plan:  Clinically appears to be in normal sinus rhythm  Has been seen and followed by cardiologist   Not on any anticoagulation      7   BMI 40 0-44 9, adult [facility])  Assessment & Plan:  Patient lost about 19 lbs weight in last 6 weeks  Patient has been watching diet  Patient  was advised to decrease portion size  Advised to decrease carb, sugar, cholesterol intake  Advised to exercise 3-5 times per week  Advised to lose weight  8  Stage 3a chronic kidney disease [facility])  Assessment & Plan:  Lab Results   Component Value Date    EGFR 54 02/01/2022    EGFR 58 11/01/2021    EGFR 68 09/29/2021    CREATININE 1 09 02/01/2022    CREATININE 1 04 11/01/2021    CREATININE 0 91 09/29/2021   Renal function stable advised to maintain hydration will follow renal function continue present medications  9  Acute pain of right shoulder  Assessment & Plan:  Right shoulder pain for last couple weeks  Denies any injury or fall  Possible tendinitis / bursitis or arthritis  Advised to get an x-ray but patient refused  Advised to see an orthopedic but she refused  Advised to take ibuprofen 400 mg p o  B i d  For 7-10 days and then stop  Patient was demonstrated right shoulder exercise  Patient advised to see an ophthalmologist and gyn specialist   Patient does not on any influenza vaccination, pneumonia vaccination, COVID vaccination  Subjective:  Patient presents for follow-up  Patient ID: Calderon Valverde is a 57 y o  female  HPI  19-year-old the white female patient presents for follow-up her medical problems  She denies any chest pain  Gets shortness of breath after walking 2 blocks  Denies any cough, fever, chills denies nausea vomiting diarrhea  Complain of right shoulder pain for last couple weeks without any injury      The following portions of the patient's history were reviewed and updated as appropriate:     Past Medical History:  She has a past medical history of Abrasion of right leg (03/19/2021), Acute respiratory failure due to COVID-19 ([facility] [address] ) (10/01/2021), BMI 45 0-49 9, adult ([facility] [address] ) (03/19/2021), CKD (chronic kidney disease) (2/8/2022), Dyslipidemia, Edema of both lower legs (03/19/2021), Essential hypertension (01/23/2021), Gastroesophageal reflux disease without esophagitis (01/23/2021), GERD (gastroesophageal reflux disease), HTN (hypertension), Hyperlipemia, Hypertension, Hypertensive arterionephrosclerosis of transplanted kidney (01/23/2021), Hypokalemia (04/20/2021), Insomnia, Leukocytosis, Medicare annual wellness visit, subsequent (11/9/2021), Mixed hyperlipidemia (01/23/2021), Numbness, Obstructive sleep apnea syndrome (01/23/2021), Pain in ankle (01/23/2021), Pain in both feet (01/23/2021), Pain in both knees (01/26/2021), Paroxysmal atrial fibrillation ([facility] [address] ) (11/9/2021), Shoulder pain, right (2/8/2022), Skin lesions, Sleep apnea, and Type 2 diabetes mellitus without complication, without long-term current use of insulin ([facility] [address] ) (01/23/2021)  ,  _______________________________________________________________________  Past Surgical History:   has a past surgical history that includes Carpal tunnel release (Bilateral, 1990); Gallbladder surgery (2004); Hysterectomy (2004); Ankle surgery (Right, 2009/2012); Mammo stereotactic breast biopsy right (all inc) (Right, 2014); Mammo (historical) (10/02/2018); Colonoscopy (03/07/2017); and Breast biopsy (Right)  ,  _______________________________________________________________________  Family History:  family history includes Breast cancer in her maternal grandmother and mother; Cancer in her mother; Diabetes in her mother; Hyperlipidemia in her mother; Lung cancer in her father; No Known Problems in her daughter, maternal aunt, maternal grandfather, sister, sister, and son ,  _______________________________________________________________________  Social History:   reports that she quit smoking about 17 years ago  Her smoking use included cigarettes  She smoked 1 50 packs per day  She has quit using smokeless tobacco  She reports current alcohol use   She reports previous drug use ,  _______________________________________________________________________  Allergies:  is allergic to aspirin and lisinopril     _______________________________________________________________________  Current Outpatient Medications   Medication Sig Dispense Refill    atorvastatin (LIPITOR) 40 mg tablet Take 1 tablet (40 mg total) by mouth daily 90 tablet 1    Blood Glucose Monitoring Suppl (FreeStyle Lite) TOYIN daily      calcium carbonate (OS-AMEE) 600 MG tablet Take 600 mg by mouth 2 (two) times a day with meals        FREESTYLE LITE test strip Use 1 each 2 (two) times a day Use as instructed 200 strip 3    glucosamine-chondroitin 500-400 MG tablet Take 1 tablet by mouth 3 (three) times a day      Lancets (freestyle) lancets Use 2 (two) times a day Use as instructed 200 each 3    losartan-hydrochlorothiazide (HYZAAR) 100-12 5 MG per tablet Take 1 tablet by mouth daily 90 tablet 1    metFORMIN (GLUCOPHAGE) 500 mg tablet Take 1 tablet (500 mg total) by mouth see administration instructions Take 1 tablet daily morning and 2 tablets daily evening before meals  Total of 3 tablets per day 270 tablet 1    metoprolol tartrate (LOPRESSOR) 50 mg tablet Take 1 tablet (50 mg total) by mouth 2 (two) times a day 180 tablet 1    Multiple Vitamin (MULTIVITAMIN) capsule Take 1 capsule by mouth daily      pantoprazole (PROTONIX) 40 mg tablet Take 1 tablet (40 mg total) by mouth daily 1/2 hr before breakfast 90 tablet 3    traMADol (ULTRAM) 50 mg tablet Take 50 mg by mouth every 8 (eight) hours as needed  0     No current facility-administered medications for this visit      _______________________________________________________________________  Review of Systems   Constitutional: Negative for chills and fever  HENT: Negative for congestion, ear pain, hearing loss, nosebleeds, sinus pain, sore throat and trouble swallowing  Eyes: Negative for discharge, redness and visual disturbance     Respiratory: Negative for cough, chest tightness and shortness of breath  Cardiovascular: Negative for chest pain and palpitations  Gastrointestinal: Negative for abdominal pain, blood in stool, constipation, diarrhea, nausea and vomiting  Genitourinary: Negative for dysuria, flank pain and hematuria  Musculoskeletal: Positive for arthralgias (Right shoulder pain  )  Negative for myalgias and neck pain  Skin: Negative for color change and rash  Neurological: Negative for dizziness, speech difficulty, weakness and headaches  Hematological: Does not bruise/bleed easily  Psychiatric/Behavioral: Negative for agitation and behavioral problems  Objective:  Vitals:    02/08/22 1121   BP: 126/72   BP Location: Left arm   Patient Position: Sitting   Cuff Size: Adult   Pulse: 70   Temp: 97 9 °F (36 6 °C)   TempSrc: Tympanic   SpO2: 97%   Weight: 104 kg (229 lb)     Body mass index is 40 57 kg/m²  Physical Exam  Vitals and nursing note reviewed  Constitutional:       General: She is not in acute distress  Appearance: She is obese  HENT:      Head: Normocephalic and atraumatic  Right Ear: Ear canal and external ear normal       Left Ear: Ear canal and external ear normal       Nose:      Comments: Patient has a face mask on     Mouth/Throat:      Comments: Patient has a face mask on  Eyes:      General: No scleral icterus  Right eye: No discharge  Left eye: No discharge  Extraocular Movements: Extraocular movements intact  Conjunctiva/sclera: Conjunctivae normal    Cardiovascular:      Rate and Rhythm: Normal rate and regular rhythm  Pulses:           Dorsalis pedis pulses are 1+ on the right side and 1+ on the left side  Posterior tibial pulses are 1+ on the right side and 1+ on the left side  Heart sounds: No murmur heard  Pulmonary:      Effort: Pulmonary effort is normal  No respiratory distress  Breath sounds: Normal breath sounds     Abdominal: General: Bowel sounds are normal       Palpations: Abdomen is soft  Tenderness: There is no abdominal tenderness  Musculoskeletal:         General: Tenderness (Right shoulder tender anteriorly and around [facility] joint area  Has a slightly restricted shoulder movement  No gross swelling or redness ) present  Normal range of motion  Cervical back: Normal range of motion and neck supple  Right lower leg: No edema  Left lower leg: No edema  Feet:      Right foot:      Skin integrity: No ulcer, skin breakdown, erythema, warmth, callus or dry skin  Left foot:      Skin integrity: No ulcer, skin breakdown, erythema, warmth, callus or dry skin  Skin:     General: Skin is warm  Findings: No rash  Neurological:      General: No focal deficit present  Mental Status: She is alert and oriented to person, place, and time  Motor: No weakness  Coordination: Coordination normal    Psychiatric:         Mood and Affect: Mood normal          Behavior: Behavior normal        Patient's shoes and socks removed  Right Foot/Ankle   Right Foot Inspection  Skin Exam: skin normal and skin intact  No dry skin, no warmth, no callus, no erythema, no maceration, no abnormal color, no pre-ulcer, no ulcer and no callus  Toe Exam: ROM and strength within normal limits  No swelling and erythema    Vascular  The right DP pulse is 1+  The right PT pulse is 1+  Left Foot/Ankle  Left Foot Inspection  Skin Exam: skin normal and skin intact  No dry skin, no warmth, no erythema, no maceration, normal color, no pre-ulcer, no ulcer and no callus  Toe Exam: ROM and strength within normal limits  No swelling and no erythema  Vascular  The left DP pulse is 1+  The left PT pulse is 1+  She has been followed by podiatrist regularly  I spent 30 minutes with the patient today    More than 50% time spent for reviewing of external notes, reviewing of the results of diagnostics test, management of care, patient education and ordering of test

## 2022-02-08 NOTE — ASSESSMENT & PLAN NOTE
Symptoms are controlled on pantoprazole  Patient has been followed by GI specialist  has been watching diet and reflux precautions

## 2022-02-08 NOTE — PATIENT INSTRUCTIONS
Patient was advised to continue present medications  discussed with the patient medications and laboratory data in detail  Follow-up with me in 3 months or as advised  If any blood test was ordered please do 1 week prior to next appointment unless advise to get earlier    If you have any questions please call the office [phone]

## 2022-02-08 NOTE — ASSESSMENT & PLAN NOTE
Clinically appears to be in normal sinus rhythm    Has been seen and followed by cardiologist   Not on any anticoagulation

## 2022-02-17 ENCOUNTER — TELEPHONE (OUTPATIENT)
Dept: INTERNAL MEDICINE CLINIC | Facility: CLINIC | Age: 62
End: 2022-02-17

## 2022-02-17 DIAGNOSIS — E11.9 TYPE 2 DIABETES MELLITUS WITHOUT COMPLICATION, WITHOUT LONG-TERM CURRENT USE OF INSULIN (HCC): ICD-10-CM

## 2022-02-17 RX ORDER — BLOOD-GLUCOSE METER
1 KIT MISCELLANEOUS 2 TIMES DAILY
Qty: 200 STRIP | Refills: 3 | Status: SHIPPED | OUTPATIENT
Start: 2022-02-17 | End: 2022-02-25 | Stop reason: SDUPTHER

## 2022-02-17 NOTE — TELEPHONE ENCOUNTER
FREESTYLE LITE test strip   Tests twice daily    Send to   [address] [facility] claims you cancelled the refills on her strips, has a lot of problems with them

## 2022-02-24 ENCOUNTER — TELEPHONE (OUTPATIENT)
Dept: INTERNAL MEDICINE CLINIC | Facility: CLINIC | Age: 62
End: 2022-02-24

## 2022-02-24 NOTE — TELEPHONE ENCOUNTER
Regarding  - Freestye Test Strips and Lancets    [facility] is too expensive - can you cxl at [place] and send to [facility]?

## 2022-02-25 DIAGNOSIS — E11.9 TYPE 2 DIABETES MELLITUS WITHOUT COMPLICATION, WITHOUT LONG-TERM CURRENT USE OF INSULIN (HCC): ICD-10-CM

## 2022-02-25 RX ORDER — LANCETS 28 GAUGE
EACH MISCELLANEOUS 2 TIMES DAILY
Qty: 200 EACH | Refills: 3 | Status: SHIPPED | OUTPATIENT
Start: 2022-02-25

## 2022-02-25 RX ORDER — BLOOD-GLUCOSE METER
1 KIT MISCELLANEOUS 2 TIMES DAILY
Qty: 200 STRIP | Refills: 3 | Status: SHIPPED | OUTPATIENT
Start: 2022-02-25

## 2022-02-28 ENCOUNTER — HOSPITAL ENCOUNTER (OUTPATIENT)
Dept: GASTROENTEROLOGY | Facility: AMBULATORY SURGERY CENTER | Age: 62
Discharge: HOME | End: 2022-02-28
Payer: MEDICARE

## 2022-02-28 ENCOUNTER — ANESTHESIA EVENT (OUTPATIENT)
Dept: GASTROENTEROLOGY | Facility: AMBULATORY SURGERY CENTER | Age: 62
End: 2022-02-28

## 2022-02-28 ENCOUNTER — EPISODE CHANGES (OUTPATIENT)
Dept: CASE MANAGEMENT | Facility: OTHER | Age: 62
End: 2022-02-28

## 2022-02-28 ENCOUNTER — ANESTHESIA (OUTPATIENT)
Dept: GASTROENTEROLOGY | Facility: AMBULATORY SURGERY CENTER | Age: 62
End: 2022-02-28

## 2022-02-28 VITALS
DIASTOLIC BLOOD PRESSURE: 59 MMHG | TEMPERATURE: 97.3 F | RESPIRATION RATE: 18 BRPM | OXYGEN SATURATION: 100 % | HEIGHT: 63 IN | WEIGHT: 229 LBS | BODY MASS INDEX: 40.57 KG/M2 | SYSTOLIC BLOOD PRESSURE: 104 MMHG | HEART RATE: 62 BPM

## 2022-02-28 DIAGNOSIS — K44.9 HIATAL HERNIA: ICD-10-CM

## 2022-02-28 DIAGNOSIS — R13.14 PHARYNGOESOPHAGEAL DYSPHAGIA: ICD-10-CM

## 2022-02-28 DIAGNOSIS — K21.00 GASTROESOPHAGEAL REFLUX DISEASE WITH ESOPHAGITIS, UNSPECIFIED WHETHER HEMORRHAGE: ICD-10-CM

## 2022-02-28 PROCEDURE — 88305 TISSUE EXAM BY PATHOLOGIST: CPT | Performed by: PATHOLOGY

## 2022-02-28 PROCEDURE — 43239 EGD BIOPSY SINGLE/MULTIPLE: CPT | Performed by: INTERNAL MEDICINE

## 2022-02-28 PROCEDURE — 43249 ESOPH EGD DILATION <30 MM: CPT | Performed by: INTERNAL MEDICINE

## 2022-02-28 PROCEDURE — 00731 ANES UPR GI NDSC PX NOS: CPT | Performed by: NURSE ANESTHETIST, CERTIFIED REGISTERED

## 2022-02-28 RX ORDER — SODIUM CHLORIDE 9 MG/ML
30 INJECTION, SOLUTION INTRAVENOUS CONTINUOUS
Status: DISCONTINUED | OUTPATIENT
Start: 2022-02-28 | End: 2022-03-04 | Stop reason: HOSPADM

## 2022-02-28 RX ORDER — LIDOCAINE HYDROCHLORIDE 10 MG/ML
INJECTION, SOLUTION EPIDURAL; INFILTRATION; INTRACAUDAL; PERINEURAL AS NEEDED
Status: DISCONTINUED | OUTPATIENT
Start: 2022-02-28 | End: 2022-02-28

## 2022-02-28 RX ORDER — PROPOFOL 10 MG/ML
INJECTION, EMULSION INTRAVENOUS AS NEEDED
Status: DISCONTINUED | OUTPATIENT
Start: 2022-02-28 | End: 2022-02-28

## 2022-02-28 RX ADMIN — PROPOFOL 50 MG: 10 INJECTION, EMULSION INTRAVENOUS at 13:39

## 2022-02-28 RX ADMIN — PROPOFOL 50 MG: 10 INJECTION, EMULSION INTRAVENOUS at 13:34

## 2022-02-28 RX ADMIN — LIDOCAINE HYDROCHLORIDE 80 MG: 10 INJECTION, SOLUTION EPIDURAL; INFILTRATION; INTRACAUDAL; PERINEURAL at 13:31

## 2022-02-28 RX ADMIN — PROPOFOL 50 MG: 10 INJECTION, EMULSION INTRAVENOUS at 13:38

## 2022-02-28 RX ADMIN — PROPOFOL 50 MG: 10 INJECTION, EMULSION INTRAVENOUS at 13:36

## 2022-02-28 RX ADMIN — SODIUM CHLORIDE: 9 INJECTION, SOLUTION INTRAVENOUS at 13:27

## 2022-02-28 RX ADMIN — PROPOFOL 200 MG: 10 INJECTION, EMULSION INTRAVENOUS at 13:31

## 2022-02-28 NOTE — DISCHARGE INSTRUCTIONS
Esophageal Dilation   WHAT YOU NEED TO KNOW:   Esophageal dilation is a procedure to widen a narrow part of your esophagus  Your healthcare provider will use a dilator (inflatable balloon or another tool that expands) to make the area wider  He may also do an endoscopy before or during your esophageal dilation  During an endoscopy, your healthcare provider will use a scope to see inside your esophagus  DISCHARGE INSTRUCTIONS:   Medicines:   · Medicines  may be given to decrease stomach acid that can irritate your esophagus  · Take your medicine as directed  Contact your healthcare provider if you think your medicine is not helping or if you have side effects  Tell him if you are allergic to any medicine  Keep a list of the medicines, vitamins, and herbs you take  Include the amounts, and when and why you take them  Bring the list or the pill bottles to follow-up visits  Carry your medicine list with you in case of an emergency  Follow up with your healthcare provider as directed:  Write down your questions so you remember to ask them during your visits  Nutrition:  You may eat foods you normally eat  Chew your food well  Eat soft foods if you still have problems swallowing  Soft foods include applesauce, bananas, cooked cereal, cottage cheese, eggs, pudding, and yogurt  Ask for more information on what types of food to eat  Contact your healthcare provider if:   · You have a fever  · You feel very full or bloated  · You have more problems swallowing food  · You have nausea or are vomiting  · You have questions or concerns about your condition or care  Seek care immediately or call 911 if:   · You vomit blood  · You are not able to swallow any food  · You have a fast heartbeat, chest pain, or sudden trouble breathing  · Your abdomen suddenly becomes tender and hard      © Copyright [employer] 2018 Information is for End User's use only and may not be sold, redistributed or otherwise used for commercial purposes  All illustrations and images included in CareNotes® are the copyrighted property of A D A M [employer] Inc  or Olman Triplett   The above information is an  only  It is not intended as medical advice for individual conditions or treatments  Talk to your doctor, nurse or pharmacist before following any medical regimen to see if it is safe and effective for you  Gastritis   WHAT YOU NEED TO KNOW:   What is gastritis? Gastritis is inflammation or irritation of the lining of your stomach  What increases my risk for gastritis? · Infection with bacteria, a virus, or a parasite    · NSAIDs, aspirin, or steroid medicine    · Use of tobacco products or alcohol    · Trauma such as an injury to your stomach or intestine    · Autoimmune disorders such as diabetes, thyroid disease, or Crohn disease    · Stress    · Age older than 60 years    · Illegal drugs, such as cocaine    What are the signs and symptoms of gastritis? · Stomach pain, burning, or tenderness when you press on it    · Stomach fullness or tightness    · Nausea or vomiting    · Loss of appetite, or feeling full quickly when you eat    · Bad breath    · Fatigue or feeling more tired than usual    · Heartburn    How is gastritis diagnosed? Your healthcare provider will ask about your signs and symptoms and examine you  You may need any of the following:  · Blood tests  may be used to show an infection, dehydration, or anemia (low red blood cell levels)  · A bowel movement sample  may be tested for blood or the germ that may be causing your gastritis  · A breath test  may show if H pylori is causing your gastritis  You will be given a liquid to drink  Then you will breathe into a bag  Your healthcare provider will measure the amount of carbon dioxide in your breath  Extra amounts of carbon dioxide may mean you have an H pylori infection      · An endoscopy  may be used to look for irritation or bleeding in your stomach  Your healthcare provider will use an endoscope (tube with a light and camera on the end) during the procedure  He or she may take a sample from your stomach to be tested  How is gastritis treated? Your symptoms may go away without treatment  Treatment will depend on what is causing your gastritis  Your healthcare provider may recommend changes to the medicines you take  Medicines may be given to help treat a bacterial infection or decrease stomach acid  How can I manage or prevent gastritis? · Do not smoke  Nicotine and other chemicals in cigarettes and cigars can make your symptoms worse and cause lung damage  Ask your healthcare provider for information if you currently smoke and need help to quit  E-cigarettes or smokeless tobacco still contain nicotine  Talk to your healthcare provider before you use these products  · Do not drink alcohol  Alcohol can prevent healing and make your gastritis worse  Talk to your healthcare provider if you need help to stop drinking  · Do not take NSAIDs or aspirin unless directed  These and similar medicines can cause irritation of your stomach lining  If your healthcare provider says it is okay to take NSAIDs, take them with food  · Do not eat foods that cause irritation  Foods such as oranges and salsa can cause burning or pain  Eat a variety of healthy foods  Examples include fruits (not citrus), vegetables, low-fat dairy products, beans, whole-grain breads, and lean meats and fish  Try to eat small meals, and drink water with your meals  Do not eat for at least 3 hours before you go to bed  · Find ways to relax and decrease stress  Stress can increase stomach acid and make gastritis worse  Activities such as yoga, meditation, or listening to music can help you relax  Spend time with friends, or do things you enjoy  Call 911 for any of the following:   · You develop chest pain or shortness of breath        When should I seek immediate care? · You vomit blood  · You have black or bloody bowel movements  · You have severe stomach or back pain  When should I contact my healthcare provider? · You have a fever  · You have new or worsening symptoms, even after treatment  · You have questions or concerns about your condition or care  CARE AGREEMENT:   You have the right to help plan your care  Learn about your health condition and how it may be treated  Discuss treatment options with your healthcare providers to decide what care you want to receive  You always have the right to refuse treatment  The above information is an  only  It is not intended as medical advice for individual conditions or treatments  Talk to your doctor, nurse or pharmacist before following any medical regimen to see if it is safe and effective for you  © Copyright [address] 2022 Information is for End User's use only and may not be sold, redistributed or otherwise used for commercial purposes  All illustrations and images included in CareNotes® are the copyrighted property of A D A M , Inc  or [facility] Odette Triplett   Hiatal Hernia   WHAT YOU NEED TO KNOW:   What is a hiatal hernia? A hiatal hernia is a condition that causes part of your stomach to bulge through the hiatus (small opening) in your diaphragm  The part of the stomach may move up and down, or it may get trapped above the diaphragm  What increases my risk for a hiatal hernia? The exact cause of a hiatal hernia is not known  You may have been born with a large hiatus  The following may increase your risk of a hiatal hernia:  · Obesity    · Older age    · Medical conditions such as diverticulosis or esophagitis    · Previous surgery of the esophagus or stomach or trauma such as from a motor vehicle accident    What are the types of hiatal hernia? · Type I (sliding hiatal hernia): A portion of the stomach slides in and out of the hiatus   This type is the most common and usually causes gastroesophageal reflux disease (GERD)  GERD occurs when the esophageal sphincter does not close properly and causes acid reflux  The esophageal sphincter is the lower muscle of the esophagus  · Type II (paraesophageal hiatal hernia):  Type II hiatal hernia forms when a part of the stomach squeezes through the hiatus and lies next to the esophagus  · Type III (combined):  Type III hiatal hernia is a combination of a sliding and a paraesophageal hiatal hernia  · Type IV (complex paraesophageal hiatal hernia): The whole stomach, the small and large bowels, spleen, pancreas, or liver is pushed up into the chest     What are the signs and symptoms of a hiatal hernia? The most common symptom is heartburn  This usually occurs after meals and spreads to your neck, jaw, or shoulder  You may have no signs or symptoms, or you may have any of the following:  · Abdominal pain, especially in the area just above your navel    · Bitter or acid taste in your mouth    · Trouble swallowing    · Coughing or hoarseness    · Chest pain or shortness of breath that occurs after eating    · Frequent burping or hiccups    · Uncomfortable feeling of fullness after eating    How is a hiatal hernia diagnosed? · An upper GI series test  includes x-rays of your esophagus, stomach, and your small intestines  It is also called a barium swallow test  You will be given barium (a chalky liquid) to drink before the pictures are taken  This liquid helps your stomach and intestines show up better on the x-rays  An upper GI series can show if you have an ulcer, a blocked intestine, or other problems  · An endoscopy  uses a scope to see the inside of your digestive tract  A scope is a long, bendable tube with a light on the end of it  A camera may be hooked to the scope to take pictures  How is a hiatal hernia treated? Treatment depends on the type of hiatal hernia you have and on your symptoms   You may not need any treatment  You may need any of the following:  · Medicines  may be given to relieve heartburn symptoms  These medicines help to decrease or block stomach acid  You may also be given medicines that help to tighten the esophageal sphincter  · Surgery  may be done when medicines cannot control your symptoms, or other problems are present  Your healthcare provider may also suggest surgery depending on the type of hernia you have  Your healthcare provider can put your stomach back into its normal location  He or she may make the hiatus (hole) smaller and anchor your stomach in your abdomen  Fundoplication is a surgery that wraps the upper part of the stomach around the esophageal sphincter to strengthen it  How can I manage my symptoms? The following nutrition and lifestyle changes may be recommended to relieve symptoms of heartburn:     · Avoid foods that make your symptoms worse  These may include spicy foods, fruit juices, alcohol, caffeine, chocolate, and mint  · Eat several small meals during the day  Small meals give your stomach less food to digest     · Avoid lying down and bending forward after you eat  Do not eat meals 2 to 3 hours before bedtime  This decreases your risk for reflux  · Maintain a healthy weight  If you are overweight, weight loss may help relieve your symptoms  · Sleep with your head elevated  at least 6 inches  · Do not smoke  Smoking can increase your symptoms of heartburn  Call your local emergency number (911 in the [address] [place] [address],[address]) if:   · You have severe chest pain and sudden trouble breathing  When should I seek immediate care? · You have severe abdominal pain  · You try to vomit but nothing comes out (retching)  · Your bowel movements are black or bloody  · Your vomit looks like coffee grounds or has blood in it  When should I call my doctor? · Your symptoms are getting worse  · You have nausea, and you are vomiting      · You are losing weight without trying  · You have questions or concerns about your condition or care  CARE AGREEMENT:   You have the right to help plan your care  Learn about your health condition and how it may be treated  Discuss treatment options with your healthcare providers to decide what care you want to receive  You always have the right to refuse treatment  The above information is an  only  It is not intended as medical advice for individual conditions or treatments  Talk to your doctor, nurse or pharmacist before following any medical regimen to see if it is safe and effective for you  © Copyright [employer] 2022 Information is for End User's use only and may not be sold, redistributed or otherwise used for commercial purposes  All illustrations and images included in CareNotes® are the copyrighted property of A D A M , Inc  or Olman Agosto  Dysphagia   WHAT YOU NEED TO KNOW:   Dysphagia is trouble swallowing  You may have trouble moving food or liquid from your mouth to your esophagus or down to your stomach  You may have the problem when you eat, drink, or any time you try to swallow  Dysphagia can last a short time, or it can be a permanent problem  DISCHARGE INSTRUCTIONS:   Call your local emergency number (911 in the [address] [place] [address],[address]) if:   · You have chest pain  · You have shortness of breath  Return to the emergency department if:   · You choke on your saliva  · You cannot eat or drink liquids at all  Call your doctor or therapist if:   · You lose weight without trying  · Your signs and symptoms get worse, or you have new signs or symptoms  · You have signs or symptoms of dehydration, such as increased thirst, dark yellow urine, or little or no urine  · You get colds often  · You have questions or concerns about your condition or care  Nutrition:  You may need to change the texture of the foods you eat to help reduce choking problems   Your healthcare provider may show you how to thicken liquids or soften foods to make them easier to swallow  A therapist  can teach you different ways of swallowing by changing your head and body positions  You may be taught exercises to strengthen the muscles that help you swallow  Follow up with your doctor or therapist as directed:  Write down your questions so you remember to ask them during your visits  © Copyright [employer] 2022 Information is for End User's use only and may not be sold, redistributed or otherwise used for commercial purposes  All illustrations and images included in CareNotes® are the copyrighted property of A D A M , Inc  or [facility] [place] [facility]   The above information is an  only  It is not intended as medical advice for individual conditions or treatments  Talk to your doctor, nurse or pharmacist before following any medical regimen to see if it is safe and effective for you  GERD (Gastroesophageal Reflux Disease)   WHAT YOU NEED TO KNOW:   Gastroesophageal reflux disease (GERD) is reflux that happens more than 2 times a week for a few weeks  Reflux means acid and food in your stomach back up into your esophagus  GERD can cause other health problems over time if it is not treated  DISCHARGE INSTRUCTIONS:   Call your local emergency number (911 in the [address] [facility],[address]) if:   · You have severe chest pain and sudden trouble breathing  Return to the emergency department if:   · You have trouble breathing after you vomit  · You have trouble swallowing, or pain with swallowing  · Your bowel movements are black, bloody, or tarry-looking  · Your vomit looks like coffee grounds or has blood in it  Call your doctor or gastroenterologist if:   · You feel full and cannot burp or vomit  · You vomit large amounts, or you vomit often  · You are losing weight without trying  · Your symptoms get worse or do not improve with treatment      · You have questions or concerns about your condition or care  Medicines:   · Medicines  are used to decrease stomach acid  Medicine may also be used to help your lower esophageal sphincter and stomach contract (tighten) more  · Take your medicine as directed  Contact your healthcare provider if you think your medicine is not helping or if you have side effects  Tell him of her if you are allergic to any medicine  Keep a list of the medicines, vitamins, and herbs you take  Include the amounts, and when and why you take them  Bring the list or the pill bottles to follow-up visits  Carry your medicine list with you in case of an emergency  Manage GERD:       · Do not have foods or drinks that may increase heartburn  These include chocolate, peppermint, fried or fatty foods, drinks that contain caffeine, or carbonated drinks (soda)  Other foods include spicy foods, onions, tomatoes, and tomato-based foods  Do not have foods or drinks that can irritate your esophagus, such as citrus fruits, juices, and alcohol  · Do not eat large meals  When you eat a lot of food at one time, your stomach needs more acid to digest it  Eat 6 small meals each day instead of 3 large ones, and eat slowly  Do not eat meals 2 to 3 hours before bedtime  · Elevate the head of your bed  Place 6-inch blocks under the head of your bed frame  You may also use more than one pillow under your head and shoulders while you sleep  · Maintain a healthy weight  If you are overweight, weight loss may help relieve symptoms of GERD  · Do not smoke  Smoking weakens the lower esophageal sphincter and increases the risk of GERD  Ask your healthcare provider for information if you currently smoke and need help to quit  E-cigarettes or smokeless tobacco still contain nicotine  Talk to your healthcare provider before you use these products  · Do not put pressure on your abdomen  Pressure pushes acid up into your esophagus   Do not wear clothing that is tight around your waist  Do not bend over  Bend at the knees if you need to pick something up  Follow up with your doctor or gastroenterologist as directed:  Write down your questions so you remember to ask them during your visits  © Copyright [employer] 2022 Information is for End User's use only and may not be sold, redistributed or otherwise used for commercial purposes  All illustrations and images included in CareNotes® are the copyrighted property of [employer]  or Olman Agosto  The above information is an  only  It is not intended as medical advice for individual conditions or treatments  Talk to your doctor, nurse or pharmacist before following any medical regimen to see if it is safe and effective for you  Upper Endoscopy   WHAT YOU NEED TO KNOW:   An upper endoscopy is also called an upper gastrointestinal (GI) endoscopy, or an esophagogastroduodenoscopy (EGD)  It is a procedure to examine the inside of your esophagus, stomach, and duodenum (first part of the small intestine) with a scope  You may feel bloated, gassy, or have some abdominal discomfort after your procedure  Your throat may be sore for 24 to 36 hours  You may burp or pass gas from air that is still inside your body  DISCHARGE INSTRUCTIONS:   Seek care immediately if:    You have sudden, severe abdominal pain   You have problems swallowing   You have a large amount of black, sticky bowel movements or blood in your bowel movements   You have sudden trouble breathing   You feel weak, lightheaded, or faint or your heart beats faster than normal for you  Contact your healthcare provider if:    You have a fever and chills   You have nausea or are vomiting   Your abdomen is bloated or feels full and hard   You have abdominal pain   You have black, sticky bowel movements or blood in your bowel movements   You have not had a bowel movement for 3 days after your procedure     You have rash or hives   You have questions or concerns about your procedure  Activity:    Do not lift, strain, or run for 24 hours after your procedure   Rest after your procedure  You have been given medicine to relax you  Do not drive or make important decisions until the day after your procedure  Return to your normal activity as directed   Relieve gas and discomfort from bloating by lying on your right side with a heating pad on your abdomen  You may need to take short walks to help the gas move out  Eat small meals until bloating is relieved  Follow up with your healthcare provider as directed: Write down your questions so you remember to ask them during your visits  If you take a blood thinner, please review the specific instructions from your endoscopist about when you should resume it  These can be found in the Recommendation and Your Medication list sections of this After Visit Summary

## 2022-02-28 NOTE — H&P
History and Physical - [facility]  Edward Alonzo 57 y o  female MRN: [id number]                  HPI: Edward Alonzo is a 57y o  year old female who presents for upper endoscopy  History of gastroesophageal reflux disease  Significant heartburn  Patient is currently taking PPI  Patient also has intermittent dysphagia  REVIEW OF SYSTEMS: Per the HPI, and otherwise unremarkable      Historical Information   Past Medical History:   Diagnosis Date    Abrasion of right leg 03/19/2021    Acute respiratory failure due to COVID-19 ([facility] [address] ) 10/01/2021    Arthritis     feet    BMI 45 0-49 9, adult ([facility] [address] ) 03/19/2021    CKD (chronic kidney disease) 2/8/2022    Colon polyp     Diabetes mellitus (HCC)     FBS today 2/28/22 was 147 at 0930 by patient--feels well    Dyslipidemia     Edema of both lower legs 03/19/2021    Essential hypertension 01/23/2021    Gastroesophageal reflux disease without esophagitis 01/23/2021    GERD (gastroesophageal reflux disease)     Hiatal hernia     HTN (hypertension)     Hyperlipemia     Hypertension     Hypertensive arterionephrosclerosis of transplanted kidney 01/23/2021    Hypokalemia 04/20/2021    Insomnia     Leukocytosis     Medicare annual wellness visit, subsequent 11/9/2021    Mixed hyperlipidemia 01/23/2021    Numbness     Obstructive sleep apnea syndrome 01/23/2021    no device    Pain in ankle 01/23/2021    Pain in both feet 01/23/2021    Pain in both knees 01/26/2021    Paroxysmal atrial fibrillation ([facility] [address] ) 11/09/2021    Personal history of COVID-19     August 2021    Shortness of breath     exertional    Shoulder pain, right 2/8/2022    Skin lesions     Sleep apnea     Type 2 diabetes mellitus without complication, without long-term current use of insulin ([facility] [address] ) 01/23/2021     Past Surgical History:   Procedure Laterality Date    ANKLE SURGERY Right 2009/2012    BREAST BIOPSY Right     stereotactic biopsy    CARPAL TUNNEL RELEASE Bilateral     COLONOSCOPY  2017    Dr Linda Jones  2004    HYSTERECTOMY  2004    MAMMO (HISTORICAL)  10/02/2018    MAMMO STEREOTACTIC BREAST BIOPSY RIGHT (ALL INC) Right 2014    UPPER GASTROINTESTINAL ENDOSCOPY      WISDOM TOOTH EXTRACTION       Social History   Social History     Substance and Sexual Activity   Alcohol Use Yes    Comment: -rare     Social History     Substance and Sexual Activity   Drug Use Not Currently    Comment: No drug use - As per [employer]     Social History     Tobacco Use   Smoking Status Former Smoker    Packs/day: 1 50    Types: Cigarettes    Quit date: 2004    Years since quittin 5   Smokeless Tobacco Former User   Tobacco Comment    quit at 38     Family History   Problem Relation Age of Onset    Cancer Mother     Diabetes Mother     Hyperlipidemia Mother     Breast cancer Mother     Breast cancer Maternal Grandmother     Lung cancer Father     No Known Problems Sister     No Known Problems Daughter     No Known Problems Maternal Grandfather     No Known Problems Sister     No Known Problems Son     No Known Problems Maternal Aunt     Colon cancer Cousin        Meds/Allergies       Current Outpatient Medications:     atorvastatin (LIPITOR) 40 mg tablet    Blood Glucose Monitoring Suppl (FreeStyle Lite) TOYIN    calcium carbonate (OS-AMEE) 600 MG tablet    FREESTYLE LITE test strip    glucosamine-chondroitin 500-400 MG tablet    Lancets (freestyle) lancets    losartan-hydrochlorothiazide (HYZAAR) 100-12 5 MG per tablet    metFORMIN (GLUCOPHAGE) 500 mg tablet    metoprolol tartrate (LOPRESSOR) 50 mg tablet    Multiple Vitamin (MULTIVITAMIN) capsule    pantoprazole (PROTONIX) 40 mg tablet    traMADol (ULTRAM) 50 mg tablet    Current Facility-Administered Medications:     sodium chloride 0 9 % infusion, 30 mL/hr, Intravenous, Continuous    Allergies   Allergen Reactions    Aspirin GI Intolerance    Lisinopril Fatigue       Objective     /67   Pulse 68   Temp (!) 97 3 °F (36 3 °C) (Skin)   Resp 18   Ht 5' 3" (1 6 m)   Wt 104 kg (229 lb)   SpO2 98%   BMI 40 57 kg/m²       PHYSICAL EXAM    Gen: NAD  Head: NCAT  CV: RRR  CHEST: Clear  ABD: soft, NT/ND  EXT: no edema      ASSESSMENT/PLAN:  This is a 57y o  year old female here for EGD, and she is stable and optimized for her procedure

## 2022-02-28 NOTE — DISCHARGE SUMMARY
Discharge Summary - Tony Christal 57 y o  female MRN: [id number]    Unit/Bed#:  Encounter: [id number]    Admission Date:  02/28/2022    Admitting Diagnosis: Gastroesophageal reflux disease with esophagitis, unspecified whether hemorrhage [K21 00]  Hiatal hernia [K44 9]  Pharyngoesophageal dysphagia [R13 14]    HPI:  Reflux and dysphagia    Procedures Performed: No orders of the defined types were placed in this encounter  Summary of Hospital Course: Tolerated procedure well    Significant Findings, Care, Treatment and Services Provided:  Hiatal hernia and reflux noted  Gastritis noted  Empiric dilatation done of the lower esophagus to 54 [place]    Complications:  None    Discharge Diagnosis:  Hiatal hernia and possible esophageal dysmotility    Medical Problems             Resolved Problems  Date Reviewed: 2/8/2022    None                Condition at Discharge: good         Discharge instructions/Information to patient and family:   See after visit summary for information provided to patient and family  Provisions for Follow-Up Care:  See after visit summary for information related to follow-up care and any pertinent home health orders        PCP: Stoney Lyn MD    Disposition: Home

## 2022-02-28 NOTE — INTERVAL H&P NOTE
H&P reviewed  After examining the patient I find no changes in the patients condition since the H&P had been written      Vitals:    02/28/22 1315   BP: 122/67   Pulse: 68   Resp: 18   Temp: (!) 97 3 °F (36 3 °C)   SpO2: 98%

## 2022-02-28 NOTE — ANESTHESIA PREPROCEDURE EVALUATION
Procedure:  EGD    Relevant Problems   CARDIO   (+) Essential hypertension   (+) Mixed hyperlipidemia   (+) Paroxysmal atrial fibrillation (HCC)      ENDO   (+) Type 2 diabetes mellitus without complication, without long-term current use of insulin (HCC)      GI/HEPATIC   (+) Gastroesophageal reflux disease without esophagitis      /RENAL   (+) CKD (chronic kidney disease)      MUSCULOSKELETAL   (+) Low back pain   (+) Piriformis syndrome of right side      PULMONARY   (+) Acute respiratory failure with hypoxia due to COVID 19 pneumonia   (+) Obstructive sleep apnea syndrome        Physical Exam    Airway    Mallampati score: I  TM Distance: >3 FB  Neck ROM: full     Dental   No notable dental hx     Cardiovascular  Rhythm: regular, Rate: normal, Cardiovascular exam normal    Pulmonary  Pulmonary exam normal Breath sounds clear to auscultation,     Other Findings        Anesthesia Plan  ASA Score- 3     Anesthesia Type- IV sedation with anesthesia with ASA Monitors  Additional Monitors:   Airway Plan:     Comment: Nasal cannula  Plan Factors-Exercise tolerance (METS): >4 METS  Chart reviewed  EKG reviewed  Imaging results reviewed  Existing labs reviewed  Patient summary reviewed  Patient is not a current smoker  Patient not instructed to abstain from smoking on day of procedure  Patient did not smoke on day of surgery  Obstructive sleep apnea risk education given perioperatively  Induction- intravenous  Postoperative Plan-     Informed Consent- Anesthetic plan and risks discussed with patient  I personally reviewed this patient with the CRNA  Discussed and agreed on the Anesthesia Plan with the CRNA  Jayme Martinez

## 2022-03-01 ENCOUNTER — PATIENT OUTREACH (OUTPATIENT)
Dept: INTERNAL MEDICINE CLINIC | Facility: CLINIC | Age: 62
End: 2022-03-01

## 2022-03-01 ENCOUNTER — HOSPITAL ENCOUNTER (OUTPATIENT)
Dept: RADIOLOGY | Facility: HOSPITAL | Age: 62
Discharge: HOME | End: 2022-03-01
Attending: INTERNAL MEDICINE
Payer: MEDICARE

## 2022-03-01 DIAGNOSIS — U09.9 COVID-19 LONG HAULER: ICD-10-CM

## 2022-03-01 PROCEDURE — 71046 X-RAY EXAM CHEST 2 VIEWS: CPT

## 2022-03-01 NOTE — PROGRESS NOTES
Chart review completed for the following sections:   Recent Vital Signs   Allergies/Contradictions   Medication Review    History    SDOH    Problem List   Immunizations   Past hospitalizations and major procedures, including surgery   Significant past illnesses and treatment history including: History and Physical, Other provider notes, Medications/Infusions/Transfusions and Surgery   Relevant past medications related to the patient's condition    As per chart review, patient does not meet criteria for [address]  Patient does not have behavioral health diagnosis

## 2022-03-07 ENCOUNTER — OFFICE VISIT (OUTPATIENT)
Dept: PULMONOLOGY | Facility: CLINIC | Age: 62
End: 2022-03-07
Payer: MEDICARE

## 2022-03-07 VITALS
DIASTOLIC BLOOD PRESSURE: 80 MMHG | HEIGHT: 63 IN | BODY MASS INDEX: 39.65 KG/M2 | WEIGHT: 223.8 LBS | OXYGEN SATURATION: 99 % | SYSTOLIC BLOOD PRESSURE: 136 MMHG | HEART RATE: 66 BPM | TEMPERATURE: 97.8 F

## 2022-03-07 DIAGNOSIS — G47.33 OBSTRUCTIVE SLEEP APNEA SYNDROME: Primary | Chronic | ICD-10-CM

## 2022-03-07 DIAGNOSIS — U09.9 COVID-19 LONG HAULER: ICD-10-CM

## 2022-03-07 PROBLEM — E66.812 CLASS 2 SEVERE OBESITY WITH BODY MASS INDEX (BMI) OF 35 TO 39.9 WITH SERIOUS COMORBIDITY (HCC): Status: ACTIVE | Noted: 2022-03-07

## 2022-03-07 PROBLEM — E66.01 CLASS 2 SEVERE OBESITY WITH BODY MASS INDEX (BMI) OF 35 TO 39.9 WITH SERIOUS COMORBIDITY (HCC): Status: ACTIVE | Noted: 2022-03-07

## 2022-03-07 PROBLEM — J96.01 ACUTE RESPIRATORY FAILURE WITH HYPOXIA (HCC): Status: RESOLVED | Noted: 2021-08-14 | Resolved: 2022-03-07

## 2022-03-07 PROCEDURE — 99213 OFFICE O/P EST LOW 20 MIN: CPT | Performed by: INTERNAL MEDICINE

## 2022-03-07 NOTE — PROGRESS NOTES
Assessment/Plan:    COVID-19 long hauler  Persistent dyspnea and somewhat abnormal chest x-ray suggesting the likelihood of some pulmonary fibrosis  Examination today is normal   Oxygenation satisfactory  In the fall patient did not clearly improved with the use of prednisone  Suggest regular aerobic exercise as able  Her walking is somewhat impaired by a bad ankle  It is possible that there will be slow improvement over months  Consider repeating PFTs this summer  Patient seems to be slowly losing weight which will be very helpful for her dyspnea also  Obstructive sleep apnea syndrome  Not adequately addressed at this visit       There are no diagnoses linked to this encounter  Subjective:      Patient ID: Carolyn Leyva is a 57 y o  female  This patient still senses persistent mild dyspnea on exertion  Does not seem to have much trouble walking on a flat surface but does have some difficulty with steps and using a vacuum   To some degree her walking is somewhat impaired by a bad ankle with arthritis  Does walk around the block somewhat frequently  Not troubled with cough or congestion  No wheezing  Does not sense any major improvement since the last time we met in December  Has lost approximately 15 lb in that time frame  Last smoked 17 years ago  Patient carries the diagnosis of obstructive sleep apnea on electronic chart  We have not spoken about this before and did not address this adequately at this visit  We will discuss this at the next visit  The following portions of the patient's history were reviewed and updated as appropriate: allergies, current medications, past family history, past medical history, past social history, past surgical history and problem list     Review of Systems   Constitutional: Positive for activity change  Negative for unexpected weight change  Respiratory: Positive for apnea (May be) and shortness of breath   Negative for cough and wheezing  Cardiovascular: Negative for chest pain, palpitations and leg swelling  Musculoskeletal: Positive for arthralgias ( right ankle) and gait problem  Objective:      /80 (BP Location: Left arm, Patient Position: Sitting, Cuff Size: Standard)   Pulse 66   Temp 97 8 °F (36 6 °C) (Tympanic)   Ht 5' 3" (1 6 m)   Wt 102 kg (223 lb 12 8 oz)   SpO2 99%   BMI 39 64 kg/m²          Physical Exam  Vitals reviewed  Constitutional:       Appearance: She is obese  She is not ill-appearing  Cardiovascular:      Rate and Rhythm: Normal rate and regular rhythm  Pulses:           Radial pulses are 2+ on the right side and 2+ on the left side  Heart sounds: Normal heart sounds  Pulmonary:      Effort: Pulmonary effort is normal       Breath sounds: Normal breath sounds  No wheezing or rales  Musculoskeletal:         General: No swelling  Cervical back: No rigidity or tenderness  Right lower leg: No edema  Left lower leg: No edema  Comments: Right ankle tender without definite swelling  Lymphadenopathy:      Cervical: No cervical adenopathy  Skin:     General: Skin is warm and dry     Psychiatric:         Mood and Affect: Mood normal          Behavior: Behavior normal

## 2022-03-07 NOTE — ASSESSMENT & PLAN NOTE
Persistent dyspnea and somewhat abnormal chest x-ray suggesting the likelihood of some pulmonary fibrosis  Examination today is normal   Oxygenation satisfactory  In the fall patient did not clearly improved with the use of prednisone  Suggest regular aerobic exercise as able  Her walking is somewhat impaired by a bad ankle  It is possible that there will be slow improvement over months  Consider repeating PFTs this summer  Patient seems to be slowly losing weight which will be very helpful for her dyspnea also

## 2022-04-20 ENCOUNTER — RA CDI HCC (OUTPATIENT)
Dept: OTHER | Facility: HOSPITAL | Age: 62
End: 2022-04-20

## 2022-04-20 NOTE — PROGRESS NOTES
E11 22, E66 01 severe   HCC coding opportunities          Chart Reviewed number of suggestions sent to Provider: 2     Patients Insurance     Medicare Insurance: Medicare        obesity

## 2022-05-10 ENCOUNTER — OFFICE VISIT (OUTPATIENT)
Dept: INTERNAL MEDICINE CLINIC | Facility: CLINIC | Age: 62
End: 2022-05-10
Payer: MEDICARE

## 2022-05-10 VITALS
OXYGEN SATURATION: 99 % | DIASTOLIC BLOOD PRESSURE: 72 MMHG | HEART RATE: 61 BPM | TEMPERATURE: 98.8 F | SYSTOLIC BLOOD PRESSURE: 124 MMHG | HEIGHT: 63 IN | BODY MASS INDEX: 39.34 KG/M2 | WEIGHT: 222 LBS | RESPIRATION RATE: 14 BRPM

## 2022-05-10 DIAGNOSIS — E78.2 MIXED HYPERLIPIDEMIA: Chronic | ICD-10-CM

## 2022-05-10 DIAGNOSIS — M79.671 RIGHT FOOT PAIN: ICD-10-CM

## 2022-05-10 DIAGNOSIS — I10 ESSENTIAL HYPERTENSION: Primary | Chronic | ICD-10-CM

## 2022-05-10 DIAGNOSIS — M25.571 CHRONIC PAIN OF RIGHT ANKLE: ICD-10-CM

## 2022-05-10 DIAGNOSIS — G89.29 CHRONIC PAIN OF RIGHT ANKLE: ICD-10-CM

## 2022-05-10 DIAGNOSIS — U09.9 COVID-19 LONG HAULER: ICD-10-CM

## 2022-05-10 DIAGNOSIS — E11.9 TYPE 2 DIABETES MELLITUS WITHOUT COMPLICATION, WITHOUT LONG-TERM CURRENT USE OF INSULIN (HCC): Chronic | ICD-10-CM

## 2022-05-10 DIAGNOSIS — Z79.899 HIGH RISK MEDICATION USE: ICD-10-CM

## 2022-05-10 DIAGNOSIS — K21.9 GASTROESOPHAGEAL REFLUX DISEASE WITHOUT ESOPHAGITIS: Chronic | ICD-10-CM

## 2022-05-10 PROCEDURE — 99214 OFFICE O/P EST MOD 30 MIN: CPT | Performed by: INTERNAL MEDICINE

## 2022-05-10 RX ORDER — MELOXICAM 15 MG/1
15 TABLET ORAL DAILY
COMMUNITY

## 2022-05-10 NOTE — ASSESSMENT & PLAN NOTE
Lab Results   Component Value Date    HGBA1C 7 3 (H) 02/01/2022   Last time her A1c was 7 3  At home blood pressure in the morning around 120 is and evening blood sugar around 110  No hypoglycemia will continue present medication and advised to watch diet 1800 calorie ADA  Will follow hemoglobin A1c next time if persistently elevated will adjust medication  Patient states she was seen by an eye doctor February 2022 per patient

## 2022-05-10 NOTE — ASSESSMENT & PLAN NOTE
Patient has chronic right foot pain  Has been followed by Dr Fernanda Saeed podiatrist on tramadol and meloxicam as above

## 2022-05-10 NOTE — ASSESSMENT & PLAN NOTE
She has a chronic pain in the right ankle  Has been followed by podiatrist Dr Sabrina Hernandez  She takes tramadol 1-2 tablets per day and she was also started on meloxicam 15 mg once a day p r n  By Dr Sabrina Hernandez per patient

## 2022-05-10 NOTE — PROGRESS NOTES
Assessment/Plan:    1  Essential hypertension  Assessment & Plan:  Blood pressure well controlled  Advised to continue present medication  Advised for low-salt diet  Orders:  -     CBC and differential; Future  -     Comprehensive metabolic panel; Future    2  Type 2 diabetes mellitus without complication, without long-term current use of insulin ([facility])  Assessment & Plan:    Lab Results   Component Value Date    HGBA1C 7 3 (H) 02/01/2022   Last time her A1c was 7 3  At home blood pressure in the morning around 120 is and evening blood sugar around 110  No hypoglycemia will continue present medication and advised to watch diet 1800 calorie ADA  Will follow hemoglobin A1c next time if persistently elevated will adjust medication  Patient states she was seen by an eye doctor February 2022 per patient  Orders:  -     Comprehensive metabolic panel; Future  -     Hemoglobin A1C; Future    3  Gastroesophageal reflux disease without esophagitis  Assessment & Plan:  She is on pantoprazole  Symptoms are controlled on present medication  She has been followed by Gastroenterology Dr Mehul Nolan  Advised to watch diet and reflux precautions  4  Mixed hyperlipidemia  Assessment & Plan:  Well controlled  Cholesterol 146, triglyceride 157, HDL 39, LDL 76  Advised to continue present dose of atorvastatin and low-cholesterol low saturated diet  Will follow lipid panel  Orders:  -     Lipid panel; Future    5  BMI 39 0-39 9,adult    6  Chronic pain of right ankle  Assessment & Plan:  She has a chronic pain in the right ankle  Has been followed by podiatrist Dr Xander Ramon  She takes tramadol 1-2 tablets per day and she was also started on meloxicam 15 mg once a day p r n  By Dr Xander Ramon per patient  Orders:  -     CBC and differential; Future    7  Right foot pain  Assessment & Plan:  Patient has chronic right foot pain    Has been followed by Dr Xander Ramon podiatrist on tramadol and meloxicam as above     Orders:  -     CBC and differential; Future    8  High risk medication use  -     CBC and differential; Future  -     Comprehensive metabolic panel; Future    9  COVID-19 long [address]:  Patient has been followed by pulmonary specialist Dr Cindy Craven  Had chest x-ray recently  Has a mild exertional shortness of breath mainly when she goes up steps or up hills  BMI Counseling: Body mass index is 39 33 kg/m²  The BMI is above normal  Nutrition recommendations include decreasing portion sizes, decreasing fast food intake, consuming healthier snacks, limiting drinks that contain sugar, moderation in carbohydrate intake, reducing intake of saturated and trans fat and reducing intake of cholesterol  Exercise recommendations include exercising 3-5 times per week  No pharmacotherapy was ordered  Rationale for BMI follow-up plan is due to patient being overweight or obese  Subjective:  Patient presents for follow-up  Patient ID: Freedom Jessica is a 57 y o  female  HPI   50-year-old white female patient presents for follow-up her medical problems  She denies any chest pain or pain in abdomen  She gets shortness of breath on exertion mainly when she goes up hills or up steps  Denies any cough, fever, chills denies nausea vomiting, diarrhea  Has a chronic pain in the right ankle and of right foot has been followed by podiatrist Dr Maura Francois  Recently she was started on meloxicam by him      The following portions of the patient's history were reviewed and updated as appropriate:     Past Medical History:  She has a past medical history of Abrasion of right leg (03/19/2021), Acute respiratory failure due to COVID-19 [facility]) (10/01/2021), Arthritis, BMI 39 0-39 9,adult (5/10/2022), CKD (chronic kidney disease) (2/8/2022), Colon polyp, Diabetes mellitus ([facility] [address] ), Dyslipidemia, Dysphagia, pharyngoesophageal, Edema of both lower legs (03/19/2021), Essential hypertension (01/23/2021), Gastroesophageal reflux disease without esophagitis (01/23/2021), GERD (gastroesophageal reflux disease), Hiatal hernia, HTN (hypertension), Hyperlipemia, Hypertension, Hypertensive arterionephrosclerosis of transplanted kidney (01/23/2021), Hypokalemia (04/20/2021), Insomnia, Leukocytosis, Medicare annual wellness visit, subsequent (11/9/2021), Mixed hyperlipidemia (01/23/2021), Numbness, Obstructive sleep apnea syndrome (01/23/2021), Pain in ankle (01/23/2021), Pain in both feet (01/23/2021), Pain in both knees (01/26/2021), Pain in right ankle (5/10/2022), Paroxysmal atrial fibrillation ([facility] ) (11/09/2021), Personal history of COVID-19, Right foot pain (5/10/2022), Shortness of breath, Shoulder pain, right (2/8/2022), Skin lesions, Sleep apnea, and Type 2 diabetes mellitus without complication, without long-term current use of insulin ([facility] ) (01/23/2021)  ,  _______________________________________________________________________  Past Surgical History:   has a past surgical history that includes Carpal tunnel release (Bilateral, 1990); Gallbladder surgery (2004); Hysterectomy (2004); Ankle surgery (Right, 2009/2012); Mammo stereotactic breast biopsy right (all inc) (Right, 2014); Mammo (historical) (10/02/2018); Colonoscopy (03/07/2017); Breast biopsy (Right); Colonoscopy; Upper gastrointestinal endoscopy; and [place] tooth extraction  ,  _______________________________________________________________________  Family History:  family history includes Breast cancer in her maternal grandmother and mother; Cancer in her mother; Colon cancer in her cousin; Diabetes in her mother; Hyperlipidemia in her mother; Lung cancer in her father; No Known Problems in her daughter, maternal aunt, maternal grandfather, sister, sister, and son ,  _______________________________________________________________________  Social History:   reports that she quit smoking about 17 years ago  Her smoking use included cigarettes   She smoked 1 50 packs per day  She has quit using smokeless tobacco  She reports current alcohol use  She reports previous drug use ,  _______________________________________________________________________  Allergies:  is allergic to aspirin and lisinopril     _______________________________________________________________________  Current Outpatient Medications   Medication Sig Dispense Refill    atorvastatin (LIPITOR) 40 mg tablet Take 1 tablet (40 mg total) by mouth daily 90 tablet 1    Blood Glucose Monitoring Suppl (FreeStyle Lite) TOYIN daily      calcium carbonate (OS-AMEE) 600 MG tablet Take 600 mg by mouth 2 (two) times a day with meals With vitamin D3       FREESTYLE LITE test strip Use 1 each 2 (two) times a day Use as instructed 200 strip 3    glucosamine-chondroitin 500-400 MG tablet Take 1 tablet by mouth 3 (three) times a day      Lancets (freestyle) lancets Use 2 (two) times a day Use as instructed 200 each 3    losartan-hydrochlorothiazide (HYZAAR) 100-12 5 MG per tablet Take 1 tablet by mouth daily 90 tablet 1    meloxicam (MOBIC) 15 mg tablet Take 15 mg by mouth daily      metFORMIN (GLUCOPHAGE) 500 mg tablet Take 1 tablet (500 mg total) by mouth see administration instructions Take 1 tablet daily morning and 2 tablets daily evening before meals  Total of 3 tablets per day 270 tablet 1    metoprolol tartrate (LOPRESSOR) 50 mg tablet Take 1 tablet (50 mg total) by mouth 2 (two) times a day 180 tablet 1    Multiple Vitamin (MULTIVITAMIN) capsule Take 1 capsule by mouth daily      pantoprazole (PROTONIX) 40 mg tablet Take 1 tablet (40 mg total) by mouth daily 1/2 hr before breakfast 90 tablet 3    traMADol (ULTRAM) 50 mg tablet Take 50 mg by mouth every 8 (eight) hours as needed  0     No current facility-administered medications for this visit      _______________________________________________________________________  Review of Systems   Constitutional: Negative for chills and fever     HENT: Negative for congestion, ear pain, hearing loss, nosebleeds, sinus pain, sore throat and trouble swallowing  Eyes: Negative for discharge, redness and visual disturbance  Respiratory: Positive for shortness of breath (On exertion when she goes up hills or up steps)  Negative for cough and chest tightness  Cardiovascular: Negative for chest pain and palpitations  Gastrointestinal: Negative for abdominal pain, blood in stool, constipation, diarrhea, nausea and vomiting  Genitourinary: Negative for dysuria, flank pain, frequency and hematuria  Musculoskeletal: Positive for arthralgias ( right ankle right foot pain chronic)  Negative for myalgias and neck pain  Skin: Negative for color change and rash  Neurological: Negative for dizziness, speech difficulty, weakness and headaches  Hematological: Does not bruise/bleed easily  Psychiatric/Behavioral: Negative for agitation and behavioral problems  Objective:  Vitals:    05/10/22 1254   BP: 124/72   BP Location: Right arm   Patient Position: Sitting   Cuff Size: Adult   Pulse: 61   Resp: 14   Temp: 98 8 °F (37 1 °C)   TempSrc: Tympanic   SpO2: 99%   Weight: 101 kg (222 lb)   Height: 5' 3" (1 6 m)     Body mass index is 39 33 kg/m²  Physical Exam  Vitals and nursing note reviewed  Constitutional:       General: She is not in acute distress  Appearance: Normal appearance  HENT:      Head: Normocephalic and atraumatic  Right Ear: Ear canal and external ear normal       Left Ear: Ear canal and external ear normal       Nose: Nose normal       Mouth/Throat:      Mouth: Mucous membranes are moist    Eyes:      General: No scleral icterus  Right eye: No discharge  Left eye: No discharge  Extraocular Movements: Extraocular movements intact  Conjunctiva/sclera: Conjunctivae normal    Cardiovascular:      Rate and Rhythm: Normal rate and regular rhythm  Pulses: Normal pulses             Dorsalis pedis pulses are 2+ on the right side and 2+ on the left side  Posterior tibial pulses are 2+ on the right side and 2+ on the left side  Heart sounds: No murmur heard  Pulmonary:      Effort: Pulmonary effort is normal  No respiratory distress  Breath sounds: Normal breath sounds  Abdominal:      General: Bowel sounds are normal       Palpations: Abdomen is soft  Tenderness: There is no abdominal tenderness  Musculoskeletal:         General: Tenderness (Has tenderness right ankle with some decreased range of motion) present  Normal range of motion  Cervical back: Normal range of motion and neck supple  No muscular tenderness  Right lower leg: No edema  Left lower leg: No edema  Feet:      Right foot:      Skin integrity: No ulcer, skin breakdown, erythema, warmth, callus or dry skin  Left foot:      Skin integrity: No ulcer, skin breakdown, erythema, warmth, callus or dry skin  Skin:     General: Skin is warm  Findings: No rash  Neurological:      General: No focal deficit present  Mental Status: She is alert and oriented to person, place, and time  Motor: No weakness  Coordination: Coordination normal    Psychiatric:         Mood and Affect: Mood normal          Behavior: Behavior normal        Patient's shoes and socks removed  Right Foot/Ankle   Right Foot Inspection  Skin Exam: skin normal and skin intact  No dry skin, no warmth, no callus, no erythema, no maceration, no abnormal color, no pre-ulcer, no ulcer and no callus  Toe Exam: tenderness (Right ankle tenderness and tenderness on the dorsal aspect of the right foot  )  Sensory   Vibration: intact  Proprioception: intact  Monofilament testing: intact    Vascular  The right DP pulse is 2+  The right PT pulse is 2+  Left Foot/Ankle  Left Foot Inspection  Skin Exam: skin normal and skin intact   No dry skin, no warmth, no erythema, no maceration, normal color, no pre-ulcer, no ulcer and no callus  Toe Exam: ROM and strength within normal limits  Sensory   Vibration: intact  Proprioception: intact  Monofilament testing: intact    Vascular  The left DP pulse is 2+  The left PT pulse is 2+  Patient has been followed by podiatrist Dr Maura Francois  I spent 30 minutes with the patient today    More than 50% time spent for reviewing of external notes, reviewing of the results of diagnostics test, management of care, patient education and ordering of test

## 2022-05-10 NOTE — PATIENT INSTRUCTIONS
10% - bad control"> 10% - bad control,Hemoglobin A1c (HbA1c) greater than 10% indicating poor diabetic control,Haemoglobin A1c greater than 10% indicating poor diabetic control">   Diabetes Mellitus Type 2 in Adults, Ambulatory Care   GENERAL INFORMATION:   Diabetes mellitus type 2  is a disease that affects how your body uses glucose (sugar)  Insulin helps move sugar out of the blood so it can be used for energy  Normally, when the blood sugar level increases, the pancreas makes more insulin  Type 2 diabetes develops because either the body cannot make enough insulin, or it cannot use the insulin correctly  After many years, your pancreas may stop making insulin  Common symptoms include the following:   · More hunger or thirst than usual     · Frequent urination     · Weight loss without trying     · Blurred vision  Seek immediate care for the following symptoms:   · Severe abdominal pain, or pain that spreads to your back  You may also be vomiting  · Trouble staying awake or focusing    · Shaking or sweating    · Blurred or double vision    · Breath has a fruity, sweet smell    · Breathing is deep and labored, or rapid and shallow    · Heartbeat is fast and weak  Treatment for diabetes mellitus type 2  includes keeping your blood sugar at a normal level  You must eat the right foods, and exercise regularly  You may also need medicine if you cannot control your blood sugar level with nutrition and exercise  Manage diabetes mellitus type 2:   · Check your blood sugar level  You will be taught how to check a small drop of blood in a glucose monitor  Ask your healthcare provider when and how often to check during the day  Ask your healthcare provider what your blood sugar levels should be when you check them  · Keep track of carbohydrates (sugar and starchy foods)  Your blood sugar level can get too high if you eat too many carbohydrates   Your dietitian will help you plan meals and snacks that have the right amount of carbohydrates  · Eat low-fat foods  Some examples are skinless chicken and low-fat milk  · Eat less sodium (salt)  Some examples of high-sodium foods to limit are soy sauce, potato chips, and soup  Do not add salt to food you cook  Limit your use of table salt  · Eat high-fiber foods  Foods that are a good source of fiber include vegetables, whole grain bread, and beans  · Limit alcohol  Alcohol affects your blood sugar level and can make it harder to manage your diabetes  Women should limit alcohol to 1 drink a day  Men should limit alcohol to 2 drinks a day  A drink of alcohol is 12 ounces of beer, 5 ounces of wine, or 1½ ounces of liquor  · Get regular exercise  Exercise can help keep your blood sugar level steady, decrease your risk of heart disease, and help you lose weight  Exercise for at least 30 minutes, 5 days a week  Include muscle strengthening activities 2 days each week  Work with your healthcare provider to create an exercise plan  · Check your feet each day  for injuries or open sores  Ask your healthcare provider for activities you can do if you have an open sore  · Quit smoking  If you smoke, it is never too late to quit  Smoking can worsen the problems that may occur with diabetes  Ask your healthcare provider for information about how to stop smoking if you are having trouble quitting  · Ask about your weight:  Ask healthcare providers if you need to lose weight, and how much to lose  Ask them to help you with a weight loss program  Even a 10 to 15 pound weight loss can help you manage your blood sugar level  · Carry medical alert identification  Wear medical alert jewelry or carry a card that says you have diabetes  Ask your healthcare provider where to get these items  · Ask about vaccines  Diabetes puts you at risk of serious illness if you get the flu, pneumonia, or hepatitis   Ask your healthcare provider if you should get a flu, pneumonia, or hepatitis B vaccine, and when to get the vaccine  Follow up with your healthcare provider as directed:  Write down your questions so you remember to ask them during your visits  CARE AGREEMENT:   You have the right to help plan your care  Learn about your health condition and how it may be treated  Discuss treatment options with your caregivers to decide what care you want to receive  You always have the right to refuse treatment  The above information is an  only  It is not intended as medical advice for individual conditions or treatments  Talk to your doctor, nurse or pharmacist before following any medical regimen to see if it is safe and effective for you  © 2014 [address] is for End User's use only and may not be sold, redistributed or otherwise used for commercial purposes  All illustrations and images included in CareNotes® are the copyrighted property of [employer]  or [employer]  10% - bad control"> 10% - bad control,Hemoglobin A1c (HbA1c) greater than 10% indicating poor diabetic control,Haemoglobin A1c greater than 10% indicating poor diabetic control">   Diabetes Mellitus Type 2 in Adults, Ambulatory Care   GENERAL INFORMATION:   Diabetes mellitus type 2  is a disease that affects how your body uses glucose (sugar)  Insulin helps move sugar out of the blood so it can be used for energy  Normally, when the blood sugar level increases, the pancreas makes more insulin  Type 2 diabetes develops because either the body cannot make enough insulin, or it cannot use the insulin correctly  After many years, your pancreas may stop making insulin  Common symptoms include the following:   · More hunger or thirst than usual     · Frequent urination     · Weight loss without trying     · Blurred vision  Seek immediate care for the following symptoms:   · Severe abdominal pain, or pain that spreads to your back   You may also be vomiting  · Trouble staying awake or focusing    · Shaking or sweating    · Blurred or double vision    · Breath has a fruity, sweet smell    · Breathing is deep and labored, or rapid and shallow    · Heartbeat is fast and weak  Treatment for diabetes mellitus type 2  includes keeping your blood sugar at a normal level  You must eat the right foods, and exercise regularly  You may also need medicine if you cannot control your blood sugar level with nutrition and exercise  Manage diabetes mellitus type 2:   · Check your blood sugar level  You will be taught how to check a small drop of blood in a glucose monitor  Ask your healthcare provider when and how often to check during the day  Ask your healthcare provider what your blood sugar levels should be when you check them  · Keep track of carbohydrates (sugar and starchy foods)  Your blood sugar level can get too high if you eat too many carbohydrates  Your dietitian will help you plan meals and snacks that have the right amount of carbohydrates  · Eat low-fat foods  Some examples are skinless chicken and low-fat milk  · Eat less sodium (salt)  Some examples of high-sodium foods to limit are soy sauce, potato chips, and soup  Do not add salt to food you cook  Limit your use of table salt  · Eat high-fiber foods  Foods that are a good source of fiber include vegetables, whole grain bread, and beans  · Limit alcohol  Alcohol affects your blood sugar level and can make it harder to manage your diabetes  Women should limit alcohol to 1 drink a day  Men should limit alcohol to 2 drinks a day  A drink of alcohol is 12 ounces of beer, 5 ounces of wine, or 1½ ounces of liquor  · Get regular exercise  Exercise can help keep your blood sugar level steady, decrease your risk of heart disease, and help you lose weight  Exercise for at least 30 minutes, 5 days a week  Include muscle strengthening activities 2 days each week   Work with your healthcare provider to create an exercise plan  · Check your feet each day  for injuries or open sores  Ask your healthcare provider for activities you can do if you have an open sore  · Quit smoking  If you smoke, it is never too late to quit  Smoking can worsen the problems that may occur with diabetes  Ask your healthcare provider for information about how to stop smoking if you are having trouble quitting  · Ask about your weight:  Ask healthcare providers if you need to lose weight, and how much to lose  Ask them to help you with a weight loss program  Even a 10 to 15 pound weight loss can help you manage your blood sugar level  · Carry medical alert identification  Wear medical alert jewelry or carry a card that says you have diabetes  Ask your healthcare provider where to get these items  · Ask about vaccines  Diabetes puts you at risk of serious illness if you get the flu, pneumonia, or hepatitis  Ask your healthcare provider if you should get a flu, pneumonia, or hepatitis B vaccine, and when to get the vaccine  Follow up with your healthcare provider as directed:  Write down your questions so you remember to ask them during your visits  CARE AGREEMENT:   You have the right to help plan your care  Learn about your health condition and how it may be treated  Discuss treatment options with your caregivers to decide what care you want to receive  You always have the right to refuse treatment  The above information is an  only  It is not intended as medical advice for individual conditions or treatments  Talk to your doctor, nurse or pharmacist before following any medical regimen to see if it is safe and effective for you  © 2014 [address] is for End User's use only and may not be sold, redistributed or otherwise used for commercial purposes   All illustrations and images included in CareNotes® are the copyrighted property of [employer]  or Alli Montana

## 2022-05-10 NOTE — ASSESSMENT & PLAN NOTE
She is on pantoprazole  Symptoms are controlled on present medication  She has been followed by Gastroenterology Dr Samreen Tello  Advised to watch diet and reflux precautions

## 2022-05-10 NOTE — ASSESSMENT & PLAN NOTE
Patient has been followed by pulmonary specialist Dr Sherry Gomez  Had chest x-ray recently  Has a mild exertional shortness of breath mainly when she goes up steps or up hills

## 2022-05-10 NOTE — ASSESSMENT & PLAN NOTE
Well controlled  Cholesterol 146, triglyceride 157, HDL 39, LDL 76  Advised to continue present dose of atorvastatin and low-cholesterol low saturated diet  Will follow lipid panel

## 2022-06-11 DIAGNOSIS — E78.2 MIXED HYPERLIPIDEMIA: Chronic | ICD-10-CM

## 2022-06-13 RX ORDER — ATORVASTATIN CALCIUM 40 MG/1
TABLET, FILM COATED ORAL
Qty: 90 TABLET | Refills: 1 | Status: SHIPPED | OUTPATIENT
Start: 2022-06-13

## 2022-07-06 DIAGNOSIS — E11.9 TYPE 2 DIABETES MELLITUS WITHOUT COMPLICATION, WITHOUT LONG-TERM CURRENT USE OF INSULIN (HCC): Chronic | ICD-10-CM

## 2022-07-11 ENCOUNTER — OFFICE VISIT (OUTPATIENT)
Dept: PULMONOLOGY | Facility: CLINIC | Age: 62
End: 2022-07-11
Payer: MEDICARE

## 2022-07-11 VITALS
TEMPERATURE: 98.5 F | WEIGHT: 224 LBS | HEART RATE: 77 BPM | BODY MASS INDEX: 39.69 KG/M2 | OXYGEN SATURATION: 97 % | DIASTOLIC BLOOD PRESSURE: 80 MMHG | SYSTOLIC BLOOD PRESSURE: 130 MMHG | HEIGHT: 63 IN

## 2022-07-11 DIAGNOSIS — G25.81 RESTLESS LEGS: ICD-10-CM

## 2022-07-11 DIAGNOSIS — U09.9 COVID-19 LONG HAULER: ICD-10-CM

## 2022-07-11 DIAGNOSIS — G47.33 OBSTRUCTIVE SLEEP APNEA SYNDROME: Primary | Chronic | ICD-10-CM

## 2022-07-11 PROCEDURE — 99213 OFFICE O/P EST LOW 20 MIN: CPT | Performed by: INTERNAL MEDICINE

## 2022-07-11 RX ORDER — BIOTIN 2500 MCG
CAPSULE ORAL 2 TIMES DAILY
COMMUNITY

## 2022-07-11 NOTE — ASSESSMENT & PLAN NOTE
This patient has persistent dyspnea  Basically normal examination  Previous PFTs were somewhat abnormal so repeat seems necessary  I encouraged the patient to continue to do regular exercise as able  Some of her exercise is limited by chronic ankle pain  I am not yet sure that this patient has chronic lung disease related to COVID-19  If PFTs remain distinctly abnormal CT scan of chest will be helpful

## 2022-07-11 NOTE — PROGRESS NOTES
Assessment/Plan:    COVID-19 long hauler  This patient has persistent dyspnea  Basically normal examination  Previous PFTs were somewhat abnormal so repeat seems necessary  I encouraged the patient to continue to do regular exercise as able  Some of her exercise is limited by chronic ankle pain  I am not yet sure that this patient has chronic lung disease related to COVID-19  If PFTs remain distinctly abnormal CT scan of chest will be helpful  Obstructive sleep apnea syndrome  Diagnosed with obstructive sleep apnea in 2013 but never treated because of insurance problems  Patient has non restful sleep but only sleeps for 4 hours  Some daytime sleepiness but I gather it is not profound  Significant nocturia  Very narrow pharynx predisposing her to sleep apnea  Plan to retest with home testing and consider treatment to improve daytime functioning  Restless legs  Patient uses the term restless legs but I am not convinced that this is a definite the diagnosis  She has chronic ankle pain and some numbness in the leg  Not convinced that she has a classic restless legs that causes the insomnia  There are no observation suggesting periodic movements of sleep  Continue to monitor clinically  Diagnoses and all orders for this visit:    Obstructive sleep apnea syndrome  -     Home Study; Future    COVID-19 long hauler  -     Spirometry with diffusing capacity; Future    Restless legs    Other orders  -     Biotin 2500 MCG CAPS; Take by mouth 2 (two) times a day          Subjective:      Patient ID: John Lacy is a 57 y o  female  This patient who had quite significant COVID-19 in August and September 2021 has somewhat persistent dyspnea on exertion  Her walking is limited to 2 blocks  Some degree some of her walking is impaired by chronic ankle pain  Does not have cough, sputum or wheezing  No nocturnal awakenings with respiratory symptoms    Previously abnormal PFTs reviewed with reduction in DLCO and vaguely abnormal chest x-ray  Last smoked approximately 18 years ago  No COPD on recent PFTs  Patient diagnosed with sleep apnea in 2013 when she presented primarily with snoring  She does not have any recollection of the severity of the disease  She never started on CPAP therapy because the her job was terminated she no longer had insurance  Has non restful sleep but only sleeps 4 hours for unclear reasons  Some daytime sleepiness  Definite nocturia but no headache  Restless sleep  Blames a lot of her sleep problems on family stress  The following portions of the patient's history were reviewed and updated as appropriate: allergies, current medications, past family history, past medical history, past social history, past surgical history and problem list     Review of Systems   Constitutional: Positive for activity change  Negative for unexpected weight change  Respiratory: Positive for apnea (May be) and shortness of breath  Negative for cough and wheezing  Cardiovascular: Negative for chest pain, palpitations and leg swelling  Genitourinary: Positive for enuresis  Neurological: Negative for headaches  Objective:      /80 (BP Location: Right arm, Patient Position: Sitting, Cuff Size: Large)   Pulse 77   Temp 98 5 °F (36 9 °C) (Tympanic)   Ht 5' 3" (1 6 m)   Wt 102 kg (224 lb)   SpO2 97%   BMI 39 68 kg/m²          Physical Exam  Vitals reviewed  Constitutional:       Appearance: She is obese  She is not ill-appearing  HENT:      Mouth/Throat:      Mouth: Mucous membranes are moist       Pharynx: Oropharynx is clear  Comments: Small constricted pharynx  Neck:      Vascular: No JVD  Cardiovascular:      Rate and Rhythm: Normal rate and regular rhythm  Pulses:           Radial pulses are 3+ on the right side and 3+ on the left side  Heart sounds: Normal heart sounds     Pulmonary:      Effort: Pulmonary effort is normal       Breath sounds: Normal breath sounds  No rales  Musculoskeletal:         General: No swelling  Cervical back: No rigidity or tenderness  Right lower leg: No edema  Left lower leg: No edema  Lymphadenopathy:      Cervical: No cervical adenopathy  Skin:     General: Skin is warm and dry  Neurological:      Mental Status: She is alert and oriented to person, place, and time     Psychiatric:         Mood and Affect: Mood normal          Behavior: Behavior normal

## 2022-07-11 NOTE — ASSESSMENT & PLAN NOTE
Patient uses the term restless legs but I am not convinced that this is a definite the diagnosis  She has chronic ankle pain and some numbness in the leg  Not convinced that she has a classic restless legs that causes the insomnia  There are no observation suggesting periodic movements of sleep  Continue to monitor clinically

## 2022-07-11 NOTE — ASSESSMENT & PLAN NOTE
Diagnosed with obstructive sleep apnea in 2013 but never treated because of insurance problems  Patient has non restful sleep but only sleeps for 4 hours  Some daytime sleepiness but I gather it is not profound  Significant nocturia  Very narrow pharynx predisposing her to sleep apnea  Plan to retest with home testing and consider treatment to improve daytime functioning

## 2022-07-12 DIAGNOSIS — I10 ESSENTIAL HYPERTENSION: Chronic | ICD-10-CM

## 2022-07-12 RX ORDER — METOPROLOL TARTRATE 50 MG/1
TABLET, FILM COATED ORAL
Qty: 180 TABLET | Refills: 1 | Status: SHIPPED | OUTPATIENT
Start: 2022-07-12

## 2022-07-12 RX ORDER — LOSARTAN POTASSIUM AND HYDROCHLOROTHIAZIDE 12.5; 1 MG/1; MG/1
TABLET ORAL
Qty: 90 TABLET | Refills: 1 | Status: SHIPPED | OUTPATIENT
Start: 2022-07-12

## 2022-07-19 ENCOUNTER — HOSPITAL ENCOUNTER (OUTPATIENT)
Dept: PULMONOLOGY | Facility: HOSPITAL | Age: 62
Discharge: HOME | End: 2022-07-19
Attending: INTERNAL MEDICINE
Payer: MEDICARE

## 2022-07-19 ENCOUNTER — TELEPHONE (OUTPATIENT)
Dept: SLEEP CENTER | Facility: CLINIC | Age: 62
End: 2022-07-19

## 2022-07-19 DIAGNOSIS — U09.9 COVID-19 LONG HAULER: ICD-10-CM

## 2022-07-19 PROCEDURE — 94760 N-INVAS EAR/PLS OXIMETRY 1: CPT

## 2022-07-19 PROCEDURE — 94729 DIFFUSING CAPACITY: CPT | Performed by: INTERNAL MEDICINE

## 2022-07-19 PROCEDURE — 94729 DIFFUSING CAPACITY: CPT

## 2022-07-19 PROCEDURE — 94010 BREATHING CAPACITY TEST: CPT

## 2022-07-19 PROCEDURE — 94010 BREATHING CAPACITY TEST: CPT | Performed by: INTERNAL MEDICINE

## 2022-07-19 NOTE — TELEPHONE ENCOUNTER
----- Message from Marylene Scudder, MD sent at 7/17/2022  9:47 PM EDT -----  Vanessa Garcia      ----- Message -----  From: Jamaica Granger  Sent: 7/12/2022   8:10 AM EDT  To: Sleep Medicine Howie Provider    This home sleep study needs approval      If approved please sign and return to clerical pool  If denied please include reasons why  Also provide alternative testing if warranted  Please sign and return to clerical pool

## 2022-07-25 NOTE — RESULT ENCOUNTER NOTE
Called and spoke to patient and reviewed below results with her  7/25/22    Misty Paez MD    Please let her know that repeat PFTs are now normal and much improved from previous studies last summer   No further testing seems necessary

## 2022-08-02 ENCOUNTER — APPOINTMENT (OUTPATIENT)
Dept: LAB | Facility: HOSPITAL | Age: 62
End: 2022-08-02
Attending: INTERNAL MEDICINE
Payer: MEDICARE

## 2022-08-02 DIAGNOSIS — G89.29 CHRONIC PAIN OF RIGHT ANKLE: ICD-10-CM

## 2022-08-02 DIAGNOSIS — I10 ESSENTIAL HYPERTENSION: Chronic | ICD-10-CM

## 2022-08-02 DIAGNOSIS — E11.9 TYPE 2 DIABETES MELLITUS WITHOUT COMPLICATION, WITHOUT LONG-TERM CURRENT USE OF INSULIN (HCC): Chronic | ICD-10-CM

## 2022-08-02 DIAGNOSIS — Z79.899 HIGH RISK MEDICATION USE: ICD-10-CM

## 2022-08-02 DIAGNOSIS — E78.2 MIXED HYPERLIPIDEMIA: Chronic | ICD-10-CM

## 2022-08-02 DIAGNOSIS — M79.671 RIGHT FOOT PAIN: ICD-10-CM

## 2022-08-02 DIAGNOSIS — M25.571 CHRONIC PAIN OF RIGHT ANKLE: ICD-10-CM

## 2022-08-02 LAB
ALBUMIN SERPL BCP-MCNC: 4 G/DL (ref 3.5–5)
ALP SERPL-CCNC: 85 U/L (ref 34–104)
ALT SERPL W P-5'-P-CCNC: 11 U/L (ref 7–52)
ANION GAP SERPL CALCULATED.3IONS-SCNC: 10 MMOL/L (ref 4–13)
AST SERPL W P-5'-P-CCNC: 15 U/L (ref 13–39)
BASOPHILS # BLD AUTO: 0.06 THOUSANDS/ΜL (ref 0–0.1)
BASOPHILS NFR BLD AUTO: 1 % (ref 0–1)
BILIRUB SERPL-MCNC: 0.46 MG/DL (ref 0.2–1)
BUN SERPL-MCNC: 13 MG/DL (ref 5–25)
CALCIUM SERPL-MCNC: 9.6 MG/DL (ref 8.4–10.2)
CHLORIDE SERPL-SCNC: 102 MMOL/L (ref 96–108)
CHOLEST SERPL-MCNC: 153 MG/DL
CO2 SERPL-SCNC: 28 MMOL/L (ref 21–32)
CREAT SERPL-MCNC: 1.01 MG/DL (ref 0.6–1.3)
EOSINOPHIL # BLD AUTO: 0.25 THOUSAND/ΜL (ref 0–0.61)
EOSINOPHIL NFR BLD AUTO: 3 % (ref 0–6)
ERYTHROCYTE [DISTWIDTH] IN BLOOD BY AUTOMATED COUNT: 13.8 % (ref 11.6–15.1)
EST. AVERAGE GLUCOSE BLD GHB EST-MCNC: 131 MG/DL
GFR SERPL CREATININE-BSD FRML MDRD: 59 ML/MIN/1.73SQ M
GLUCOSE P FAST SERPL-MCNC: 144 MG/DL (ref 65–99)
HBA1C MFR BLD: 6.2 %
HCT VFR BLD AUTO: 40.8 % (ref 34.8–46.1)
HDLC SERPL-MCNC: 55 MG/DL
HGB BLD-MCNC: 13.2 G/DL (ref 11.5–15.4)
IMM GRANULOCYTES # BLD AUTO: 0.01 THOUSAND/UL (ref 0–0.2)
IMM GRANULOCYTES NFR BLD AUTO: 0 % (ref 0–2)
LDLC SERPL CALC-MCNC: 64 MG/DL (ref 0–100)
LYMPHOCYTES # BLD AUTO: 3.84 THOUSANDS/ΜL (ref 0.6–4.47)
LYMPHOCYTES NFR BLD AUTO: 43 % (ref 14–44)
MCH RBC QN AUTO: 28.7 PG (ref 26.8–34.3)
MCHC RBC AUTO-ENTMCNC: 32.4 G/DL (ref 31.4–37.4)
MCV RBC AUTO: 89 FL (ref 82–98)
MONOCYTES # BLD AUTO: 0.68 THOUSAND/ΜL (ref 0.17–1.22)
MONOCYTES NFR BLD AUTO: 8 % (ref 4–12)
NEUTROPHILS # BLD AUTO: 4.12 THOUSANDS/ΜL (ref 1.85–7.62)
NEUTS SEG NFR BLD AUTO: 45 % (ref 43–75)
NONHDLC SERPL-MCNC: 98 MG/DL
NRBC BLD AUTO-RTO: 0 /100 WBCS
PLATELET # BLD AUTO: 311 THOUSANDS/UL (ref 149–390)
PMV BLD AUTO: 10 FL (ref 8.9–12.7)
POTASSIUM SERPL-SCNC: 3.8 MMOL/L (ref 3.5–5.3)
PROT SERPL-MCNC: 7.2 G/DL (ref 6.4–8.4)
RBC # BLD AUTO: 4.6 MILLION/UL (ref 3.81–5.12)
SODIUM SERPL-SCNC: 140 MMOL/L (ref 135–147)
TRIGL SERPL-MCNC: 168 MG/DL
WBC # BLD AUTO: 8.96 THOUSAND/UL (ref 4.31–10.16)

## 2022-08-02 PROCEDURE — 85025 COMPLETE CBC W/AUTO DIFF WBC: CPT

## 2022-08-02 PROCEDURE — 80061 LIPID PANEL: CPT

## 2022-08-02 PROCEDURE — 80053 COMPREHEN METABOLIC PANEL: CPT

## 2022-08-02 PROCEDURE — 36415 COLL VENOUS BLD VENIPUNCTURE: CPT

## 2022-08-02 PROCEDURE — 83036 HEMOGLOBIN GLYCOSYLATED A1C: CPT

## 2022-08-08 ENCOUNTER — TELEPHONE (OUTPATIENT)
Dept: INTERNAL MEDICINE CLINIC | Facility: CLINIC | Age: 62
End: 2022-08-08

## 2022-08-08 NOTE — TELEPHONE ENCOUNTER
HARRIETI - Patient called just to let you know that she has Covid again  Just has sold symtpoms and a sore throat - she did go yo [facility] to confirm  I offered her a VV - but she said she doesn't feel too bad - and [facility] told her to just treat it as a cold

## 2022-08-25 ENCOUNTER — OFFICE VISIT (OUTPATIENT)
Dept: CARDIOLOGY CLINIC | Facility: CLINIC | Age: 62
End: 2022-08-25
Payer: MEDICARE

## 2022-08-25 VITALS
OXYGEN SATURATION: 99 % | BODY MASS INDEX: 39.87 KG/M2 | SYSTOLIC BLOOD PRESSURE: 122 MMHG | WEIGHT: 225 LBS | HEART RATE: 60 BPM | HEIGHT: 63 IN | TEMPERATURE: 97.8 F | DIASTOLIC BLOOD PRESSURE: 60 MMHG

## 2022-08-25 DIAGNOSIS — I10 ESSENTIAL HYPERTENSION: ICD-10-CM

## 2022-08-25 DIAGNOSIS — K44.9 HIATAL HERNIA: ICD-10-CM

## 2022-08-25 DIAGNOSIS — E78.2 MIXED HYPERLIPIDEMIA: ICD-10-CM

## 2022-08-25 DIAGNOSIS — R07.2 PRECORDIAL PAIN: ICD-10-CM

## 2022-08-25 DIAGNOSIS — R13.14 PHARYNGOESOPHAGEAL DYSPHAGIA: ICD-10-CM

## 2022-08-25 DIAGNOSIS — I48.0 PAROXYSMAL ATRIAL FIBRILLATION (HCC): Primary | ICD-10-CM

## 2022-08-25 DIAGNOSIS — K21.00 GASTROESOPHAGEAL REFLUX DISEASE WITH ESOPHAGITIS, UNSPECIFIED WHETHER HEMORRHAGE: ICD-10-CM

## 2022-08-25 PROCEDURE — 99214 OFFICE O/P EST MOD 30 MIN: CPT | Performed by: INTERNAL MEDICINE

## 2022-08-25 PROCEDURE — 93000 ELECTROCARDIOGRAM COMPLETE: CPT | Performed by: INTERNAL MEDICINE

## 2022-08-25 RX ORDER — GABAPENTIN 300 MG/1
300 CAPSULE ORAL 2 TIMES DAILY
COMMUNITY
Start: 2022-07-30

## 2022-08-25 RX ORDER — PANTOPRAZOLE SODIUM 40 MG/1
TABLET, DELAYED RELEASE ORAL
Qty: 90 TABLET | Refills: 3 | Status: SHIPPED | OUTPATIENT
Start: 2022-08-25

## 2022-08-25 RX ORDER — CHLORAL HYDRATE 500 MG
2000 CAPSULE ORAL DAILY
Qty: 240 CAPSULE | Refills: 3 | Status: SHIPPED | OUTPATIENT
Start: 2022-08-25

## 2022-08-25 NOTE — PROGRESS NOTES
Progress Note - Cardiology Office  [facility]    Quang Meade 60 y o  female MRN: [id number]  : 1960  Encounter: [id number]         ASSESSMENT:  Chest Pain    COVID-19 long debra  patient had COVID-19 pneumonia and hospitalized for 16 days with significant hypoxemia   Did not require mechanical ventilation   She was admitted from  to 2021  Thereafter she went to [facility]   Generally feels better and has had no recurrence of symptoms     PAF:  During hospitalization patient had episode of atrial fibrillation and was put on Eliquis which was subsequently discontinued when she went back to  normal sinus rhythm  Has not had any recurrence of symptoms     TTE, 2021  EF 50-55%     48 hour Holter monitor:  10/26/2021  Sinus rhythm with average heart rate of 63 per minute   0 PVCs, 72 PACs, no arrhythmias     Obesity:  BMI is 39 86     Type 2 diabetes mellitus     Hypertension  BP today is  122/60 mmHg with heart rate of 60 per minute  On losartan hydrochlorothiazide, 100-12 5 mg daily and Metoprolol 50 mg bid     Mixed hyperlipidemia  LDL 76, triglycerides 157, HDL 39  2022: LDL 64, , HDL 55                            l  Currently on atorvastatin 40 mg daily    Abnormal EKG:  Normal sinus rhythm, heart rate 60 per minute cannot rule out old anterior infarct, age undetermined      Arthritis of left knee  Difficulty in ambulation        RECOMMENDATIONS:  Continue atorvastatin, losartan hydrochlorothiazide and metoprolol  Regular cardiovascular exercise/walking, as much as tolerated aiming for 30 minutes daily   Low-salt, low-carbohydrate, low-cholesterol and low sugar diet  Weight loss  Pharmacologic nuclear stress test    Please call [phone] if any questions  HPI :     Quang Meade is a 60y o  year old female who came for follow up  She complains of occasional retrosternal chest pain which she has been attributing to stress  However she has multiple risk factors including obesity, diabetes, hypertension, hyperlipidemia and history of COVID infection  Therefore I am requesting a pharmacologic nuclear stress test to rule out underlying Coronary artery disease since the patient is unable to walk on a treadmill    REVIEW OF SYSTEMS:  Review of Systems   Cardiovascular: Positive for chest pain  Musculoskeletal: Positive for arthralgias, gait problem and joint swelling  All other systems reviewed and are negative          Historical Information   Past Medical History:   Diagnosis Date    Abrasion of right leg 03/19/2021    Acute respiratory failure due to COVID-19 ([facility] [address] ) 10/01/2021    Arthritis     feet    BMI 39 0-39 9,adult 5/10/2022    CKD (chronic kidney disease) 2/8/2022    Colon polyp     Diabetes mellitus (HCC)     FBS today 2/28/22 was 147 at 0930 by patient--feels well    Dyslipidemia     Dysphagia, pharyngoesophageal     Edema of both lower legs 03/19/2021    Essential hypertension 01/23/2021    Gastroesophageal reflux disease without esophagitis 01/23/2021    GERD (gastroesophageal reflux disease)     Hiatal hernia     HTN (hypertension)     Hyperlipemia     Hypertension     Hypertensive arterionephrosclerosis of transplanted kidney 01/23/2021    Hypokalemia 04/20/2021    Insomnia     Leukocytosis     Medicare annual wellness visit, subsequent 11/9/2021    Mixed hyperlipidemia 01/23/2021    Numbness     Obstructive sleep apnea syndrome 01/23/2021    no device    Pain in ankle 01/23/2021    Pain in both feet 01/23/2021    Pain in both knees 01/26/2021    Pain in right ankle 5/10/2022    Paroxysmal atrial fibrillation ([facility] [address] ) 11/09/2021    Personal history of COVID-19     August 2021    Right foot pain 5/10/2022    Shortness of breath     exertional    Shoulder pain, right 2/8/2022    Skin lesions     Sleep apnea     Type 2 diabetes mellitus without complication, without long-term current use of insulin ([facility] [address] ) 2021     Past Surgical History:   Procedure Laterality Date    ANKLE SURGERY Right     BREAST BIOPSY Right     stereotactic biopsy    CARPAL TUNNEL RELEASE Bilateral     COLONOSCOPY  2017    Dr Deysi Bearden  2004    HYSTERECTOMY  2004    MAMMO (HISTORICAL)  10/02/2018    MAMMO STEREOTACTIC BREAST BIOPSY RIGHT (ALL INC) Right 2014    UPPER GASTROINTESTINAL ENDOSCOPY      WISDOM TOOTH EXTRACTION       Social History     Substance and Sexual Activity   Alcohol Use Yes    Comment: -rare     Social History     Substance and Sexual Activity   Drug Use Not Currently    Comment: No drug use - As per [employer]     Social History     Tobacco Use   Smoking Status Former Smoker    Packs/day: 1 50    Types: Cigarettes    Quit date: 2004    Years since quittin 9   Smokeless Tobacco Former User   Tobacco Comment    quit at 38     Family History:   Family History   Problem Relation Age of Onset    Cancer Mother     Diabetes Mother     Hyperlipidemia Mother     Breast cancer Mother     Breast cancer Maternal Grandmother     Lung cancer Father     No Known Problems Sister     No Known Problems Daughter     No Known Problems Maternal Grandfather     No Known Problems Sister     No Known Problems Son     No Known Problems Maternal Aunt     Colon cancer Cousin        Meds/Allergies     Allergies   Allergen Reactions    Aspirin GI Intolerance    Lisinopril Fatigue       Current Outpatient Medications:     atorvastatin (LIPITOR) 40 mg tablet, TAKE 1 TABLET EVERY DAY, Disp: 90 tablet, Rfl: 1    Biotin 2500 MCG CAPS, Take by mouth 2 (two) times a day, Disp: , Rfl:     Blood Glucose Monitoring Suppl (FreeStyle Lite) TOYIN, daily, Disp: , Rfl:     calcium carbonate (OS-AMEE) 600 MG tablet, Take 600 mg by mouth 2 (two) times a day with meals With vitamin D3 , Disp: , Rfl:     FREESTYLE LITE test strip, Use 1 each 2 (two) times a day Use as instructed, Disp: 200 strip, Rfl: 3    gabapentin (NEURONTIN) 300 mg capsule, Take 300 mg by mouth 2 (two) times a day, Disp: , Rfl:     glucosamine-chondroitin 500-400 MG tablet, Take 1 tablet by mouth 3 (three) times a day, Disp: , Rfl:     Lancets (freestyle) lancets, Use 2 (two) times a day Use as instructed, Disp: 200 each, Rfl: 3    losartan-hydrochlorothiazide (HYZAAR) 100-12 5 MG per tablet, TAKE 1 TABLET EVERY DAY, Disp: 90 tablet, Rfl: 1    meloxicam (MOBIC) 15 mg tablet, Take 15 mg by mouth daily, Disp: , Rfl:     metFORMIN (GLUCOPHAGE) 500 mg tablet, TAKE 1 TABLET EVERY MORNING AND TAKE 2 TABLETS EVERY EVENING BEFORE MEALS, Disp: 270 tablet, Rfl: 1    metoprolol tartrate (LOPRESSOR) 50 mg tablet, TAKE 1 TABLET TWICE DAILY, Disp: 180 tablet, Rfl: 1    Multiple Vitamin (MULTIVITAMIN) capsule, Take 1 capsule by mouth daily, Disp: , Rfl:     Omega-3 Fatty Acids (fish oil) 1,000 mg, Take 2 capsules (2,000 mg total) by mouth daily, Disp: 240 capsule, Rfl: 3    pantoprazole (PROTONIX) 40 mg tablet, TAKE 1 TABLET BY MOUTH 30 MINUTES BEFORE BREAKFAST, Disp: 90 tablet, Rfl: 3    traMADol (ULTRAM) 50 mg tablet, Take 50 mg by mouth every 8 (eight) hours as needed, Disp: , Rfl: 0    Vitals: Blood pressure 122/60, pulse 60, temperature 97 8 °F (36 6 °C), height 5' 3" (1 6 m), weight 102 kg (225 lb), SpO2 99 %  ?  Body mass index is 39 86 kg/m²    Vitals:    08/25/22 0942   Weight: 102 kg (225 lb)     BP Readings from Last 3 Encounters:   08/25/22 122/60   07/11/22 130/80   05/10/22 124/72       Physical Exam:    Neurologic:  Alert & oriented x 3, no new focal deficits, Not in any acute distress,  Constitutional:  Obese  Eyes:  Pupil equal and reacting to light, conjunctiva normal,   HENT:  Atraumatic, oropharynx moist, Neck- normal range of motion, no tenderness,  Neck supple, No JVP, No LNP   Respiratory:  Bilateral air entry, mostly clear to auscultation  Cardiovascular: S1-S2 regular with a I/VI ejection systolic murmur   GI:  Soft, nondistended, normal bowel sounds, nontender, no hepatosplenomegaly appreciated  Musculoskeletal:  no deformities  Skin:  Well hydrated, no rash   Lymphatic:  No lymphadenopathy noted   Extremities:  Trace edema        Diagnostic Studies Review Cardio:      EKG:  Normal sinus rhythm, heart rate 60 per minute cannot rule out old anterior infarct, age undetermined    Cardiac testing:   Results for orders placed during the hospital encounter of 21    Echo complete with contrast if indicated    Narrative  [address]  [address] [facility] [address]   [phone]    Transthoracic Echocardiogram  Limited 2D, M-mode, Doppler, and Color Doppler    Study date:  26-Aug-2021    Patient: Ricky Castillo  MR number: [id number]  Account number: [de-identified]  : 1960  Age: 57 years  Gender: Female  Status: Inpatient  Location: Bedside  Height: 63 in  Weight: 239 6 lb  BP: 129/ 75 mmHg    Indications: Acute Respiratory failure due to Covid 19  Diagnoses: I48 0 - Atrial fibrillation    Sonographer:  CARLOS Montano  Primary Physician:  Judith Tirado MD  Referring Physician:  Ashlie Colvin DO  Group:  [facility]  Interpreting Physician:  Shabbir Read MD    SUMMARY    PROCEDURE INFORMATION:  This was a technically difficult study  LEFT VENTRICLE:  Systolic function was normal  Ejection fraction was estimated in the range of 50 % to 55 %  There were no regional wall motion abnormalities  Wall thickness was mildly increased  HISTORY: PRIOR HISTORY: Hyperlipidemia,A Fib ,HTN,Diabetes,obstructive sleep apnea,edema,Covid 19 virus  PROCEDURE: The procedure was performed at the bedside  This was a routine study  The transthoracic approach was used  The study included limited 2D imaging, M-mode, limited spectral Doppler, and color Doppler  The heart rate was 92 bpm, at  the start of the study  Images were obtained from the parasternal, apical, subcostal, and suprasternal notch acoustic windows  This was a technically difficult study  LEFT VENTRICLE: Size was normal  Systolic function was normal  Ejection fraction was estimated in the range of 50 % to 55 %  There were no regional wall motion abnormalities  Wall thickness was mildly increased  No evidence of apical  thrombus  RIGHT VENTRICLE: The size was normal  Systolic function was normal  Wall thickness was normal     LEFT ATRIUM: Size was at the upper limits of normal     RIGHT ATRIUM: Size was at the upper limits of normal     MITRAL VALVE: Valve structure was normal  There was normal leaflet separation  DOPPLER: The transmitral velocity was within the normal range  There was no evidence for stenosis  There was no significant regurgitation  AORTIC VALVE: The valve was probably trileaflet  Leaflets exhibited normal thickness and normal cuspal separation  DOPPLER: Transaortic velocity was within the normal range  There was no evidence for stenosis  There was no significant  regurgitation  TRICUSPID VALVE: The valve structure was normal  There was normal leaflet separation  DOPPLER: The transtricuspid velocity was within the normal range  There was no evidence for stenosis  There was trace regurgitation  PULMONIC VALVE: Leaflets exhibited normal thickness, no calcification, and normal cuspal separation  DOPPLER: The transpulmonic velocity was within the normal range  There was no significant regurgitation  PERICARDIUM: There was no pericardial effusion  The pericardium was normal in appearance  AORTA: The root exhibited normal size  SYSTEMIC VEINS: IVC: The inferior vena cava was not well visualized      SYSTEM MEASUREMENT TABLES    2D  %FS: 25 19 %  Ao Diam: 3 38 cm  EDV(Teich): 91 99 ml  EF Biplane: 44 38 %  ESV(Teich): 46 07 ml  IVSd: 1 15 cm  LA Area: 22 09 cm2  LA Diam: 3 12 cm  LVEDV MOD A2C: 80 2 ml  LVEDV MOD A4C: 98 11 ml  LVEDV MOD BP: 92 99 ml  LVEDVInd MOD BP: 44 49 ml/m2  LVEF MOD A2C: 26 42 %  LVEF MOD A4C: 54 46 %  LVESV MOD A2C: 59 01 ml  LVESV MOD A4C: 44 68 ml  LVESV MOD BP: 51 72 ml  LVESVInd MOD BP: 24 75 ml/m2  LVIDd: 4 49 cm  LVIDs: 3 36 cm  LVLd A2C: 6 93 cm  LVLd A4C: 7 65 cm  LVLs A2C: 6 03 cm  LVLs A4C: 6 19 cm  LVPWd: 1 06 cm  RA Area: 16 37 cm2  RVIDd: 2 66 cm  SV (Teich): 45 92 ml  SV MOD A2C: 21 19 ml  SV MOD A4C: 53 43 ml    [employer] Accredited [facility]    Prepared and electronically signed by    Maricel Lackey MD  Signed 29-Aug-2021 15:42:44    Results for orders placed during the hospital encounter of 10/26/21    Holter monitor    Interpretation Summary  Indication: PAF    The patient was in sinus rhythm throughout the recording  Minimum HR: 57  Average HR: 73  Maximum HR: 124    Premature ventricular contractions: 0  Ventricular runs: 0    Supraventricular contractions: 72  Supraventricular runs: 0    Longest RR: 1 2 sec    Arrhythmias: None    Diary submitted: yes but no symptoms reported      Impression    Normal holter with rare PAC's      Signed by: Mauricio Rodríguez MD      Imaging:  Chest X-Ray:   XR chest pa & lateral    Result Date: 3/3/2022  Impression Unchanged peripheral areas of pulmonary scarring possibly on a post Covid 19 basis  Workstation performed: [id number]     XR chest pa & lateral    Result Date: 10/20/2021  Impression Essentially stable predominantly peripherally based multifocal parenchymal opacities involving both lungs  While findings most likely reflect scarring related to prior Covid pneumonia, given persistence of the opacities, consider further evaluation with  CT of chest for further characterization  The study was marked in EPIC for significant notification  Workstation performed: [id number]       CT-scan of the chest:     CTA chest pe study    Result Date: 8/30/2021  Impression No pulmonary embolism   Peripheral areas of consolidation throughout both lungs in keeping with Covid 19 pneumonia without significant change  Fatty liver   Workstation performed: [id number]     Lab Review   Lab Results   Component Value Date    WBC 8 96 08/02/2022    HGB 13 2 08/02/2022    HCT 40 8 08/02/2022    MCV 89 08/02/2022    RDW 13 8 08/02/2022     08/02/2022     BMP:  Lab Results   Component Value Date    SODIUM 140 08/02/2022    K 3 8 08/02/2022     08/02/2022    CO2 28 08/02/2022    BUN 13 08/02/2022    CREATININE 1 01 08/02/2022    GLUC 144 (H) 11/01/2021    GLUF 144 (H) 08/02/2022    CALCIUM 9 6 08/02/2022    CORRECTEDCA 9 2 08/21/2021    EGFR 59 08/02/2022    MG 1 6 11/01/2021     LFT:  Lab Results   Component Value Date    AST 15 08/02/2022    ALT 11 08/02/2022    ALKPHOS 85 08/02/2022    TP 7 2 08/02/2022    ALB 4 0 08/02/2022      No components found for: TSH3  No results found for: [facility]  Lab Results   Component Value Date    HGBA1C 6 2 (H) 08/02/2022     Lipid Profile:   Lab Results   Component Value Date    CHOLESTEROL 153 08/02/2022    HDL 55 08/02/2022    LDLCALC 64 08/02/2022    TRIG 168 (H) 08/02/2022     Lab Results   Component Value Date    CHOLESTEROL 153 08/02/2022    CHOLESTEROL 146 09/29/2021     Lab Results   Component Value Date    CKTOTAL 341 (H) 08/14/2021    CKMB 1 1 08/14/2021    CKMBINDEX <1 0 08/14/2021    TROPONINI <0 02 08/14/2021     No results found for: NTBNP   Recent Results (from the past 672 hour(s))   CBC and differential    Collection Time: 08/02/22 10:10 AM   Result Value Ref Range    WBC 8 96 4 31 - 10 16 Thousand/uL    RBC 4 60 3 81 - 5 12 Million/uL    Hemoglobin 13 2 11 5 - 15 4 g/dL    Hematocrit 40 8 34 8 - 46 1 %    MCV 89 82 - 98 fL    MCH 28 7 26 8 - 34 3 pg    MCHC 32 4 31 4 - 37 4 g/dL    RDW 13 8 11 6 - 15 1 %    MPV 10 0 8 9 - 12 7 fL    Platelets 524 225 - 828 Thousands/uL    nRBC 0 /100 WBCs    Neutrophils Relative 45 43 - 75 %    Immat GRANS % 0 0 - 2 %    Lymphocytes Relative 43 14 - 44 % Monocytes Relative 8 4 - 12 %    Eosinophils Relative 3 0 - 6 %    Basophils Relative 1 0 - 1 %    Neutrophils Absolute 4 12 1 85 - 7 62 Thousands/µL    Immature Grans Absolute 0 01 0 00 - 0 20 Thousand/uL    Lymphocytes Absolute 3 84 0 60 - 4 47 Thousands/µL    Monocytes Absolute 0 68 0 17 - 1 22 Thousand/µL    Eosinophils Absolute 0 25 0 00 - 0 61 Thousand/µL    Basophils Absolute 0 06 0 00 - 0 10 Thousands/µL   Lipid panel    Collection Time: 08/02/22 10:10 AM   Result Value Ref Range    Cholesterol 153 See Comment mg/dL    Triglycerides 168 (H) See Comment mg/dL    HDL, Direct 55 >=50 mg/dL    LDL Calculated 64 0 - 100 mg/dL    Non-HDL-Chol (CHOL-HDL) 98 mg/dl   Comprehensive metabolic panel    Collection Time: 08/02/22 10:10 AM   Result Value Ref Range    Sodium 140 135 - 147 mmol/L    Potassium 3 8 3 5 - 5 3 mmol/L    Chloride 102 96 - 108 mmol/L    CO2 28 21 - 32 mmol/L    ANION GAP 10 4 - 13 mmol/L    BUN 13 5 - 25 mg/dL    Creatinine 1 01 0 60 - 1 30 mg/dL    Glucose, Fasting 144 (H) 65 - 99 mg/dL    Calcium 9 6 8 4 - 10 2 mg/dL    AST 15 13 - 39 U/L    ALT 11 7 - 52 U/L    Alkaline Phosphatase 85 34 - 104 U/L    Total Protein 7 2 6 4 - 8 4 g/dL    Albumin 4 0 3 5 - 5 0 g/dL    Total Bilirubin 0 46 0 20 - 1 00 mg/dL    eGFR 59 ml/min/1 73sq m   Hemoglobin A1C    Collection Time: 08/02/22 10:10 AM   Result Value Ref Range    Hemoglobin A1C 6 2 (H) Normal 3 8-5 6%; PreDiabetic 5 7-6 4%; Diabetic >=6 5%; Glycemic control for adults with diabetes <7 0% %     mg/dl             Dr Leyda Santo MD, [facility]      "This note has been constructed using a voice recognition system  Therefore there may be syntax, spelling, and/or grammatical errors   Please call if you have any questions  "

## 2022-08-26 ENCOUNTER — OFFICE VISIT (OUTPATIENT)
Dept: INTERNAL MEDICINE CLINIC | Facility: CLINIC | Age: 62
End: 2022-08-26
Payer: MEDICARE

## 2022-08-26 VITALS
OXYGEN SATURATION: 100 % | BODY MASS INDEX: 39.69 KG/M2 | SYSTOLIC BLOOD PRESSURE: 128 MMHG | WEIGHT: 224 LBS | HEIGHT: 63 IN | TEMPERATURE: 98.4 F | RESPIRATION RATE: 14 BRPM | DIASTOLIC BLOOD PRESSURE: 76 MMHG | HEART RATE: 62 BPM

## 2022-08-26 DIAGNOSIS — R07.89 OTHER CHEST PAIN: ICD-10-CM

## 2022-08-26 DIAGNOSIS — M79.672 PAIN IN BOTH FEET: Chronic | ICD-10-CM

## 2022-08-26 DIAGNOSIS — M79.671 PAIN IN BOTH FEET: Chronic | ICD-10-CM

## 2022-08-26 DIAGNOSIS — K21.9 GASTROESOPHAGEAL REFLUX DISEASE WITHOUT ESOPHAGITIS: Chronic | ICD-10-CM

## 2022-08-26 DIAGNOSIS — I10 ESSENTIAL HYPERTENSION: Chronic | ICD-10-CM

## 2022-08-26 DIAGNOSIS — G89.29 CHRONIC PAIN OF RIGHT ANKLE: ICD-10-CM

## 2022-08-26 DIAGNOSIS — E11.9 TYPE 2 DIABETES MELLITUS WITHOUT COMPLICATION, WITHOUT LONG-TERM CURRENT USE OF INSULIN (HCC): Primary | Chronic | ICD-10-CM

## 2022-08-26 DIAGNOSIS — M25.571 CHRONIC PAIN OF RIGHT ANKLE: ICD-10-CM

## 2022-08-26 DIAGNOSIS — E78.2 MIXED HYPERLIPIDEMIA: Chronic | ICD-10-CM

## 2022-08-26 DIAGNOSIS — U09.9 COVID-19 LONG HAULER: ICD-10-CM

## 2022-08-26 PROCEDURE — 99214 OFFICE O/P EST MOD 30 MIN: CPT | Performed by: INTERNAL MEDICINE

## 2022-08-26 NOTE — PATIENT INSTRUCTIONS
Patient was advised to continue present medications  discussed with the patient medications and laboratory data in detail  Follow-up with me in 3 months or as advised  If any blood test was ordered please do 1 week prior to next appointment unless advise to get earlier  If you have any questions please call the office [phone]   Type 2 Diabetes Management for Adults   AMBULATORY CARE:   Type 2 diabetes  is a disease that affects how your body uses glucose (sugar)  Either your body cannot make enough insulin, or it cannot use the insulin correctly  It is important to keep diabetes controlled to prevent damage to your heart, blood vessels, and other organs  Have someone call your local emergency number (911 in the [address] [place] [address],[address]) if:   · You cannot be woken  · You have signs of diabetic ketoacidosis:     ? confusion, fatigue    ? vomiting    ? rapid heartbeat    ? fruity smelling breath    ? extreme thirst    ? dry mouth and skin    · You have any of the following signs of a heart attack:      ? Squeezing, pressure, or pain in your chest    ? You may  also have any of the following:     § Discomfort or pain in your back, neck, jaw, stomach, or arm    § Shortness of breath    § Nausea or vomiting    § Lightheadedness or a sudden cold sweat    · You have any of the following signs of a stroke:      ? Numbness or drooping on one side of your face     ? Weakness in an arm or leg    ? Confusion or difficulty speaking    ? Dizziness, a severe headache, or vision loss    Call your doctor or diabetes care team if:   · You have a sore or wound that will not heal     · You have a change in the amount you urinate  · Your blood sugar levels are higher than your target goals  · You often have lower blood sugar levels than your target goals  · Your skin is red, dry, warm, or swollen  · You have trouble coping with diabetes, or you feel anxious or depressed      · You have questions or concerns about your condition or care     What you need to know about high blood sugar levels:  High blood sugar levels may not cause any symptoms  You may feel more thirsty or urinate more often than usual  Over time, high blood sugar levels can damage your nerves, blood vessels, tissues, and organs  The following can increase your blood sugar levels:  · Large meals or large amounts of carbohydrates at one time    · Less physical activity    · Stress    · Illness    · A lower dose of medicine or insulin, or a late dose    What you need to know about low blood sugar levels: You can prevent symptoms such as shakiness, dizziness, irritability, or confusion by preventing your blood sugar levels from going too low  · Treat a low blood sugar level right away  ? Drink 4 ounces of juice or have 1 tube of glucose gel  ? Check your blood sugar level again 10 to 15 minutes later  ? When the level goes back to normal, eat a meal or snack to prevent another decrease  · Keep glucose gel, raisins, or hard candy with you at all times to treat a low blood sugar level  · Your blood sugar level can get too low if you take diabetes medicine or insulin and do not eat enough food  · If you use insulin, check your blood sugar level before you exercise  ? If your blood sugar level is below 100 mg/dL, eat 4 crackers or 2 ounces of raisins, or drink 4 ounces of juice  ? Check your level every 30 minutes if you exercise more than 1 hour  ? You may need a snack during or after exercise  What you can do to manage your blood sugar levels:   · Check your blood sugar levels as directed and as needed  Several items are available to use to check your levels  You may need to check by testing a drop of blood in a glucose monitor  You may instead be given a continuous glucose monitoring (CGM) device  The device is worn at all times  The CGM checks your blood sugar level every 5 minutes   It sends results to an electronic device such as a smart phone  A CGM can be used with or without an insulin pump  Talk with your provider to find out which method is best for you  The goal for blood sugar levels before meals  is between 80 and 130 mg/dL and 2 hours after eating  is lower than 180 mg/dL  · Make healthy food choices  Work with a dietitian to develop a meal plan that works for you and your schedule  A dietitian can help you learn how to eat the right amount of carbohydrates during your meals and snacks  Carbohydrates can raise your blood sugar level if you eat too many at one time  Examples of foods that contain carbohydrates are breads, cereals, rice, pasta, and sweets  · Get regular physical activity  Physical activity can help you get to your target blood sugar level goal and manage your weight  Get at least 150 minutes of moderate to vigorous aerobic physical activity each week  Do not miss more than 2 days in a row  Do not sit longer than 30 minutes at a time  Your healthcare provider can help you create an activity plan  The plan can include the best activities for you and can help you build your strength and endurance  · Maintain a healthy weight  Ask your healthcare provider what a healthy weight is for you  Ask him or her to help you create a safe weight loss plan if you are overweight  · Take your diabetes medicine or insulin as directed  You may need diabetes medicine, insulin, or both to help control your blood sugar levels  Your healthcare provider will teach you how and when to take your diabetes medicine or insulin  You will also be taught about side effects oral diabetes medicine can cause  Insulin may be injected, or given through a pump or pen  You and your care team will discuss which method is best for you  ? An insulin pump  is an implanted device that gives your insulin 24 hours a day  An insulin pump prevents the need for multiple insulin injections in a day  ?  An insulin pen  is a device prefilled with the right amount of insulin  ? You and your family members will be taught how to draw up and give insulin  if this is the best method for you  Your education team will also teach you how to dispose of needles and syringes  ? You will learn how much insulin you need  and when to give it  You will be taught when not to give insulin  You will also be taught what to do if your blood sugar level drops too low  This may happen if you take insulin and do not eat the right amount of carbohydrates  Other things you can do to manage type 2 diabetes:   · Wear medical alert identification  Wear medical alert jewelry or carry a card that says you have diabetes  Ask your provider where to get these items  · Do not smoke  Nicotine and other chemicals in cigarettes and cigars can cause lung and blood vessel damage  It also makes it more difficult to manage your diabetes  Ask your provider for information if you currently smoke and need help to quit  Do not use e-cigarettes or smokeless tobacco in place of cigarettes or to help you quit  They still contain nicotine  · Check your feet each day for cuts, scratches, calluses, or other wounds  Look for redness and swelling, and feel for warmth  Wear shoes that fit well  Check your shoes for rocks or other objects that can hurt your feet  Do not walk barefoot or wear shoes without socks  Wear cotton socks to help keep your feet dry  · Ask about vaccines you may need  You have a higher risk for serious illness if you get the flu, pneumonia, COVID-19, or hepatitis  Ask your provider if you should get vaccines to prevent these or other diseases, and when to get the vaccines  · Talk to your care team if you become stressed about diabetes care  Sometimes being able to fit diabetes care into your life can cause increased stress  The stress can cause you not to take care of yourself properly   Your care team can help by offering tips about self-care  Your care team may suggest you talk to a mental health provider  The provider can listen and offer help with self-care issues  Follow up with your doctor or diabetes care team as directed: You may need to have blood tests done before your follow-up visit  The test results will show if changes need to be made in your treatment or self-care  Write down your questions so you remember to ask them during your visits  Talk to your provider if you cannot afford your medicine  © Copyright [employer] 2022 Information is for End User's use only and may not be sold, redistributed or otherwise used for commercial purposes  All illustrations and images included in CareNotes® are the copyrighted property of [employer] , Inc  or [facility] Odette Triplett   The above information is an  only  It is not intended as medical advice for individual conditions or treatments  Talk to your doctor, nurse or pharmacist before following any medical regimen to see if it is safe and effective for you

## 2022-08-26 NOTE — PROGRESS NOTES
Assessment/Plan:    1  Type 2 diabetes mellitus without complication, without long-term current use of insulin [facility])  Assessment & Plan:    Lab Results   Component Value Date    HGBA1C 6 2 (H) 08/02/2022   Her diabetes mellitus well controlled  Hemoglobin A1c decreased from 7 3-6 2  No hypoglycemia  Advised to check blood sugar once a day but a m  p m  alternate day instead of b i d  Fahe[facility] Torres Call if last blood sugar less than 80 continue 1800 calorie ADA diet  Continue present medication  Orders:  -     Basic metabolic panel; Future  -     Microalbumin / creatinine urine ratio  -     UA (URINE) with reflex to Scope    2  Essential hypertension  Assessment & Plan:  Blood pressure well controlled  Advised to continue present medication  Advised for low-salt diet  Orders:  -     Basic metabolic panel; Future  -     UA (URINE) with reflex to Scope    3  Mixed hyperlipidemia  Assessment & Plan:  Well controlled  Cholesterol 153, HDL 55 LDL 64  Slightly elevated triglyceride 168  She was advised by cardiologist to start fish oil  Advised to continue atorvastatin 40 mg daily  Advised for low-cholesterol low saturated diet  4  Gastroesophageal reflux disease without esophagitis  Assessment & Plan:  Her symptoms are well controlled on pantoprazole  She has been followed by GI specialist Advised to watch diet for spicy foods, caffeinated beverages, alcoholic beverages, soda, citrus fluids and foods  Advised for reflux precautions  5  Other chest pain  Assessment & Plan:  She sometimes gets chest pain on heart area   Has some stress  She was seen by cardiologist will be going for pharmacological nuclear medicine test      6  COVID-19 long hauler  Assessment & Plan:  Her symptoms are improving  Her breathing is also improving  She has been followed by pulmonary specialist also  She was seen by cardiologist going for stress test       7   BMI 39 0-39 9,adult  Assessment & Plan:  Patient  was advised to decrease portion size  Advised to decrease carb, sugar, cholesterol intake  Advised to exercise 3-5 times per week  Advised to lose weight  8  Pain in both feet  Assessment & Plan:  She gets a pain in the both feet home plantar aspect with some numbness possible neuropathy  She has been followed by podiatrist Dr Yovany Bangura  On gabapentin  Sometime takes tramadol pain      9  Chronic pain of right ankle  Assessment & Plan:  Patient has been followed by podiatrist   On gabapentin and p r n  tramadol  Subjective:  Patient presents for follow-up her medical problems  Patient ID: Ila Crook is a 60 y o  female  HPI   79-year-old white male patient presents for follow-up her medical problems  She complained of sometime gets chest pain on the heart area  Patient states lately she has a stress  She was seen by cardiologist   She will be going for pharmacological nuclear medical test   She has been followed by podiatrist as well as pulmonary specialist   Denies any cough, fever, chills  Breathing is better  Patient states she  had a COVID test August 7, 2022 which was positive  This times she had only mild cold symptoms, got better by itself      The following portions of the patient's history were reviewed and updated as appropriate:     Past Medical History:  She has a past medical history of Abrasion of right leg (03/19/2021), Acute respiratory failure due to COVID-19 [facility]) (10/01/2021), Arthritis, BMI 39 0-39 9,adult (5/10/2022), CKD (chronic kidney disease) (2/8/2022), Colon polyp, Diabetes mellitus ([facility] [address] ), Dyslipidemia, Dysphagia, pharyngoesophageal, Edema of both lower legs (03/19/2021), Essential hypertension (01/23/2021), Gastroesophageal reflux disease without esophagitis (01/23/2021), GERD (gastroesophageal reflux disease), Hiatal hernia, HTN (hypertension), Hyperlipemia, Hypertension, Hypertensive arterionephrosclerosis of transplanted kidney (01/23/2021), Hypokalemia (04/20/2021), Insomnia, Leukocytosis, Medicare annual wellness visit, subsequent (11/9/2021), Mixed hyperlipidemia (01/23/2021), Numbness, Obstructive sleep apnea syndrome (01/23/2021), Other chest pain (8/26/2022), Pain in ankle (01/23/2021), Pain in both feet (01/23/2021), Pain in both knees (01/26/2021), Pain in right ankle (5/10/2022), Paroxysmal atrial fibrillation ([facility] ) (11/09/2021), Personal history of COVID-19, Right foot pain (5/10/2022), Shortness of breath, Shoulder pain, right (2/8/2022), Skin lesions, Sleep apnea, and Type 2 diabetes mellitus without complication, without long-term current use of insulin ([facility] ) (01/23/2021)  ,  _______________________________________________________________________  Past Surgical History:   has a past surgical history that includes Carpal tunnel release (Bilateral, 1990); Gallbladder surgery (2004); Hysterectomy (2004); Ankle surgery (Right, 2009/2012); Mammo stereotactic breast biopsy right (all inc) (Right, 2014); Mammo (historical) (10/02/2018); Colonoscopy (03/07/2017); Breast biopsy (Right); Colonoscopy; Upper gastrointestinal endoscopy; and [place] tooth extraction  ,  _______________________________________________________________________  Family History:  family history includes Breast cancer in her maternal grandmother and mother; Cancer in her mother; Colon cancer in her cousin; Diabetes in her mother; Hyperlipidemia in her mother; Lung cancer in her father; No Known Problems in her daughter, maternal aunt, maternal grandfather, sister, sister, and son ,  _______________________________________________________________________  Social History:   reports that she quit smoking about 17 years ago  Her smoking use included cigarettes  She smoked 1 50 packs per day  She has quit using smokeless tobacco  She reports current alcohol use   She reports previous drug use ,  _______________________________________________________________________  Allergies:  is allergic to aspirin and lisinopril     _______________________________________________________________________  Current Outpatient Medications   Medication Sig Dispense Refill    atorvastatin (LIPITOR) 40 mg tablet TAKE 1 TABLET EVERY DAY 90 tablet 1    Biotin 2500 MCG CAPS Take by mouth 2 (two) times a day      Blood Glucose Monitoring Suppl (FreeStyle Lite) TOYIN daily      calcium carbonate (OS-AMEE) 600 MG tablet Take 600 mg by mouth 2 (two) times a day with meals With vitamin D3       FREESTYLE LITE test strip Use 1 each 2 (two) times a day Use as instructed 200 strip 3    gabapentin (NEURONTIN) 300 mg capsule Take 300 mg by mouth 2 (two) times a day      glucosamine-chondroitin 500-400 MG tablet Take 1 tablet by mouth 3 (three) times a day      Lancets (freestyle) lancets Use 2 (two) times a day Use as instructed 200 each 3    losartan-hydrochlorothiazide (HYZAAR) 100-12 5 MG per tablet TAKE 1 TABLET EVERY DAY 90 tablet 1    meloxicam (MOBIC) 15 mg tablet Take 15 mg by mouth daily      metFORMIN (GLUCOPHAGE) 500 mg tablet TAKE 1 TABLET EVERY MORNING AND TAKE 2 TABLETS EVERY EVENING BEFORE MEALS 270 tablet 1    metoprolol tartrate (LOPRESSOR) 50 mg tablet TAKE 1 TABLET TWICE DAILY 180 tablet 1    Multiple Vitamin (MULTIVITAMIN) capsule Take 1 capsule by mouth daily      Omega-3 Fatty Acids (fish oil) 1,000 mg Take 2 capsules (2,000 mg total) by mouth daily 240 capsule 3    pantoprazole (PROTONIX) 40 mg tablet TAKE 1 TABLET BY MOUTH 30 MINUTES BEFORE BREAKFAST 90 tablet 3    traMADol (ULTRAM) 50 mg tablet Take 50 mg by mouth every 8 (eight) hours as needed  0     No current facility-administered medications for this visit      _______________________________________________________________________  Review of Systems   Constitutional: Negative for chills and fever  HENT: Negative for congestion, ear pain, hearing loss, nosebleeds, sinus pain, sore throat and trouble swallowing      Eyes: Negative for discharge, redness and visual disturbance  Respiratory: Negative for cough, chest tightness and shortness of breath  Cardiovascular: Positive for chest pain ( sometimes gets chest pain on heart area  )  Negative for palpitations  Gastrointestinal: Negative for abdominal pain, blood in stool, constipation, diarrhea, nausea and vomiting  Genitourinary: Negative for dysuria, flank pain, frequency and hematuria  Musculoskeletal: Positive for arthralgias (Right ankle pain pain in the feet plantar aspect)  Negative for myalgias and neck pain  Skin: Negative for color change and rash  Neurological: Negative for dizziness, speech difficulty, weakness and headaches  Hematological: Does not bruise/bleed easily  Psychiatric/Behavioral: Negative for agitation and behavioral problems  Objective:  Vitals:    08/26/22 1152   BP: 128/76   BP Location: Left arm   Patient Position: Sitting   Cuff Size: Adult   Pulse: 62   Resp: 14   Temp: 98 4 °F (36 9 °C)   TempSrc: Tympanic   SpO2: 100%   Weight: 102 kg (224 lb)   Height: 5' 3" (1 6 m)     Body mass index is 39 68 kg/m²  Physical Exam  Vitals and nursing note reviewed  Constitutional:       General: She is not in acute distress  Appearance: She is obese  HENT:      Head: Normocephalic and atraumatic  Right Ear: Ear canal and external ear normal       Left Ear: Ear canal and external ear normal       Nose: Nose normal       Mouth/Throat:      Mouth: Mucous membranes are moist    Eyes:      General: No scleral icterus  Right eye: No discharge  Left eye: No discharge  Extraocular Movements: Extraocular movements intact  Conjunctiva/sclera: Conjunctivae normal    Cardiovascular:      Rate and Rhythm: Normal rate and regular rhythm  Pulses: Normal pulses  no weak pulses          Dorsalis pedis pulses are 2+ on the right side and 2+ on the left side          Posterior tibial pulses are 2+ on the right side and 2+ on the left side       Heart sounds: No murmur heard  Pulmonary:      Effort: Pulmonary effort is normal  No respiratory distress  Breath sounds: Normal breath sounds  Abdominal:      General: Bowel sounds are normal       Palpations: Abdomen is soft  Tenderness: There is no abdominal tenderness  Musculoskeletal:         General: Normal range of motion  Cervical back: Normal range of motion and neck supple  No muscular tenderness  Right lower leg: No edema  Left lower leg: No edema  Feet:      Right foot:      Skin integrity: No ulcer, skin breakdown, erythema, warmth or dry skin  Left foot:      Skin integrity: No ulcer, skin breakdown, erythema, warmth or dry skin  Skin:     General: Skin is warm  Findings: No rash  Neurological:      General: No focal deficit present  Mental Status: She is alert and oriented to person, place, and time  Motor: No weakness  Coordination: Coordination normal    Psychiatric:         Mood and Affect: Mood normal          Behavior: Behavior normal         Patient's shoes and socks removed  Right Foot/Ankle   Right Foot Inspection  Skin Exam: skin normal and skin intact  No dry skin, no warmth, no erythema, no maceration, no abnormal color, no pre-ulcer and no ulcer  Toe Exam: ROM and strength within normal limits  No tenderness (Tender right ankle and plantar aspect her right foot  )    Sensory   Monofilament testing: intact    Vascular  Capillary refills: < 3 seconds  The right DP pulse is 2+  The right PT pulse is 2+  Left Foot/Ankle  Left Foot Inspection  Skin Exam: skin normal and skin intact  No dry skin, no warmth, no erythema, no maceration, normal color, no pre-ulcer and no ulcer  Toe Exam: ROM and strength within normal limits  No tenderness ( tender on plantar aspect of left foot)  Sensory   Monofilament testing: intact    Vascular  Capillary refills: < 3 seconds  The left DP pulse is 2+   The left PT pulse is 2+      Assign Risk Category  No deformity present  No loss of protective sensation  No weak pulses  Risk: 0    She has been followed by podiatrist regularly

## 2022-08-27 NOTE — ASSESSMENT & PLAN NOTE
She sometimes gets chest pain on heart area   Has some stress    She was seen by cardiologist will be going for pharmacological nuclear medicine test

## 2022-08-27 NOTE — ASSESSMENT & PLAN NOTE
Her symptoms are well controlled on pantoprazole  She has been followed by GI specialist Advised to watch diet for spicy foods, caffeinated beverages, alcoholic beverages, soda, citrus fluids and foods  Advised for reflux precautions

## 2022-08-27 NOTE — ASSESSMENT & PLAN NOTE
Well controlled  Cholesterol 153, HDL 55 LDL 64  Slightly elevated triglyceride 168  She was advised by cardiologist to start fish oil  Advised to continue atorvastatin 40 mg daily  Advised for low-cholesterol low saturated diet

## 2022-08-27 NOTE — ASSESSMENT & PLAN NOTE
She gets a pain in the both feet home plantar aspect with some numbness possible neuropathy  She has been followed by podiatrist Dr Gallo Whipple  On gabapentin    Sometime takes tramadol pain

## 2022-08-27 NOTE — ASSESSMENT & PLAN NOTE
Lab Results   Component Value Date    HGBA1C 6 2 (H) 08/02/2022   Her diabetes mellitus well controlled  Hemoglobin A1c decreased from 7 3-6 2  No hypoglycemia  Advised to check blood sugar once a day but a m  p m  alternate day instead of b i d  [facility] Call if last blood sugar less than 80 continue 1800 calorie ADA diet  Continue present medication

## 2022-08-27 NOTE — ASSESSMENT & PLAN NOTE
Her symptoms are improving  Her breathing is also improving  She has been followed by pulmonary specialist also    She was seen by cardiologist going for stress test

## 2022-10-12 PROBLEM — Z00.00 MEDICARE ANNUAL WELLNESS VISIT, SUBSEQUENT: Status: RESOLVED | Noted: 2021-11-09 | Resolved: 2022-10-12

## 2022-11-01 ENCOUNTER — OFFICE VISIT (OUTPATIENT)
Dept: PULMONOLOGY | Facility: CLINIC | Age: 62
End: 2022-11-01

## 2022-11-01 VITALS
HEIGHT: 63 IN | SYSTOLIC BLOOD PRESSURE: 124 MMHG | DIASTOLIC BLOOD PRESSURE: 86 MMHG | OXYGEN SATURATION: 97 % | BODY MASS INDEX: 39.87 KG/M2 | TEMPERATURE: 97.1 F | WEIGHT: 225 LBS | HEART RATE: 60 BPM

## 2022-11-01 DIAGNOSIS — U09.9 COVID-19 LONG HAULER: ICD-10-CM

## 2022-11-01 DIAGNOSIS — G47.33 OBSTRUCTIVE SLEEP APNEA SYNDROME: Primary | Chronic | ICD-10-CM

## 2022-11-01 NOTE — ASSESSMENT & PLAN NOTE
Patient canceled her home study because that is when she was ill with COVID-19 in August   At this point she does not feel that she has significant sleep-related symptoms  No in her family notes her to have serious snoring or obstructed airway events  Patient is not interested in pursuing the diagnosis of sleep apnea at this point  She will let us know if things change and will reschedule testing

## 2022-11-01 NOTE — PROGRESS NOTES
Assessment/Plan:    COVID-19 long hauler  Patient senses that dyspnea has resolved  Patient advised that PFTs are basically normal at this point  Specifically there was dramatic improvement in her DLCO  Examination normal today  This point I do not think that there is any concern about ongoing pulmonary problems from COVID-19  Note that patient states that she had another round of COVID-19 in August but did not have any respiratory complaints  I encouraged the patient to get COVID-19 vaccines but she is not interested  Obstructive sleep apnea syndrome  Patient canceled her home study because that is when she was ill with COVID-19 in August   At this point she does not feel that she has significant sleep-related symptoms  No in her family notes her to have serious snoring or obstructed airway events  Patient is not interested in pursuing the diagnosis of sleep apnea at this point  She will let us know if things change and will reschedule testing  There are no diagnoses linked to this encounter  Subjective:      Patient ID: Marianna Hoffman is a 60 y o  female  This patient now feels well with regard to respiratory symptoms  She senses the only dyspnea when climbing steps  Does not do any sort of exercise on a regular basis  Not troubled with cough or congestion and no wheezing  Patient advised that PFTs have basically normalized from previous abnormal studies  We decided after discussion not to do further imaging  She will let me know if she has any more problems  With regard to sleep apnea patient now senses that she is sleeping moderately well although she still only sleeps about 4 hours per night  Does not seem to have major daytime sleepiness in spite of this  No in her family notices that she snores heavily or has obstructed airway events    Patient missed her home study because she was ill with COVID-19 again in August   At this point she is not interested in rescheduling  The following portions of the patient's history were reviewed and updated as appropriate: allergies, current medications, past family history, past medical history, past social history, past surgical history and problem list     Review of Systems   Constitutional: Negative for activity change and unexpected weight change  Respiratory: Positive for apnea  Negative for cough, shortness of breath and wheezing  Cardiovascular: Positive for chest pain (With emotional stress, stress test planned)  Negative for palpitations and leg swelling  Genitourinary: Negative for enuresis  Neurological: Negative for headaches  Objective:      /86   Pulse 60   Temp (!) 97 1 °F (36 2 °C)   Ht 5' 3" (1 6 m)   Wt 102 kg (225 lb)   SpO2 97%   BMI 39 86 kg/m²          Physical Exam  Vitals reviewed  Constitutional:       General: She is not in acute distress  Appearance: She is obese  She is not ill-appearing  Cardiovascular:      Rate and Rhythm: Normal rate and regular rhythm  Heart sounds: Heart sounds are distant  Pulmonary:      Effort: Pulmonary effort is normal       Breath sounds: Normal breath sounds  No wheezing or rales  Musculoskeletal:         General: No swelling  Cervical back: No rigidity or tenderness  Right lower leg: No edema  Left lower leg: No edema  Lymphadenopathy:      Cervical: No cervical adenopathy  Skin:     General: Skin is warm and dry  Neurological:      Mental Status: She is alert and oriented to person, place, and time     Psychiatric:         Mood and Affect: Mood normal          Behavior: Behavior normal

## 2022-11-01 NOTE — ASSESSMENT & PLAN NOTE
Patient senses that dyspnea has resolved  Patient advised that PFTs are basically normal at this point  Specifically there was dramatic improvement in her DLCO  Examination normal today  This point I do not think that there is any concern about ongoing pulmonary problems from COVID-19  Note that patient states that she had another round of COVID-19 in August but did not have any respiratory complaints  I encouraged the patient to get COVID-19 vaccines but she is not interested

## 2022-11-14 ENCOUNTER — HOSPITAL ENCOUNTER (OUTPATIENT)
Dept: NON INVASIVE DIAGNOSTICS | Facility: CLINIC | Age: 62
Discharge: HOME | End: 2022-11-14

## 2022-11-14 DIAGNOSIS — I10 ESSENTIAL HYPERTENSION: ICD-10-CM

## 2022-11-14 DIAGNOSIS — R07.2 PRECORDIAL PAIN: ICD-10-CM

## 2022-11-14 LAB
BASELINE ST DEPRESSION: 0 MM
NUC STRESS EJECTION FRACTION: 74 %
RATE PRESSURE PRODUCT: NORMAL
SL CV REST NUCLEAR ISOTOPE DOSE: 8.9 MCI
SL CV STRESS NUCLEAR ISOTOPE DOSE: 26.9 MCI
SL CV STRESS RECOVERY BP: NORMAL MMHG
SL CV STRESS RECOVERY HR: 81 BPM
SL CV STRESS RECOVERY O2 SAT: 99 %
STRESS ANGINA INDEX: 0
STRESS BASELINE BP: NORMAL MMHG
STRESS BASELINE HR: 56 BPM
STRESS O2 SAT REST: 99 %
STRESS PEAK HR: 92 BPM
STRESS POST O2 SAT PEAK: 100 %
STRESS POST PEAK BP: 136 MMHG

## 2022-11-14 RX ADMIN — REGADENOSON 0.4 MG: 0.08 INJECTION, SOLUTION INTRAVENOUS at 13:35

## 2022-11-15 ENCOUNTER — RA CDI HCC (OUTPATIENT)
Dept: OTHER | Facility: HOSPITAL | Age: 62
End: 2022-11-15

## 2022-11-15 NOTE — PROGRESS NOTES
E11 22, e66 01  [facility]  coding opportunities          Chart Reviewed number of suggestions sent to Provider: 2     Patients Insurance     Medicare Insurance: [employer]

## 2022-11-16 ENCOUNTER — APPOINTMENT (OUTPATIENT)
Dept: LAB | Facility: HOSPITAL | Age: 62
End: 2022-11-16
Attending: INTERNAL MEDICINE

## 2022-11-16 DIAGNOSIS — E11.9 TYPE 2 DIABETES MELLITUS WITHOUT COMPLICATION, WITHOUT LONG-TERM CURRENT USE OF INSULIN (HCC): Chronic | ICD-10-CM

## 2022-11-16 DIAGNOSIS — I10 ESSENTIAL HYPERTENSION: Chronic | ICD-10-CM

## 2022-11-16 LAB
ANION GAP SERPL CALCULATED.3IONS-SCNC: 7 MMOL/L (ref 4–13)
BACTERIA UR QL AUTO: NORMAL /HPF
BILIRUB UR QL STRIP: NEGATIVE
BUN SERPL-MCNC: 18 MG/DL (ref 5–25)
CALCIUM SERPL-MCNC: 9.3 MG/DL (ref 8.4–10.2)
CHEST PAIN STATEMENT: NORMAL
CHLORIDE SERPL-SCNC: 100 MMOL/L (ref 96–108)
CLARITY UR: CLEAR
CO2 SERPL-SCNC: 32 MMOL/L (ref 21–32)
COLOR UR: YELLOW
CREAT SERPL-MCNC: 1.16 MG/DL (ref 0.6–1.3)
CREAT UR-MCNC: 141 MG/DL
GFR SERPL CREATININE-BSD FRML MDRD: 50 ML/MIN/1.73SQ M
GLUCOSE P FAST SERPL-MCNC: 113 MG/DL (ref 65–99)
GLUCOSE UR STRIP-MCNC: NEGATIVE MG/DL
HGB UR QL STRIP.AUTO: NEGATIVE
KETONES UR STRIP-MCNC: NEGATIVE MG/DL
LEUKOCYTE ESTERASE UR QL STRIP: ABNORMAL
MAX DIASTOLIC BP: 72 MMHG
MAX HEART RATE: 92 BPM
MAX PREDICTED HEART RATE: 157 BPM
MAX. SYSTOLIC BP: 140 MMHG
MICROALBUMIN UR-MCNC: 5.7 MG/L (ref 0–20)
MICROALBUMIN/CREAT 24H UR: 4 MG/G CREATININE (ref 0–30)
NITRITE UR QL STRIP: NEGATIVE
NON-SQ EPI CELLS URNS QL MICRO: NORMAL /HPF
PH UR STRIP.AUTO: 5.5 [PH]
POTASSIUM SERPL-SCNC: 3.9 MMOL/L (ref 3.5–5.3)
PROT UR STRIP-MCNC: NEGATIVE MG/DL
PROTOCOL NAME: NORMAL
RBC #/AREA URNS AUTO: NORMAL /HPF
REASON FOR TERMINATION: NORMAL
SODIUM SERPL-SCNC: 139 MMOL/L (ref 135–147)
SP GR UR STRIP.AUTO: 1.02 (ref 1–1.03)
TARGET HR FORMULA: NORMAL
TEST INDICATION: NORMAL
TIME IN EXERCISE PHASE: NORMAL
UROBILINOGEN UR QL STRIP.AUTO: 0.2 E.U./DL
WBC #/AREA URNS AUTO: NORMAL /HPF

## 2022-11-22 ENCOUNTER — OFFICE VISIT (OUTPATIENT)
Dept: INTERNAL MEDICINE CLINIC | Facility: CLINIC | Age: 62
End: 2022-11-22

## 2022-11-22 VITALS
OXYGEN SATURATION: 100 % | HEART RATE: 60 BPM | BODY MASS INDEX: 39.51 KG/M2 | RESPIRATION RATE: 18 BRPM | DIASTOLIC BLOOD PRESSURE: 76 MMHG | TEMPERATURE: 98.1 F | WEIGHT: 223 LBS | SYSTOLIC BLOOD PRESSURE: 128 MMHG | HEIGHT: 63 IN

## 2022-11-22 DIAGNOSIS — U09.9 COVID-19 LONG HAULER: ICD-10-CM

## 2022-11-22 DIAGNOSIS — K21.9 GASTROESOPHAGEAL REFLUX DISEASE WITHOUT ESOPHAGITIS: Chronic | ICD-10-CM

## 2022-11-22 DIAGNOSIS — Z11.59 NEED FOR HEPATITIS C SCREENING TEST: ICD-10-CM

## 2022-11-22 DIAGNOSIS — M79.671 PAIN IN BOTH FEET: Chronic | ICD-10-CM

## 2022-11-22 DIAGNOSIS — I10 ESSENTIAL HYPERTENSION: Chronic | ICD-10-CM

## 2022-11-22 DIAGNOSIS — M79.672 PAIN IN BOTH FEET: Chronic | ICD-10-CM

## 2022-11-22 DIAGNOSIS — E11.9 TYPE 2 DIABETES MELLITUS WITHOUT COMPLICATION, WITHOUT LONG-TERM CURRENT USE OF INSULIN (HCC): Primary | Chronic | ICD-10-CM

## 2022-11-22 DIAGNOSIS — I48.0 PAROXYSMAL ATRIAL FIBRILLATION (HCC): ICD-10-CM

## 2022-11-22 DIAGNOSIS — Z79.899 HIGH RISK MEDICATION USE: ICD-10-CM

## 2022-11-22 DIAGNOSIS — E78.2 MIXED HYPERLIPIDEMIA: Chronic | ICD-10-CM

## 2022-11-22 NOTE — PROGRESS NOTES
Assessment and Plan:     Problem List Items Addressed This Visit    None       Preventive health issues were discussed with patient, and age appropriate screening tests were ordered as noted in patient's After Visit Summary  Personalized health advice and appropriate referrals for health education or preventive services given if needed, as noted in patient's After Visit Summary       History of Present Illness:     Patient presents for a Medicare Wellness Visit    HPI   Patient Care Team:  Alec Sam MD as PCP - General (Internal Medicine)     Review of Systems:     Review of Systems     Problem List:     Patient Active Problem List   Diagnosis   • Low back pain   • Piriformis syndrome of right side   • Greater trochanteric pain syndrome of right lower extremity   • Essential hypertension   • Mixed hyperlipidemia   • Type 2 diabetes mellitus without complication, without long-term current use of insulin (HCC)   • Gastroesophageal reflux disease without esophagitis   • Pain in both feet   • Obstructive sleep apnea syndrome   • Pain in ankle   • Pain in both knees   • Edema of both lower legs   • Hypomagnesemia   • Paroxysmal atrial fibrillation (HCC)   • CKD (chronic kidney disease)   • Shoulder pain, right   • Class 2 severe obesity with body mass index (BMI) of 35 to 39 9 with serious comorbidity (HCC)   • BMI 39 0-39 9,adult   • Pain in right ankle   • Right foot pain   • Restless legs   • Other chest pain      Past Medical and Surgical History:     Past Medical History:   Diagnosis Date   • Abrasion of right leg 03/19/2021   • Acute respiratory failure due to COVID-19 ([facility] [address] ) 10/01/2021   • Arthritis     feet   • BMI 39 0-39 9,adult 5/10/2022   • CKD (chronic kidney disease) 2/8/2022   • Colon polyp    • Diabetes mellitus ([address] )     FBS today 2/28/22 was 147 at 0930 by patient--feels well   • Dyslipidemia    • Dysphagia, pharyngoesophageal    • Edema of both lower legs 03/19/2021   • Essential hypertension 01/23/2021   • Gastroesophageal reflux disease without esophagitis 01/23/2021   • GERD (gastroesophageal reflux disease)    • Hiatal hernia    • HTN (hypertension)    • Hyperlipemia    • Hypertension    • Hypertensive arterionephrosclerosis of transplanted kidney 01/23/2021   • Hypokalemia 04/20/2021   • Insomnia    • Leukocytosis    • Medicare annual wellness visit, subsequent 11/9/2021   • Mixed hyperlipidemia 01/23/2021   • Numbness    • Obstructive sleep apnea syndrome 01/23/2021    no device   • Other chest pain 8/26/2022   • Pain in ankle 01/23/2021   • Pain in both feet 01/23/2021   • Pain in both knees 01/26/2021   • Pain in right ankle 5/10/2022   • Paroxysmal atrial fibrillation ([facility] [address] ) 11/09/2021   • Personal history of COVID-19     August 2021   • Right foot pain 5/10/2022   • Shortness of breath     exertional   • Shoulder pain, right 2/8/2022   • Skin lesions    • Sleep apnea    • Type 2 diabetes mellitus without complication, without long-term current use of insulin ([facility] [address] ) 01/23/2021     Past Surgical History:   Procedure Laterality Date   • ANKLE SURGERY Right 2009/2012   • BREAST BIOPSY Right     stereotactic biopsy   • CARPAL TUNNEL RELEASE Bilateral 1990   • COLONOSCOPY  03/07/2017    Dr Smiley Disla   • COLONOSCOPY     • GALLBLADDER SURGERY  2004   • HYSTERECTOMY  2004   • MAMMO (HISTORICAL)  10/02/2018   • MAMMO STEREOTACTIC BREAST BIOPSY RIGHT (ALL INC) Right 2014   • UPPER GASTROINTESTINAL ENDOSCOPY     • WISDOM TOOTH EXTRACTION        Family History:     Family History   Problem Relation Age of Onset   • Cancer Mother    • Diabetes Mother    • Hyperlipidemia Mother    • Breast cancer Mother    • Breast cancer Maternal Grandmother    • Lung cancer Father    • No Known Problems Sister    • No Known Problems Daughter    • No Known Problems Maternal Grandfather    • No Known Problems Sister    • No Known Problems Son    • No Known Problems Maternal Aunt    • Colon cancer Cousin Social History:     Social History     Socioeconomic History   • Marital status: Single     Spouse name: None   • Number of children: None   • Years of education: None   • Highest education level: None   Occupational History   • Occupation: Unemployed, looking for work   Tobacco Use   • Smoking status: Former     Packs/day: 1 50     Types: Cigarettes     Quit date: 2004     Years since quittin 2   • Smokeless tobacco: Former   • Tobacco comments:     quit at 38   Vaping Use   • Vaping Use: Never used   Substance and Sexual Activity   • Alcohol use: Yes     Comment: -rare   • Drug use: Not Currently     Comment: No drug use - As per [employer]   • Sexual activity: Yes     Partners: Male   Other Topics Concern   • None   Social History Narrative    Lives with spouse    Tea, 3 servings/day    Lives with spouse    Annual eye exam: Advised    Pap smear: By her gyn    - As per [employer]     Social Determinants of Health     Financial Resource Strain: Medium Risk   • Difficulty of Paying Living Expenses: Somewhat hard   Food Insecurity: Not on file   Transportation Needs: No Transportation Needs   • Lack of Transportation (Medical): No   • Lack of Transportation (Non-Medical):  No   Physical Activity: Not on file   Stress: Not on file   Social Connections: Not on file   Intimate Partner Violence: Not on file   Housing Stability: Not on file      Medications and Allergies:     Current Outpatient Medications   Medication Sig Dispense Refill   • atorvastatin (LIPITOR) 40 mg tablet TAKE 1 TABLET EVERY DAY 90 tablet 1   • Biotin 2500 MCG CAPS Take by mouth 2 (two) times a day     • Blood Glucose Monitoring Suppl (FreeStyle Lite) TOYIN daily     • calcium carbonate (OS-AMEE) 600 MG tablet Take 600 mg by mouth 2 (two) times a day with meals With vitamin D3      • FREESTYLE LITE test strip Use 1 each 2 (two) times a day Use as instructed 200 strip 3   • gabapentin (NEURONTIN) 300 mg capsule Take 300 mg by mouth 2 (two) times a day     • glucosamine-chondroitin 500-400 MG tablet Take 1 tablet by mouth 3 (three) times a day     • Lancets (freestyle) lancets Use 2 (two) times a day Use as instructed 200 each 3   • losartan-hydrochlorothiazide (HYZAAR) 100-12 5 MG per tablet TAKE 1 TABLET EVERY DAY 90 tablet 1   • meloxicam (MOBIC) 15 mg tablet Take 15 mg by mouth daily     • metFORMIN (GLUCOPHAGE) 500 mg tablet TAKE 1 TABLET EVERY MORNING AND TAKE 2 TABLETS EVERY EVENING BEFORE MEALS 270 tablet 1   • metoprolol tartrate (LOPRESSOR) 50 mg tablet TAKE 1 TABLET TWICE DAILY 180 tablet 1   • Multiple Vitamin (MULTIVITAMIN) capsule Take 1 capsule by mouth daily     • Omega-3 Fatty Acids (fish oil) 1,000 mg Take 2 capsules (2,000 mg total) by mouth daily 240 capsule 3   • pantoprazole (PROTONIX) 40 mg tablet TAKE 1 TABLET BY MOUTH 30 MINUTES BEFORE BREAKFAST 90 tablet 3   • traMADol (ULTRAM) 50 mg tablet Take 50 mg by mouth every 8 (eight) hours as needed BID  0     No current facility-administered medications for this visit  Allergies   Allergen Reactions   • Aspirin GI Intolerance   • Lisinopril Fatigue      Immunizations: There is no immunization history on file for this patient  Health Maintenance:         Topic Date Due   • Hepatitis C Screening  Never done   • HIV Screening  Never done   • Cervical Cancer Screening  Never done   • Breast Cancer Screening: Mammogram  12/14/2022   • Colorectal Cancer Screening  11/17/2023         Topic Date Due   • Hepatitis B Vaccine (1 of 3 - 3-dose series) Never done   • COVID-19 Vaccine (1) Never done   • Pneumococcal Vaccine: Pediatrics (0 to 5 Years) and At-Risk Patients (6 to 59 Years) (1 - PCV) Never done      Medicare Screening Tests and Risk Assessments:     Josh Hair is here for her Subsequent Wellness visit  Health Risk Assessment:   Patient rates overall health as good  Patient feels that their physical health rating is slightly better  Patient is satisfied with their life  Eyesight was rated as same  Hearing was rated as same  Patient feels that their emotional and mental health rating is same  Patients states they are never, rarely angry  Patient states they are sometimes unusually tired/fatigued  Pain experienced in the last 7 days has been none  Patient states that she has experienced no weight loss or gain in last 6 months  Depression Screening:   PHQ-2 Score: 0      Fall Risk Screening: In the past year, patient has experienced: no history of falling in past year      Urinary Incontinence Screening:   Patient has not leaked urine accidently in the last six months  Home Safety:  Patient has trouble with stairs inside or outside of their home  Patient has working smoke alarms and has working carbon monoxide detector  Home safety hazards include: none  Medications:   Patient is currently taking over-the-counter supplements  OTC medications include: see medication list  Patient is able to manage medications  She takes a tramadol prescribed by her podiatry for pain in feet    Activities of Daily Living (ADLs)/Instrumental Activities of Daily Living (IADLs):   Walk and transfer into and out of bed and chair?: Yes  Dress and groom yourself?: Yes    Bathe or shower yourself?: Yes    Feed yourself?  Yes  Do your laundry/housekeeping?: Yes  Manage your money, pay your bills and track your expenses?: Yes  Make your own meals?: Yes    Do your own shopping?: Yes    Previous Hospitalizations:   Any hospitalizations or ED visits within the last 12 months?: No      Advance Care Planning:   Living will: No    Durable POA for healthcare: No    Advanced directive: No    Advanced directive counseling given: Yes    Five wishes given: Yes    Patient declined ACP directive: No    End of Life Decisions reviewed with patient: Yes      Cognitive Screening:   Provider or family/friend/caregiver concerned regarding cognition?: No    PREVENTIVE SCREENINGS      Cardiovascular Screening: General: History Lipid Disorder and Screening Current      Diabetes Screening:     General: History Diabetes and Screening Current      Colorectal Cancer Screening:     General: Screening Current      Breast Cancer Screening:     General: Screening Current      Cervical Cancer Screening:    General: Risks and Benefits Discussed      Osteoporosis Screening:    General: Risks and Benefits Discussed      Abdominal Aortic Aneurysm (AAA) Screening:        General: Screening Current      Lung Cancer Screening:     General: Screening Current      Hepatitis C Screening:    General: Risks and Benefits Discussed    Screening, Brief Intervention, and Referral to Treatment (SBIRT)    Screening  Typical number of drinks in a day: 0  Typical number of drinks in a week: 0  Interpretation: Low risk drinking behavior  Brief Intervention  Alcohol & drug use screenings were reviewed  No concerns regarding substance use disorder identified  Review of Current Opioid Use    Opioid Risk Tool (ORT) Interpretation: Complete Opioid Risk Tool (ORT)    Other Counseling Topics:   Car/seat belt/driving safety, skin self-exam, sunscreen and calcium and vitamin D intake and regular weightbearing exercise  No results found       Physical Exam:     /76 (BP Location: Left arm, Patient Position: Sitting, Cuff Size: Adult)   Pulse 60   Temp 98 1 °F (36 7 °C) (Tympanic)   Resp 18   Ht 5' 3" (1 6 m)   Wt 101 kg (223 lb)   SpO2 100%   BMI 39 50 kg/m²     Physical Exam     Sho Ortiz MD

## 2022-11-22 NOTE — ASSESSMENT & PLAN NOTE
Lab Results   Component Value Date    HGBA1C 6 2 (H) 08/02/2022   Well controlled  Fasting blood sugar 113  Advised to continue present medication  Advised to continue 1800 calorie ADA diet  Will follow hemoglobin A1c

## 2022-11-22 NOTE — ASSESSMENT & PLAN NOTE
Symptoms are well controlled on pantoprazole  She had EGD January 2022  Advised to watch diet and reflux precautions

## 2022-11-22 NOTE — PROGRESS NOTES
Assessment/Plan:    1  Type 2 diabetes mellitus without complication, without long-term current use of insulin [facility])  Assessment & Plan:    Lab Results   Component Value Date    HGBA1C 6 2 (H) 08/02/2022   Well controlled  Fasting blood sugar 113  Advised to continue present medication  Advised to continue 1800 calorie ADA diet  Will follow hemoglobin A1c  Orders:  -     Comprehensive metabolic panel; Future  -     Hemoglobin A1C; Future    2  Gastroesophageal reflux disease without esophagitis  Assessment & Plan:  Symptoms are well controlled on pantoprazole  She had EGD January 2022  Advised to watch diet and reflux precautions  3  Essential hypertension  Assessment & Plan:  Blood pressure well controlled  Advised to continue present medication  Advised for low-salt diet  Orders:  -     Comprehensive metabolic panel; Future    4  Pain in both feet  Assessment & Plan:  She has been followed by podiatrist Dr Darrin Espinoza takes tramadol p r n  5  Mixed hyperlipidemia  Assessment & Plan:  Well controlled  Cholesterol 153, triglyceride 168, HDL 55, LDL 64 advised to continue present medication  Advised to continue low-cholesterol low saturated diet  Will follow lipid panel    Orders:  -     Comprehensive metabolic panel; Future  -     Lipid panel; Future    6  BMI 39 0-39 9,adult  Assessment & Plan:  Patient  was advised to decrease portion size  Advised to decrease carb, sugar, cholesterol intake  Advised to exercise 3-5 times per week  Advised to lose weight  7  COVID-19 long debra  Assessment & Plan:  Her symptoms are much better  Has been followed by pulmonary specialist as well as cardiologist   She had recently stress test was okay ejection fraction 74%  Now she gets shortness with mainly when she goes up hills or up stairs but otherwise her breathing is better  8  High risk medication use  -     Comprehensive metabolic panel; Future    9   Need for hepatitis C screening test  -     Hepatitis C antibody; Future    10  Paroxysmal atrial fibrillation (HCC)  Assessment & Plan:  During COVID  Now in normal sinus rhythm  Off anticoagulation  Seen by cardiologist   Had a stress test recently  Ejection fraction 74%    She was advised to see gyn specialist for gyn examination  Subjective:  Patient presents annual wellness exam and for follow-up  Patient ID: Madeline Guajardo is a 60 y o  female  HPI   21-year-old white female patient presents for annual wellness exam and for follow-up her medical problems  She denies any chest pain  Breathing is okay  She gets shortness of breath when he goes up hills or up stairs  But breathing is better  Denies any cough, fever, chills  Denies nausea vomiting diarrhea  Has been seen by pulmonary specialist   She had a recently stress test which was okay as well    Has been followed by podiatrist     The following portions of the patient's history were reviewed and updated as appropriate:     Past Medical History:  She has a past medical history of Abrasion of right leg (03/19/2021), Acute respiratory failure due to COVID-19 [facility]) (10/01/2021), Arthritis, BMI 39 0-39 9,adult (5/10/2022), CKD (chronic kidney disease) (2/8/2022), Colon polyp, COVID-19 long hauler (11/22/2022), [facility] ), Dyslipidemia, Dysphagia, pharyngoesophageal, Edema of both lower legs (03/19/2021), Essential hypertension (01/23/2021), Gastroesophageal reflux disease without esophagitis (01/23/2021), GERD (gastroesophageal reflux disease), Hiatal hernia, HTN (hypertension), Hyperlipemia, Hypertension, Hypertensive arterionephrosclerosis of transplanted kidney (01/23/2021), Hypokalemia (04/20/2021), Insomnia, Leukocytosis, Medicare annual wellness visit, subsequent (11/9/2021), Mixed hyperlipidemia (01/23/2021), Numbness, Obstructive sleep apnea syndrome (01/23/2021), Other chest pain (8/26/2022), Pain in ankle (01/23/2021), Pain in both feet (01/23/2021), Pain in both knees (01/26/2021), Pain in right ankle (5/10/2022), Paroxysmal atrial fibrillation ([facility] ) (11/09/2021), Personal history of COVID-19, Right foot pain (5/10/2022), Shortness of breath, Shoulder pain, right (2/8/2022), Skin lesions, Sleep apnea, and Type 2 diabetes mellitus without complication, without long-term current use of insulin ([facility] ) (01/23/2021)  ,  _______________________________________________________________________  Past Surgical History:   has a past surgical history that includes Carpal tunnel release (Bilateral, 1990); Gallbladder surgery (2004); Hysterectomy (2004); Ankle surgery (Right, 2009/2012); Mammo stereotactic breast biopsy right (all inc) (Right, 2014); Mammo (historical) (10/02/2018); Colonoscopy (03/07/2017); Breast biopsy (Right); Colonoscopy; Upper gastrointestinal endoscopy; and [place] tooth extraction  ,  _______________________________________________________________________  Family History:  family history includes Breast cancer in her maternal grandmother and mother; Cancer in her mother; Colon cancer in her cousin; Diabetes in her mother; Hyperlipidemia in her mother; Lung cancer in her father; No Known Problems in her daughter, maternal aunt, maternal grandfather, sister, sister, and son ,  _______________________________________________________________________  Social History:   reports that she quit smoking about 18 years ago  Her smoking use included cigarettes  She smoked an average of 1 5 packs per day  She has quit using smokeless tobacco  She reports current alcohol use  She reports that she does not currently use drugs  ,  _______________________________________________________________________  Allergies:  is allergic to aspirin and lisinopril     _______________________________________________________________________  Current Outpatient Medications   Medication Sig Dispense Refill   • atorvastatin (LIPITOR) 40 mg tablet TAKE 1 TABLET EVERY DAY 90 tablet 1   • Biotin 2500 MCG CAPS Take by mouth 2 (two) times a day     • Blood Glucose Monitoring Suppl (FreeStyle Lite) TOYIN daily     • calcium carbonate (OS-AMEE) 600 MG tablet Take 600 mg by mouth 2 (two) times a day with meals With vitamin D3      • FREESTYLE LITE test strip Use 1 each 2 (two) times a day Use as instructed 200 strip 3   • gabapentin (NEURONTIN) 300 mg capsule Take 300 mg by mouth 2 (two) times a day     • glucosamine-chondroitin 500-400 MG tablet Take 1 tablet by mouth 3 (three) times a day     • Lancets (freestyle) lancets Use 2 (two) times a day Use as instructed 200 each 3   • losartan-hydrochlorothiazide (HYZAAR) 100-12 5 MG per tablet TAKE 1 TABLET EVERY DAY 90 tablet 1   • meloxicam (MOBIC) 15 mg tablet Take 15 mg by mouth daily     • metFORMIN (GLUCOPHAGE) 500 mg tablet TAKE 1 TABLET EVERY MORNING AND TAKE 2 TABLETS EVERY EVENING BEFORE MEALS 270 tablet 1   • metoprolol tartrate (LOPRESSOR) 50 mg tablet TAKE 1 TABLET TWICE DAILY 180 tablet 1   • Multiple Vitamin (MULTIVITAMIN) capsule Take 1 capsule by mouth daily     • Omega-3 Fatty Acids (fish oil) 1,000 mg Take 2 capsules (2,000 mg total) by mouth daily 240 capsule 3   • pantoprazole (PROTONIX) 40 mg tablet TAKE 1 TABLET BY MOUTH 30 MINUTES BEFORE BREAKFAST 90 tablet 3   • traMADol (ULTRAM) 50 mg tablet Take 50 mg by mouth every 8 (eight) hours as needed BID  0     No current facility-administered medications for this visit      _______________________________________________________________________  Review of Systems   Constitutional: Negative for chills and fever  HENT: Negative for congestion, ear pain, hearing loss, nosebleeds, sinus pain, sore throat and trouble swallowing  Eyes: Negative for discharge, redness and visual disturbance  Respiratory: Negative for cough, chest tightness and shortness of breath  Cardiovascular: Negative for chest pain and palpitations     Gastrointestinal: Negative for abdominal pain, blood in stool, constipation, diarrhea, nausea and vomiting  Genitourinary: Negative for dysuria, flank pain, frequency and hematuria  Musculoskeletal: Negative for arthralgias, myalgias and neck pain  Skin: Negative for color change and rash  Neurological: Negative for dizziness, speech difficulty, weakness and headaches  Hematological: Does not bruise/bleed easily  Psychiatric/Behavioral: Negative for agitation and behavioral problems  Objective:  Vitals:    11/22/22 0953   BP: 128/76   BP Location: Left arm   Patient Position: Sitting   Cuff Size: Adult   Pulse: 60   Resp: 18   Temp: 98 1 °F (36 7 °C)   TempSrc: Tympanic   SpO2: 100%   Weight: 101 kg (223 lb)   Height: 5' 3" (1 6 m)     Body mass index is 39 5 kg/m²  Physical Exam  Vitals and nursing note reviewed  Constitutional:       General: She is not in acute distress  Appearance: Normal appearance  HENT:      Head: Normocephalic and atraumatic  Right Ear: Ear canal and external ear normal       Left Ear: Ear canal and external ear normal       Nose: Nose normal       Mouth/Throat:      Mouth: Mucous membranes are moist    Eyes:      General: No scleral icterus  Right eye: No discharge  Left eye: No discharge  Extraocular Movements: Extraocular movements intact  Conjunctiva/sclera: Conjunctivae normal    Cardiovascular:      Rate and Rhythm: Normal rate and regular rhythm  Pulses: Normal pulses  Heart sounds: No murmur heard  Pulmonary:      Effort: Pulmonary effort is normal  No respiratory distress  Breath sounds: Normal breath sounds  Abdominal:      General: Bowel sounds are normal       Palpations: Abdomen is soft  Tenderness: There is no abdominal tenderness  Musculoskeletal:         General: Normal range of motion  Cervical back: Normal range of motion and neck supple  No muscular tenderness  Right lower leg: No edema  Left lower leg: No edema     Skin: General: Skin is warm  Findings: No rash  Neurological:      General: No focal deficit present  Mental Status: She is alert and oriented to person, place, and time  Motor: No weakness  Coordination: Coordination normal    Psychiatric:         Mood and Affect: Mood normal          Behavior: Behavior normal          I spent 30 minutes with the patient today    More than 50% time spent for reviewing of external notes, reviewing of the results of diagnostics test, management of care, patient education and ordering of test

## 2022-11-22 NOTE — PATIENT INSTRUCTIONS
Medicare Preventive Visit Patient Instructions  Thank you for completing your Welcome to Medicare Visit or Medicare Annual Wellness Visit today  Your next wellness visit will be due in one year (11/23/2023)  The screening/preventive services that you may require over the next 5-10 years are detailed below  Some tests may not apply to you based off risk factors and/or age  Screening tests ordered at today's visit but not completed yet may show as past due  Also, please note that scanned in results may not display below  Preventive Screenings:  Service Recommendations Previous Testing/Comments   Colorectal Cancer Screening  * Colonoscopy    * Fecal Occult Blood Test (FOBT)/Fecal Immunochemical Test (FIT)  * Fecal DNA/Cologuard Test  * Flexible Sigmoidoscopy Age: 39-70 years old   Colonoscopy: every 10 years (may be performed more frequently if at higher risk)  OR  FOBT/FIT: every 1 year  OR  Cologuard: every 3 years  OR  Sigmoidoscopy: every 5 years  Screening may be recommended earlier than age 39 if at higher risk for colorectal cancer  Also, an individualized decision between you and your healthcare provider will decide whether screening between the ages of 74-80 would be appropriate  Colonoscopy: 11/17/2020  FOBT/FIT: Not on file  Cologuard: Not on file  Sigmoidoscopy: Not on file    Screening Current     Breast Cancer Screening Age: 36 years old  Frequency: every 1-2 years  Not required if history of left and right mastectomy Mammogram: 12/14/2021    Screening Current   Cervical Cancer Screening Between the ages of 21-29, pap smear recommended once every 3 years  Between the ages of 33-67, can perform pap smear with HPV co-testing every 5 years     Recommendations may differ for women with a history of total hysterectomy, cervical cancer, or abnormal pap smears in past  Pap Smear: Not on file        Hepatitis C Screening Once for adults born between 1945 and 1965  More frequently in patients at high risk for Hepatitis C Hep C Antibody: Not on file        Diabetes Screening 1-2 times per year if you're at risk for diabetes or have pre-diabetes Fasting glucose: 113 mg/dL (11/16/2022)  A1C: 6 2 % (8/2/2022)  Screening Not Indicated  History Diabetes   Cholesterol Screening Once every 5 years if you don't have a lipid disorder  May order more often based on risk factors  Lipid panel: 08/02/2022    Screening Not Indicated  History Lipid Disorder     Other Preventive Screenings Covered by Medicare:  1  Abdominal Aortic Aneurysm (AAA) Screening: covered once if your at risk  You're considered to be at risk if you have a family history of AAA  2  Lung Cancer Screening: covers low dose CT scan once per year if you meet all of the following conditions: (1) Age 50-69; (2) No signs or symptoms of lung cancer; (3) Current smoker or have quit smoking within the last 15 years; (4) You have a tobacco smoking history of at least 20 pack years (packs per day multiplied by number of years you smoked); (5) You get a written order from a healthcare provider  3  Glaucoma Screening: covered annually if you're considered high risk: (1) You have diabetes OR (2) Family history of glaucoma OR (3)  aged 48 and older OR (3)  American aged 72 and older  3  Osteoporosis Screening: covered every 2 years if you meet one of the following conditions: (1) You're estrogen deficient and at risk for osteoporosis based off medical history and other findings; (2) Have a vertebral abnormality; (3) On glucocorticoid therapy for more than 3 months; (4) Have primary hyperparathyroidism; (5) On osteoporosis medications and need to assess response to drug therapy  · Last bone density test (DXA Scan): Not on file  5  HIV Screening: covered annually if you're between the age of 12-76  Also covered annually if you are younger than 13 and older than 72 with risk factors for HIV infection   For pregnant patients, it is covered up to 3 times per pregnancy  Immunizations:  Immunization Recommendations   Influenza Vaccine Annual influenza vaccination during flu season is recommended for all persons aged >= 6 months who do not have contraindications   Pneumococcal Vaccine   * Pneumococcal conjugate vaccine = PCV13 (Prevnar 13), PCV15 (Vaxneuvance), PCV20 (Prevnar 20)  * Pneumococcal polysaccharide vaccine = PPSV23 (Pneumovax) Adults 25-60 years old: 1-3 doses may be recommended based on certain risk factors  Adults 72 years old: 1-2 doses may be recommended based off what pneumonia vaccine you previously received   Hepatitis B Vaccine 3 dose series if at intermediate or high risk (ex: diabetes, end stage renal disease, liver disease)   Tetanus (Td) Vaccine - COST NOT COVERED BY MEDICARE PART B Following completion of primary series, a booster dose should be given every 10 years to maintain immunity against tetanus  Td may also be given as tetanus wound prophylaxis  Tdap Vaccine - COST NOT COVERED BY MEDICARE PART B Recommended at least once for all adults  For pregnant patients, recommended with each pregnancy  Shingles Vaccine (Shingrix) - COST NOT COVERED BY MEDICARE PART B  2 shot series recommended in those aged 48 and above     Health Maintenance Due:      Topic Date Due   • Hepatitis C Screening  Never done   • HIV Screening  Never done   • Cervical Cancer Screening  Never done   • Breast Cancer Screening: Mammogram  12/14/2022   • Colorectal Cancer Screening  11/17/2023     Immunizations Due:      Topic Date Due   • Hepatitis B Vaccine (1 of 3 - 3-dose series) Never done   • COVID-19 Vaccine (1) Never done   • Pneumococcal Vaccine: Pediatrics (0 to 5 Years) and At-Risk Patients (6 to 59 Years) (1 - PCV) Never done     Advance Directives   What are advance directives? Advance directives are legal documents that state your wishes and plans for medical care   These plans are made ahead of time in case you lose your ability to make decisions for yourself  Advance directives can apply to any medical decision, such as the treatments you want, and if you want to donate organs  What are the types of advance directives? There are many types of advance directives, and each state has rules about how to use them  You may choose a combination of any of the following:  · Living will: This is a written record of the treatment you want  You can also choose which treatments you do not want, which to limit, and which to stop at a certain time  This includes surgery, medicine, IV fluid, and tube feedings  · Durable power of  for healthcare [facility]): This is a written record that states who you want to make healthcare choices for you when you are unable to make them for yourself  This person, called a proxy, is usually a family member or a friend  You may choose more than 1 proxy  · Do not resuscitate (DNR) order:  A DNR order is used in case your heart stops beating or you stop breathing  It is a request not to have certain forms of treatment, such as CPR  A DNR order may be included in other types of advance directives  · Medical directive: This covers the care that you want if you are in a coma, near death, or unable to make decisions for yourself  You can list the treatments you want for each condition  Treatment may include pain medicine, surgery, blood transfusions, dialysis, IV or tube feedings, and a ventilator (breathing machine)  · Values history: This document has questions about your views, beliefs, and how you feel and think about life  This information can help others choose the care that you would choose  Why are advance directives important? An advance directive helps you control your care  Although spoken wishes may be used, it is better to have your wishes written down  Spoken wishes can be misunderstood, or not followed  Treatments may be given even if you do not want them   An advance directive may make it easier for your family to make difficult choices about your care  Weight Management   Why it is important to manage your weight:  Being overweight increases your risk of health conditions such as heart disease, high blood pressure, type 2 diabetes, and certain types of cancer  It can also increase your risk for osteoarthritis, sleep apnea, and other respiratory problems  Aim for a slow, steady weight loss  Even a small amount of weight loss can lower your risk of health problems  How to lose weight safely:  A safe and healthy way to lose weight is to eat fewer calories and get regular exercise  You can lose up about 1 pound a week by decreasing the number of calories you eat by 500 calories each day  Healthy meal plan for weight management:  A healthy meal plan includes a variety of foods, contains fewer calories, and helps you stay healthy  A healthy meal plan includes the following:  · Eat whole-grain foods more often  A healthy meal plan should contain fiber  Fiber is the part of grains, fruits, and vegetables that is not broken down by your body  Whole-grain foods are healthy and provide extra fiber in your diet  Some examples of whole-grain foods are whole-wheat breads and pastas, oatmeal, brown rice, and bulgur  · Eat a variety of vegetables every day  Include dark, leafy greens such as spinach, kale, faith greens, and mustard greens  Eat yellow and orange vegetables such as carrots, sweet potatoes, and winter squash  · Eat a variety of fruits every day  Choose fresh or canned fruit (canned in its own juice or light syrup) instead of juice  Fruit juice has very little or no fiber  · Eat low-fat dairy foods  Drink fat-free (skim) milk or 1% milk  Eat fat-free yogurt and low-fat cottage cheese  Try low-fat cheeses such as mozzarella and other reduced-fat cheeses  · Choose meat and other protein foods that are low in fat  Choose beans or other legumes such as split peas or lentils   Choose fish, skinless poultry (chicken or turkey), or lean cuts of red meat (beef or pork)  Before you cook meat or poultry, cut off any visible fat  · Use less fat and oil  Try baking foods instead of frying them  Add less fat, such as margarine, sour cream, regular salad dressing and mayonnaise to foods  Eat fewer high-fat foods  Some examples of high-fat foods include french fries, doughnuts, ice cream, and cakes  · Eat fewer sweets  Limit foods and drinks that are high in sugar  This includes candy, cookies, regular soda, and sweetened drinks  Exercise:  Exercise at least 30 minutes per day on most days of the week  Some examples of exercise include walking, biking, dancing, and swimming  You can also fit in more physical activity by taking the stairs instead of the elevator or parking farther away from stores  Ask your healthcare provider about the best exercise plan for you  © Copyright [employer] 2018 Information is for End User's use only and may not be sold, redistributed or otherwise used for commercial purposes  All illustrations and images included in CareNotes® are the copyrighted property of A D A M , Inc  or [employer] [facility]  Patient was advised to continue present medications  discussed with the patient medications and laboratory data in detail  Follow-up with me in 3 months or as advised  If any blood test was ordered please do 1 week prior to next appointment unless advise to get earlier  If you have any questions please call the office [phone]   Type 2 Diabetes Management for Adults   AMBULATORY CARE:   Type 2 diabetes  is a disease that affects how your body uses glucose (sugar)  Either your body cannot make enough insulin, or it cannot use the insulin correctly  It is important to keep diabetes controlled to prevent damage to your heart, blood vessels, and other organs  Have someone call your local emergency number (911 in the [address]) if:   · You cannot be woken      · You have signs of diabetic ketoacidosis:     ? confusion, fatigue    ? vomiting    ? rapid heartbeat    ? fruity smelling breath    ? extreme thirst    ? dry mouth and skin    · You have any of the following signs of a heart attack:      ? Squeezing, pressure, or pain in your chest    ? You may  also have any of the following:     § Discomfort or pain in your back, neck, jaw, stomach, or arm    § Shortness of breath    § Nausea or vomiting    § Lightheadedness or a sudden cold sweat    · You have any of the following signs of a stroke:      ? Numbness or drooping on one side of your face     ? Weakness in an arm or leg    ? Confusion or difficulty speaking    ? Dizziness, a severe headache, or vision loss    Call your doctor or diabetes care team if:   · You have a sore or wound that will not heal     · You have a change in the amount you urinate  · Your blood sugar levels are higher than your target goals  · You often have lower blood sugar levels than your target goals  · Your skin is red, dry, warm, or swollen  · You have trouble coping with diabetes, or you feel anxious or depressed  · You have questions or concerns about your condition or care  What you need to know about high blood sugar levels:  High blood sugar levels may not cause any symptoms  You may feel more thirsty or urinate more often than usual  Over time, high blood sugar levels can damage your nerves, blood vessels, tissues, and organs  The following can increase your blood sugar levels:  · Large meals or large amounts of carbohydrates at one time    · Less physical activity    · Stress    · Illness    · A lower dose of medicine or insulin, or a late dose    What you need to know about low blood sugar levels: You can prevent symptoms such as shakiness, dizziness, irritability, or confusion by preventing your blood sugar levels from going too low  · Treat a low blood sugar level right away        ? Drink 4 ounces of juice or have 1 tube of glucose gel     ? Check your blood sugar level again 10 to 15 minutes later  ? When the level goes back to normal, eat a meal or snack to prevent another decrease  · Keep glucose gel, raisins, or hard candy with you at all times to treat a low blood sugar level  · Your blood sugar level can get too low if you take diabetes medicine or insulin and do not eat enough food  · If you use insulin, check your blood sugar level before you exercise  ? If your blood sugar level is below 100 mg/dL, eat 4 crackers or 2 ounces of raisins, or drink 4 ounces of juice  ? Check your level every 30 minutes if you exercise more than 1 hour  ? You may need a snack during or after exercise  What you can do to manage your blood sugar levels:   · Check your blood sugar levels as directed and as needed  Several items are available to use to check your levels  You may need to check by testing a drop of blood in a glucose monitor  You may instead be given a continuous glucose monitoring (CGM) device  The device is worn at all times  The CGM checks your blood sugar level every 5 minutes  It sends results to an electronic device such as a smart phone  A CGM can be used with or without an insulin pump  Talk with your provider to find out which method is best for you  The goal for blood sugar levels before meals  is between 80 and 130 mg/dL and 2 hours after eating  is lower than 180 mg/dL  · Make healthy food choices  Work with a dietitian to develop a meal plan that works for you and your schedule  A dietitian can help you learn how to eat the right amount of carbohydrates during your meals and snacks  Carbohydrates can raise your blood sugar level if you eat too many at one time  Examples of foods that contain carbohydrates are breads, cereals, rice, pasta, and sweets  · Get regular physical activity  Physical activity can help you get to your target blood sugar level goal and manage your weight  Get at least 150 minutes of moderate to vigorous aerobic physical activity each week  Do not miss more than 2 days in a row  Do not sit longer than 30 minutes at a time  Your healthcare provider can help you create an activity plan  The plan can include the best activities for you and can help you build your strength and endurance  · Maintain a healthy weight  Ask your healthcare provider what a healthy weight is for you  Ask him or her to help you create a safe weight loss plan if you are overweight  · Take your diabetes medicine or insulin as directed  You may need diabetes medicine, insulin, or both to help control your blood sugar levels  Your healthcare provider will teach you how and when to take your diabetes medicine or insulin  You will also be taught about side effects oral diabetes medicine can cause  Insulin may be injected, or given through a pump or pen  You and your care team will discuss which method is best for you  ? An insulin pump  is an implanted device that gives your insulin 24 hours a day  An insulin pump prevents the need for multiple insulin injections in a day  ? An insulin pen  is a device prefilled with the right amount of insulin  ? You and your family members will be taught how to draw up and give insulin  if this is the best method for you  Your education team will also teach you how to dispose of needles and syringes  ? You will learn how much insulin you need  and when to give it  You will be taught when not to give insulin  You will also be taught what to do if your blood sugar level drops too low  This may happen if you take insulin and do not eat the right amount of carbohydrates  Other things you can do to manage type 2 diabetes:   · Wear medical alert identification  Wear medical alert jewelry or carry a card that says you have diabetes  Ask your provider where to get these items  · Do not smoke    Nicotine and other chemicals in cigarettes and cigars can cause lung and blood vessel damage  It also makes it more difficult to manage your diabetes  Ask your provider for information if you currently smoke and need help to quit  Do not use e-cigarettes or smokeless tobacco in place of cigarettes or to help you quit  They still contain nicotine  · Check your feet each day for cuts, scratches, calluses, or other wounds  Look for redness and swelling, and feel for warmth  Wear shoes that fit well  Check your shoes for rocks or other objects that can hurt your feet  Do not walk barefoot or wear shoes without socks  Wear cotton socks to help keep your feet dry  · Ask about vaccines you may need  You have a higher risk for serious illness if you get the flu, pneumonia, COVID-19, or hepatitis  Ask your provider if you should get vaccines to prevent these or other diseases, and when to get the vaccines  · Talk to your care team if you become stressed about diabetes care  Sometimes being able to fit diabetes care into your life can cause increased stress  The stress can cause you not to take care of yourself properly  Your care team can help by offering tips about self-care  Your care team may suggest you talk to a mental health provider  The provider can listen and offer help with self-care issues  Follow up with your doctor or diabetes care team as directed: You may need to have blood tests done before your follow-up visit  The test results will show if changes need to be made in your treatment or self-care  Write down your questions so you remember to ask them during your visits  Talk to your provider if you cannot afford your medicine  © Copyright [employer] 2022 Information is for End User's use only and may not be sold, redistributed or otherwise used for commercial purposes   All illustrations and images included in CareNotes® are the copyrighted property of A D A M , Inc  or [employer]man [employer]   The above information is an  only  It is not intended as medical advice for individual conditions or treatments  Talk to your doctor, nurse or pharmacist before following any medical regimen to see if it is safe and effective for you

## 2022-11-22 NOTE — ASSESSMENT & PLAN NOTE
During COVID  Now in normal sinus rhythm  Off anticoagulation  Seen by cardiologist   Had a stress test recently    Ejection fraction 74%

## 2022-11-22 NOTE — ASSESSMENT & PLAN NOTE
Well controlled  Cholesterol 153, triglyceride 168, HDL 55, LDL 64 advised to continue present medication  Advised to continue low-cholesterol low saturated diet    Will follow lipid panel

## 2022-11-23 NOTE — ASSESSMENT & PLAN NOTE
Her symptoms are much better  Has been followed by pulmonary specialist as well as cardiologist   She had recently stress test was okay ejection fraction 74%  Now she gets shortness with mainly when she goes up hills or up stairs but otherwise her breathing is better

## 2023-01-22 DIAGNOSIS — E78.2 MIXED HYPERLIPIDEMIA: Chronic | ICD-10-CM

## 2023-01-23 RX ORDER — ATORVASTATIN CALCIUM 40 MG/1
TABLET, FILM COATED ORAL
Qty: 90 TABLET | Refills: 1 | Status: SHIPPED | OUTPATIENT
Start: 2023-01-23

## 2023-02-11 DIAGNOSIS — E11.9 TYPE 2 DIABETES MELLITUS WITHOUT COMPLICATION, WITHOUT LONG-TERM CURRENT USE OF INSULIN (HCC): Chronic | ICD-10-CM

## 2023-02-12 ENCOUNTER — RA CDI HCC (OUTPATIENT)
Dept: OTHER | Facility: HOSPITAL | Age: 63
End: 2023-02-12

## 2023-02-12 NOTE — PROGRESS NOTES
E11 [id number], [id number]  [facility]  coding opportunities          Chart Reviewed number of suggestions sent to Provider: 2     Patients Insurance     Medicare Insurance: [employer]

## 2023-02-13 ENCOUNTER — OFFICE VISIT (OUTPATIENT)
Dept: CARDIOLOGY CLINIC | Facility: CLINIC | Age: 63
End: 2023-02-13

## 2023-02-13 VITALS
SYSTOLIC BLOOD PRESSURE: 118 MMHG | HEIGHT: 63 IN | DIASTOLIC BLOOD PRESSURE: 60 MMHG | OXYGEN SATURATION: 99 % | WEIGHT: 226 LBS | HEART RATE: 63 BPM | BODY MASS INDEX: 40.04 KG/M2

## 2023-02-13 DIAGNOSIS — E66.01 OBESITY, MORBID, BMI 50 OR HIGHER (HCC): ICD-10-CM

## 2023-02-13 DIAGNOSIS — I10 ESSENTIAL HYPERTENSION: ICD-10-CM

## 2023-02-13 DIAGNOSIS — E11.65 TYPE 2 DIABETES MELLITUS WITH HYPERGLYCEMIA, WITHOUT LONG-TERM CURRENT USE OF INSULIN (HCC): ICD-10-CM

## 2023-02-13 DIAGNOSIS — I48.0 PAROXYSMAL ATRIAL FIBRILLATION (HCC): Primary | ICD-10-CM

## 2023-02-13 NOTE — PROGRESS NOTES
Progress Note - Cardiology Office  [facility]    Suzy Soares 60 y o  female MRN: [id number]  : 1960  Encounter: [id number]      ASSESSMENT:  History of Chest Pain,  Has not had any recurrence since last    Pharmacologic nuclear stress test, 2022:  No evidence of ischemia or scar,  Paradoxical small mid anterior perfusion defect  EF 74%     COVID-19 long hauler  patient had COVID-19 pneumonia and hospitalized for 16 days with significant hypoxemia   Did not require mechanical ventilation     She was admitted from  to 2021  Thereafter she went to [employer]  Generally feels better and has had no recurrence of symptoms     PAF:  During hospitalization patient had episode of atrial fibrillation and was put on Eliquis which was subsequently discontinued when she went back to  normal sinus rhythm  Has not had any recurrence of symptoms     TTE, 2021  EF 50-55%     48 hour Holter monitor:  10/26/2021  Sinus rhythm with average heart rate of 63 per minute   0 PVCs, 72 PACs, no arrhythmias     Obesity:  BMI is 40 03     Type 2 diabetes mellitus     Hypertension  BP today is  118/60 mmHg with heart rate of 63 per minute  On losartan hydrochlorothiazide, 100-12 5 mg daily and Metoprolol 50 mg bid     Mixed hyperlipidemia  LDL 76, triglycerides 157, HDL 39  2022: LDL 64, , HDL 55                            l  Currently on atorvastatin 40 mg daily     Abnormal EKG:  Normal sinus rhythm, heart rate 60 per minute cannot rule out old anterior infarct, age undetermined        Arthritis of left knee and right foot  Difficulty in ambulation        RECOMMENDATIONS:  Continue atorvastatin, fish oil losartan hydrochlorothiazide and metoprolol  Regular cardiovascular exercise/walking, as much as tolerated aiming for 30 minutes daily   Low-salt, low-carbohydrate, low-cholesterol and low sugar diet  Weight loss  Patient is to have blood test/lipid profile next week prior to appointment with PCP      Please call [phone] if any questions  HPI :     Eduardo Willson is a 60y o  year old female who came for follow up  She is feeling well and denies any new or acute cardiac symptoms  She denies any chest pain, unusual dyspnea, palpitations or syncope  We discussed the result of her pharmacologic nuclear stress test which showed no evidence of ischemia or myocardial scarring and had an EF of 74%  Her last lipid profile was in August last year and she is going to get another one next week after will we will determine her requirement for lipid-lowering medications  REVIEW OF SYSTEMS:  Denies any new or acute cardiac symptoms    Denies chest pain, dyspnea, palpitations or syncope  Complains of arthritis especially of left knee and her right foot with right foot pain difficulty in ambulation    Historical Information   Past Medical History:   Diagnosis Date   • Abrasion of right leg 03/19/2021   • Acute respiratory failure due to COVID-19 ([address] ) 10/01/2021   • Arthritis     feet   • BMI 39 0-39 9,adult 5/10/2022   • CKD (chronic kidney disease) 2/8/2022   • Colon polyp    • COVID-19 long hauler 11/22/2022   • Diabetes mellitus ([address] )     FBS today 2/28/22 was 147 at 0930 by patient--feels well   • Dyslipidemia    • Dysphagia, pharyngoesophageal    • Edema of both lower legs 03/19/2021   • Essential hypertension 01/23/2021   • Gastroesophageal reflux disease without esophagitis 01/23/2021   • GERD (gastroesophageal reflux disease)    • Hiatal hernia    • HTN (hypertension)    • Hyperlipemia    • Hypertension    • Hypertensive arterionephrosclerosis of transplanted kidney 01/23/2021   • Hypokalemia 04/20/2021   • Insomnia    • Leukocytosis    • Medicare annual wellness visit, subsequent 11/9/2021   • Mixed hyperlipidemia 01/23/2021   • Numbness    • Obstructive sleep apnea syndrome 01/23/2021    no device   • Other chest pain 8/26/2022   • Pain in ankle 2021   • Pain in both feet 2021   • Pain in both knees 2021   • Pain in right ankle 5/10/2022   • Paroxysmal atrial fibrillation ([facility] [address] ) 2021   • Personal history of COVID-19     2021   • Right foot pain 5/10/2022   • Shortness of breath     exertional   • Shoulder pain, right 2022   • Skin lesions    • Sleep apnea    • Type 2 diabetes mellitus without complication, without long-term current use of insulin ([facility] [address] ) 2021     Past Surgical History:   Procedure Laterality Date   • ANKLE SURGERY Right    • BREAST BIOPSY Right     stereotactic biopsy   • CARPAL TUNNEL RELEASE Bilateral    • COLONOSCOPY  2017    Dr Tee Haley   • COLONOSCOPY     • GALLBLADDER SURGERY     • HYSTERECTOMY     • MAMMO (HISTORICAL)  10/02/2018   • MAMMO STEREOTACTIC BREAST BIOPSY RIGHT (ALL INC) Right    • UPPER GASTROINTESTINAL ENDOSCOPY     • WISDOM TOOTH EXTRACTION       Social History     Substance and Sexual Activity   Alcohol Use Yes    Comment: -rare     Social History     Substance and Sexual Activity   Drug Use Not Currently    Comment: No drug use - As per [employer]     Social History     Tobacco Use   Smoking Status Former   • Packs/day: 1 50   • Types: Cigarettes   • Quit date: 2004   • Years since quittin 4   Smokeless Tobacco Former   Tobacco Comments    quit at 38     Family History:   Family History   Problem Relation Age of Onset   • Cancer Mother    • Diabetes Mother    • Hyperlipidemia Mother    • Breast cancer Mother    • Breast cancer Maternal Grandmother    • Lung cancer Father    • No Known Problems Sister    • No Known Problems Daughter    • No Known Problems Maternal Grandfather    • No Known Problems Sister    • No Known Problems Son    • No Known Problems Maternal Aunt    • Colon cancer Cousin        Meds/Allergies     Allergies   Allergen Reactions   • Aspirin GI Intolerance   • Lisinopril Fatigue       Current Outpatient Medications:   • atorvastatin (LIPITOR) 40 mg tablet, TAKE 1 TABLET EVERY DAY, Disp: 90 tablet, Rfl: 1  •  Biotin 2500 MCG CAPS, Take by mouth 2 (two) times a day, Disp: , Rfl:   •  Blood Glucose Monitoring Suppl (FreeStyle Lite) TOYIN, daily, Disp: , Rfl:   •  calcium carbonate (OS-AMEE) 600 MG tablet, Take 600 mg by mouth 2 (two) times a day with meals With vitamin D3 , Disp: , Rfl:   •  FREESTYLE LITE test strip, Use 1 each 2 (two) times a day Use as instructed, Disp: 200 strip, Rfl: 3  •  gabapentin (NEURONTIN) 300 mg capsule, Take 300 mg by mouth 2 (two) times a day, Disp: , Rfl:   •  glucosamine-chondroitin 500-400 MG tablet, Take 1 tablet by mouth 3 (three) times a day, Disp: , Rfl:   •  Lancets (freestyle) lancets, Use 2 (two) times a day Use as instructed, Disp: 200 each, Rfl: 3  •  losartan-hydrochlorothiazide (HYZAAR) 100-12 5 MG per tablet, TAKE 1 TABLET EVERY DAY, Disp: 90 tablet, Rfl: 1  •  meloxicam (MOBIC) 15 mg tablet, Take 15 mg by mouth daily, Disp: , Rfl:   •  metFORMIN (GLUCOPHAGE) 500 mg tablet, TAKE 1 TABLET EVERY MORNING AND TAKE 2 TABLETS EVERY EVENING BEFORE MEALS (Patient taking differently: Take 500 mg by mouth 3 (three) times a day with meals), Disp: 270 tablet, Rfl: 1  •  metoprolol tartrate (LOPRESSOR) 50 mg tablet, TAKE 1 TABLET TWICE DAILY, Disp: 180 tablet, Rfl: 1  •  Multiple Vitamin (MULTIVITAMIN) capsule, Take 1 capsule by mouth daily, Disp: , Rfl:   •  Omega-3 Fatty Acids (fish oil) 1,000 mg, Take 2 capsules (2,000 mg total) by mouth daily, Disp: 240 capsule, Rfl: 3  •  pantoprazole (PROTONIX) 40 mg tablet, TAKE 1 TABLET BY MOUTH 30 MINUTES BEFORE BREAKFAST, Disp: 90 tablet, Rfl: 3  •  traMADol (ULTRAM) 50 mg tablet, Take 50 mg by mouth every 8 (eight) hours as needed BID, Disp: , Rfl: 0    Vitals: Blood pressure 118/60, pulse 63, height 5' 3" (1 6 m), weight 103 kg (226 lb), SpO2 99 %  ?  Body mass index is 40 03 kg/m²    Vitals:    02/13/23 1322   Weight: 103 kg (226 lb)     BP Readings from Last 3 Encounters:   23 118/60   22 128/76   22 124/86       Physical Exam:    Neurologic:  Alert & oriented x 3, no new focal deficits, Not in any acute distress,  Constitutional: Obese  Eyes:  Pupil equal and reacting to light, conjunctiva normal,   HENT:  Atraumatic, oropharynx moist, Neck- normal range of motion, no tenderness,  Neck supple, No JVP, No LNP   Respiratory:  Bilateral air entry, mostly clear to auscultation  Cardiovascular: S1-S2 regular rhythm with a I/VI systolic murmur   GI:  Soft, nondistended, normal bowel sounds, nontender, no hepatosplenomegaly appreciated  Musculoskeletal: Mild right foot tenderness  Skin:  Well hydrated, no rash   Lymphatic:  No lymphadenopathy noted   Extremities: Trace right lower extremity edema    Cardiac testing:   Results for orders placed during the hospital encounter of 21    Echo complete with contrast if indicated    Narrative  [address]  [address] [address]  [phone]    Transthoracic Echocardiogram  Limited 2D, M-mode, Doppler, and Color Doppler    Study date:  26-Aug-2021    Patient: Halley Reynoso  MR number: [id number]  Account number: [de-identified]  : 1960  Age: 57 years  Gender: Female  Status: Inpatient  Location: Bedside  Height: 63 in  Weight: 239 6 lb  BP: 129/ 75 mmHg    Indications: Acute Respiratory failure due to Covid 19  Diagnoses: I48 0 - Atrial fibrillation    Sonographer:  CARLOS Dixon  Primary Physician:  Jose Avila MD  Referring Physician:  Neida Vargas DO  Group:  [facility]  Interpreting Physician:  Krishan Gilliam MD    SUMMARY    PROCEDURE INFORMATION:  This was a technically difficult study  LEFT VENTRICLE:  Systolic function was normal  Ejection fraction was estimated in the range of 50 % to 55 %  There were no regional wall motion abnormalities  Wall thickness was mildly increased      HISTORY: PRIOR HISTORY: Hyperlipidemia,A Fib ,HTN,Diabetes,obstructive sleep apnea,edema,Covid 19 virus  PROCEDURE: The procedure was performed at the bedside  This was a routine study  The transthoracic approach was used  The study included limited 2D imaging, M-mode, limited spectral Doppler, and color Doppler  The heart rate was 92 bpm, at  the start of the study  Images were obtained from the parasternal, apical, subcostal, and suprasternal notch acoustic windows  This was a technically difficult study  LEFT VENTRICLE: Size was normal  Systolic function was normal  Ejection fraction was estimated in the range of 50 % to 55 %  There were no regional wall motion abnormalities  Wall thickness was mildly increased  No evidence of apical  thrombus  RIGHT VENTRICLE: The size was normal  Systolic function was normal  Wall thickness was normal     LEFT ATRIUM: Size was at the upper limits of normal     RIGHT ATRIUM: Size was at the upper limits of normal     MITRAL VALVE: Valve structure was normal  There was normal leaflet separation  DOPPLER: The transmitral velocity was within the normal range  There was no evidence for stenosis  There was no significant regurgitation  AORTIC VALVE: The valve was probably trileaflet  Leaflets exhibited normal thickness and normal cuspal separation  DOPPLER: Transaortic velocity was within the normal range  There was no evidence for stenosis  There was no significant  regurgitation  TRICUSPID VALVE: The valve structure was normal  There was normal leaflet separation  DOPPLER: The transtricuspid velocity was within the normal range  There was no evidence for stenosis  There was trace regurgitation  PULMONIC VALVE: Leaflets exhibited normal thickness, no calcification, and normal cuspal separation  DOPPLER: The transpulmonic velocity was within the normal range  There was no significant regurgitation  PERICARDIUM: There was no pericardial effusion   The pericardium was normal in appearance  AORTA: The root exhibited normal size  SYSTEMIC VEINS: IVC: The inferior vena cava was not well visualized  SYSTEM MEASUREMENT TABLES    2D  %FS: 25 19 %  Ao Diam: 3 38 cm  EDV(Teich): 91 99 ml  EF Biplane: 44 38 %  ESV(Teich): 46 07 ml  IVSd: 1 15 cm  LA Area: 22 09 cm2  LA Diam: 3 12 cm  LVEDV MOD A2C: 80 2 ml  LVEDV MOD A4C: 98 11 ml  LVEDV MOD BP: 92 99 ml  LVEDVInd MOD BP: 44 49 ml/m2  LVEF MOD A2C: 26 42 %  LVEF MOD A4C: 54 46 %  LVESV MOD A2C: 59 01 ml  LVESV MOD A4C: 44 68 ml  LVESV MOD BP: 51 72 ml  LVESVInd MOD BP: 24 75 ml/m2  LVIDd: 4 49 cm  LVIDs: 3 36 cm  LVLd A2C: 6 93 cm  LVLd A4C: 7 65 cm  LVLs A2C: 6 03 cm  LVLs A4C: 6 19 cm  LVPWd: 1 06 cm  RA Area: 16 37 cm2  RVIDd: 2 66 cm  SV (Teich): 45 92 ml  SV MOD A2C: 21 19 ml  SV MOD A4C: 53 43 ml    [facility] Accredited [facility]ch[facility]    Prepared and electronically signed by    Ovi Church MD  Signed 29-Aug-2021 15:42:44      Results for orders placed during the hospital encounter of 11/14/22    NM myocardial perfusion spect (rx stress and/or rest)    Interpretation Summary  •  Stress ECG: The ECG was not diagnostic due to pharmacological (vasodilator) stress  •  Perfusion: There is a left ventricular perfusion defect that is small in size with mild reduction in uptake present in the mid anterior location(s) that is paradoxical   •  Stress Function: Left ventricular function post-stress is normal  Post-stress ejection fraction is 74 %  No ischemia or scar  Results for orders placed during the hospital encounter of 10/26/21    Holter monitor    Interpretation Summary  Indication: PAF    The patient was in sinus rhythm throughout the recording      Minimum HR: 57  Average HR: 73  Maximum HR: 124    Premature ventricular contractions: 0  Ventricular runs: 0    Supraventricular contractions: 72  Supraventricular runs: 0    Longest RR: 1 2 sec    Arrhythmias: None    Diary submitted: yes but no symptoms reported      Impression    Normal holter with rare PAC's      Signed by: Brett Nath MD      Imaging:  Chest X-Ray:   XR chest pa & lateral    Result Date: 3/3/2022  Impression Unchanged peripheral areas of pulmonary scarring possibly on a post Covid 19 basis  Workstation performed: [id number]       CT-scan of the chest:     No CTA results available for this patient  Lab Review   Lab Results   Component Value Date    WBC 8 96 08/02/2022    HGB 13 2 08/02/2022    HCT 40 8 08/02/2022    MCV 89 08/02/2022    RDW 13 8 08/02/2022     08/02/2022     BMP:  Lab Results   Component Value Date    SODIUM 139 11/16/2022    K 3 9 11/16/2022     11/16/2022    CO2 32 11/16/2022    BUN 18 11/16/2022    CREATININE 1 16 11/16/2022    GLUC 144 (H) 11/01/2021    GLUF 113 (H) 11/16/2022    CALCIUM 9 3 11/16/2022    CORRECTEDCA 9 2 08/21/2021    EGFR 50 11/16/2022    MG 1 6 11/01/2021     LFT:  Lab Results   Component Value Date    AST 15 08/02/2022    ALT 11 08/02/2022    ALKPHOS 85 08/02/2022    TP 7 2 08/02/2022    ALB 4 0 08/02/2022      No components found for: TSH3  No results found for: [facility]  Lab Results   Component Value Date    HGBA1C 6 2 (H) 08/02/2022     Lipid Profile:   Lab Results   Component Value Date    CHOLESTEROL 153 08/02/2022    HDL 55 08/02/2022    LDLCALC 64 08/02/2022    TRIG 168 (H) 08/02/2022     Lab Results   Component Value Date    CHOLESTEROL 153 08/02/2022    CHOLESTEROL 146 09/29/2021     Lab Results   Component Value Date    CKTOTAL 341 (H) 08/14/2021    CKMB 1 1 08/14/2021    CKMBINDEX <1 0 08/14/2021    TROPONINI <0 02 08/14/2021     No results found for: NTBNP   No results found for this or any previous visit (from the past 672 hour(s))  Dr Parker Montes MD, [facility] [place]      "This note has been constructed using a voice recognition system  Therefore there may be syntax, spelling, and/or grammatical errors   Please call if you have any questions  "

## 2023-02-15 ENCOUNTER — APPOINTMENT (OUTPATIENT)
Dept: LAB | Facility: HOSPITAL | Age: 63
End: 2023-02-15
Attending: INTERNAL MEDICINE

## 2023-02-15 DIAGNOSIS — Z79.899 HIGH RISK MEDICATION USE: ICD-10-CM

## 2023-02-15 DIAGNOSIS — E11.9 TYPE 2 DIABETES MELLITUS WITHOUT COMPLICATION, WITHOUT LONG-TERM CURRENT USE OF INSULIN (HCC): Chronic | ICD-10-CM

## 2023-02-15 DIAGNOSIS — Z11.59 NEED FOR HEPATITIS C SCREENING TEST: ICD-10-CM

## 2023-02-15 DIAGNOSIS — I10 ESSENTIAL HYPERTENSION: Chronic | ICD-10-CM

## 2023-02-15 DIAGNOSIS — E78.2 MIXED HYPERLIPIDEMIA: Chronic | ICD-10-CM

## 2023-02-15 LAB
ALBUMIN SERPL BCP-MCNC: 3.8 G/DL (ref 3.5–5)
ALP SERPL-CCNC: 76 U/L (ref 34–104)
ALT SERPL W P-5'-P-CCNC: 11 U/L (ref 7–52)
ANION GAP SERPL CALCULATED.3IONS-SCNC: 6 MMOL/L (ref 4–13)
AST SERPL W P-5'-P-CCNC: 13 U/L (ref 13–39)
BILIRUB SERPL-MCNC: 0.5 MG/DL (ref 0.2–1)
BUN SERPL-MCNC: 19 MG/DL (ref 5–25)
CALCIUM SERPL-MCNC: 9.9 MG/DL (ref 8.4–10.2)
CHLORIDE SERPL-SCNC: 100 MMOL/L (ref 96–108)
CHOLEST SERPL-MCNC: 132 MG/DL
CO2 SERPL-SCNC: 32 MMOL/L (ref 21–32)
CREAT SERPL-MCNC: 1.05 MG/DL (ref 0.6–1.3)
EST. AVERAGE GLUCOSE BLD GHB EST-MCNC: 131 MG/DL
GFR SERPL CREATININE-BSD FRML MDRD: 56 ML/MIN/1.73SQ M
GLUCOSE P FAST SERPL-MCNC: 125 MG/DL (ref 65–99)
HBA1C MFR BLD: 6.2 %
HCV AB SER QL: NORMAL
HDLC SERPL-MCNC: 54 MG/DL
LDLC SERPL CALC-MCNC: 48 MG/DL (ref 0–100)
NONHDLC SERPL-MCNC: 78 MG/DL
POTASSIUM SERPL-SCNC: 3.9 MMOL/L (ref 3.5–5.3)
PROT SERPL-MCNC: 6.8 G/DL (ref 6.4–8.4)
SODIUM SERPL-SCNC: 138 MMOL/L (ref 135–147)
TRIGL SERPL-MCNC: 148 MG/DL

## 2023-02-16 DIAGNOSIS — I10 ESSENTIAL HYPERTENSION: Chronic | ICD-10-CM

## 2023-02-16 RX ORDER — LOSARTAN POTASSIUM AND HYDROCHLOROTHIAZIDE 12.5; 1 MG/1; MG/1
1 TABLET ORAL DAILY
Qty: 90 TABLET | Refills: 1 | Status: SHIPPED | OUTPATIENT
Start: 2023-02-16

## 2023-02-16 RX ORDER — METOPROLOL TARTRATE 50 MG/1
50 TABLET, FILM COATED ORAL 2 TIMES DAILY
Qty: 180 TABLET | Refills: 1 | Status: SHIPPED | OUTPATIENT
Start: 2023-02-16

## 2023-02-16 NOTE — TELEPHONE ENCOUNTER
Patients called stating patient needs a prescription refill for metoprolol and losartan/hydrochlorothiazide to [facility] pharmacy  Medication were sent to the pharmacy at this time

## 2023-02-20 ENCOUNTER — OFFICE VISIT (OUTPATIENT)
Dept: INTERNAL MEDICINE CLINIC | Facility: CLINIC | Age: 63
End: 2023-02-20

## 2023-02-20 VITALS
DIASTOLIC BLOOD PRESSURE: 76 MMHG | HEART RATE: 60 BPM | BODY MASS INDEX: 40.22 KG/M2 | TEMPERATURE: 98.4 F | SYSTOLIC BLOOD PRESSURE: 124 MMHG | RESPIRATION RATE: 16 BRPM | OXYGEN SATURATION: 99 % | HEIGHT: 63 IN | WEIGHT: 227 LBS

## 2023-02-20 DIAGNOSIS — Z12.31 ENCOUNTER FOR SCREENING MAMMOGRAM FOR BREAST CANCER: ICD-10-CM

## 2023-02-20 DIAGNOSIS — G89.29 CHRONIC RIGHT SHOULDER PAIN: ICD-10-CM

## 2023-02-20 DIAGNOSIS — E11.9 TYPE 2 DIABETES MELLITUS WITHOUT COMPLICATION, WITHOUT LONG-TERM CURRENT USE OF INSULIN (HCC): Primary | Chronic | ICD-10-CM

## 2023-02-20 DIAGNOSIS — K21.9 GASTROESOPHAGEAL REFLUX DISEASE WITHOUT ESOPHAGITIS: Chronic | ICD-10-CM

## 2023-02-20 DIAGNOSIS — M79.671 PAIN IN BOTH FEET: Chronic | ICD-10-CM

## 2023-02-20 DIAGNOSIS — N18.31 STAGE 3A CHRONIC KIDNEY DISEASE (HCC): ICD-10-CM

## 2023-02-20 DIAGNOSIS — M79.672 PAIN IN BOTH FEET: Chronic | ICD-10-CM

## 2023-02-20 DIAGNOSIS — E78.2 MIXED HYPERLIPIDEMIA: Chronic | ICD-10-CM

## 2023-02-20 DIAGNOSIS — M25.511 CHRONIC RIGHT SHOULDER PAIN: ICD-10-CM

## 2023-02-20 DIAGNOSIS — I10 ESSENTIAL HYPERTENSION: Chronic | ICD-10-CM

## 2023-02-20 DIAGNOSIS — I48.0 PAROXYSMAL ATRIAL FIBRILLATION (HCC): ICD-10-CM

## 2023-02-20 DIAGNOSIS — N39.498 OTHER URINARY INCONTINENCE: ICD-10-CM

## 2023-02-20 PROBLEM — R32 URINARY INCONTINENCE: Status: ACTIVE | Noted: 2023-02-20

## 2023-02-20 NOTE — ASSESSMENT & PLAN NOTE
Lab Results   Component Value Date    EGFR 56 02/15/2023    EGFR 50 11/16/2022    EGFR 59 08/02/2022    CREATININE 1 05 02/15/2023    CREATININE 1 16 11/16/2022    CREATININE 1 01 08/02/2022   Renal function stable  Advised to maintain hydration  Advised to continue present medications

## 2023-02-20 NOTE — ASSESSMENT & PLAN NOTE
Well-controlled  Cholesterol 132, triglyceride 148, HDL 54, LDL 48  Advised to continue present medication and low-cholesterol diet

## 2023-02-20 NOTE — ASSESSMENT & PLAN NOTE
She has a pain in his right shoulder for last few months without any injury  she has difficulty lifting the shoulder up questionable rotator cuff problem    Refer to orthopedic specialist

## 2023-02-20 NOTE — PROGRESS NOTES
Assessment/Plan:    1  Type 2 diabetes mellitus without complication, without long-term current use of insulin ([facility])  Assessment & Plan:    Lab Results   Component Value Date    HGBA1C 6 2 (H) 02/15/2023   Diabetes mellitus well controlled  Advised to continue present medication and 1800-calorie ADA diet  2  Essential hypertension  Assessment & Plan:  Blood pressure well controlled  Advised to continue present medication  Advised for low-salt diet  3  Gastroesophageal reflux disease without esophagitis  Assessment & Plan:  Her symptoms are controlled on pantoprazole for and has been followed by GI specialist   Monica Buckley to continue to watch diet and reflux precautions  4  Mixed hyperlipidemia  Assessment & Plan:  Well-controlled  Cholesterol 132, triglyceride 148, HDL 54, LDL 48  Advised to continue present medication and low-cholesterol diet  5  Stage 3a chronic kidney disease [facility])  Assessment & Plan:  Lab Results   Component Value Date    EGFR 56 02/15/2023    EGFR 50 11/16/2022    EGFR 59 08/02/2022    CREATININE 1 05 02/15/2023    CREATININE 1 16 11/16/2022    CREATININE 1 01 08/02/2022   Renal function stable  Advised to maintain hydration  Advised to continue present medications  6  BMI 40 0-44 9, adult ([facility])    7  Paroxysmal atrial fibrillation [facility])  Assessment & Plan:  She was seen by cardiologist last week  In normal sinus rhythm  Off anticoagulation  Follow-up with cardiology as scheduled      8  Pain in both feet  Assessment & Plan:  She has been seen and followed by podiatrist   On meloxicam, gabapentin, tramadol  9  Chronic right shoulder pain  Assessment & Plan:  She has a pain in his right shoulder for last few months without any injury  she has difficulty lifting the shoulder up questionable rotator cuff problem  Refer to orthopedic specialist     Orders:  -     Ambulatory Referral to Orthopedic Surgery; Future    10   Other urinary incontinence  Assessment & Plan:  She had sling for urinary incontinence by Dr Sebastien Pearson in 2011  Now she is having a problem again and requires to wear diaper  Will refer to urologist     Orders:  -     Ambulatory Referral to Urology; Future    11  Encounter for screening mammogram for breast cancer  -     Mammo screening bilateral w 3d & cad; Future    BMI Counseling: Body mass index is 40 21 kg/m²  The BMI is above normal  Nutrition recommendations include decreasing portion sizes, decreasing fast food intake, consuming healthier snacks, limiting drinks that contain sugar, moderation in carbohydrate intake, reducing intake of saturated and trans fat and reducing intake of cholesterol  Exercise recommendations include exercising 3-5 times per week  No pharmacotherapy was ordered  Rationale for BMI follow-up plan is due to patient being overweight or obese  Patient refused for influenza vaccination and pneumococcal vaccination  Subjective: Patient presents for follow-up  Patient ID: Belkys Rodríguez is a 60 y o  female  HPI   77-year-old white female patient presents to follow-up her medical problems  She denies any chest pain, shortness of breath, pain in abdomen  Denies any cough, fever, chills  Denies nausea vomiting diarrhea  Complain of right shoulder pain without any injury and has had difficulty lifting the right shoulder up  Complain of urinary incontinence getting worse    She had a sling procedure in 2011 by Dr Sebastien Pearson urologist now she requires to wear the diapers due to incontinence    The following portions of the patient's history were reviewed and updated as appropriate:     Past Medical History:  She has a past medical history of Abrasion of right leg (03/19/2021), Acute respiratory failure due to COVID-19 [facility]) (10/01/2021), Arthritis, BMI 39 0-39 9,adult (5/10/2022), BMI 40 0-44 9, adult ([facility] [address] ) (2/20/2023), CKD (chronic kidney disease) (2/8/2022), Colon polyp, COVID-19 long hauler (11/22/2022), Diabetes mellitus ([facility] ), Dyslipidemia, Dysphagia, pharyngoesophageal, Edema of both lower legs (03/19/2021), Essential hypertension (01/23/2021), Gastroesophageal reflux disease without esophagitis (01/23/2021), GERD (gastroesophageal reflux disease), Hiatal hernia, HTN (hypertension), Hyperlipemia, Hypertension, Hypertensive arterionephrosclerosis of transplanted kidney (01/23/2021), Hypokalemia (04/20/2021), Insomnia, Leukocytosis, Medicare annual wellness visit, subsequent (11/9/2021), Mixed hyperlipidemia (01/23/2021), Numbness, Obstructive sleep apnea syndrome (01/23/2021), Other chest pain (8/26/2022), Pain in ankle (01/23/2021), Pain in both feet (01/23/2021), Pain in both knees (01/26/2021), Pain in right ankle (5/10/2022), Paroxysmal atrial fibrillation ([place] [address] ) (11/09/2021), Personal history of COVID-19, Right foot pain (5/10/2022), Right shoulder pain (2/20/2023), Shortness of breath, Shoulder pain, right (2/8/2022), Skin lesions, Sleep apnea, Type 2 diabetes mellitus without complication, without long-term current use of insulin ([place] [address] ) (01/23/2021), and Urinary incontinence (2/20/2023)  ,  _______________________________________________________________________  Past Surgical History:   has a past surgical history that includes Carpal tunnel release (Bilateral, 1990); Gallbladder surgery (2004); Hysterectomy (2004); Ankle surgery (Right, 2009/2012); Mammo stereotactic breast biopsy right (all inc) (Right, 2014); Mammo (historical) (10/02/2018); Colonoscopy (03/07/2017); Breast biopsy (Right); Colonoscopy; Upper gastrointestinal endoscopy; and [place] tooth extraction  ,  _______________________________________________________________________  Family History:  family history includes Breast cancer in her maternal grandmother and mother; Cancer in her mother; Colon cancer in her cousin; Diabetes in her mother; Hyperlipidemia in her mother; Lung cancer in her father; No Known Problems in her daughter, maternal aunt, maternal grandfather, sister, sister, and son ,  _______________________________________________________________________  Social History:   reports that she quit smoking about 18 years ago  Her smoking use included cigarettes  She smoked an average of 1 5 packs per day  She has quit using smokeless tobacco  She reports current alcohol use  She reports that she does not currently use drugs  ,  _______________________________________________________________________  Allergies:  is allergic to aspirin and lisinopril     _______________________________________________________________________  Current Outpatient Medications   Medication Sig Dispense Refill   • atorvastatin (LIPITOR) 40 mg tablet TAKE 1 TABLET EVERY DAY 90 tablet 1   • Biotin 2500 MCG CAPS Take by mouth 2 (two) times a day     • Blood Glucose Monitoring Suppl (FreeStyle Lite) TOYIN daily     • calcium carbonate (OS-AMEE) 600 MG tablet Take 600 mg by mouth 2 (two) times a day with meals With vitamin D3      • FREESTYLE LITE test strip Use 1 each 2 (two) times a day Use as instructed 200 strip 3   • gabapentin (NEURONTIN) 300 mg capsule Take 300 mg by mouth 2 (two) times a day     • glucosamine-chondroitin 500-400 MG tablet Take 1 tablet by mouth 3 (three) times a day     • Lancets (freestyle) lancets Use 2 (two) times a day Use as instructed 200 each 3   • losartan-hydrochlorothiazide (HYZAAR) 100-12 5 MG per tablet Take 1 tablet by mouth daily 90 tablet 1   • meloxicam (MOBIC) 15 mg tablet Take 15 mg by mouth daily     • metFORMIN (GLUCOPHAGE) 500 mg tablet TAKE 1 TABLET EVERY MORNING AND TAKE 2 TABLETS EVERY EVENING BEFORE MEALS (Patient taking differently: Take 500 mg by mouth 3 (three) times a day with meals) 270 tablet 1   • metoprolol tartrate (LOPRESSOR) 50 mg tablet Take 1 tablet (50 mg total) by mouth 2 (two) times a day 180 tablet 1   • Multiple Vitamin (MULTIVITAMIN) capsule Take 1 capsule by mouth daily     • Omega-3 Fatty Acids (fish oil) 1,000 mg Take 2 capsules (2,000 mg total) by mouth daily 240 capsule 3   • pantoprazole (PROTONIX) 40 mg tablet TAKE 1 TABLET BY MOUTH 30 MINUTES BEFORE BREAKFAST 90 tablet 3   • traMADol (ULTRAM) 50 mg tablet Take 50 mg by mouth every 8 (eight) hours as needed BID  0     No current facility-administered medications for this visit      _______________________________________________________________________  Review of Systems   Constitutional: Negative for chills and fever  HENT: Negative for congestion, ear pain, hearing loss, nosebleeds, sinus pain, sore throat and trouble swallowing  Eyes: Negative for discharge, redness and visual disturbance  Respiratory: Negative for cough, chest tightness and shortness of breath  Cardiovascular: Negative for chest pain and palpitations  Gastrointestinal: Negative for abdominal pain, blood in stool, constipation, diarrhea, nausea and vomiting  Genitourinary: Negative for dysuria, flank pain and hematuria  Urinary incontinence   Musculoskeletal: Negative for arthralgias ( Right shoulder pain), myalgias and neck pain  Skin: Negative for color change and rash  Neurological: Negative for dizziness, speech difficulty, weakness and headaches  Hematological: Does not bruise/bleed easily  Psychiatric/Behavioral: Negative for agitation and behavioral problems  Objective:  Vitals:    02/20/23 0949   BP: 124/76   BP Location: Right arm   Patient Position: Sitting   Cuff Size: Adult   Pulse: 60   Resp: 16   Temp: 98 4 °F (36 9 °C)   TempSrc: Tympanic   SpO2: 99%   Weight: 103 kg (227 lb)   Height: 5' 3" (1 6 m)     Body mass index is 40 21 kg/m²  Physical Exam  Vitals and nursing note reviewed  Constitutional:       General: She is not in acute distress  Appearance: She is obese  HENT:      Head: Normocephalic and atraumatic        Right Ear: Ear canal and external ear normal       Left Ear: Ear canal and external ear normal       Nose: Nose normal  Mouth/Throat:      Mouth: Mucous membranes are moist    Eyes:      General: No scleral icterus  Right eye: No discharge  Left eye: No discharge  Extraocular Movements: Extraocular movements intact  Conjunctiva/sclera: Conjunctivae normal    Cardiovascular:      Rate and Rhythm: Normal rate and regular rhythm  Pulses: Normal pulses  Heart sounds: No murmur heard  Pulmonary:      Effort: Pulmonary effort is normal  No respiratory distress  Breath sounds: Normal breath sounds  No wheezing, rhonchi or rales  Abdominal:      General: Bowel sounds are normal       Palpations: Abdomen is soft  Tenderness: There is no abdominal tenderness  Musculoskeletal:         General: Tenderness ( Tender on the right deltoid area and has difficulty lifting right shoulder up ) present  Normal range of motion  Cervical back: Normal range of motion and neck supple  No muscular tenderness  Right lower leg: No edema  Left lower leg: No edema  Skin:     General: Skin is warm  Findings: No rash  Neurological:      General: No focal deficit present  Mental Status: She is alert and oriented to person, place, and time  Motor: No weakness        Coordination: Coordination normal    Psychiatric:         Mood and Affect: Mood normal          Behavior: Behavior normal

## 2023-02-20 NOTE — PATIENT INSTRUCTIONS
Patient was advised to continue present medications  discussed with the patient medications and laboratory data in detail  Follow-up with me in 3 months or as advised  If any blood test was ordered please do 1 week prior to next appointment unless advise to get earlier  If you have any questions please call the office [phone]   Type 2 Diabetes Management for Adults   AMBULATORY CARE:   Type 2 diabetes  is a disease that affects how your body uses glucose (sugar)  Either your body cannot make enough insulin, or it cannot use the insulin correctly  It is important to keep diabetes controlled to prevent damage to your heart, blood vessels, and other organs  Management will help you feel well and enjoy your daily activities  Your diabetes care team providers can help you make a plan to fit diabetes care into your schedule  Your plan can change over time to fit your needs and your family's needs  Have someone call your local emergency number (911 in the [address]) if:   • You cannot be woken  • You have signs of diabetic ketoacidosis:     ? confusion, fatigue    ? vomiting    ? rapid heartbeat    ? fruity smelling breath    ? extreme thirst    ? dry mouth and skin    • You have any of the following signs of a heart attack:      ? Squeezing, pressure, or pain in your chest    ? You may  also have any of the following:     - Discomfort or pain in your back, neck, jaw, stomach, or arm    - Shortness of breath    - Nausea or vomiting    - Lightheadedness or a sudden cold sweat    • You have any of the following signs of a stroke:      ? Numbness or drooping on one side of your face     ? Weakness in an arm or leg    ? Confusion or difficulty speaking    ? Dizziness, a severe headache, or vision loss    Call your doctor or diabetes care team provider if:   • You have a sore or wound that will not heal     • You have a change in the amount you urinate      • Your blood sugar levels are higher than your target goals     • You often have lower blood sugar levels than your target goals  • Your skin is red, dry, warm, or swollen  • You have trouble coping with diabetes, or you feel anxious or depressed  • You have questions or concerns about your condition or care  What you need to know about high blood sugar levels:  High blood sugar levels may not cause any symptoms  You may feel more thirsty or urinate more often than usual  Over time, high blood sugar levels can damage your nerves, blood vessels, tissues, and organs  The following can increase your blood sugar levels:  • Large meals or large amounts of carbohydrates at one time    • Less physical activity    • Stress    • Illness    • A lower dose of diabetes medicine or insulin, or a late dose    What you need to know about low blood sugar levels:  Symptoms include feeling shaky, dizzy, irritable, or confused  You can prevent symptoms by keeping your blood sugar levels from going too low  • Treat a low blood sugar level right away:      ? Drink 4 ounces of juice or have 1 tube of glucose gel  ? Check your blood sugar level again 10 to 15 minutes later  ? When the level goes back to normal, eat a meal or snack to prevent another decrease  • Keep glucose gel, raisins, or hard candy with you at all times to treat a low blood sugar level  • Your blood sugar level can get too low if you take diabetes medicine or insulin and do not eat enough food  • If you use insulin, check your blood sugar level before you exercise  ? If your blood sugar level is below 100 mg/dL, eat 4 crackers or 2 ounces of raisins, or drink 4 ounces of juice  ? Check your level every 30 minutes if you exercise longer than 1 hour  ? You may need a snack during or after exercise  What you can do to manage your blood sugar levels:   • Check your blood sugar levels as directed and as needed  Several items are available to use to check your levels   You may need to check by testing a drop of blood in a glucose monitor  You may instead be given a continuous glucose monitoring (CGM) device  The device is worn at all times  The CGM checks your blood sugar level every 5 minutes  It sends results to an electronic device such as a smart phone  A CGM can be used with or without an insulin pump  You and your diabetes care team providers will decide on the best method for you  The goal for blood sugar levels before meals  is between 80 and 130 mg/dL and 2 hours after eating  is lower than 180 mg/dL  • Make healthy food choices  Work with a dietitian to develop a meal plan that works for you and your schedule  A dietitian can help you learn how to eat the right amount of carbohydrates during your meals and snacks  Carbohydrates can raise your blood sugar level if you eat too many at one time  Examples of foods that contain carbohydrates are breads, cereals, rice, pasta, and sweets  • Eat high-fiber foods as directed  Fiber helps improve blood sugar levels  Fiber also lowers your risk for heart disease and other problems diabetes can cause  Examples of high-fiber foods include vegetables, whole-grain bread, and beans such as pritchadr beans  Your dietitian can tell you how much fiber to have each day  • Get regular physical activity  Physical activity can help you get to your target blood sugar level goal and manage your weight  Get at least 150 minutes of moderate to vigorous aerobic physical activity each week  Do not miss more than 2 days in a row  Do not sit longer than 30 minutes at a time  Your healthcare provider can help you create an activity plan  The plan can include the best activities for you and can help you build your strength and endurance  • Maintain a healthy weight  Ask your team what a healthy weight is for you  A healthy weight can help you control diabetes and prevent heart disease   Ask your team to help you create a weight loss plan, if needed  Weight loss can help make a difference in managing diabetes  Your team will help you set a weight-loss goal, such as 10 to 15 pounds, or 5% of your extra weight  Together you and your team can set manageable weight loss goals  • Take your diabetes medicine or insulin as directed  You may need diabetes medicine, insulin, or both to help control your blood sugar levels  Your healthcare provider will teach you how and when to take your diabetes medicine or insulin  You will also be taught about side effects oral diabetes medicine can cause  Insulin may be injected or given through a pump or pen  You and your providers will decide on the best method for you:    ? An insulin pump  is an implanted device that gives your insulin 24 hours a day  An insulin pump prevents the need for multiple insulin injections in a day  ? An insulin pen  is a device prefilled with the right amount of insulin  ? You and your family members will be taught how to draw up and give insulin  if this is the best method for you  Your providers will also teach you how to dispose of needles and syringes  ? You will learn how much insulin you need  and when to give it  You will be taught when not to give insulin  You will also be taught what to do if your blood sugar level drops too low  This may happen if you take insulin and do not eat the right amount of carbohydrates  More ways to manage type 2 diabetes:   • Wear medical alert identification  Wear medical alert jewelry or carry a card that says you have diabetes  Ask your provider where to get these items  • Do not smoke  Nicotine and other chemicals in cigarettes and cigars can cause lung and blood vessel damage  It also makes it more difficult to manage your diabetes  Ask your provider for information if you currently smoke and need help to quit  Do not use e-cigarettes or smokeless tobacco in place of cigarettes or to help you quit   They still contain nicotine  • Check your feet each day for cuts, scratches, calluses, or other wounds  Look for redness and swelling, and feel for warmth  Wear shoes that fit well  Check your shoes for rocks or other objects that can hurt your feet  Do not walk barefoot or wear shoes without socks  Wear cotton socks to help keep your feet dry  • Ask about vaccines you may need  You have a higher risk for serious illness if you get the flu, pneumonia, COVID-19, or hepatitis  Ask your provider if you should get vaccines to prevent these or other diseases, and when to get the vaccines  • Talk to your provider if you become stressed about diabetes care  Sometimes being able to fit diabetes care into your life can cause increased stress  The stress can cause you not to take care of yourself properly  Your care team providers can help by offering tips about self-care  Your providers may suggest you talk to a mental health provider who can listen and offer help with self-care issues  • Have your A1c checked as directed  Your provider may check your A1c every 3 months, or 2 times each year if your diabetes is controlled  An A1c test shows the average amount of sugar in your blood over the past 2 to 3 months  Your provider will tell you what your A1c level should be  • Have screening tests as directed  Your provider may recommend screening for complications of diabetes and other conditions that may develop  Some screenings may begin right away and some may happen within the first 5 years of diagnosis:    ? Examples of diabetes complications  include kidney problems, high cholesterol, high blood pressure, blood vessel problems, eye problems, and sleep apnea  ? You may be screened for a low vitamin B level  if you take oral diabetes medicine for a long time  ? Women of childbearing years may be screened  for polycystic ovarian syndrome (PCOS)      Follow up with your doctor or diabetes care team providers as directed: You may need to have blood tests done before your follow-up visit  The test results will show if changes need to be made in your treatment or self-care  Talk to your provider if you cannot afford your medicine  Write down your questions so you remember to ask them during your visits  © Copyright [employer] 2022 Information is for End User's use only and may not be sold, redistributed or otherwise used for commercial purposes  The above information is an  only  It is not intended as medical advice for individual conditions or treatments  Talk to your doctor, nurse or pharmacist before following any medical regimen to see if it is safe and effective for you

## 2023-02-20 NOTE — ASSESSMENT & PLAN NOTE
Lab Results   Component Value Date    HGBA1C 6 2 (H) 02/15/2023   Diabetes mellitus well controlled  Advised to continue present medication and 1800-calorie ADA diet

## 2023-02-20 NOTE — ASSESSMENT & PLAN NOTE
She was seen by cardiologist last week  In normal sinus rhythm  Off anticoagulation    Follow-up with cardiology as scheduled

## 2023-02-20 NOTE — ASSESSMENT & PLAN NOTE
Her symptoms are controlled on pantoprazole for and has been followed by GI specialist   Joanne Macias to continue to watch diet and reflux precautions

## 2023-03-01 ENCOUNTER — HOSPITAL ENCOUNTER (OUTPATIENT)
Dept: RADIOLOGY | Facility: HOSPITAL | Age: 63
Discharge: HOME | End: 2023-03-01
Attending: ORTHOPAEDIC SURGERY

## 2023-03-01 ENCOUNTER — OFFICE VISIT (OUTPATIENT)
Dept: OBGYN CLINIC | Facility: CLINIC | Age: 63
End: 2023-03-01

## 2023-03-01 VITALS
BODY MASS INDEX: 40.22 KG/M2 | HEART RATE: 66 BPM | OXYGEN SATURATION: 96 % | WEIGHT: 227 LBS | HEIGHT: 63 IN | DIASTOLIC BLOOD PRESSURE: 85 MMHG | SYSTOLIC BLOOD PRESSURE: 139 MMHG

## 2023-03-01 DIAGNOSIS — M24.811 INTERNAL DERANGEMENT OF RIGHT SHOULDER: Primary | ICD-10-CM

## 2023-03-01 DIAGNOSIS — G89.29 CHRONIC RIGHT SHOULDER PAIN: ICD-10-CM

## 2023-03-01 DIAGNOSIS — M25.511 RIGHT SHOULDER PAIN, UNSPECIFIED CHRONICITY: ICD-10-CM

## 2023-03-01 DIAGNOSIS — M25.511 CHRONIC RIGHT SHOULDER PAIN: ICD-10-CM

## 2023-03-01 NOTE — PROGRESS NOTES
[facility] [id number]  [phone]       Gautam Hughes  [id number]  1960    ORTHOPAEDIC SURGERY OUTPATIENT NOTE  3/1/2023      HISTORY:  60 y o  female presents for initial evaluation of her right shoulder  She is right-hand dominant  She reports an injury 18 months ago pulling herself up a step  She has had an increase in pain over the last 6 months  She reports nighttime pain and trouble reaching overhead and away from her body  She has not had any conservative management in the form of injections or physical therapy  She is not taking medications for this  No numbness or tingling down the arm  No previous surgery or trauma to the shoulder  She is diabetic  She does not smoke    Last hemoglobin A1c was 6 2    Past Medical History:   Diagnosis Date   • Abrasion of right leg 03/19/2021   • Acute respiratory failure due to COVID-19 ([facility] [address] ) 10/01/2021   • Arthritis     feet   • BMI 39 0-39 9,adult 5/10/2022   • BMI 40 0-44 9, adult ([address] ) 2/20/2023   • CKD (chronic kidney disease) 2/8/2022   • Colon polyp    • COVID-19 long hauler 11/22/2022   • Diabetes mellitus ([address] )     FBS today 2/28/22 was 147 at 0930 by patient--feels well   • Dyslipidemia    • Dysphagia, pharyngoesophageal    • Edema of both lower legs 03/19/2021   • Essential hypertension 01/23/2021   • Gastroesophageal reflux disease without esophagitis 01/23/2021   • GERD (gastroesophageal reflux disease)    • Hiatal hernia    • HTN (hypertension)    • Hyperlipemia    • Hypertension    • Hypertensive arterionephrosclerosis of transplanted kidney 01/23/2021   • Hypokalemia 04/20/2021   • Insomnia    • Leukocytosis    • Medicare annual wellness visit, subsequent 11/9/2021   • Mixed hyperlipidemia 01/23/2021   • Numbness    • Obstructive sleep apnea syndrome 01/23/2021    no device   • Other chest pain 8/26/2022   • Pain in ankle 01/23/2021   • Pain in both feet 2021   • Pain in both knees 2021   • Pain in right ankle 5/10/2022   • Paroxysmal atrial fibrillation ([facility] [address] ) 2021   • Personal history of COVID-2021   • Right foot pain 5/10/2022   • Right shoulder pain 2023   • Shortness of breath     exertional   • Shoulder pain, right 2022   • Skin lesions    • Sleep apnea    • Type 2 diabetes mellitus without complication, without long-term current use of insulin ([facility] [address] ) 2021   • Urinary incontinence 2023       Past Surgical History:   Procedure Laterality Date   • ANKLE SURGERY Right    • BREAST BIOPSY Right     stereotactic biopsy   • CARPAL TUNNEL RELEASE Bilateral    • COLONOSCOPY  2017    Dr Марина Mena   • COLONOSCOPY     • GALLBLADDER SURGERY     • HYSTERECTOMY     • MAMMO (HISTORICAL)  10/02/2018   • MAMMO STEREOTACTIC BREAST BIOPSY RIGHT (ALL INC) Right    • UPPER GASTROINTESTINAL ENDOSCOPY     • WISDOM TOOTH EXTRACTION         Social History     Socioeconomic History   • Marital status: Single     Spouse name: Not on file   • Number of children: Not on file   • Years of education: Not on file   • Highest education level: Not on file   Occupational History   • Occupation: Unemployed, looking for work   Tobacco Use   • Smoking status: Former     Packs/day: 1 50     Types: Cigarettes     Quit date: 2004     Years since quittin 5   • Smokeless tobacco: Former   • Tobacco comments:     quit at 38   Vaping Use   • Vaping Use: Never used   Substance and Sexual Activity   • Alcohol use: Yes     Comment: -rare   • Drug use: Not Currently     Comment: No drug use - As per [employer]   • Sexual activity: Yes     Partners: Male   Other Topics Concern   • Not on file   Social History Narrative    Lives with spouse    Tea, 3 servings/day    Lives with spouse    Annual eye exam: Advised    Pap smear: By her gyn    - As per [employer]     Social Determinants of Health Financial Resource Strain: Medium Risk   • Difficulty of Paying Living Expenses: Somewhat hard   Food Insecurity: Not on file   Transportation Needs: No Transportation Needs   • Lack of Transportation (Medical): No   • Lack of Transportation (Non-Medical):  No   Physical Activity: Not on file   Stress: Not on file   Social Connections: Not on file   Intimate Partner Violence: Not on file   Housing Stability: Not on file       Family History   Problem Relation Age of Onset   • Cancer Mother    • Diabetes Mother    • Hyperlipidemia Mother    • Breast cancer Mother    • Breast cancer Maternal Grandmother    • Lung cancer Father    • No Known Problems Sister    • No Known Problems Daughter    • No Known Problems Maternal Grandfather    • No Known Problems Sister    • No Known Problems Son    • No Known Problems Maternal Aunt    • Colon cancer Cousin         Patient's Medications   New Prescriptions    No medications on file   Previous Medications    ATORVASTATIN (LIPITOR) 40 MG TABLET    TAKE 1 TABLET EVERY DAY    BIOTIN 2500 MCG CAPS    Take by mouth 2 (two) times a day    BLOOD GLUCOSE MONITORING SUPPL (FREESTYLE LITE) TOYIN    daily    CALCIUM CARBONATE (OS-AMEE) 600 MG TABLET    Take 600 mg by mouth 2 (two) times a day with meals With vitamin D3     FREESTYLE LITE TEST STRIP    Use 1 each 2 (two) times a day Use as instructed    GABAPENTIN (NEURONTIN) 300 MG CAPSULE    Take 300 mg by mouth 2 (two) times a day    GLUCOSAMINE-CHONDROITIN 500-400 MG TABLET    Take 1 tablet by mouth 3 (three) times a day    LANCETS (FREESTYLE) LANCETS    Use 2 (two) times a day Use as instructed    LOSARTAN-HYDROCHLOROTHIAZIDE (HYZAAR) 100-12 5 MG PER TABLET    Take 1 tablet by mouth daily    MELOXICAM (MOBIC) 15 MG TABLET    Take 15 mg by mouth daily    METFORMIN (GLUCOPHAGE) 500 MG TABLET    TAKE 1 TABLET EVERY MORNING AND TAKE 2 TABLETS EVERY EVENING BEFORE MEALS    METOPROLOL TARTRATE (LOPRESSOR) 50 MG TABLET    Take 1 tablet (50 mg total) by mouth 2 (two) times a day    MULTIPLE VITAMIN (MULTIVITAMIN) CAPSULE    Take 1 capsule by mouth daily    OMEGA-3 FATTY ACIDS (FISH OIL) 1,000 MG    Take 2 capsules (2,000 mg total) by mouth daily    PANTOPRAZOLE (PROTONIX) 40 MG TABLET    TAKE 1 TABLET BY MOUTH 30 MINUTES BEFORE BREAKFAST    TRAMADOL (ULTRAM) 50 MG TABLET    Take 50 mg by mouth every 8 (eight) hours as needed BID   Modified Medications    No medications on file   Discontinued Medications    No medications on file       Allergies   Allergen Reactions   • Aspirin GI Intolerance   • Lisinopril Fatigue        /85 (BP Location: Left arm, Patient Position: Sitting, Cuff Size: Standard)   Pulse 66   Ht 5' 3" (1 6 m)   Wt 103 kg (227 lb)   SpO2 96%   BMI 40 21 kg/m²      REVIEW OF SYSTEMS:  Constitutional: Negative  HEENT: Negative  Respiratory: Negative  Skin: Negative  Neurological: Negative  Psychiatric/Behavioral: Negative  Musculoskeletal: Negative except for that mentioned in the HPI      /85 (BP Location: Left arm, Patient Position: Sitting, Cuff Size: Standard)   Pulse 66   Ht 5' 3" (1 6 m)   Wt 103 kg (227 lb)   SpO2 96%   BMI 40 21 kg/m²   Gen: No acute distress, resting comfortably in bed  HEENT: Eyes clear, moist mucus membranes, hearing intact  Respiratory: No audible wheezing or stridor  Cardiovascular: Well Perfused peripherally, 2+ distal pulse  Abdomen: nondistended, no peritoneal signs     PHYSICAL EXAM:  RIGHT SHOULDER:    Appearance: anterior superior escape    Forward flexion:   100 degrees active, 160 passive  Abduction:  90 degrees   External rotation at 0 degrees:   70 degrees   Internal rotation: L1     STRENGTH:  Forward flexion:  4/5   Abduction:  4/5   External rotation:  4+/5   Internal rotation:  5/5        Speed test: Positive  Yergason's: Negative   Tender to palpation ACJ (acromioclavicular joint): Negative   Tender to palpation LHB (long head of biceps): Negative  Tedra Patella test: Negative  Kiowa test: Positive  Hornblower's: Negative  Lift off: Negative  Belly press: Negative  Bear hug: Negative  External lag sign: Negative  Cross-body adduction: Negative  Sulcus sign: Negative  Tony's test: Positive  Drop arm test: negative    Radial/median/ulnar nerve intact    <2 sec cap refill        IMAGING:  3 views of the right shoulder were taken, reviewed and interpreted independently that demonstrate significant degenerative changes the glenohumeral joint  Mild AC arthritis  No acute fracture or subluxation  Reviewed by me personally  ASSESSMENT AND PLAN:  60 y o  female with right shoulder pain for the last 18 months  Symptoms of been worse over the last 6 months  She did have an injury 18 months ago with pulling on the arm  She has anterior superior escape on exam as well as weakness with flexion abduction and external rotation  She has limited range of motion  Due to her history of trauma and exam findings concern for rotator cuff tear  We will order an MRI to evaluate the rotator cuff and see her back afterwards to discuss her results

## 2023-03-02 ENCOUNTER — TELEPHONE (OUTPATIENT)
Dept: UROLOGY | Facility: AMBULATORY SURGERY CENTER | Age: 63
End: 2023-03-02

## 2023-03-02 NOTE — TELEPHONE ENCOUNTER
New Patient    What is the reason for the patient’s appointment? Patient being referred for urinary incontinence and having leaking  What office location does the patient prefer? [place]     Imaging/Lab Results: N/A    Do we accept the patient's insurance or is the patient Self-Pay? Yes, Medicare and [employer] Provider:  Plan Type/Number:  Member ID#: Has the patient had any previous Urologist(s)? Dr Navneet Alvarez been many years  Have patient records been requested? If not are records showing in Epic:     Has the patient had any outside testing done? Basic labs done on 2/15/23    Does the patient have a personal history of cancer?     Patient scheduled on 4/5/23 at 9 am with [facility] in [place]

## 2023-03-22 ENCOUNTER — HOSPITAL ENCOUNTER (OUTPATIENT)
Dept: MRI IMAGING | Facility: HOSPITAL | Age: 63
Discharge: HOME | End: 2023-03-22

## 2023-03-22 DIAGNOSIS — M24.811 INTERNAL DERANGEMENT OF RIGHT SHOULDER: ICD-10-CM

## 2023-03-24 ENCOUNTER — TELEPHONE (OUTPATIENT)
Dept: OBGYN CLINIC | Facility: HOSPITAL | Age: 63
End: 2023-03-24

## 2023-03-24 NOTE — TELEPHONE ENCOUNTER
Caller: patient     Doctor: lala    Reason for call: patient called in and stated she was unable to have the MRI completed due to being Claustrophobic  Patient states she will not have the MRI  Should patient still have follow up appt?     Call back#: [phone]

## 2023-03-24 NOTE — TELEPHONE ENCOUNTER
Called pt and let her know, also went ahead and canceled the upcoming appointment and let pt know we will make an appointment after the us is complete

## 2023-03-27 LAB
LEFT EYE DIABETIC RETINOPATHY: NORMAL
RIGHT EYE DIABETIC RETINOPATHY: NORMAL

## 2023-05-14 DIAGNOSIS — E11.9 TYPE 2 DIABETES MELLITUS WITHOUT COMPLICATION, WITHOUT LONG-TERM CURRENT USE OF INSULIN (HCC): ICD-10-CM

## 2023-05-15 ENCOUNTER — RA CDI HCC (OUTPATIENT)
Dept: OTHER | Facility: HOSPITAL | Age: 63
End: 2023-05-15

## 2023-05-15 RX ORDER — LANCETS 28 GAUGE
EACH MISCELLANEOUS
Qty: 200 EACH | Refills: 3 | Status: SHIPPED | OUTPATIENT
Start: 2023-05-15

## 2023-05-15 RX ORDER — BLOOD-GLUCOSE METER
KIT MISCELLANEOUS
Qty: 200 EACH | Refills: 3 | Status: SHIPPED | OUTPATIENT
Start: 2023-05-15

## 2023-05-15 NOTE — PROGRESS NOTES
E11 22, E66 01  [facility]  coding opportunities          Chart Reviewed number of suggestions sent to Provider: 2     Patients Insurance     Medicare Insurance: [employer]

## 2023-05-23 ENCOUNTER — OFFICE VISIT (OUTPATIENT)
Dept: INTERNAL MEDICINE CLINIC | Facility: CLINIC | Age: 63
End: 2023-05-23

## 2023-05-23 VITALS
BODY MASS INDEX: 40.68 KG/M2 | OXYGEN SATURATION: 99 % | TEMPERATURE: 98 F | HEART RATE: 65 BPM | SYSTOLIC BLOOD PRESSURE: 124 MMHG | DIASTOLIC BLOOD PRESSURE: 74 MMHG | HEIGHT: 63 IN | RESPIRATION RATE: 18 BRPM | WEIGHT: 229.6 LBS

## 2023-05-23 DIAGNOSIS — I10 ESSENTIAL HYPERTENSION: Chronic | ICD-10-CM

## 2023-05-23 DIAGNOSIS — M25.511 CHRONIC RIGHT SHOULDER PAIN: ICD-10-CM

## 2023-05-23 DIAGNOSIS — H25.89 OTHER AGE-RELATED CATARACT OF BOTH EYES: ICD-10-CM

## 2023-05-23 DIAGNOSIS — E78.2 MIXED HYPERLIPIDEMIA: Chronic | ICD-10-CM

## 2023-05-23 DIAGNOSIS — Z79.899 HIGH RISK MEDICATION USE: ICD-10-CM

## 2023-05-23 DIAGNOSIS — N18.31 STAGE 3A CHRONIC KIDNEY DISEASE (HCC): ICD-10-CM

## 2023-05-23 DIAGNOSIS — M79.671 PAIN IN BOTH FEET: Chronic | ICD-10-CM

## 2023-05-23 DIAGNOSIS — G89.29 CHRONIC RIGHT SHOULDER PAIN: ICD-10-CM

## 2023-05-23 DIAGNOSIS — E11.9 TYPE 2 DIABETES MELLITUS WITHOUT COMPLICATION, WITHOUT LONG-TERM CURRENT USE OF INSULIN (HCC): Primary | Chronic | ICD-10-CM

## 2023-05-23 DIAGNOSIS — I48.0 PAROXYSMAL ATRIAL FIBRILLATION (HCC): ICD-10-CM

## 2023-05-23 DIAGNOSIS — M79.672 PAIN IN BOTH FEET: Chronic | ICD-10-CM

## 2023-05-23 DIAGNOSIS — K21.9 GASTROESOPHAGEAL REFLUX DISEASE WITHOUT ESOPHAGITIS: Chronic | ICD-10-CM

## 2023-05-23 PROBLEM — H25.9 AGE-RELATED CATARACT OF BOTH EYES: Status: ACTIVE | Noted: 2023-05-23

## 2023-05-23 NOTE — PROGRESS NOTES
Assessment/Plan:    1  Type 2 diabetes mellitus without complication, without long-term current use of insulin ([facility])  Assessment & Plan:    Lab Results   Component Value Date    HGBA1C 6 2 (H) 02/15/2023   Diabetes mellitus well controlled  Patient denies any hypoglycemia  Morning sugar 98-1 30 evening sugar   Call if blood sugar less than 80  Continue present medications  We will follow blood sugar hemoglobin A1c  She has been seen and followed by eye doctor as well as podiatry  Orders:  -     Comprehensive metabolic panel; Future  -     CBC and differential; Future  -     Hemoglobin A1C; Future  -     Hemoglobin A1C; Future; Expected date: 08/23/2023    2  Essential hypertension  Assessment & Plan:  Blood pressure well controlled  Advised to continue present medication  Advised for low-salt diet  Orders:  -     Comprehensive metabolic panel; Future  -     CBC and differential; Future    3  Gastroesophageal reflux disease without esophagitis  Assessment & Plan:  she needs to take pantoprazole daily to control symptoms  Had EGD February 2022 revealed a hiatal hernia, reflux, gastritis  Advised to watch diet and reflux precautions  4  BMI 40 0-44 9, adult [facility])  Assessment & Plan:  Patient  was advised to decrease portion size  Advised to decrease carb, sugar, cholesterol intake  Advised to exercise 3-5 times per week  Advised to lose weight  5  Mixed hyperlipidemia  Assessment & Plan:  Well-controlled  Cholesterol 132, triglycerides 148, HDL 54, LDL 48 advised to continue present medication and low-cholesterol low carbs diet  Will follow lipid panel  Orders:  -     Comprehensive metabolic panel; Future  -     Lipid panel; Future    6  Pain in both feet  Assessment & Plan:  She has been seen and followed by podiatrist on tramadol, gabapentin and meloxicam per patient    Advised to follow with podiatrist       7  Stage 3a chronic kidney disease ([facility] [address] )  Assessment & Plan:  Lab Results   Component Value Date    EGFR 56 02/15/2023    EGFR 50 11/16/2022    EGFR 59 08/02/2022    CREATININE 1 05 02/15/2023    CREATININE 1 16 11/16/2022    CREATININE 1 01 08/02/2022   Renal function stable  Advised to maintain hydration  Advised to continue present medications  We will follow electrolyte renal function  Orders:  -     Comprehensive metabolic panel; Future  -     CBC and differential; Future    8  High risk medication use  -     Comprehensive metabolic panel; Future    9  Other age-related cataract of both eyes  Assessment & Plan:  She has been seen and followed by ophthalmologist   She has a cataract bilaterally worse on the left than right side  Patient states her eye doctor would like to observe and no need for surgery at present  10  Chronic right shoulder pain  Assessment & Plan:  She was seen by orthopedic specialist was ordered an MRI but due to claustrophobia she was not able to do MRI will be going for ultrasound  Advised to follow with the orthopedic specialist       11  Paroxysmal atrial fibrillation ([address] )  Assessment & Plan:  Clinically appears to be in normal sinus rhythm  She is off anticoagulation  Has been followed by cardiology  Presently asymptomatic stable  She is going for mammogram next month  Declined for pneumonia vaccination and COVID-19 vaccination    Subjective: Patient presents for follow-up  Patient ID: Amanda Camejo is a 60 y o  female  HPI   19-year-old white female patient presents to follow-up her medical problems  She denies any chest pain  She gets only shortness of breath particularly when she goes up hills  Denies any cough, fever, chills  Denies nausea vomiting diarrhea  Denies pain in abdomen      The following portions of the patient's history were reviewed and updated as appropriate:     Past Medical History:  She has a past medical history of Abrasion of right leg (03/19/2021), Acute respiratory failure due to COVID-19 ([place] [address] ) (10/01/2021), Age-related cataract of both eyes (5/23/2023), Arthritis, BMI 39 0-39 9,adult (5/10/2022), BMI 40 0-44 9, adult ([place] [address] ) (2/20/2023), CKD (chronic kidney disease) (2/8/2022), Colon polyp, COVID-19 long hauler (11/22/2022), Diabetes mellitus ([place] [address] ), Dyslipidemia, Dysphagia, pharyngoesophageal, Edema of both lower legs (03/19/2021), Essential hypertension (01/23/2021), Gastroesophageal reflux disease without esophagitis (01/23/2021), GERD (gastroesophageal reflux disease), Hiatal hernia, HTN (hypertension), Hyperlipemia, Hypertension, Hypertensive arterionephrosclerosis of transplanted kidney (01/23/2021), Hypokalemia (04/20/2021), Insomnia, Leukocytosis, Medicare annual wellness visit, subsequent (11/9/2021), Mixed hyperlipidemia (01/23/2021), Numbness, Obstructive sleep apnea syndrome (01/23/2021), Other chest pain (8/26/2022), Pain in ankle (01/23/2021), Pain in both feet (01/23/2021), Pain in both knees (01/26/2021), Pain in right ankle (5/10/2022), Paroxysmal atrial fibrillation ([place] [address] ) (11/09/2021), Personal history of COVID-19, Right foot pain (5/10/2022), Right shoulder pain (2/20/2023), Shortness of breath, Shoulder pain, right (2/8/2022), Skin lesions, Sleep apnea, Type 2 diabetes mellitus without complication, without long-term current use of insulin ([place] [address] ) (01/23/2021), and Urinary incontinence (2/20/2023)  ,  _______________________________________________________________________  Past Surgical History:   has a past surgical history that includes Carpal tunnel release (Bilateral, 1990); Gallbladder surgery (2004); Hysterectomy (2004); Ankle surgery (Right, 2009/2012); Mammo stereotactic breast biopsy right (all inc) (Right, 2014); Mammo (historical) (10/02/2018); Colonoscopy (03/07/2017); Breast biopsy (Right); Colonoscopy; Upper gastrointestinal endoscopy; and [place] tooth extraction  ,  _______________________________________________________________________  Family History:  family history includes Breast cancer in her maternal grandmother and mother; Cancer in her mother; Colon cancer in her cousin; Diabetes in her mother; Hyperlipidemia in her mother; Lung cancer in her father; No Known Problems in her daughter, maternal aunt, maternal grandfather, sister, sister, and son ,  _______________________________________________________________________  Social History:   reports that she quit smoking about 18 years ago  Her smoking use included cigarettes  She smoked an average of 1 5 packs per day  She has quit using smokeless tobacco  She reports current alcohol use  She reports that she does not currently use drugs  ,  _______________________________________________________________________  Allergies:  is allergic to aspirin and lisinopril     _______________________________________________________________________  Current Outpatient Medications   Medication Sig Dispense Refill   • atorvastatin (LIPITOR) 40 mg tablet TAKE 1 TABLET EVERY DAY 90 tablet 1   • Biotin 2500 MCG CAPS Take by mouth 2 (two) times a day     • Blood Glucose Monitoring Suppl (FreeStyle Lite) TOYIN daily     • calcium carbonate (OS-AMEE) 600 MG tablet Take 600 mg by mouth 2 (two) times a day with meals With vitamin D3      • FREESTYLE LITE test strip USE TO TEST BLOOD SUGAR TWICE DAILY AS INSTRUCTED 200 each 3   • gabapentin (NEURONTIN) 300 mg capsule Take 300 mg by mouth 2 (two) times a day     • glucosamine-chondroitin 500-400 MG tablet Take 1 tablet by mouth 3 (three) times a day     • Lancets (freestyle) lancets USE 2 TIMES A DAY AS INSTRUCTED 200 each 3   • losartan-hydrochlorothiazide (HYZAAR) 100-12 5 MG per tablet Take 1 tablet by mouth daily 90 tablet 1   • meloxicam (MOBIC) 15 mg tablet Take 15 mg by mouth daily     • metFORMIN (GLUCOPHAGE) 500 mg tablet TAKE 1 TABLET EVERY MORNING AND TAKE 2 TABLETS EVERY EVENING BEFORE MEALS (Patient taking differently: Take 500 mg by mouth 3 (three) times a day with meals) 270 tablet 1   • metoprolol tartrate (LOPRESSOR) 50 mg tablet Take 1 tablet (50 mg total) by mouth 2 (two) times a day 180 tablet 1   • Multiple Vitamin (MULTIVITAMIN) capsule Take 1 capsule by mouth daily     • Omega-3 Fatty Acids (fish oil) 1,000 mg Take 2 capsules (2,000 mg total) by mouth daily 240 capsule 3   • pantoprazole (PROTONIX) 40 mg tablet TAKE 1 TABLET BY MOUTH 30 MINUTES BEFORE BREAKFAST 90 tablet 3   • traMADol (ULTRAM) 50 mg tablet Take 50 mg by mouth every 8 (eight) hours as needed BID  0     No current facility-administered medications for this visit      _______________________________________________________________________  Review of Systems   Constitutional: Negative for chills and fever  HENT: Negative for congestion, ear pain, hearing loss, nosebleeds, sinus pain, sore throat and trouble swallowing  Eyes: Negative for discharge, redness and visual disturbance  Respiratory: Positive for shortness of breath ( Mainly when she goes up hills)  Negative for cough, chest tightness and wheezing  Cardiovascular: Negative for chest pain and palpitations  Gastrointestinal: Negative for abdominal pain, blood in stool, constipation, diarrhea, nausea and vomiting  Genitourinary: Negative for dysuria, flank pain, frequency and hematuria  Musculoskeletal: Positive for arthralgias ( Right shoulder pain was seen by orthopedic specialist   Also gets a pain in the feet has been followed by podiatrist )  Negative for myalgias and neck pain  Skin: Negative for color change and rash  Neurological: Negative for dizziness, speech difficulty, weakness and headaches  Hematological: Does not bruise/bleed easily  Psychiatric/Behavioral: Negative for agitation and behavioral problems             Objective:  Vitals:    05/23/23 1003   BP: 124/74   BP Location: Left arm   Patient Position: Sitting   Cuff Size: Adult   Pulse: 65   Resp: 18   Temp: 98 °F (36 7 °C)   TempSrc: Temporal   SpO2: 99%   Weight: 104 kg (229 "lb 9 6 oz)   Height: 5' 3\" (1 6 m)     Body mass index is 40 67 kg/m²  Physical Exam  Vitals and nursing note reviewed  Constitutional:       General: She is not in acute distress  Appearance: She is obese  HENT:      Head: Normocephalic and atraumatic  Right Ear: Ear canal and external ear normal       Left Ear: Ear canal and external ear normal       Nose: Nose normal       Mouth/Throat:      Mouth: Mucous membranes are moist    Eyes:      General: No scleral icterus  Right eye: No discharge  Left eye: No discharge  Extraocular Movements: Extraocular movements intact  Conjunctiva/sclera: Conjunctivae normal    Cardiovascular:      Rate and Rhythm: Normal rate and regular rhythm  Pulses: Normal pulses  Heart sounds: Normal heart sounds  No murmur heard  Pulmonary:      Effort: Pulmonary effort is normal  No respiratory distress  Breath sounds: Normal breath sounds  No rhonchi  Abdominal:      General: Bowel sounds are normal       Palpations: Abdomen is soft  Tenderness: There is no abdominal tenderness  Musculoskeletal:         General: Tenderness ( Has some tenderness right shoulder ) present  Normal range of motion  Cervical back: Normal range of motion and neck supple  No muscular tenderness  Right lower leg: No edema  Left lower leg: No edema  Skin:     General: Skin is warm  Findings: No rash  Neurological:      General: No focal deficit present  Mental Status: She is alert and oriented to person, place, and time  Motor: No weakness  Coordination: Coordination normal    Psychiatric:         Mood and Affect: Mood normal          Behavior: Behavior normal        I spent 30 minutes with the patient today    More than 50% time spent for reviewing of external notes, reviewing of the results of diagnostics test, management of care, patient education and ordering of test     "

## 2023-05-23 NOTE — PATIENT INSTRUCTIONS
Patient was advised to continue present medications  discussed with the patient medications and laboratory data in detail  Follow-up with me in 3 months or as advised  If any blood test was ordered please do 1 week prior to next appointment unless advise to get earlier  If you have any questions please call the office [phone]   Type 2 Diabetes Management for Adults   AMBULATORY CARE:   Type 2 diabetes  is a disease that affects how your body uses glucose (sugar)  Either your body cannot make enough insulin, or it cannot use the insulin correctly  It is important to keep diabetes controlled to prevent damage to your heart, blood vessels, and other organs  Management will help you feel well and enjoy your daily activities  Your diabetes care team providers can help you make a plan to fit diabetes care into your schedule  Your plan can change over time to fit your needs and your family's needs  Have someone call your local emergency number (911 in the [address] [facility]) if:   • You cannot be woken  • You have signs of diabetic ketoacidosis:     ? confusion, fatigue    ? vomiting    ? rapid heartbeat    ? fruity smelling breath    ? extreme thirst    ? dry mouth and skin    • You have any of the following signs of a heart attack:      ? Squeezing, pressure, or pain in your chest    ? You may  also have any of the following:     - Discomfort or pain in your back, neck, jaw, stomach, or arm    - Shortness of breath    - Nausea or vomiting    - Lightheadedness or a sudden cold sweat    • You have any of the following signs of a stroke:      ? Numbness or drooping on one side of your face     ? Weakness in an arm or leg    ? Confusion or difficulty speaking    ? Dizziness, a severe headache, or vision loss    Call your doctor or diabetes care team provider if:   • You have a sore or wound that will not heal     • You have a change in the amount you urinate      • Your blood sugar levels are higher than your target goals     • You often have lower blood sugar levels than your target goals  • Your skin is red, dry, warm, or swollen  • You have trouble coping with diabetes, or you feel anxious or depressed  • You have questions or concerns about your condition or care  What you need to know about high blood sugar levels:  High blood sugar levels may not cause any symptoms  You may feel more thirsty or urinate more often than usual  Over time, high blood sugar levels can damage your nerves, blood vessels, tissues, and organs  The following can increase your blood sugar levels:  • Large meals or large amounts of carbohydrates at one time    • Less physical activity    • Stress    • Illness    • A lower dose of diabetes medicine or insulin, or a late dose    What you need to know about low blood sugar levels:  Symptoms include feeling shaky, dizzy, irritable, or confused  You can prevent symptoms by keeping your blood sugar levels from going too low  • Treat a low blood sugar level right away:      ? Drink 4 ounces of juice or have 1 tube of glucose gel  ? Check your blood sugar level again 10 to 15 minutes later  ? When the level goes back to normal, eat a meal or snack to prevent another decrease  • Keep glucose gel, raisins, or hard candy with you at all times to treat a low blood sugar level  • Your blood sugar level can get too low if you take diabetes medicine or insulin and do not eat enough food  • If you use insulin, check your blood sugar level before you exercise  ? If your blood sugar level is below 100 mg/dL, eat 4 crackers or 2 ounces of raisins, or drink 4 ounces of juice  ? Check your level every 30 minutes if you exercise longer than 1 hour  ? You may need a snack during or after exercise  What you can do to manage your blood sugar levels:   • Check your blood sugar levels as directed and as needed  Several items are available to use to check your levels   You may need to check by testing a drop of blood in a glucose monitor  You may instead be given a continuous glucose monitoring (CGM) device  The device is worn at all times  The CGM checks your blood sugar level every 5 minutes  It sends results to an electronic device such as a smart phone  A CGM can be used with or without an insulin pump  You and your diabetes care team providers will decide on the best method for you  The goal for blood sugar levels before meals  is between 80 and 130 mg/dL and 2 hours after eating  is lower than 180 mg/dL  • Make healthy food choices  Work with a dietitian to develop a meal plan that works for you and your schedule  A dietitian can help you learn how to eat the right amount of carbohydrates during your meals and snacks  Carbohydrates can raise your blood sugar level if you eat too many at one time  Examples of foods that contain carbohydrates are breads, cereals, rice, pasta, and sweets  • Eat high-fiber foods as directed  Fiber helps improve blood sugar levels  Fiber also lowers your risk for heart disease and other problems diabetes can cause  Examples of high-fiber foods include vegetables, whole-grain bread, and beans such as pritchard beans  Your dietitian can tell you how much fiber to have each day  • Get regular physical activity  Physical activity can help you get to your target blood sugar level goal and manage your weight  Get at least 150 minutes of moderate to vigorous aerobic physical activity each week  Do not miss more than 2 days in a row  Do not sit longer than 30 minutes at a time  Your healthcare provider can help you create an activity plan  The plan can include the best activities for you and can help you build your strength and endurance  • Maintain a healthy weight  Ask your team what a healthy weight is for you  A healthy weight can help you control diabetes and prevent heart disease   Ask your team to help you create a weight loss plan, if needed  Weight loss can help make a difference in managing diabetes  Your team will help you set a weight-loss goal, such as 10 to 15 pounds, or 5% of your extra weight  Together you and your team can set manageable weight loss goals  • Take your diabetes medicine or insulin as directed  You may need diabetes medicine, insulin, or both to help control your blood sugar levels  Your healthcare provider will teach you how and when to take your diabetes medicine or insulin  You will also be taught about side effects oral diabetes medicine can cause  Insulin may be injected or given through a pump or pen  You and your providers will decide on the best method for you:    ? An insulin pump  is an implanted device that gives your insulin 24 hours a day  An insulin pump prevents the need for multiple insulin injections in a day  ? An insulin pen  is a device prefilled with the right amount of insulin  ? You and your family members will be taught how to draw up and give insulin  if this is the best method for you  Your providers will also teach you how to dispose of needles and syringes  ? You will learn how much insulin you need  and when to give it  You will be taught when not to give insulin  You will also be taught what to do if your blood sugar level drops too low  This may happen if you take insulin and do not eat the right amount of carbohydrates  More ways to manage type 2 diabetes:   • Wear medical alert identification  Wear medical alert jewelry or carry a card that says you have diabetes  Ask your provider where to get these items  • Do not smoke  Nicotine and other chemicals in cigarettes and cigars can cause lung and blood vessel damage  It also makes it more difficult to manage your diabetes  Ask your provider for information if you currently smoke and need help to quit  Do not use e-cigarettes or smokeless tobacco in place of cigarettes or to help you quit   They still contain nicotine  • Check your feet each day for cuts, scratches, calluses, or other wounds  Look for redness and swelling, and feel for warmth  Wear shoes that fit well  Check your shoes for rocks or other objects that can hurt your feet  Do not walk barefoot or wear shoes without socks  Wear cotton socks to help keep your feet dry  • Ask about vaccines you may need  You have a higher risk for serious illness if you get the flu, pneumonia, COVID-19, or hepatitis  Ask your provider if you should get vaccines to prevent these or other diseases, and when to get the vaccines  • Talk to your provider if you become stressed about diabetes care  Sometimes being able to fit diabetes care into your life can cause increased stress  The stress can cause you not to take care of yourself properly  Your care team providers can help by offering tips about self-care  Your providers may suggest you talk to a mental health provider who can listen and offer help with self-care issues  • Have your A1c checked as directed  Your provider may check your A1c every 3 months, or 2 times each year if your diabetes is controlled  An A1c test shows the average amount of sugar in your blood over the past 2 to 3 months  Your provider will tell you what your A1c level should be  • Have screening tests as directed  Your provider may recommend screening for complications of diabetes and other conditions that may develop  Some screenings may begin right away and some may happen within the first 5 years of diagnosis:    ? Examples of diabetes complications  include kidney problems, high cholesterol, high blood pressure, blood vessel problems, eye problems, and sleep apnea  ? You may be screened for a low vitamin B level  if you take oral diabetes medicine for a long time  ? Women of childbearing years may be screened  for polycystic ovarian syndrome (PCOS)      Follow up with your doctor or diabetes care team providers as directed: You may need to have blood tests done before your follow-up visit  The test results will show if changes need to be made in your treatment or self-care  Talk to your provider if you cannot afford your medicine  Write down your questions so you remember to ask them during your visits  © Copyright [employer] 2022 Information is for End User's use only and may not be sold, redistributed or otherwise used for commercial purposes  The above information is an  only  It is not intended as medical advice for individual conditions or treatments  Talk to your doctor, nurse or pharmacist before following any medical regimen to see if it is safe and effective for you

## 2023-05-23 NOTE — ASSESSMENT & PLAN NOTE
Clinically appears to be in normal sinus rhythm  She is off anticoagulation  Has been followed by cardiology  Presently asymptomatic stable

## 2023-05-23 NOTE — ASSESSMENT & PLAN NOTE
Lab Results   Component Value Date    EGFR 56 02/15/2023    EGFR 50 11/16/2022    EGFR 59 08/02/2022    CREATININE 1 05 02/15/2023    CREATININE 1 16 11/16/2022    CREATININE 1 01 08/02/2022   Renal function stable  Advised to maintain hydration  Advised to continue present medications  We will follow electrolyte renal function

## 2023-05-23 NOTE — ASSESSMENT & PLAN NOTE
she needs to take pantoprazole daily to control symptoms  Had EGD February 2022 revealed a hiatal hernia, reflux, gastritis  Advised to watch diet and reflux precautions

## 2023-05-23 NOTE — ASSESSMENT & PLAN NOTE
She was seen by orthopedic specialist was ordered an MRI but due to claustrophobia she was not able to do MRI will be going for ultrasound    Advised to follow with the orthopedic specialist

## 2023-05-23 NOTE — ASSESSMENT & PLAN NOTE
She has been seen and followed by ophthalmologist   She has a cataract bilaterally worse on the left than right side  Patient states her eye doctor would like to observe and no need for surgery at present

## 2023-05-23 NOTE — ASSESSMENT & PLAN NOTE
Lab Results   Component Value Date    HGBA1C 6 2 (H) 02/15/2023   Diabetes mellitus well controlled  Patient denies any hypoglycemia  Morning sugar 98-1 30 evening sugar   Call if blood sugar less than 80  Continue present medications  We will follow blood sugar hemoglobin A1c  She has been seen and followed by eye doctor as well as podiatry

## 2023-05-23 NOTE — ASSESSMENT & PLAN NOTE
Well-controlled  Cholesterol 132, triglycerides 148, HDL 54, LDL 48 advised to continue present medication and low-cholesterol low carbs diet  Will follow lipid panel

## 2023-06-07 ENCOUNTER — TELEPHONE (OUTPATIENT)
Dept: INTERNAL MEDICINE CLINIC | Facility: CLINIC | Age: 63
End: 2023-06-07

## 2023-06-07 ENCOUNTER — HOSPITAL ENCOUNTER (OUTPATIENT)
Dept: MAMMOGRAPHY | Facility: HOSPITAL | Age: 63
Discharge: HOME | End: 2023-06-07
Attending: INTERNAL MEDICINE
Payer: MEDICARE

## 2023-06-07 VITALS — WEIGHT: 229 LBS | HEIGHT: 63 IN | BODY MASS INDEX: 40.57 KG/M2

## 2023-06-07 DIAGNOSIS — Z12.31 ENCOUNTER FOR SCREENING MAMMOGRAM FOR BREAST CANCER: ICD-10-CM

## 2023-06-07 PROCEDURE — 77067 SCR MAMMO BI INCL CAD: CPT

## 2023-06-07 PROCEDURE — 77063 BREAST TOMOSYNTHESIS BI: CPT

## 2023-06-07 NOTE — TELEPHONE ENCOUNTER
----- Message from Solis Raygoza MD sent at 6/7/2023  1:12 PM EDT -----  Please call her that her mammogram is within normal limit  Will need follow-up mammogram after 1 year

## 2023-07-09 DIAGNOSIS — I10 ESSENTIAL HYPERTENSION: Chronic | ICD-10-CM

## 2023-07-10 RX ORDER — METOPROLOL TARTRATE 50 MG/1
TABLET, FILM COATED ORAL
Qty: 180 TABLET | Refills: 1 | Status: SHIPPED | OUTPATIENT
Start: 2023-07-10

## 2023-07-10 RX ORDER — LOSARTAN POTASSIUM AND HYDROCHLOROTHIAZIDE 12.5; 1 MG/1; MG/1
TABLET ORAL
Qty: 90 TABLET | Refills: 1 | Status: SHIPPED | OUTPATIENT
Start: 2023-07-10

## 2023-07-17 ENCOUNTER — HOSPITAL ENCOUNTER (OUTPATIENT)
Dept: RADIOLOGY | Facility: HOSPITAL | Age: 63
Discharge: HOME | End: 2023-07-17
Attending: ORTHOPAEDIC SURGERY
Payer: MEDICARE

## 2023-07-17 DIAGNOSIS — G89.29 CHRONIC RIGHT SHOULDER PAIN: ICD-10-CM

## 2023-07-17 DIAGNOSIS — M25.511 CHRONIC RIGHT SHOULDER PAIN: ICD-10-CM

## 2023-07-17 PROCEDURE — 76882 US LMTD JT/FCL EVL NVASC XTR: CPT

## 2023-07-19 ENCOUNTER — OFFICE VISIT (OUTPATIENT)
Dept: OBGYN CLINIC | Facility: CLINIC | Age: 63
End: 2023-07-19
Payer: MEDICARE

## 2023-07-19 VITALS — BODY MASS INDEX: 41.18 KG/M2 | HEIGHT: 63 IN | WEIGHT: 232.4 LBS

## 2023-07-19 DIAGNOSIS — M24.811 INTERNAL DERANGEMENT OF RIGHT SHOULDER: Primary | ICD-10-CM

## 2023-07-19 PROCEDURE — 99214 OFFICE O/P EST MOD 30 MIN: CPT | Performed by: ORTHOPAEDIC SURGERY

## 2023-07-19 NOTE — PROGRESS NOTES
[address]  [address]  [facility] [place] [address]  [phone]       Stephane Carranza  [id number]  1960    ORTHOPAEDIC SURGERY OUTPATIENT NOTE  7/19/2023      HISTORY:  60 y.o. female patient presents for follow-up ultrasound findings right shoulder. Patient still has pain in the right shoulder with activity. She has seen no improvement with her shoulder since we last saw her. She was unable to obtain MRI but did obtain an ultrasound to review today.     Past Medical History:   Diagnosis Date   • Abrasion of right leg 03/19/2021   • Acute respiratory failure due to COVID-19 [facility]) 10/01/2021   • Age-related cataract of both eyes 5/23/2023   • Arthritis     feet   • BMI 39.0-39.9,adult 5/10/2022   • BMI 40.0-44.9, adult ([address]) 2/20/2023   • CKD (chronic kidney disease) 2/8/2022   • Colon polyp    • COVID-19 long hauler 11/22/2022   • Diabetes mellitus ([address])     FBS today 2/28/22 was 147 at 0930 by patient--feels well   • Dyslipidemia    • Dysphagia, pharyngoesophageal    • Edema of both lower legs 03/19/2021   • Essential hypertension 01/23/2021   • Gastroesophageal reflux disease without esophagitis 01/23/2021   • GERD (gastroesophageal reflux disease)    • Hiatal hernia    • HTN (hypertension)    • Hyperlipemia    • Hypertension    • Hypertensive arterionephrosclerosis of transplanted kidney 01/23/2021   • Hypokalemia 04/20/2021   • Insomnia    • Leukocytosis    • Medicare annual wellness visit, subsequent 11/9/2021   • Mixed hyperlipidemia 01/23/2021   • Numbness    • Obstructive sleep apnea syndrome 01/23/2021    no device   • Other chest pain 8/26/2022   • Pain in ankle 01/23/2021   • Pain in both feet 01/23/2021   • Pain in both knees 01/26/2021   • Pain in right ankle 5/10/2022   • Paroxysmal atrial fibrillation ([address]) 11/09/2021   • Personal history of COVID-19     August 2021   • Right foot pain 5/10/2022   • Right shoulder pain 2023   • Shortness of breath     exertional   • Shoulder pain, right 2022   • Skin lesions    • Sleep apnea    • Type 2 diabetes mellitus without complication, without long-term current use of insulin ([address]) 2021   • Urinary incontinence 2023       Past Surgical History:   Procedure Laterality Date   • ANKLE SURGERY Right    • BREAST BIOPSY Right 05/15/2014    stereotactic biopsy at [facility] Bilateral    • COLONOSCOPY  2017    Dr. Radha Wolf   • COLONOSCOPY     • GALLBLADDER SURGERY     • HYSTERECTOMY     • MAMMO (HISTORICAL)  10/02/2018   • MAMMO STEREOTACTIC BREAST BIOPSY RIGHT (ALL INC) Right    • UPPER GASTROINTESTINAL ENDOSCOPY     • WISDOM TOOTH EXTRACTION         Social History     Socioeconomic History   • Marital status: Single     Spouse name: Not on file   • Number of children: Not on file   • Years of education: Not on file   • Highest education level: Not on file   Occupational History   • Occupation: Unemployed, looking for work   Tobacco Use   • Smoking status: Former     Packs/day: 1.50     Types: Cigarettes     Quit date: 2004     Years since quittin.8   • Smokeless tobacco: Former   • Tobacco comments:     quit at 38   Vaping Use   • Vaping Use: Never used   Substance and Sexual Activity   • Alcohol use: Yes     Comment: -rare   • Drug use: Not Currently     Comment: No drug use - As per [employer]   • Sexual activity: Yes     Partners: Male   Other Topics Concern   • Not on file   Social History Narrative    Lives with spouse    Tea, 3 servings/day    Lives with spouse    Annual eye exam: Advised    Pap smear: By her gyn    - As per [employer]     Social Determinants of Health     Financial Resource Strain: Medium Risk (2022)    Overall Financial Resource Strain (CARDIA)    • Difficulty of Paying Living Expenses: Somewhat hard   Food Insecurity: Not on file   Transportation Needs: No Transportation Needs (11/22/2022)    PRAPARE - Transportation    • Lack of Transportation (Medical): No    • Lack of Transportation (Non-Medical):  No   Physical Activity: Not on file   Stress: Not on file   Social Connections: Not on file   Intimate Partner Violence: Not on file   Housing Stability: Not on file       Family History   Problem Relation Age of Onset   • Cancer Mother    • Diabetes Mother    • Hyperlipidemia Mother    • Breast cancer Mother         27   • Lung cancer Father    • No Known Problems Sister    • No Known Problems Sister    • No Known Problems Daughter    • Breast cancer Maternal Grandmother    • No Known Problems Maternal Grandfather    • No Known Problems Son    • No Known Problems Maternal Aunt    • Colon cancer Cousin         Patient's Medications   New Prescriptions    No medications on file   Previous Medications    ATORVASTATIN (LIPITOR) 40 MG TABLET    TAKE 1 TABLET EVERY DAY    BIOTIN 2500 MCG CAPS    Take by mouth 2 (two) times a day    BLOOD GLUCOSE MONITORING SUPPL (FREESTYLE LITE) TOYIN    daily    CALCIUM CARBONATE (OS-AMEE) 600 MG TABLET    Take 600 mg by mouth 2 (two) times a day with meals With vitamin D3     FREESTYLE LITE TEST STRIP    USE TO TEST BLOOD SUGAR TWICE DAILY AS INSTRUCTED    GABAPENTIN (NEURONTIN) 300 MG CAPSULE    Take 300 mg by mouth 2 (two) times a day    GLUCOSAMINE-CHONDROITIN 500-400 MG TABLET    Take 1 tablet by mouth 3 (three) times a day    LANCETS (FREESTYLE) LANCETS    USE 2 TIMES A DAY AS INSTRUCTED    LOSARTAN-HYDROCHLOROTHIAZIDE (HYZAAR) 100-12.5 MG PER TABLET    TAKE 1 TABLET EVERY DAY    MELOXICAM (MOBIC) 15 MG TABLET    Take 15 mg by mouth daily    METFORMIN (GLUCOPHAGE) 500 MG TABLET    TAKE 1 TABLET EVERY MORNING AND TAKE 2 TABLETS EVERY EVENING BEFORE MEALS    METOPROLOL TARTRATE (LOPRESSOR) 50 MG TABLET    TAKE 1 TABLET TWICE DAILY    MULTIPLE VITAMIN (MULTIVITAMIN) CAPSULE    Take 1 capsule by mouth daily    OMEGA-3 FATTY ACIDS (FISH OIL) 1,000 MG    TAKE 2 CAPSULES EVERY DAY    PANTOPRAZOLE (PROTONIX) 40 MG TABLET    TAKE 1 TABLET BY MOUTH 30 MINUTES BEFORE BREAKFAST    TRAMADOL (ULTRAM) 50 MG TABLET    Take 50 mg by mouth every 8 (eight) hours as needed BID   Modified Medications    No medications on file   Discontinued Medications    No medications on file       Allergies   Allergen Reactions   • Aspirin GI Intolerance   • Lisinopril Fatigue        Ht 5' 3" (1.6 m) Comment: verbal  Wt 105 kg (232 lb 6.4 oz)   BMI 41.17 kg/m²      REVIEW OF SYSTEMS:  Constitutional: Negative. HEENT: Negative. Respiratory: Negative. Skin: Negative. Neurological: Negative. Psychiatric/Behavioral: Negative. Musculoskeletal: Negative except for that mentioned in the HPI.    [unfilled]     PHYSICAL EXAM: Right shoulder: Forward elevation 180 degrees. External rotation 50 degrees. Internal rotation to sacrum. Strength 5 out of 5 in all range of motion. IMAGING: Ultrasound right shoulder: There is no full-thickness tear of the supraspinatus, infraspinatus, or subscapularis tendons. Long head biceps tendon is intact. ASSESSMENT AND PLAN:  60 y.o. female right shoulder rotator cuff tendinitis. At this time given the patient's insignificant imaging studies we will get her into formal physical therapy. If she does not see improvement after 6 weeks we will see her back in the clinic. At which time we can discuss exploring subacromial injection.

## 2023-08-11 ENCOUNTER — RA CDI HCC (OUTPATIENT)
Dept: OTHER | Facility: HOSPITAL | Age: 63
End: 2023-08-11

## 2023-08-11 NOTE — PROGRESS NOTES
E11.22, E66.01  [facility] coding opportunities          Chart Reviewed number of suggestions sent to Provider: 2     Patients Insurance     Medicare Insurance: [employer]

## 2023-08-15 ENCOUNTER — APPOINTMENT (OUTPATIENT)
Dept: LAB | Facility: HOSPITAL | Age: 63
End: 2023-08-15
Attending: INTERNAL MEDICINE
Payer: MEDICARE

## 2023-08-15 DIAGNOSIS — N18.31 STAGE 3A CHRONIC KIDNEY DISEASE (HCC): ICD-10-CM

## 2023-08-15 DIAGNOSIS — E11.9 TYPE 2 DIABETES MELLITUS WITHOUT COMPLICATION, WITHOUT LONG-TERM CURRENT USE OF INSULIN (HCC): Chronic | ICD-10-CM

## 2023-08-15 DIAGNOSIS — Z79.899 HIGH RISK MEDICATION USE: ICD-10-CM

## 2023-08-15 DIAGNOSIS — E78.2 MIXED HYPERLIPIDEMIA: Chronic | ICD-10-CM

## 2023-08-15 DIAGNOSIS — I10 ESSENTIAL HYPERTENSION: Chronic | ICD-10-CM

## 2023-08-15 LAB
ALBUMIN SERPL BCP-MCNC: 3.9 G/DL (ref 3.5–5)
ALP SERPL-CCNC: 89 U/L (ref 34–104)
ALT SERPL W P-5'-P-CCNC: 16 U/L (ref 7–52)
ANION GAP SERPL CALCULATED.3IONS-SCNC: 6 MMOL/L
AST SERPL W P-5'-P-CCNC: 15 U/L (ref 13–39)
BASOPHILS # BLD AUTO: 0.05 THOUSANDS/ÂΜL (ref 0–0.1)
BASOPHILS NFR BLD AUTO: 1 % (ref 0–1)
BILIRUB SERPL-MCNC: 0.55 MG/DL (ref 0.2–1)
BUN SERPL-MCNC: 17 MG/DL (ref 5–25)
CALCIUM SERPL-MCNC: 9.6 MG/DL (ref 8.4–10.2)
CHLORIDE SERPL-SCNC: 101 MMOL/L (ref 96–108)
CHOLEST SERPL-MCNC: 140 MG/DL
CO2 SERPL-SCNC: 31 MMOL/L (ref 21–32)
CREAT SERPL-MCNC: 1.04 MG/DL (ref 0.6–1.3)
EOSINOPHIL # BLD AUTO: 0.43 THOUSAND/ÂΜL (ref 0–0.61)
EOSINOPHIL NFR BLD AUTO: 5 % (ref 0–6)
ERYTHROCYTE [DISTWIDTH] IN BLOOD BY AUTOMATED COUNT: 13.3 % (ref 11.6–15.1)
EST. AVERAGE GLUCOSE BLD GHB EST-MCNC: 148 MG/DL
GFR SERPL CREATININE-BSD FRML MDRD: 57 ML/MIN/1.73SQ M
GLUCOSE P FAST SERPL-MCNC: 138 MG/DL (ref 65–99)
HBA1C MFR BLD: 6.8 %
HCT VFR BLD AUTO: 41.7 % (ref 34.8–46.1)
HDLC SERPL-MCNC: 59 MG/DL
HGB BLD-MCNC: 13 G/DL (ref 11.5–15.4)
IMM GRANULOCYTES # BLD AUTO: 0.02 THOUSAND/UL (ref 0–0.2)
IMM GRANULOCYTES NFR BLD AUTO: 0 % (ref 0–2)
LDLC SERPL CALC-MCNC: 51 MG/DL (ref 0–100)
LYMPHOCYTES # BLD AUTO: 3.86 THOUSANDS/ÂΜL (ref 0.6–4.47)
LYMPHOCYTES NFR BLD AUTO: 45 % (ref 14–44)
MCH RBC QN AUTO: 28.4 PG (ref 26.8–34.3)
MCHC RBC AUTO-ENTMCNC: 31.2 G/DL (ref 31.4–37.4)
MCV RBC AUTO: 91 FL (ref 82–98)
MONOCYTES # BLD AUTO: 0.82 THOUSAND/ÂΜL (ref 0.17–1.22)
MONOCYTES NFR BLD AUTO: 10 % (ref 4–12)
NEUTROPHILS # BLD AUTO: 3.27 THOUSANDS/ÂΜL (ref 1.85–7.62)
NEUTS SEG NFR BLD AUTO: 39 % (ref 43–75)
NONHDLC SERPL-MCNC: 81 MG/DL
NRBC BLD AUTO-RTO: 0 /100 WBCS
PLATELET # BLD AUTO: 310 THOUSANDS/UL (ref 149–390)
PMV BLD AUTO: 10.4 FL (ref 8.9–12.7)
POTASSIUM SERPL-SCNC: 4.7 MMOL/L (ref 3.5–5.3)
PROT SERPL-MCNC: 7.4 G/DL (ref 6.4–8.4)
RBC # BLD AUTO: 4.58 MILLION/UL (ref 3.81–5.12)
SODIUM SERPL-SCNC: 138 MMOL/L (ref 135–147)
TRIGL SERPL-MCNC: 148 MG/DL
WBC # BLD AUTO: 8.45 THOUSAND/UL (ref 4.31–10.16)

## 2023-08-15 PROCEDURE — 80053 COMPREHEN METABOLIC PANEL: CPT

## 2023-08-15 PROCEDURE — 80061 LIPID PANEL: CPT

## 2023-08-15 PROCEDURE — 85025 COMPLETE CBC W/AUTO DIFF WBC: CPT

## 2023-08-15 PROCEDURE — 83036 HEMOGLOBIN GLYCOSYLATED A1C: CPT

## 2023-08-15 PROCEDURE — 36415 COLL VENOUS BLD VENIPUNCTURE: CPT

## 2023-08-17 NOTE — PROGRESS NOTES
Progress Note - Cardiology Office  [facility]    Sae Sanchez 60 y.o. female MRN: [id number]  : 1960  Encounter: [id number]      ASSESSMENT:  History of Chest Pain,  Had mild recurrence when she was stressed/angry     Pharmacologic nuclear stress test, 2022:  No evidence of ischemia or scar,  Paradoxical small mid anterior perfusion defect  EF 74%     COVID-19 long debra  patient had COVID-19 pneumonia and hospitalized for 16 days with significant hypoxemia.  Did not require mechanical ventilation .   She was admitted from  to 2021. Thereafter she went to [facility]  Generally feels better and has had no recurrence of symptoms     PAF:  During hospitalization patient had episode of atrial fibrillation and was put on Eliquis which was subsequently discontinued when she went back to  normal sinus rhythm  Has not had any recurrence of symptoms     TTE, 2021  EF 50-55%     48 hour Holter monitor:  10/26/2021  Sinus rhythm with average heart rate of 63 per minute.  0 PVCs, 72 PACs, no arrhythmias     Obesity:  BMI is 41.10     Type 2 diabetes mellitus     Hypertension  BP today is 124/70 mmHg with heart rate of 56/min     Mixed hyperlipidemia  LDL 76, triglycerides 157, HDL 39  2022: LDL 64, , HDL 55                            l  Currently on atorvastatin 40 mg daily  08/15/2023: LDL 51, , HDL 59, normal AST and ALT     Abnormal EKG:  Normal sinus rhythm, heart rate 60 per minute cannot rule out old anterior infarct, age undetermined     Arthritis of left knee and right foot  Difficulty in ambulation        RECOMMENDATIONS:  Continue atorvastatin, fish oil losartan hydrochlorothiazide and metoprolol  20 to 30 mmHg compression stockings to be worn regularly during the day  Regular cardiovascular exercise/walking, as much as tolerated aiming for 30 minutes daily   Low-salt, low-carbohydrate, low-cholesterol and low sugar diet  Weight loss      Please call [phone] if any questions. HPI :     Diana De Jesus is a 60y.o. year old female who came for follow up. She generally feels well and denies any cardiac symptoms. She was seen previously for chest pain and had a pharmacologic stress test without any definite evidence of ischemia or scarring. She states that she gets some chest discomfort when she is angry or stressed but not otherwise. REVIEW OF SYSTEMS:  Denies any new or acute cardiac symptoms today.   Denies chest pain, dyspnea, palpitations or syncope    Historical Information   Past Medical History:   Diagnosis Date   • Abrasion of right leg 03/19/2021   • Acute respiratory failure due to COVID-19 [facility]) 10/01/2021   • Age-related cataract of both eyes 5/23/2023   • Arthritis     feet   • BMI 39.0-39.9,adult 5/10/2022   • BMI 40.0-44.9, adult ([address]) 2/20/2023   • CKD (chronic kidney disease) 2/8/2022   • Colon polyp    • COVID-19 long hauler 11/22/2022   • Diabetes mellitus ([address])     FBS today 2/28/22 was 147 at 0930 by patient--feels well   • Dyslipidemia    • Dysphagia, pharyngoesophageal    • Edema of both lower legs 03/19/2021   • Essential hypertension 01/23/2021   • Gastroesophageal reflux disease without esophagitis 01/23/2021   • GERD (gastroesophageal reflux disease)    • Hiatal hernia    • HTN (hypertension)    • Hyperlipemia    • Hypertension    • Hypertensive arterionephrosclerosis of transplanted kidney 01/23/2021   • Hypokalemia 04/20/2021   • Insomnia    • Leukocytosis    • Medicare annual wellness visit, subsequent 11/9/2021   • Mixed hyperlipidemia 01/23/2021   • Numbness    • Obstructive sleep apnea syndrome 01/23/2021    no device   • Other chest pain 8/26/2022   • Pain in ankle 01/23/2021   • Pain in both feet 01/23/2021   • Pain in both knees 01/26/2021   • Pain in right ankle 5/10/2022   • Paroxysmal atrial fibrillation ([address]) 11/09/2021   • Personal history of COVID-19     August 2021   • Right foot pain 5/10/2022   • Right shoulder pain 2023   • Shortness of breath     exertional   • Shoulder pain, right 2022   • Skin lesions    • Sleep apnea    • Type 2 diabetes mellitus without complication, without long-term current use of insulin ([address]) 2021   • Urinary incontinence 2023     Past Surgical History:   Procedure Laterality Date   • ANKLE SURGERY Right    • BREAST BIOPSY Right 05/15/2014    stereotactic biopsy at [facility] Bilateral    • COLONOSCOPY  2017    Dr. Melquiades Tesfaye   • COLONOSCOPY     • GALLBLADDER SURGERY     • HYSTERECTOMY     • MAMMO (HISTORICAL)  10/02/2018   • MAMMO STEREOTACTIC BREAST BIOPSY RIGHT (ALL INC) Right    • UPPER GASTROINTESTINAL ENDOSCOPY     • WISDOM TOOTH EXTRACTION       Social History     Substance and Sexual Activity   Alcohol Use Yes    Comment: -rare     Social History     Substance and Sexual Activity   Drug Use Not Currently    Comment: No drug use - As per [employer]     Social History     Tobacco Use   Smoking Status Former   • Packs/day: 1.50   • Types: Cigarettes   • Quit date: 2004   • Years since quittin.9   Smokeless Tobacco Former   Tobacco Comments    quit at 38     Family History:   Family History   Problem Relation Age of Onset   • Cancer Mother    • Diabetes Mother    • Hyperlipidemia Mother    • Breast cancer Mother         27   • Lung cancer Father    • No Known Problems Sister    • No Known Problems Sister    • No Known Problems Daughter    • Breast cancer Maternal Grandmother    • No Known Problems Maternal Grandfather    • No Known Problems Son    • No Known Problems Maternal Aunt    • Colon cancer Cousin        Meds/Allergies     Allergies   Allergen Reactions   • Aspirin GI Intolerance   • Lisinopril Fatigue       Current Outpatient Medications:   •  atorvastatin (LIPITOR) 40 mg tablet, TAKE 1 TABLET EVERY DAY, Disp: 90 tablet, Rfl: 1  •  Biotin 2500 MCG CAPS, Take by mouth 2 (two) times a day, Disp: , Rfl:   •  Blood Glucose Monitoring Suppl (FreeStyle Lite) TOYIN, daily, Disp: , Rfl:   •  calcium carbonate (OS-AMEE) 600 MG tablet, Take 600 mg by mouth 2 (two) times a day with meals With vitamin D3 , Disp: , Rfl:   •  FREESTYLE LITE test strip, USE TO TEST BLOOD SUGAR TWICE DAILY AS INSTRUCTED, Disp: 200 each, Rfl: 3  •  gabapentin (NEURONTIN) 300 mg capsule, Take 300 mg by mouth 2 (two) times a day, Disp: , Rfl:   •  glucosamine-chondroitin 500-400 MG tablet, Take 1 tablet by mouth 3 (three) times a day, Disp: , Rfl:   •  Lancets (freestyle) lancets, USE 2 TIMES A DAY AS INSTRUCTED, Disp: 200 each, Rfl: 3  •  losartan-hydrochlorothiazide (HYZAAR) 100-12.5 MG per tablet, TAKE 1 TABLET EVERY DAY, Disp: 90 tablet, Rfl: 1  •  meloxicam (MOBIC) 15 mg tablet, Take 15 mg by mouth daily, Disp: , Rfl:   •  metFORMIN (GLUCOPHAGE) 500 mg tablet, TAKE 1 TABLET EVERY MORNING AND TAKE 2 TABLETS EVERY EVENING BEFORE MEALS (Patient taking differently: Take 500 mg by mouth 3 (three) times a day with meals), Disp: 270 tablet, Rfl: 1  •  metoprolol tartrate (LOPRESSOR) 50 mg tablet, TAKE 1 TABLET TWICE DAILY, Disp: 180 tablet, Rfl: 1  •  Multiple Vitamin (MULTIVITAMIN) capsule, Take 1 capsule by mouth daily, Disp: , Rfl:   •  Omega-3 Fatty Acids (fish oil) 1,000 mg, TAKE 2 CAPSULES EVERY DAY, Disp: 180 capsule, Rfl: 3  •  pantoprazole (PROTONIX) 40 mg tablet, TAKE 1 TABLET BY MOUTH 30 MINUTES BEFORE BREAKFAST, Disp: 90 tablet, Rfl: 3  •  traMADol (ULTRAM) 50 mg tablet, Take 50 mg by mouth every 8 (eight) hours as needed BID, Disp: , Rfl: 0    Vitals: There were no vitals taken for this visit. There is no height or weight on file to calculate BMI. There were no vitals filed for this visit.   BP Readings from Last 3 Encounters:   05/23/23 124/74   03/01/23 139/85   02/20/23 124/76       Physical Exam:  Physical Exam    Neurologic:  Alert & oriented x 3, no new focal deficits, Not in any acute distress,  Constitutional: Morbidly obese  Eyes:  Pupil equal and reacting to light, conjunctiva normal,   HENT:  Atraumatic, oropharynx moist, Neck- normal range of motion, no tenderness,  Neck supple, No JVP, No LNP   Respiratory:  Bilateral air entry, mostly clear to auscultation  Cardiovascular: S1-S2 regular with a I/VI systolic murmur   GI:  Soft, nondistended, normal bowel sounds, nontender, no hepatosplenomegaly appreciated. Musculoskeletal: no tenderness, no deformities. Skin:  Well hydrated, no rash   Lymphatic:  No lymphadenopathy noted   Extremities: Mild lower extremity edema with varicose veins        Diagnostic Studies Review Cardio:      EKG: Sinus bradycardia with first-degree AV block, heart rate 56/min    Cardiac testing:   Results for orders placed during the hospital encounter of 21    Echo complete with contrast if indicated    [address]  [phone]    Transthoracic Echocardiogram  Limited 2D, M-mode, Doppler, and Color Doppler    Study date:  26-Aug-2021    Patient: Jose Pate  MR number: [id number]  Account number: [de-identified]  : 1960  Age: 57 years  Gender: Female  Status: Inpatient  Location: Bedside  Height: 63 in  Weight: 239.6 lb  BP: 129/ 75 mmHg    Indications: Acute Respiratory failure due to Covid 19. Diagnoses: I48.0 - Atrial fibrillation    Sonographer:  CARLOS Benito  Primary Physician:  Jose Cohen MD  Referring Physician:  Oanh Dee DO  Group:  [facility]  Interpreting Physician:  Ventura Conte MD    SUMMARY    PROCEDURE INFORMATION:  This was a technically difficult study. LEFT VENTRICLE:  Systolic function was normal. Ejection fraction was estimated in the range of 50 % to 55 %. There were no regional wall motion abnormalities. Wall thickness was mildly increased.     HISTORY: PRIOR HISTORY: Hyperlipidemia,A.Fib.,HTN,Diabetes,obstructive sleep apnea,edema,Covid 19 virus. PROCEDURE: The procedure was performed at the bedside. This was a routine study. The transthoracic approach was used. The study included limited 2D imaging, M-mode, limited spectral Doppler, and color Doppler. The heart rate was 92 bpm, at  the start of the study. Images were obtained from the parasternal, apical, subcostal, and suprasternal notch acoustic windows. This was a technically difficult study. LEFT VENTRICLE: Size was normal. Systolic function was normal. Ejection fraction was estimated in the range of 50 % to 55 %. There were no regional wall motion abnormalities. Wall thickness was mildly increased. No evidence of apical  thrombus. RIGHT VENTRICLE: The size was normal. Systolic function was normal. Wall thickness was normal.    LEFT ATRIUM: Size was at the upper limits of normal.    RIGHT ATRIUM: Size was at the upper limits of normal.    MITRAL VALVE: Valve structure was normal. There was normal leaflet separation. DOPPLER: The transmitral velocity was within the normal range. There was no evidence for stenosis. There was no significant regurgitation. AORTIC VALVE: The valve was probably trileaflet. Leaflets exhibited normal thickness and normal cuspal separation. DOPPLER: Transaortic velocity was within the normal range. There was no evidence for stenosis. There was no significant  regurgitation. TRICUSPID VALVE: The valve structure was normal. There was normal leaflet separation. DOPPLER: The transtricuspid velocity was within the normal range. There was no evidence for stenosis. There was trace regurgitation. PULMONIC VALVE: Leaflets exhibited normal thickness, no calcification, and normal cuspal separation. DOPPLER: The transpulmonic velocity was within the normal range. There was no significant regurgitation. PERICARDIUM: There was no pericardial effusion.  The pericardium was normal in appearance. AORTA: The root exhibited normal size. SYSTEMIC VEINS: IVC: The inferior vena cava was not well visualized. SYSTEM MEASUREMENT TABLES    2D  %FS: 25.19 %  Ao Diam: 3.38 cm  EDV(Teich): 91.99 ml  EF Biplane: 44.38 %  ESV(Teich): 46.07 ml  IVSd: 1.15 cm  LA Area: 22.09 cm2  LA Diam: 3.12 cm  LVEDV MOD A2C: 80.2 ml  LVEDV MOD A4C: 98.11 ml  LVEDV MOD BP: 92.99 ml  LVEDVInd MOD BP: 44.49 ml/m2  LVEF MOD A2C: 26.42 %  LVEF MOD A4C: 54.46 %  LVESV MOD A2C: 59.01 ml  LVESV MOD A4C: 44.68 ml  LVESV MOD BP: 51.72 ml  LVESVInd MOD BP: 24.75 ml/m2  LVIDd: 4.49 cm  LVIDs: 3.36 cm  LVLd A2C: 6.93 cm  LVLd A4C: 7.65 cm  LVLs A2C: 6.03 cm  LVLs A4C: 6.19 cm  LVPWd: 1.06 cm  RA Area: 16.37 cm2  RVIDd: 2.66 cm  SV (Teich): 45.92 ml  SV MOD A2C: 21.19 ml  SV MOD A4C: 53.43 ml    [facility] Accredited Echocardiography Laboratory    Prepared and electronically signed by    Amita Wright MD  Signed 29-Aug-2021 15:42:44      Results for orders placed during the hospital encounter of 11/14/22    NM myocardial perfusion spect (rx stress and/or rest)    Interpretation Summary  •  Stress ECG: The ECG was not diagnostic due to pharmacological (vasodilator) stress. •  Perfusion: There is a left ventricular perfusion defect that is small in size with mild reduction in uptake present in the mid anterior location(s) that is paradoxical.  •  Stress Function: Left ventricular function post-stress is normal. Post-stress ejection fraction is 74 %. No ischemia or scar. Results for orders placed during the hospital encounter of 10/26/21    Holter monitor    Interpretation Summary  Indication: PAF    The patient was in sinus rhythm throughout the recording.     Minimum HR: 57  Average HR: 73  Maximum HR: 124    Premature ventricular contractions: 0  Ventricular runs: 0    Supraventricular contractions: 72  Supraventricular runs: 0    Longest RR: 1.2 sec    Arrhythmias: None    Diary submitted: yes but no symptoms reported      Impression    Normal holter with rare PAC's      Signed by: Maryjo Lewis MD      Imaging:  Chest X-Ray:   No Chest XR results available for this patient. CT-scan of the chest:     No CTA results available for this patient.   Lab Review   Lab Results   Component Value Date    WBC 8.45 08/15/2023    HGB 13.0 08/15/2023    HCT 41.7 08/15/2023    MCV 91 08/15/2023    RDW 13.3 08/15/2023     08/15/2023     BMP:  Lab Results   Component Value Date    SODIUM 138 08/15/2023    K 4.7 08/15/2023     08/15/2023    CO2 31 08/15/2023    BUN 17 08/15/2023    CREATININE 1.04 08/15/2023    GLUC 144 (H) 11/01/2021    GLUF 138 (H) 08/15/2023    CALCIUM 9.6 08/15/2023    CORRECTEDCA 9.2 08/21/2021    EGFR 57 08/15/2023    MG 1.6 11/01/2021     LFT:  Lab Results   Component Value Date    AST 15 08/15/2023    ALT 16 08/15/2023    ALKPHOS 89 08/15/2023    TP 7.4 08/15/2023    ALB 3.9 08/15/2023      No components found for: "TSH3"  No results found for: "[id number]"  Lab Results   Component Value Date    HGBA1C 6.8 (H) 08/15/2023     Lipid Profile:   Lab Results   Component Value Date    CHOLESTEROL 140 08/15/2023    HDL 59 08/15/2023    LDLCALC 51 08/15/2023    TRIG 148 08/15/2023     Lab Results   Component Value Date    CHOLESTEROL 140 08/15/2023    CHOLESTEROL 132 02/15/2023     Lab Results   Component Value Date    CKTOTAL 341 (H) 08/14/2021    CKMB 1.1 08/14/2021    CKMBINDEX <1.0 08/14/2021    TROPONINI <0.02 08/14/2021     No results found for: "NTBNP"   Recent Results (from the past 672 hour(s))   Comprehensive metabolic panel    Collection Time: 08/15/23 11:23 AM   Result Value Ref Range    Sodium 138 135 - 147 mmol/L    Potassium 4.7 3.5 - 5.3 mmol/L    Chloride 101 96 - 108 mmol/L    CO2 31 21 - 32 mmol/L    ANION GAP 6 mmol/L    BUN 17 5 - 25 mg/dL    Creatinine 1.04 0.60 - 1.30 mg/dL    Glucose, Fasting 138 (H) 65 - 99 mg/dL    Calcium 9.6 8.4 - 10.2 mg/dL    AST 15 13 - 39 U/L    ALT 16 7 - 52 U/L    Alkaline Phosphatase 89 34 - 104 U/L    Total Protein 7.4 6.4 - 8.4 g/dL    Albumin 3.9 3.5 - 5.0 g/dL    Total Bilirubin 0.55 0.20 - 1.00 mg/dL    eGFR 57 ml/min/1.73sq m   Lipid panel    Collection Time: 08/15/23 11:23 AM   Result Value Ref Range    Cholesterol 140 See Comment mg/dL    Triglycerides 148 See Comment mg/dL    HDL, Direct 59 >=50 mg/dL    LDL Calculated 51 0 - 100 mg/dL    Non-HDL-Chol (CHOL-HDL) 81 mg/dl   CBC and differential    Collection Time: 08/15/23 11:23 AM   Result Value Ref Range    WBC 8.45 4.31 - 10.16 Thousand/uL    RBC 4.58 3.81 - 5.12 Million/uL    Hemoglobin 13.0 11.5 - 15.4 g/dL    Hematocrit 41.7 34.8 - 46.1 %    MCV 91 82 - 98 fL    MCH 28.4 26.8 - 34.3 pg    MCHC 31.2 (L) 31.4 - 37.4 g/dL    RDW 13.3 11.6 - 15.1 %    MPV 10.4 8.9 - 12.7 fL    Platelets 759 695 - 048 Thousands/uL    nRBC 0 /100 WBCs    Neutrophils Relative 39 (L) 43 - 75 %    Immat GRANS % 0 0 - 2 %    Lymphocytes Relative 45 (H) 14 - 44 %    Monocytes Relative 10 4 - 12 %    Eosinophils Relative 5 0 - 6 %    Basophils Relative 1 0 - 1 %    Neutrophils Absolute 3.27 1.85 - 7.62 Thousands/µL    Immature Grans Absolute 0.02 0.00 - 0.20 Thousand/uL    Lymphocytes Absolute 3.86 0.60 - 4.47 Thousands/µL    Monocytes Absolute 0.82 0.17 - 1.22 Thousand/µL    Eosinophils Absolute 0.43 0.00 - 0.61 Thousand/µL    Basophils Absolute 0.05 0.00 - 0.10 Thousands/µL   Hemoglobin A1C    Collection Time: 08/15/23 11:23 AM   Result Value Ref Range    Hemoglobin A1C 6.8 (H) Normal 4.0-5.6%; PreDiabetic 5.7-6.4%; Diabetic >=6.5%; Glycemic control for adults with diabetes <7.0% %     mg/dl             Dr. Ashly Pierre MD, [facility] [place]      "This note has been constructed using a voice recognition system. Therefore there may be syntax, spelling, and/or grammatical errors.  Please call if you have any questions. "

## 2023-08-18 ENCOUNTER — OFFICE VISIT (OUTPATIENT)
Dept: CARDIOLOGY CLINIC | Facility: CLINIC | Age: 63
End: 2023-08-18
Payer: MEDICARE

## 2023-08-18 VITALS
DIASTOLIC BLOOD PRESSURE: 70 MMHG | HEIGHT: 63 IN | BODY MASS INDEX: 41.11 KG/M2 | HEART RATE: 56 BPM | WEIGHT: 232 LBS | OXYGEN SATURATION: 94 % | SYSTOLIC BLOOD PRESSURE: 124 MMHG

## 2023-08-18 DIAGNOSIS — I10 ESSENTIAL HYPERTENSION: Chronic | ICD-10-CM

## 2023-08-18 DIAGNOSIS — R07.89 OTHER CHEST PAIN: ICD-10-CM

## 2023-08-18 DIAGNOSIS — I48.0 PAROXYSMAL ATRIAL FIBRILLATION (HCC): Primary | ICD-10-CM

## 2023-08-18 DIAGNOSIS — R60.0 EDEMA OF BOTH LOWER LEGS: ICD-10-CM

## 2023-08-18 DIAGNOSIS — E78.2 MIXED HYPERLIPIDEMIA: Chronic | ICD-10-CM

## 2023-08-18 PROCEDURE — 93000 ELECTROCARDIOGRAM COMPLETE: CPT | Performed by: INTERNAL MEDICINE

## 2023-08-18 PROCEDURE — 99214 OFFICE O/P EST MOD 30 MIN: CPT | Performed by: INTERNAL MEDICINE

## 2023-08-21 ENCOUNTER — OFFICE VISIT (OUTPATIENT)
Dept: INTERNAL MEDICINE CLINIC | Facility: CLINIC | Age: 63
End: 2023-08-21
Payer: MEDICARE

## 2023-08-21 VITALS
SYSTOLIC BLOOD PRESSURE: 128 MMHG | OXYGEN SATURATION: 99 % | BODY MASS INDEX: 40.93 KG/M2 | WEIGHT: 231 LBS | DIASTOLIC BLOOD PRESSURE: 76 MMHG | TEMPERATURE: 97.9 F | HEART RATE: 70 BPM | HEIGHT: 63 IN | RESPIRATION RATE: 18 BRPM

## 2023-08-21 DIAGNOSIS — R60.0 EDEMA OF BOTH LOWER LEGS: ICD-10-CM

## 2023-08-21 DIAGNOSIS — G89.29 CHRONIC RIGHT SHOULDER PAIN: ICD-10-CM

## 2023-08-21 DIAGNOSIS — I48.0 PAROXYSMAL ATRIAL FIBRILLATION (HCC): ICD-10-CM

## 2023-08-21 DIAGNOSIS — I10 ESSENTIAL HYPERTENSION: Chronic | ICD-10-CM

## 2023-08-21 DIAGNOSIS — M79.672 PAIN IN BOTH FEET: Chronic | ICD-10-CM

## 2023-08-21 DIAGNOSIS — M79.671 PAIN IN BOTH FEET: Chronic | ICD-10-CM

## 2023-08-21 DIAGNOSIS — E78.2 MIXED HYPERLIPIDEMIA: Chronic | ICD-10-CM

## 2023-08-21 DIAGNOSIS — M25.511 CHRONIC RIGHT SHOULDER PAIN: ICD-10-CM

## 2023-08-21 DIAGNOSIS — E11.9 TYPE 2 DIABETES MELLITUS WITHOUT COMPLICATION, WITHOUT LONG-TERM CURRENT USE OF INSULIN (HCC): Primary | Chronic | ICD-10-CM

## 2023-08-21 PROCEDURE — 99214 OFFICE O/P EST MOD 30 MIN: CPT | Performed by: INTERNAL MEDICINE

## 2023-08-21 NOTE — PROGRESS NOTES
Assessment/Plan:    1. Type 2 diabetes mellitus without complication, without long-term current use of insulin (HCC)  -     Basic metabolic panel; Future; Expected date: 11/21/2023  -     Basic metabolic panel; Future  -     Albumin / creatinine urine ratio  -     UA (URINE) with reflex to Scope    2. Essential hypertension  -     Basic metabolic panel; Future; Expected date: 11/21/2023  -     Basic metabolic panel; Future  -     UA (URINE) with reflex to Scope    3. Paroxysmal atrial fibrillation (HCC)    4. BMI 40.0-44.9, adult ([address])    5. Mixed hyperlipidemia    6. Chronic right shoulder pain    7. Pain in both feet    8. Edema of both lower legs    Diabetes mellitus well controlled. Hemoglobin A1c 6.8. Patient denies any hypoglycemia. Advised to continue present medication and 1800-calorie ADA diet. Blood pressure well controlled so advised to continue same medication and low-salt diet. Ken 140, triglyceride 148, HDL 59, LDL 51 advised to continue present dose of atorvastatin and fish oil. Advised for low-cholesterol low carbs diet. She has right shoulder pain she was seen by orthopedic specialist was advised to start physical therapy but she has not started physical therapy yet. History of paroxysmal atrial fibrillation now in normal sinus rhythm. Off of anticoagulation. Has been seen and followed by cardiologist.  She gets a pain in the both feet lateral aspect as well as burning pain in the bottom of the feet as well as numbness. Most likely neuropathy. Has been seen and followed by podiatrist Dr. Fartun Perez on gabapentin plus meloxicam plus tramadol. Advised to follow with podiatrist regularly. She has edema of lower leg most likely dependent and  varicose veins and warm weather. She was seen by cardiologist was recently prescribed elastic stockings to wear during daytime. She was advised for pneumonia vaccination as well as influenza vaccination but she refused.   She does not want any COVID vaccination. Subjective: Patient presents for follow-up. Patient ID: Teresa Castaneda is a 60 y.o. female. HPI   68-year-old white female patient presents to follow-up her medical problems. She denies any chest pain, shortness of breath, pain in abdomen. Any cough, fever, chills. Denies nausea vomiting diarrhea or constipation.   She has been seen and followed by cardiologist, podiatrist.    The following portions of the patient's history were reviewed and updated as appropriate:     Past Medical History:  She has a past medical history of Abrasion of right leg (03/19/2021), Acute respiratory failure due to COVID-19 [facility]) (10/01/2021), Age-related cataract of both eyes (5/23/2023), Arthritis, BMI 39.0-39.9,adult (5/10/2022), BMI 40.0-44.9, adult ([address]) (2/20/2023), CKD (chronic kidney disease) (2/8/2022), Colon polyp, COVID-19 long hauler (11/22/2022), Diabetes mellitus ([address]), Dyslipidemia, Dysphagia, pharyngoesophageal, Edema of both lower legs (03/19/2021), Essential hypertension (01/23/2021), Gastroesophageal reflux disease without esophagitis (01/23/2021), GERD (gastroesophageal reflux disease), Hiatal hernia, HTN (hypertension), Hyperlipemia, Hypertension, Hypertensive arterionephrosclerosis of transplanted kidney (01/23/2021), Hypokalemia (04/20/2021), Insomnia, Leukocytosis, Medicare annual wellness visit, subsequent (11/9/2021), Mixed hyperlipidemia (01/23/2021), Numbness, Obstructive sleep apnea syndrome (01/23/2021), Other chest pain (8/26/2022), Pain in ankle (01/23/2021), Pain in both feet (01/23/2021), Pain in both knees (01/26/2021), Pain in right ankle (5/10/2022), Paroxysmal atrial fibrillation ([address]) (11/09/2021), Personal history of COVID-19, Right foot pain (5/10/2022), Right shoulder pain (2/20/2023), Shortness of breath, Shoulder pain, right (2/8/2022), Skin lesions, Sleep apnea, Type 2 diabetes mellitus without complication, without long-term current use of insulin ([address]) (01/23/2021), and Urinary incontinence (2/20/2023). ,  _______________________________________________________________________  Past Surgical History:   has a past surgical history that includes Carpal tunnel release (Bilateral, 1990); Gallbladder surgery (2004); Hysterectomy (2004); Ankle surgery (Right, 2009/2012); Mammo stereotactic breast biopsy right (all inc) (Right, 2014); Mammo (historical) (10/02/2018); Colonoscopy (03/07/2017); Breast biopsy (Right, 05/15/2014); Colonoscopy; Upper gastrointestinal endoscopy; and [place] tooth extraction. ,  _______________________________________________________________________  Family History:  family history includes Breast cancer in her maternal grandmother and mother; Cancer in her mother; Colon cancer in her cousin; Diabetes in her mother; Hyperlipidemia in her mother; Lung cancer in her father; No Known Problems in her daughter, maternal aunt, maternal grandfather, sister, sister, and son.,  _______________________________________________________________________  Social History:   reports that she quit smoking about 18 years ago. Her smoking use included cigarettes. She smoked an average of 1.5 packs per day. She has quit using smokeless tobacco. She reports current alcohol use. She reports that she does not currently use drugs. ,  _______________________________________________________________________  Allergies:  is allergic to aspirin and lisinopril. .  _______________________________________________________________________  Current Outpatient Medications   Medication Sig Dispense Refill   • atorvastatin (LIPITOR) 40 mg tablet TAKE 1 TABLET EVERY DAY 90 tablet 1   • Biotin 2500 MCG CAPS Take by mouth 2 (two) times a day     • Blood Glucose Monitoring Suppl (FreeStyle Lite) TOYIN daily     • calcium carbonate (OS-AMEE) 600 MG tablet Take 600 mg by mouth 2 (two) times a day with meals With vitamin D3      • FREESTYLE LITE test strip USE TO TEST BLOOD SUGAR TWICE DAILY AS INSTRUCTED 200 each 3   • gabapentin (NEURONTIN) 300 mg capsule Take 300 mg by mouth 2 (two) times a day     • glucosamine-chondroitin 500-400 MG tablet Take 1 tablet by mouth 3 (three) times a day     • Lancets (freestyle) lancets USE 2 TIMES A DAY AS INSTRUCTED 200 each 3   • losartan-hydrochlorothiazide (HYZAAR) 100-12.5 MG per tablet TAKE 1 TABLET EVERY DAY 90 tablet 1   • meloxicam (MOBIC) 15 mg tablet Take 15 mg by mouth daily     • metFORMIN (GLUCOPHAGE) 500 mg tablet TAKE 1 TABLET EVERY MORNING AND TAKE 2 TABLETS EVERY EVENING BEFORE MEALS (Patient taking differently: Take 500 mg by mouth 3 (three) times a day with meals) 270 tablet 1   • metoprolol tartrate (LOPRESSOR) 50 mg tablet TAKE 1 TABLET TWICE DAILY 180 tablet 1   • Multiple Vitamin (MULTIVITAMIN) capsule Take 1 capsule by mouth daily     • Omega-3 Fatty Acids (fish oil) 1,000 mg TAKE 2 CAPSULES EVERY  capsule 3   • pantoprazole (PROTONIX) 40 mg tablet TAKE 1 TABLET BY MOUTH 30 MINUTES BEFORE BREAKFAST 90 tablet 3   • traMADol (ULTRAM) 50 mg tablet Take 50 mg by mouth every 8 (eight) hours as needed BID  0     No current facility-administered medications for this visit.     _______________________________________________________________________  Review of Systems   Constitutional: Negative for chills and fever. HENT: Negative for congestion, ear pain, hearing loss, nosebleeds, sinus pain, sore throat and trouble swallowing. Eyes: Negative for discharge, redness and visual disturbance. Respiratory: Negative for cough, chest tightness and shortness of breath. Cardiovascular: Negative for chest pain and palpitations. Gastrointestinal: Negative for abdominal pain, blood in stool, constipation, diarrhea, nausea and vomiting. Genitourinary: Negative for dysuria, flank pain and hematuria. Musculoskeletal: Positive for arthralgias ( Pain in the feet more on the lateral aspect. Also pain in right shoulder. ).  Negative for myalgias and neck pain. Skin: Negative for color change and rash. Neurological: Positive for numbness ( Burning sensation and numbness bottom of the feet. ). Negative for dizziness, speech difficulty, weakness and headaches. Hematological: Does not bruise/bleed easily. Psychiatric/Behavioral: Negative for agitation and behavioral problems. Objective:  Vitals:    08/21/23 1420   BP: 128/76   BP Location: Left arm   Patient Position: Sitting   Cuff Size: Large   Pulse: 70   Resp: 18   Temp: 97.9 °F (36.6 °C)   TempSrc: Temporal   SpO2: 99%   Weight: 105 kg (231 lb)   Height: 5' 3" (1.6 m)     Body mass index is 40.92 kg/m². Physical Exam  Vitals and nursing note reviewed. Constitutional:       General: She is not in acute distress. Appearance: She is obese. HENT:      Head: Normocephalic and atraumatic. Right Ear: Ear canal and external ear normal.      Left Ear: Ear canal and external ear normal.      Nose: Nose normal.      Mouth/Throat:      Mouth: Mucous membranes are moist.   Eyes:      General: No scleral icterus. Right eye: No discharge. Left eye: No discharge. Extraocular Movements: Extraocular movements intact. Conjunctiva/sclera: Conjunctivae normal.   Cardiovascular:      Rate and Rhythm: Normal rate and regular rhythm. Pulses: Normal pulses. no weak pulses          Dorsalis pedis pulses are 2+ on the right side and 2+ on the left side. Posterior tibial pulses are 2+ on the right side and 2+ on the left side. Heart sounds: No murmur heard. Pulmonary:      Effort: Pulmonary effort is normal. No respiratory distress. Breath sounds: Normal breath sounds. Abdominal:      General: Bowel sounds are normal.      Palpations: Abdomen is soft. Tenderness: There is no abdominal tenderness. Musculoskeletal:         General: Tenderness ( Right shoulder particular when she tried to lift above head.) present. Normal range of motion.       Cervical back: Normal range of motion and neck supple. No muscular tenderness. Right lower leg: No edema. Left lower leg: No edema. Feet:      Right foot:      Skin integrity: No ulcer, skin breakdown, erythema, warmth or dry skin. Left foot:      Skin integrity: No ulcer, skin breakdown, erythema, warmth or dry skin. Skin:     General: Skin is warm. Findings: No rash. Neurological:      General: No focal deficit present. Mental Status: She is alert and oriented to person, place, and time. Motor: No weakness. Coordination: Coordination normal.   Psychiatric:         Mood and Affect: Mood normal.         Behavior: Behavior normal.             Patient's shoes and socks removed. Right Foot/Ankle   Right Foot Inspection  Skin Exam: skin normal and skin intact. No dry skin, no warmth, no erythema, no maceration, no abnormal color, no pre-ulcer and no ulcer. Toe Exam: ROM and strength within normal limits. Sensory   Monofilament testing: intact    Vascular  The right DP pulse is 2+. The right PT pulse is 2+. Left Foot/Ankle  Left Foot Inspection  Skin Exam: skin normal and skin intact. No dry skin, no warmth, no erythema, no maceration, normal color, no pre-ulcer and no ulcer. Toe Exam: ROM and strength within normal limits. Sensory   Monofilament testing: intact    Vascular  The left DP pulse is 2+. The left PT pulse is 2+. Assign Risk Category  No deformity present  No loss of protective sensation  No weak pulses  Risk: 0   I spent 30 minutes with the patient today.   More than 50% time spent for reviewing of external notes, reviewing of the results of diagnostics test, management of care, patient education and ordering of test.

## 2023-08-21 NOTE — PATIENT INSTRUCTIONS
Type 2 Diabetes Management for Adults   AMBULATORY CARE:   Type 2 diabetes  is a disease that affects how your body uses glucose (sugar). Either your body cannot make enough insulin, or it cannot use the insulin correctly. It is important to keep diabetes controlled to prevent damage to your heart, blood vessels, and other organs. Management will help you feel well and enjoy your daily activities. Your diabetes care team providers can help you make a plan to fit diabetes care into your schedule. Your plan can change over time to fit your needs and your family's needs. Have someone call your local emergency number (911 in the [address]) if:   You cannot be woken. You have signs of diabetic ketoacidosis:     confusion, fatigue    vomiting    rapid heartbeat    fruity smelling breath    extreme thirst    dry mouth and skin    You have any of the following signs of a heart attack:      Squeezing, pressure, or pain in your chest    You may  also have any of the following:     Discomfort or pain in your back, neck, jaw, stomach, or arm    Shortness of breath    Nausea or vomiting    Lightheadedness or a sudden cold sweat    You have any of the following signs of a stroke:      Numbness or drooping on one side of your face     Weakness in an arm or leg    Confusion or difficulty speaking    Dizziness, a severe headache, or vision loss    Call your doctor or diabetes care team provider if:   You have a sore or wound that will not heal.    You have a change in the amount you urinate. Your blood sugar levels are higher than your target goals. You often have lower blood sugar levels than your target goals. Your skin is red, dry, warm, or swollen. You have trouble coping with diabetes, or you feel anxious or depressed. You have questions or concerns about your condition or care. What you need to know about high blood sugar levels:  High blood sugar levels may not cause any symptoms.  You may feel more thirsty or urinate more often than usual. Over time, high blood sugar levels can damage your nerves, blood vessels, tissues, and organs. The following can increase your blood sugar levels:  Large meals or large amounts of carbohydrates at one time    Less physical activity    Stress    Illness    A lower dose of diabetes medicine or insulin, or a late dose    What you need to know about low blood sugar levels:  Symptoms include feeling shaky, dizzy, irritable, or confused. You can prevent symptoms by keeping your blood sugar levels from going too low. Treat a low blood sugar level right away:      Drink 4 ounces of juice or have 1 tube of glucose gel. Check your blood sugar level again 10 to 15 minutes later. When the level goes back to normal, eat a meal or snack to prevent another decrease. Keep glucose gel, raisins, or hard candy with you at all times to treat a low blood sugar level. Your blood sugar level can get too low if you take diabetes medicine or insulin and do not eat enough food. If you use insulin, check your blood sugar level before you exercise. If your blood sugar level is below 100 mg/dL, eat 4 crackers or 2 ounces of raisins, or drink 4 ounces of juice. Check your level every 30 minutes if you exercise longer than 1 hour. You may need a snack during or after exercise. What you can do to manage your blood sugar levels:   Check your blood sugar levels as directed and as needed. Several items are available to use to check your levels. You may need to check by testing a drop of blood in a glucose monitor. You may instead be given a continuous glucose monitoring (CGM) device. The device is worn at all times. The CGM checks your blood sugar level every 5 minutes. It sends results to an electronic device such as a smart phone. A CGM can be used with or without an insulin pump. You and your diabetes care team providers will decide on the best method for you.  The goal for blood sugar levels before meals  is between 80 and 130 mg/dL and 2 hours after eating  is lower than 180 mg/dL. Make healthy food choices. Work with a dietitian to develop a meal plan that works for you and your schedule. A dietitian can help you learn how to eat the right amount of carbohydrates during your meals and snacks. Carbohydrates can raise your blood sugar level if you eat too many at one time. Examples of foods that contain carbohydrates are breads, cereals, rice, pasta, and sweets. Eat high-fiber foods as directed. Fiber helps improve blood sugar levels. Fiber also lowers your risk for heart disease and other problems diabetes can cause. Examples of high-fiber foods include vegetables, whole-grain bread, and beans such as pritchard beans. Your dietitian can tell you how much fiber to have each day. Get regular physical activity. Physical activity can help you get to your target blood sugar level goal and manage your weight. Get at least 150 minutes of moderate to vigorous aerobic physical activity each week. Do not miss more than 2 days in a row. Do not sit longer than 30 minutes at a time. Your healthcare provider can help you create an activity plan. The plan can include the best activities for you and can help you build your strength and endurance. Maintain a healthy weight. Ask your team what a healthy weight is for you. A healthy weight can help you control diabetes and prevent heart disease. Ask your team to help you create a weight loss plan, if needed. Weight loss can help make a difference in managing diabetes. Your team will help you set a weight-loss goal, such as 10 to 15 pounds, or 5% of your extra weight. Together you and your team can set manageable weight loss goals. Take your diabetes medicine or insulin as directed. You may need diabetes medicine, insulin, or both to help control your blood sugar levels.  Your healthcare provider will teach you how and when to take your diabetes medicine or insulin. You will also be taught about side effects oral diabetes medicine can cause. Insulin may be injected or given through a pump or pen. You and your providers will decide on the best method for you: An insulin pump  is an implanted device that gives your insulin 24 hours a day. An insulin pump prevents the need for multiple insulin injections in a day. An insulin pen  is a device prefilled with the right amount of insulin. You and your family members will be taught how to draw up and give insulin  if this is the best method for you. Your providers will also teach you how to dispose of needles and syringes. You will learn how much insulin you need  and when to give it. You will be taught when not to give insulin. You will also be taught what to do if your blood sugar level drops too low. This may happen if you take insulin and do not eat the right amount of carbohydrates. More ways to manage type 2 diabetes:   Wear medical alert identification. Wear medical alert jewelry or carry a card that says you have diabetes. Ask your provider where to get these items. Do not smoke. Nicotine and other chemicals in cigarettes and cigars can cause lung and blood vessel damage. It also makes it more difficult to manage your diabetes. Ask your provider for information if you currently smoke and need help to quit. Do not use e-cigarettes or smokeless tobacco in place of cigarettes or to help you quit. They still contain nicotine. Check your feet each day for cuts, scratches, calluses, or other wounds. Look for redness and swelling, and feel for warmth. Wear shoes that fit well. Check your shoes for rocks or other objects that can hurt your feet. Do not walk barefoot or wear shoes without socks. Wear cotton socks to help keep your feet dry. Ask about vaccines you may need.   You have a higher risk for serious illness if you get the flu, pneumonia, COVID-19, or hepatitis. Ask your provider if you should get vaccines to prevent these or other diseases, and when to get the vaccines. Talk to your provider if you become stressed about diabetes care. Sometimes being able to fit diabetes care into your life can cause increased stress. The stress can cause you not to take care of yourself properly. Your care team providers can help by offering tips about self-care. Your providers may suggest you talk to a mental health provider who can listen and offer help with self-care issues. Have your A1c checked as directed. Your provider may check your A1c every 3 months, or 2 times each year if your diabetes is controlled. An A1c test shows the average amount of sugar in your blood over the past 2 to 3 months. Your provider will tell you what your A1c level should be. Have screening tests as directed. Your provider may recommend screening for complications of diabetes and other conditions that may develop. Some screenings may begin right away and some may happen within the first 5 years of diagnosis:    Examples of diabetes complications  include kidney problems, high cholesterol, high blood pressure, blood vessel problems, eye problems, and sleep apnea. You may be screened for a low vitamin B level  if you take oral diabetes medicine for a long time. Women of childbearing years may be screened  for polycystic ovarian syndrome (PCOS). Follow up with your doctor or diabetes care team providers as directed: You may need to have blood tests done before your follow-up visit. The test results will show if changes need to be made in your treatment or self-care. Talk to your provider if you cannot afford your medicine. Write down your questions so you remember to ask them during your visits. © Copyright Jessica Mar 2022 Information is for End User's use only and may not be sold, redistributed or otherwise used for commercial purposes.   The above information is an  only. It is not intended as medical advice for individual conditions or treatments. Talk to your doctor, nurse or pharmacist before following any medical regimen to see if it is safe and effective for you. Type 2 Diabetes Management for Adults   AMBULATORY CARE:   Type 2 diabetes  is a disease that affects how your body uses glucose (sugar). Either your body cannot make enough insulin, or it cannot use the insulin correctly. It is important to keep diabetes controlled to prevent damage to your heart, blood vessels, and other organs. Management will help you feel well and enjoy your daily activities. Your diabetes care team providers can help you make a plan to fit diabetes care into your schedule. Your plan can change over time to fit your needs and your family's needs. Have someone call your local emergency number (911 in the [address]) if:   You cannot be woken. You have signs of diabetic ketoacidosis:     confusion, fatigue    vomiting    rapid heartbeat    fruity smelling breath    extreme thirst    dry mouth and skin    You have any of the following signs of a heart attack:      Squeezing, pressure, or pain in your chest    You may  also have any of the following:     Discomfort or pain in your back, neck, jaw, stomach, or arm    Shortness of breath    Nausea or vomiting    Lightheadedness or a sudden cold sweat    You have any of the following signs of a stroke:      Numbness or drooping on one side of your face     Weakness in an arm or leg    Confusion or difficulty speaking    Dizziness, a severe headache, or vision loss    Call your doctor or diabetes care team provider if:   You have a sore or wound that will not heal.    You have a change in the amount you urinate. Your blood sugar levels are higher than your target goals. You often have lower blood sugar levels than your target goals. Your skin is red, dry, warm, or swollen.     You have trouble coping with diabetes, or you feel anxious or depressed. You have questions or concerns about your condition or care. What you need to know about high blood sugar levels:  High blood sugar levels may not cause any symptoms. You may feel more thirsty or urinate more often than usual. Over time, high blood sugar levels can damage your nerves, blood vessels, tissues, and organs. The following can increase your blood sugar levels:  Large meals or large amounts of carbohydrates at one time    Less physical activity    Stress    Illness    A lower dose of diabetes medicine or insulin, or a late dose    What you need to know about low blood sugar levels:  Symptoms include feeling shaky, dizzy, irritable, or confused. You can prevent symptoms by keeping your blood sugar levels from going too low. Treat a low blood sugar level right away:      Drink 4 ounces of juice or have 1 tube of glucose gel. Check your blood sugar level again 10 to 15 minutes later. When the level goes back to normal, eat a meal or snack to prevent another decrease. Keep glucose gel, raisins, or hard candy with you at all times to treat a low blood sugar level. Your blood sugar level can get too low if you take diabetes medicine or insulin and do not eat enough food. If you use insulin, check your blood sugar level before you exercise. If your blood sugar level is below 100 mg/dL, eat 4 crackers or 2 ounces of raisins, or drink 4 ounces of juice. Check your level every 30 minutes if you exercise longer than 1 hour. You may need a snack during or after exercise. What you can do to manage your blood sugar levels:   Check your blood sugar levels as directed and as needed. Several items are available to use to check your levels. You may need to check by testing a drop of blood in a glucose monitor. You may instead be given a continuous glucose monitoring (CGM) device. The device is worn at all times.  The CGM checks your blood sugar level every 5 minutes. It sends results to an electronic device such as a smart phone. A CGM can be used with or without an insulin pump. You and your diabetes care team providers will decide on the best method for you. The goal for blood sugar levels before meals  is between 80 and 130 mg/dL and 2 hours after eating  is lower than 180 mg/dL. Make healthy food choices. Work with a dietitian to develop a meal plan that works for you and your schedule. A dietitian can help you learn how to eat the right amount of carbohydrates during your meals and snacks. Carbohydrates can raise your blood sugar level if you eat too many at one time. Examples of foods that contain carbohydrates are breads, cereals, rice, pasta, and sweets. Eat high-fiber foods as directed. Fiber helps improve blood sugar levels. Fiber also lowers your risk for heart disease and other problems diabetes can cause. Examples of high-fiber foods include vegetables, whole-grain bread, and beans such as pritchard beans. Your dietitian can tell you how much fiber to have each day. Get regular physical activity. Physical activity can help you get to your target blood sugar level goal and manage your weight. Get at least 150 minutes of moderate to vigorous aerobic physical activity each week. Do not miss more than 2 days in a row. Do not sit longer than 30 minutes at a time. Your healthcare provider can help you create an activity plan. The plan can include the best activities for you and can help you build your strength and endurance. Maintain a healthy weight. Ask your team what a healthy weight is for you. A healthy weight can help you control diabetes and prevent heart disease. Ask your team to help you create a weight loss plan, if needed. Weight loss can help make a difference in managing diabetes. Your team will help you set a weight-loss goal, such as 10 to 15 pounds, or 5% of your extra weight.  Together you and your team can set manageable weight loss goals. Take your diabetes medicine or insulin as directed. You may need diabetes medicine, insulin, or both to help control your blood sugar levels. Your healthcare provider will teach you how and when to take your diabetes medicine or insulin. You will also be taught about side effects oral diabetes medicine can cause. Insulin may be injected or given through a pump or pen. You and your providers will decide on the best method for you: An insulin pump  is an implanted device that gives your insulin 24 hours a day. An insulin pump prevents the need for multiple insulin injections in a day. An insulin pen  is a device prefilled with the right amount of insulin. You and your family members will be taught how to draw up and give insulin  if this is the best method for you. Your providers will also teach you how to dispose of needles and syringes. You will learn how much insulin you need  and when to give it. You will be taught when not to give insulin. You will also be taught what to do if your blood sugar level drops too low. This may happen if you take insulin and do not eat the right amount of carbohydrates. More ways to manage type 2 diabetes:   Wear medical alert identification. Wear medical alert jewelry or carry a card that says you have diabetes. Ask your provider where to get these items. Do not smoke. Nicotine and other chemicals in cigarettes and cigars can cause lung and blood vessel damage. It also makes it more difficult to manage your diabetes. Ask your provider for information if you currently smoke and need help to quit. Do not use e-cigarettes or smokeless tobacco in place of cigarettes or to help you quit. They still contain nicotine. Check your feet each day for cuts, scratches, calluses, or other wounds. Look for redness and swelling, and feel for warmth. Wear shoes that fit well.  Check your shoes for rocks or other objects that can hurt your feet. Do not walk barefoot or wear shoes without socks. Wear cotton socks to help keep your feet dry. Ask about vaccines you may need. You have a higher risk for serious illness if you get the flu, pneumonia, COVID-19, or hepatitis. Ask your provider if you should get vaccines to prevent these or other diseases, and when to get the vaccines. Talk to your provider if you become stressed about diabetes care. Sometimes being able to fit diabetes care into your life can cause increased stress. The stress can cause you not to take care of yourself properly. Your care team providers can help by offering tips about self-care. Your providers may suggest you talk to a mental health provider who can listen and offer help with self-care issues. Have your A1c checked as directed. Your provider may check your A1c every 3 months, or 2 times each year if your diabetes is controlled. An A1c test shows the average amount of sugar in your blood over the past 2 to 3 months. Your provider will tell you what your A1c level should be. Have screening tests as directed. Your provider may recommend screening for complications of diabetes and other conditions that may develop. Some screenings may begin right away and some may happen within the first 5 years of diagnosis:    Examples of diabetes complications  include kidney problems, high cholesterol, high blood pressure, blood vessel problems, eye problems, and sleep apnea. You may be screened for a low vitamin B level  if you take oral diabetes medicine for a long time. Women of childbearing years may be screened  for polycystic ovarian syndrome (PCOS). Follow up with your doctor or diabetes care team providers as directed: You may need to have blood tests done before your follow-up visit. The test results will show if changes need to be made in your treatment or self-care.  Talk to your provider if you cannot afford your medicine. Write down your questions so you remember to ask them during your visits. © Copyright Elisha Goltz 2022 Information is for End User's use only and may not be sold, redistributed or otherwise used for commercial purposes. The above information is an  only. It is not intended as medical advice for individual conditions or treatments. Talk to your doctor, nurse or pharmacist before following any medical regimen to see if it is safe and effective for you. Patient was advised to continue present medications. discussed with the patient medications and laboratory data in detail. Follow-up with me in 3 months or as advised. If any blood test was ordered please do 1 week prior to next appointment unless advise to get earlier.   If you have any questions please call the office [phone]

## 2023-08-30 DIAGNOSIS — E78.2 MIXED HYPERLIPIDEMIA: Chronic | ICD-10-CM

## 2023-08-30 RX ORDER — ATORVASTATIN CALCIUM 40 MG/1
TABLET, FILM COATED ORAL
Qty: 90 TABLET | Refills: 1 | Status: SHIPPED | OUTPATIENT
Start: 2023-08-30

## 2023-09-21 DIAGNOSIS — E11.9 TYPE 2 DIABETES MELLITUS WITHOUT COMPLICATION, WITHOUT LONG-TERM CURRENT USE OF INSULIN (HCC): Chronic | ICD-10-CM

## 2023-11-14 ENCOUNTER — APPOINTMENT (OUTPATIENT)
Dept: LAB | Facility: HOSPITAL | Age: 63
End: 2023-11-14
Attending: INTERNAL MEDICINE
Payer: MEDICARE

## 2023-11-14 DIAGNOSIS — E11.9 TYPE 2 DIABETES MELLITUS WITHOUT COMPLICATION, WITHOUT LONG-TERM CURRENT USE OF INSULIN (HCC): Chronic | ICD-10-CM

## 2023-11-14 DIAGNOSIS — I10 ESSENTIAL HYPERTENSION: Chronic | ICD-10-CM

## 2023-11-14 LAB
ANION GAP SERPL CALCULATED.3IONS-SCNC: 7 MMOL/L
BACTERIA UR QL AUTO: ABNORMAL /HPF
BILIRUB UR QL STRIP: NEGATIVE
BUN SERPL-MCNC: 16 MG/DL (ref 5–25)
CALCIUM SERPL-MCNC: 9.4 MG/DL (ref 8.4–10.2)
CHLORIDE SERPL-SCNC: 102 MMOL/L (ref 96–108)
CLARITY UR: CLEAR
CO2 SERPL-SCNC: 30 MMOL/L (ref 21–32)
COLOR UR: YELLOW
CREAT SERPL-MCNC: 1.16 MG/DL (ref 0.6–1.3)
CREAT UR-MCNC: 143.6 MG/DL
EST. AVERAGE GLUCOSE BLD GHB EST-MCNC: 160 MG/DL
GFR SERPL CREATININE-BSD FRML MDRD: 49 ML/MIN/1.73SQ M
GLUCOSE P FAST SERPL-MCNC: 147 MG/DL (ref 65–99)
GLUCOSE UR STRIP-MCNC: NEGATIVE MG/DL
HBA1C MFR BLD: 7.2 %
HGB UR QL STRIP.AUTO: NEGATIVE
KETONES UR STRIP-MCNC: NEGATIVE MG/DL
LEUKOCYTE ESTERASE UR QL STRIP: ABNORMAL
MICROALBUMIN UR-MCNC: <7 MG/L
MICROALBUMIN/CREAT 24H UR: <5 MG/G CREATININE (ref 0–30)
NITRITE UR QL STRIP: NEGATIVE
NON-SQ EPI CELLS URNS QL MICRO: ABNORMAL /HPF
OTHER STN SPEC: ABNORMAL
PH UR STRIP.AUTO: 6 [PH]
POTASSIUM SERPL-SCNC: 4.2 MMOL/L (ref 3.5–5.3)
PROT UR STRIP-MCNC: NEGATIVE MG/DL
RBC #/AREA URNS AUTO: ABNORMAL /HPF
SODIUM SERPL-SCNC: 139 MMOL/L (ref 135–147)
SP GR UR STRIP.AUTO: 1.02 (ref 1–1.03)
UROBILINOGEN UR QL STRIP.AUTO: 0.2 E.U./DL
WBC #/AREA URNS AUTO: ABNORMAL /HPF

## 2023-11-14 PROCEDURE — 83036 HEMOGLOBIN GLYCOSYLATED A1C: CPT

## 2023-11-14 PROCEDURE — 80048 BASIC METABOLIC PNL TOTAL CA: CPT

## 2023-11-14 PROCEDURE — 36415 COLL VENOUS BLD VENIPUNCTURE: CPT

## 2023-11-20 ENCOUNTER — OFFICE VISIT (OUTPATIENT)
Dept: INTERNAL MEDICINE CLINIC | Facility: CLINIC | Age: 63
End: 2023-11-20
Payer: COMMERCIAL

## 2023-11-20 VITALS
DIASTOLIC BLOOD PRESSURE: 78 MMHG | WEIGHT: 236 LBS | HEART RATE: 64 BPM | OXYGEN SATURATION: 99 % | BODY MASS INDEX: 41.82 KG/M2 | RESPIRATION RATE: 18 BRPM | SYSTOLIC BLOOD PRESSURE: 128 MMHG | HEIGHT: 63 IN | TEMPERATURE: 97.5 F

## 2023-11-20 DIAGNOSIS — I48.0 PAROXYSMAL ATRIAL FIBRILLATION (HCC): ICD-10-CM

## 2023-11-20 DIAGNOSIS — E11.9 TYPE 2 DIABETES MELLITUS WITHOUT COMPLICATION, WITHOUT LONG-TERM CURRENT USE OF INSULIN (HCC): Primary | ICD-10-CM

## 2023-11-20 DIAGNOSIS — K21.9 GASTROESOPHAGEAL REFLUX DISEASE WITHOUT ESOPHAGITIS: Chronic | ICD-10-CM

## 2023-11-20 DIAGNOSIS — E78.2 MIXED HYPERLIPIDEMIA: Chronic | ICD-10-CM

## 2023-11-20 DIAGNOSIS — M25.561 CHRONIC PAIN OF BOTH KNEES: ICD-10-CM

## 2023-11-20 DIAGNOSIS — G89.29 CHRONIC PAIN OF BOTH KNEES: ICD-10-CM

## 2023-11-20 DIAGNOSIS — I10 ESSENTIAL HYPERTENSION: Chronic | ICD-10-CM

## 2023-11-20 DIAGNOSIS — Z79.899 HIGH RISK MEDICATION USE: ICD-10-CM

## 2023-11-20 DIAGNOSIS — M25.562 CHRONIC PAIN OF BOTH KNEES: ICD-10-CM

## 2023-11-20 DIAGNOSIS — E11.9 TYPE 2 DIABETES MELLITUS WITHOUT COMPLICATION, WITHOUT LONG-TERM CURRENT USE OF INSULIN (HCC): Primary | Chronic | ICD-10-CM

## 2023-11-20 PROCEDURE — 99213 OFFICE O/P EST LOW 20 MIN: CPT | Performed by: INTERNAL MEDICINE

## 2023-11-20 RX ORDER — BLOOD-GLUCOSE METER
EACH MISCELLANEOUS DAILY
Qty: 1 KIT | Refills: 0 | Status: SHIPPED | OUTPATIENT
Start: 2023-11-20

## 2023-11-20 NOTE — PROGRESS NOTES
Assessment/Plan:    1. Type 2 diabetes mellitus without complication, without long-term current use of insulin (HCC)  -     Lipid panel; Future  -     Comprehensive metabolic panel; Future  -     Comprehensive metabolic panel; Future; Expected date: 02/20/2024    2. Essential hypertension  -     Comprehensive metabolic panel; Future    3. Paroxysmal atrial fibrillation (HCC)    4. BMI 40.0-44.9, adult ([address])    5. Mixed hyperlipidemia  -     Lipid panel; Future  -     Comprehensive metabolic panel; Future    6. Gastroesophageal reflux disease without esophagitis    7. Chronic pain of both knees    8. High risk medication use  -     Comprehensive metabolic panel; Future       Her hemoglobin A1c increased from 6.8-7.2. Discussed with the patient to strictly follow 1800-calorie ADA diet. Patient has been checking blood sugar once a day. Advised to check ampm alternate day. Prescribed her lancets as well as test strips 100 with 3 refills. Denies any hypoglycemia. Presently will continue present medications. If A1c persistently greater than 7 will adjust medication. Hypertension well controlled. Advised to continue present medication and low-salt diet. She is in normal sinus rhythm. Cholesterol 140, triglyceride 148, HDL 59, LDL 51. To continue atorvastatin and fish oil and low-cholesterol low carbs diet. She had a EGD May 2023 on pantoprazole for GE reflux. He needs to take this medication daily to control her symptoms. Advised to watch diet and reflux precautions. She has a chronic pain in the both feet for which he is she has been seen and followed by podiatry Dr. Prince Valdez on gabapentin, meloxicam, tramadol as needed. Discussed  with the patient about sleep apnea evaluation as was advised by pulmonary specialist but patient feels there is no need for it. Patient states she got a letter from the her gastroenterology was retiring and for follow-up. Subjective: Patient present for follow-up. Patient ID: Zo Barrientos is a 58 y.o. female. HPI  59-year-old white female patient presents to follow-up her medical problems. She denies any chest pain, shortness of breath, pain in abdomen. Denies any cough, fever, chills denies nausea vomiting diarrhea. The following portions of the patient's history were reviewed and updated as appropriate:     Past Medical History:  She has a past medical history of Abrasion of right leg (03/19/2021), Acute respiratory failure due to COVID-19 [facility]) (10/01/2021), Age-related cataract of both eyes (5/23/2023), Arthritis, BMI 39.0-39.9,adult (5/10/2022), BMI 40.0-44.9, adult ([address]) (2/20/2023), CKD (chronic kidney disease) (2/8/2022), Colon polyp, COVID-19 long hauler (11/22/2022), Diabetes mellitus ([address]), Dyslipidemia, Dysphagia, pharyngoesophageal, Edema of both lower legs (03/19/2021), Essential hypertension (01/23/2021), Gastroesophageal reflux disease without esophagitis (01/23/2021), GERD (gastroesophageal reflux disease), Hiatal hernia, HTN (hypertension), Hyperlipemia, Hypertension, Hypertensive arterionephrosclerosis of transplanted kidney (01/23/2021), Hypokalemia (04/20/2021), Insomnia, Leukocytosis, Medicare annual wellness visit, subsequent (11/9/2021), Mixed hyperlipidemia (01/23/2021), Numbness, Obstructive sleep apnea syndrome (01/23/2021), Other chest pain (8/26/2022), Pain in ankle (01/23/2021), Pain in both feet (01/23/2021), Pain in both knees (01/26/2021), Pain in right ankle (5/10/2022), Paroxysmal atrial fibrillation ([address]) (11/09/2021), Personal history of COVID-19, Right foot pain (5/10/2022), Right shoulder pain (2/20/2023), Shortness of breath, Shoulder pain, right (2/8/2022), Skin lesions, Sleep apnea, Type 2 diabetes mellitus without complication, without long-term current use of insulin ([address]) (01/23/2021), and Urinary incontinence (2/20/2023). ,  _______________________________________________________________________  Past Surgical History: has a past surgical history that includes Carpal tunnel release (Bilateral, 1990); Gallbladder surgery (2004); Hysterectomy (2004); Ankle surgery (Right, 2009/2012); Mammo stereotactic breast biopsy right (all inc) (Right, 2014); Mammo (historical) (10/02/2018); Colonoscopy (03/07/2017); Breast biopsy (Right, 05/15/2014); Colonoscopy; Upper gastrointestinal endoscopy; and [place] tooth extraction. ,  _______________________________________________________________________  Family History:  family history includes Breast cancer in her maternal grandmother and mother; Cancer in her mother; Colon cancer in her cousin; Diabetes in her mother; Hyperlipidemia in her mother; Lung cancer in her father; No Known Problems in her daughter, maternal aunt, maternal grandfather, sister, sister, and son.,  _______________________________________________________________________  Social History:   reports that she quit smoking about 19 years ago. Her smoking use included cigarettes. She smoked an average of 1.5 packs per day. She has quit using smokeless tobacco. She reports current alcohol use. She reports that she does not currently use drugs. ,  _______________________________________________________________________  Allergies:  is allergic to aspirin and lisinopril. .  _______________________________________________________________________  Current Outpatient Medications   Medication Sig Dispense Refill    atorvastatin (LIPITOR) 40 mg tablet TAKE 1 TABLET EVERY DAY 90 tablet 1    Biotin 2500 MCG CAPS Take by mouth 2 (two) times a day      Blood Glucose Monitoring Suppl (FreeStyle Lite) TOYIN daily      calcium carbonate (OS-AMEE) 600 MG tablet Take 600 mg by mouth 2 (two) times a day with meals With vitamin D3       FREESTYLE LITE test strip USE TO TEST BLOOD SUGAR TWICE DAILY AS INSTRUCTED 200 each 3    gabapentin (NEURONTIN) 300 mg capsule Take 300 mg by mouth 2 (two) times a day      glucosamine-chondroitin 500-400 MG tablet Take 1 tablet by mouth 3 (three) times a day      Lancets (freestyle) lancets USE 2 TIMES A DAY AS INSTRUCTED 200 each 3    losartan-hydrochlorothiazide (HYZAAR) 100-12.5 MG per tablet TAKE 1 TABLET EVERY DAY 90 tablet 1    meloxicam (MOBIC) 15 mg tablet Take 15 mg by mouth daily      metFORMIN (GLUCOPHAGE) 500 mg tablet TAKE 1 TABLET EVERY MORNING AND 2 TABLETS EVERY EVENING BEFORE MEALS 270 tablet 1    metoprolol tartrate (LOPRESSOR) 50 mg tablet TAKE 1 TABLET TWICE DAILY 180 tablet 1    Multiple Vitamin (MULTIVITAMIN) capsule Take 1 capsule by mouth daily      Omega-3 Fatty Acids (fish oil) 1,000 mg TAKE 2 CAPSULES EVERY  capsule 3    pantoprazole (PROTONIX) 40 mg tablet TAKE 1 TABLET 30 MINUTES BEFORE BREAKFAST 90 tablet 1    traMADol (ULTRAM) 50 mg tablet Take 50 mg by mouth every 8 (eight) hours as needed BID  0     No current facility-administered medications for this visit.     _______________________________________________________________________  Review of Systems   Constitutional:  Negative for chills and fever. HENT:  Negative for congestion, ear pain, hearing loss, nosebleeds, sinus pain, sore throat and trouble swallowing. Eyes:  Negative for discharge, redness and visual disturbance. Respiratory:  Negative for cough, chest tightness and shortness of breath. Cardiovascular:  Negative for chest pain and palpitations. Gastrointestinal:  Negative for abdominal pain, blood in stool, constipation, diarrhea, nausea and vomiting. Genitourinary:  Negative for dysuria, flank pain and hematuria. Musculoskeletal:  Positive for arthralgias (Pain in feet). Negative for myalgias and neck pain. Skin:  Negative for color change and rash. Neurological:  Negative for dizziness, speech difficulty, weakness and headaches. Hematological:  Does not bruise/bleed easily. Psychiatric/Behavioral:  Negative for agitation and behavioral problems.           Objective:  Vitals:    11/20/23 1034   BP: 128/78 BP Location: Left arm   Patient Position: Sitting   Cuff Size: Large   Pulse: 64   Resp: 18   Temp: 97.5 °F (36.4 °C)   TempSrc: Temporal   SpO2: 99%   Weight: 107 kg (236 lb)   Height: 5' 3" (1.6 m)     Body mass index is 41.81 kg/m². Physical Exam  Vitals and nursing note reviewed. Constitutional:       General: She is not in acute distress. Appearance: She is obese. HENT:      Head: Normocephalic and atraumatic. Right Ear: Ear canal and external ear normal.      Left Ear: Ear canal and external ear normal.      Nose: Nose normal.      Mouth/Throat:      Mouth: Mucous membranes are moist.   Eyes:      General: No scleral icterus. Right eye: No discharge. Left eye: No discharge. Extraocular Movements: Extraocular movements intact. Conjunctiva/sclera: Conjunctivae normal.   Cardiovascular:      Rate and Rhythm: Normal rate and regular rhythm. Pulses: Normal pulses. Heart sounds: Normal heart sounds. No murmur heard. Pulmonary:      Effort: Pulmonary effort is normal. No respiratory distress. Breath sounds: Normal breath sounds. No wheezing, rhonchi or rales. Abdominal:      General: Bowel sounds are normal.      Palpations: Abdomen is soft. Tenderness: There is no abdominal tenderness. Musculoskeletal:         General: Normal range of motion. Cervical back: Normal range of motion and neck supple. No muscular tenderness. Right lower leg: No edema. Left lower leg: No edema. Skin:     General: Skin is warm. Findings: No rash. Neurological:      General: No focal deficit present. Mental Status: She is alert and oriented to person, place, and time. Motor: No weakness.    Psychiatric:         Mood and Affect: Mood normal.         Behavior: Behavior normal.

## 2023-11-20 NOTE — TELEPHONE ENCOUNTER
Check blood sugar once a day. Check am and pm alternate day. Please dispense glucometer, test strips, lancets. Test strips 100 with 3 refills lancets 100 with 3 refills.   Use 1 strip per day as well as 1 lancet  per day

## 2023-11-20 NOTE — PATIENT INSTRUCTIONS
Patient was advised to continue present medications. discussed with the patient medications and laboratory data in detail. Follow-up with me in 3 months or as advised. If any blood test was ordered please do 1 week prior to next appointment unless advise to get earlier. If you have any questions please call the office [phone]  Type 2 Diabetes Management for Adults   AMBULATORY CARE:   Type 2 diabetes  is a disease that affects how your body uses glucose (sugar). Either your body cannot make enough insulin, or it cannot use the insulin correctly. It is important to keep diabetes controlled to prevent damage to your heart, blood vessels, and other organs. Management will help you feel well and enjoy your daily activities. Your diabetes care team providers can help you make a plan to fit diabetes care into your schedule. Your plan can change over time to fit your needs and your family's needs. Have someone call your local emergency number (911 in the [address]) if:   You cannot be woken. You have signs of diabetic ketoacidosis:     confusion, fatigue    vomiting    rapid heartbeat    fruity smelling breath    extreme thirst    dry mouth and skin    You have any of the following signs of a heart attack:      Squeezing, pressure, or pain in your chest    You may  also have any of the following:     Discomfort or pain in your back, neck, jaw, stomach, or arm    Shortness of breath    Nausea or vomiting    Lightheadedness or a sudden cold sweat    You have any of the following signs of a stroke:      Numbness or drooping on one side of your face     Weakness in an arm or leg    Confusion or difficulty speaking    Dizziness, a severe headache, or vision loss    Call your doctor or diabetes care team provider if:   You have a sore or wound that will not heal.    You have a change in the amount you urinate. Your blood sugar levels are higher than your target goals.     You often have lower blood sugar levels than your target goals. Your skin is red, dry, warm, or swollen. You have trouble coping with diabetes, or you feel anxious or depressed. You have trouble following any part of your care plan, such as your meal plan. You have questions or concerns about your condition or care. What you need to know about high blood sugar levels:  High blood sugar levels may not cause any symptoms. You may feel more thirsty or urinate more often than usual. Over time, high blood sugar levels can damage your nerves, blood vessels, tissues, and organs. The following can increase your blood sugar levels:  Large meals or large amounts of carbohydrates at one time    Less physical activity    Stress    Illness    A lower dose of diabetes medicine or insulin, or a late dose    What you need to know about low blood sugar levels:  Symptoms include feeling shaky, dizzy, irritable, or confused. You can prevent symptoms by keeping your blood sugar levels from going too low. Treat a low blood sugar level right away:      Drink 4 ounces of juice or have 1 tube of glucose gel. Check your blood sugar level again 10 to 15 minutes later. When the level goes back to normal, eat a meal or snack to prevent another decrease. Keep glucose gel, raisins, or hard candy with you at all times to treat a low blood sugar level. Your blood sugar level can get too low if you take diabetes medicine or insulin and do not eat enough food. If you use insulin, check your blood sugar level before you exercise. If your blood sugar level is below 100 mg/dL, eat 4 crackers or 2 ounces of raisins, or drink 4 ounces of juice. Check your level every 30 minutes if you exercise longer than 1 hour. You may need a snack during or after exercise. What you can do to manage your blood sugar levels:   Check your blood sugar levels as directed and as needed. Several items are available to use to check your levels.  You may need to check by testing a drop of blood in a glucose monitor. You may instead be given a continuous glucose monitoring (CGM) device. The device is worn at all times. The CGM checks your blood sugar level every 5 minutes. It sends results to an electronic device such as a smart phone. A CGM can be used with or without an insulin pump. You and your diabetes care team providers will decide on the best method for you. The goal for blood sugar levels before meals  is between 80 and 130 mg/dL and 2 hours after eating  is lower than 180 mg/dL. Make healthy food choices. Work with a dietitian to create a meal plan that works for you and your schedule. A dietitian can help you learn how to eat the right amount of carbohydrates (sugar and starchy foods) during your meals and snacks. Examples of carbohydrates are breads, cereals, rice, pasta, fruit, low-fat dairy, and sweets. Carbohydrates can raise your blood sugar level if you eat too many at one time. Eat high-fiber foods as directed. Fiber helps improve blood sugar levels. Fiber also lowers your risk for heart disease and other problems diabetes can cause. Examples of high-fiber foods include vegetables, whole-grain bread, and beans such as pritchard beans. Your dietitian can tell you how much fiber to have each day. Get regular physical activity. Physical activity can help you get to your target blood sugar level goal and manage your weight. Get at least 150 minutes of moderate to vigorous aerobic physical activity each week. Resistance training, such as lifting weights, should be done 3 times each week. Do not miss more than 2 days of physical activity in a row. Do not sit longer than 30 minutes at a time. Your healthcare provider can help you create an activity plan. The plan can include the best activities for you and can help you build your strength and endurance. Maintain a healthy weight. Ask your team what a healthy weight is for you.  A healthy weight can help you control diabetes and prevent heart disease. Ask your team to help you create a weight-loss plan, if needed. Even a loss of 3% to 7% of your excess body weight can help make a difference in managing diabetes. Your team will help you set a weight-loss goal, such as 10 to 15 pounds, or 5% of your extra weight. Together you and your team can set manageable weight-loss goals. Take your diabetes medicine or insulin as directed. You may need diabetes medicine, insulin, or both to help control your blood sugar levels. Your healthcare provider will teach you how and when to take your diabetes medicine or insulin. You will also be taught about side effects oral diabetes medicine can cause. Insulin may be injected or given through a pump or pen. You and your providers will decide on the best method for you: An insulin pump  is an implanted device that gives your insulin 24 hours a day. An insulin pump prevents the need for multiple insulin injections in a day. An insulin pen  is a device prefilled with the right amount of insulin. You and your family members will be taught how to draw up and give insulin  if this is the best method for you. Your providers will also teach you how to dispose of needles and syringes. You will learn how much insulin you need  and when to give it. You will be taught when not to give insulin. You will also be taught what to do if your blood sugar level drops too low. This may happen if you take insulin and do not eat the right amount of carbohydrates. More ways to manage type 2 diabetes:   Wear medical alert identification. Wear medical alert jewelry or carry a card that says you have diabetes. Ask your provider where to get these items. Do not smoke. Nicotine and other chemicals in cigarettes and cigars can cause lung and blood vessel damage. It also makes it more difficult to manage your diabetes.  Ask your provider for information if you currently smoke and need help to quit. Do not use e-cigarettes or smokeless tobacco in place of cigarettes or to help you quit. They still contain nicotine. Check your feet each day for cuts, scratches, calluses, or other wounds. Look for redness and swelling, and feel for warmth. Wear shoes that fit well. Check your shoes for rocks or other objects that can hurt your feet. Do not walk barefoot or wear shoes without socks. Wear cotton socks to help keep your feet dry. Ask about vaccines you may need. You have a higher risk for serious illness if you get the flu, pneumonia, COVID-19, or hepatitis. Ask your provider if you should get vaccines to prevent these or other diseases, and when to get the vaccines. Talk to your provider if you become stressed about diabetes care. Sometimes being able to fit diabetes care into your life can cause increased stress. The stress can cause you not to take care of yourself properly. Your provider can help by offering tips about self-care. A mental health provider can listen and offer help with self-care issues. Other types of counseling can help you make nutrition or physical activity changes. Have your A1c checked as directed. Your provider may check your A1c every 3 months, or 2 times each year if your diabetes is controlled. An A1c test shows the average amount of sugar in your blood over the past 2 to 3 months. Your provider will tell you what your A1c level should be. Have screening tests as directed. Your provider may recommend screening for complications of diabetes and other conditions that may develop. Some screenings may begin right away and some may happen within the first 5 years of diagnosis:    Examples of diabetes complications  include kidney problems, high cholesterol, high blood pressure, blood vessel problems, eye problems, and sleep apnea.     You may be screened for a low vitamin B level  if you take oral diabetes medicine for a long time. You may be screened for polycystic ovarian syndrome (PCOS)  if you are of childbearing age. Follow up with your doctor or diabetes care team providers as directed: You may need to have blood tests done before your follow-up visit. The test results will show if changes need to be made in your treatment or self-care. Talk to your provider if you cannot afford your medicine. Write down your questions so you remember to ask them during your visits. © Copyright [facility] [employer] 2023 Information is for End User's use only and may not be sold, redistributed or otherwise used for commercial purposes. The above information is an  only. It is not intended as medical advice for individual conditions or treatments. Talk to your doctor, nurse or pharmacist before following any medical regimen to see if it is safe and effective for you.

## 2023-11-20 NOTE — TELEPHONE ENCOUNTER
PA received for patient's BG test strips. I spoke with patient who states pharmacy told her insurance is preferring that she uses another brand but unsure of brand. Spoke with [facility] pharmacy, preferred brand Acc-chek or True Metrix. Patient will need new machine, strips, lancets.  Orders pended and sent to provider for approval.

## 2023-12-26 DIAGNOSIS — E11.9 TYPE 2 DIABETES MELLITUS WITHOUT COMPLICATION, WITHOUT LONG-TERM CURRENT USE OF INSULIN (HCC): ICD-10-CM

## 2023-12-26 NOTE — TELEPHONE ENCOUNTER
Marilu Woo,     Patient needs help with getting her test strips, she was told [facility]    Patient would like a call to discuss.    Please Advise    CB: [phone]

## 2023-12-26 NOTE — TELEPHONE ENCOUNTER
Reason for call:   [x] Refill   [] Prior Auth  [] Other:     Office:   [] PCP/Provider -   [x] Specialty/Provider -     Medication: LANCETS - FREE STYLE    Dose/Frequency:     Quantity: 200 EACH    Pharmacy:   [facility]  [address]    Does the patient have enough for 3 days?   [x] Yes   [] No - Send as HP to POD

## 2023-12-27 RX ORDER — LANCETS 28 GAUGE
EACH MISCELLANEOUS 2 TIMES DAILY
Qty: 200 EACH | Refills: 3 | Status: SHIPPED | OUTPATIENT
Start: 2023-12-27

## 2024-02-14 DIAGNOSIS — I10 ESSENTIAL HYPERTENSION: Chronic | ICD-10-CM

## 2024-02-14 RX ORDER — LOSARTAN POTASSIUM AND HYDROCHLOROTHIAZIDE 12.5; 1 MG/1; MG/1
TABLET ORAL
Qty: 90 TABLET | Refills: 1 | Status: SHIPPED | OUTPATIENT
Start: 2024-02-14

## 2024-02-14 RX ORDER — METOPROLOL TARTRATE 50 MG/1
TABLET, FILM COATED ORAL
Qty: 180 TABLET | Refills: 1 | Status: SHIPPED | OUTPATIENT
Start: 2024-02-14

## 2024-02-20 ENCOUNTER — OFFICE VISIT (OUTPATIENT)
Dept: INTERNAL MEDICINE CLINIC | Facility: CLINIC | Age: 64
End: 2024-02-20
Payer: COMMERCIAL

## 2024-02-20 VITALS
RESPIRATION RATE: 18 BRPM | OXYGEN SATURATION: 98 % | DIASTOLIC BLOOD PRESSURE: 70 MMHG | TEMPERATURE: 98 F | HEIGHT: 63 IN | WEIGHT: 233 LBS | SYSTOLIC BLOOD PRESSURE: 138 MMHG | HEART RATE: 67 BPM | BODY MASS INDEX: 41.29 KG/M2

## 2024-02-20 DIAGNOSIS — E78.2 MIXED HYPERLIPIDEMIA: Chronic | ICD-10-CM

## 2024-02-20 DIAGNOSIS — E11.9 TYPE 2 DIABETES MELLITUS WITHOUT COMPLICATION, WITHOUT LONG-TERM CURRENT USE OF INSULIN (HCC): Primary | Chronic | ICD-10-CM

## 2024-02-20 DIAGNOSIS — K21.9 GASTROESOPHAGEAL REFLUX DISEASE WITHOUT ESOPHAGITIS: Chronic | ICD-10-CM

## 2024-02-20 DIAGNOSIS — M79.671 PAIN IN BOTH FEET: Chronic | ICD-10-CM

## 2024-02-20 DIAGNOSIS — I10 ESSENTIAL HYPERTENSION: Chronic | ICD-10-CM

## 2024-02-20 DIAGNOSIS — I48.0 PAROXYSMAL ATRIAL FIBRILLATION (HCC): ICD-10-CM

## 2024-02-20 DIAGNOSIS — M79.672 PAIN IN BOTH FEET: Chronic | ICD-10-CM

## 2024-02-20 DIAGNOSIS — Z79.899 HIGH RISK MEDICATION USE: ICD-10-CM

## 2024-02-20 PROCEDURE — 99213 OFFICE O/P EST LOW 20 MIN: CPT | Performed by: INTERNAL MEDICINE

## 2024-02-20 NOTE — PATIENT INSTRUCTIONS
Patient was advised to continue present medications. discussed with the patient medications and laboratory data in detail.  Follow-up with me in 3 months or as advised.  If any blood test was ordered please do 1 week prior to next appointment unless advise to get earlier.  If you have any questions please call the office [phone]  Type 2 Diabetes Management for Adults   AMBULATORY CARE:   Type 2 diabetes  is a disease that affects how your body uses glucose (sugar). Either your body cannot make enough insulin, or it cannot use the insulin correctly. It is important to keep diabetes controlled to prevent damage to your heart, blood vessels, and other organs. Management will help you feel well and enjoy your daily activities. Your diabetes care team providers can help you make a plan to fit diabetes care into your schedule. Your plan can change over time to fit your needs and your family's needs.       Have someone call your local emergency number (911 in the [place]) if:   You cannot be woken.    You have signs of diabetic ketoacidosis:     confusion, fatigue    vomiting    rapid heartbeat    fruity smelling breath    extreme thirst    dry mouth and skin    You have any of the following signs of a heart attack:      Squeezing, pressure, or pain in your chest    You may  also have any of the following:     Discomfort or pain in your back, neck, jaw, stomach, or arm    Shortness of breath    Nausea or vomiting    Lightheadedness or a sudden cold sweat    You have any of the following signs of a stroke:      Numbness or drooping on one side of your face     Weakness in an arm or leg    Confusion or difficulty speaking    Dizziness, a severe headache, or vision loss    Call your doctor or diabetes care team provider if:   You have a sore or wound that will not heal.    You have a change in the amount you urinate.    Your blood sugar levels are higher than your target goals.    You often have lower blood sugar levels than  your target goals.    Your skin is red, dry, warm, or swollen.    You have trouble coping with diabetes, or you feel anxious or depressed.    You have trouble following any part of your care plan, such as your meal plan.    You have questions or concerns about your condition or care.    What you need to know about high blood sugar levels:  High blood sugar levels may not cause any symptoms. You may feel more thirsty or urinate more often than usual. Over time, high blood sugar levels can damage your nerves, blood vessels, tissues, and organs. The following can increase your blood sugar levels:  Large meals or large amounts of carbohydrates at one time    Less physical activity    Stress    Illness    A lower dose of diabetes medicine or insulin, or a late dose    What you need to know about low blood sugar levels:  Symptoms include feeling shaky, dizzy, irritable, or confused. You can prevent symptoms by keeping your blood sugar levels from going too low.  Treat a low blood sugar level right away:      Drink 4 ounces of juice or have 1 tube of glucose gel.    Check your blood sugar level again 10 to 15 minutes later.    When the level goes back to normal, eat a meal or snack to prevent another decrease.       Keep glucose gel, raisins, or hard candy with you at all times to treat a low blood sugar level.     Your blood sugar level can get too low if you take diabetes medicine or insulin and do not eat enough food.     If you use insulin, check your blood sugar level before you exercise.      If your blood sugar level is below 100 mg/dL, eat 4 crackers or 2 ounces of raisins, or drink 4 ounces of juice.    Check your level every 30 minutes if you exercise longer than 1 hour.    You may need a snack during or after exercise.    What you can do to manage your blood sugar levels:   Check your blood sugar levels as directed and as needed.  Several items are available to use to check your levels. You may need to check by  testing a drop of blood in a glucose monitor. You may instead be given a continuous glucose monitoring (CGM) device. The device is worn at all times. The CGM checks your blood sugar level every 5 minutes. It sends results to an electronic device such as a smart phone. A CGM can be used with or without an insulin pump. You and your diabetes care team providers will decide on the best method for you. The goal for blood sugar levels before meals  is between 80 and 130 mg/dL and 2 hours after eating  is lower than 180 mg/dL.            Make healthy food choices.  Work with a dietitian to create a meal plan that works for you and your schedule. A dietitian can help you learn how to eat the right amount of carbohydrates (sugar and starchy foods) during your meals and snacks. Examples of carbohydrates are breads, cereals, rice, pasta, fruit, low-fat dairy, and sweets. Carbohydrates can raise your blood sugar level if you eat too many at one time.         Eat high-fiber foods as directed.  Fiber helps improve blood sugar levels. Fiber also lowers your risk for heart disease and other problems diabetes can cause. Examples of high-fiber foods include vegetables, whole-grain bread, and beans such as pritchard beans. Your dietitian can tell you how much fiber to have each day.         Get regular physical activity.  Physical activity can help you get to your target blood sugar level goal and manage your weight. Get at least 150 minutes of moderate to vigorous aerobic physical activity each week. Resistance training, such as lifting weights, should be done 3 times each week. Do not miss more than 2 days of physical activity in a row. Do not sit longer than 30 minutes at a time. Your healthcare provider can help you create an activity plan. The plan can include the best activities for you and can help you build your strength and endurance.            Maintain a healthy weight.  Ask your team what a healthy weight is for you. A  healthy weight can help you control diabetes and prevent heart disease. Ask your team to help you create a weight-loss plan, if needed. Even a loss of 3% to 7% of your excess body weight can help make a difference in managing diabetes. Your team will help you set a weight-loss goal, such as 10 to 15 pounds, or 5% of your extra weight. Together you and your team can set manageable weight-loss goals.    Take your diabetes medicine or insulin as directed.  You may need diabetes medicine, insulin, or both to help control your blood sugar levels. Your healthcare provider will teach you how and when to take your diabetes medicine or insulin. You will also be taught about side effects oral diabetes medicine can cause. Insulin may be injected or given through a pump or pen. You and your providers will decide on the best method for you:    An insulin pump  is an implanted device that gives your insulin 24 hours a day. An insulin pump prevents the need for multiple insulin injections in a day.         An insulin pen  is a device prefilled with the right amount of insulin.         You and your family members will be taught how to draw up and give insulin  if this is the best method for you. Your providers will also teach you how to dispose of needles and syringes.    You will learn how much insulin you need  and when to give it. You will be taught when not to give insulin. You will also be taught what to do if your blood sugar level drops too low. This may happen if you take insulin and do not eat the right amount of carbohydrates.    More ways to manage type 2 diabetes:   Wear medical alert identification.  Wear medical alert jewelry or carry a card that says you have diabetes. Ask your provider where to get these items.         Do not smoke.  Nicotine and other chemicals in cigarettes and cigars can cause lung and blood vessel damage. It also makes it more difficult to manage your diabetes. Ask your provider for information  if you currently smoke and need help to quit. Do not use e-cigarettes or smokeless tobacco in place of cigarettes or to help you quit. They still contain nicotine.    Check your feet each day for cuts, scratches, calluses, or other wounds.  Look for redness and swelling, and feel for warmth. Wear shoes that fit well. Check your shoes for rocks or other objects that can hurt your feet. Do not walk barefoot or wear shoes without socks. Wear cotton socks to help keep your feet dry.         Ask about vaccines you may need.  You have a higher risk for serious illness if you get the flu, pneumonia, COVID-19, or hepatitis. Ask your provider if you should get vaccines to prevent these or other diseases, and when to get the vaccines.    Talk to your provider if you become stressed about diabetes care.  Sometimes being able to fit diabetes care into your life can cause increased stress. The stress can cause you not to take care of yourself properly. Your provider can help by offering tips about self-care. A mental health provider can listen and offer help with self-care issues. Other types of counseling can help you make nutrition or physical activity changes.    Have your A1c checked as directed.  Your provider may check your A1c every 3 months, or 2 times each year if your diabetes is controlled. An A1c test shows the average amount of sugar in your blood over the past 2 to 3 months. Your provider will tell you what your A1c level should be.    Have screening tests as directed.  Your provider may recommend screening for complications of diabetes and other conditions that may develop. Some screenings may begin right away and some may happen within the first 5 years of diagnosis:    Examples of diabetes complications  include kidney problems, high cholesterol, high blood pressure, blood vessel problems, eye problems, and sleep apnea.    You may be screened for a low vitamin B level  if you take oral diabetes medicine for a  long time.    You may be screened for polycystic ovarian syndrome (PCOS)  if you are of childbearing age.    Follow up with your doctor or diabetes care team providers as directed:  You may need to have blood tests done before your follow-up visit. The test results will show if changes need to be made in your treatment or self-care. Talk to your provider if you cannot afford your medicine. Write down your questions so you remember to ask them during your visits.  © Copyright [employer] 2023 Information is for End User's use only and may not be sold, redistributed or otherwise used for commercial purposes.  The above information is an  only. It is not intended as medical advice for individual conditions or treatments. Talk to your doctor, nurse or pharmacist before following any medical regimen to see if it is safe and effective for you.

## 2024-02-20 NOTE — PROGRESS NOTES
Assessment/Plan:    1. Type 2 diabetes mellitus without complication, without long-term current use of insulin ([facility])  -     CBC and differential; Future  -     Comprehensive metabolic panel; Future  -     Hemoglobin A1C; Future  -     Hemoglobin A1C; Future; Expected date: 05/20/2024    2. Gastroesophageal reflux disease without esophagitis  -     CBC and differential; Future  -     Comprehensive metabolic panel; Future    3. Essential hypertension  -     CBC and differential; Future  -     Comprehensive metabolic panel; Future    4. BMI 40.0-44.9, adult ([facility])  Assessment & Plan:  Patient  was advised to decrease portion size.  Advised to decrease carb, sugar, cholesterol intake.  Advised to exercise 3-5 times per week.  Advised to lose weight.      5. Mixed hyperlipidemia  -     Comprehensive metabolic panel; Future  -     Lipid panel; Future    6. Pain in both feet    7. High risk medication use  -     Comprehensive metabolic panel; Future    8. Paroxysmal atrial fibrillation ([facility])       Discussion/summary/plan:.    Diabetes mellitus last time A1c was elevated to 7.2.  Previously it was 6.8.  Patient denies any hypoglycemia.  Advised to continue 1800-calorie ADA diet.  Will follow blood sugar hemoglobin A1c.  Goal of the hemoglobin A1c at least to keep below 7.  Continue present medication.  GE reflux controlled on pantoprazole patient needs this medicine daily to control symptoms.  Has seen GI specialist.  Advised to watch diet and reflux precautions.  Systolic blood pressure slightly elevated.  Will continue present medications for now advised to exercise try to lose weight low-salt diet.  If persistently elevated will adjust medication.  Hyperlipidemia well-controlled.  Last cholesterol 140, triglyceride 148, HDL 59, LDL 51 advised to continue present medication and low-cholesterol low-carb diet.  Will follow lipid panel.  She has history of paroxysmal atrial fibrillation off anticoagulation as she is in normal  sinus rhythm.  Has been seen and followed by cardiologist.  She has a chronic pain in the feet and numbness of the right foot for which she has been seen and followed by her podiatrist.  She is on gabapentin, meloxicam and she takes 1 tramadol daily to control her symptoms.  This medication has been prescribed by her podiatrist Dr. Camp.  Advised to continue to follow with podiatrist.  She was also advised to see an ophthalmologist yearly at least due to diabetes mellitus and hypertension.  Her depression screen positive for mild depression patient states due to winter season she cannot go out and gives her symptoms of mild depression.  Patient does not feel there is a need for any medication for depression.  Patient states once weather gets better and start spring she will feel better.    Subjective: Patient presents for follow-up      Patient ID: Marilu Brunson is a 63 y.o. female.    HPI  63-year-old white female patient presents for follow-up of her medical problems.  She denies chest pain, shortness of breath, pain in abdomen.  Denies any cough, fever, chills.  Denies nausea vomiting diarrhea.    The following portions of the patient's history were reviewed and updated as appropriate:     Past Medical History:  She has a past medical history of Abrasion of right leg (03/19/2021), Acute respiratory failure due to COVID-19 (HCC) (10/01/2021), Age-related cataract of both eyes (5/23/2023), Arthritis, BMI 39.0-39.9,adult (5/10/2022), BMI 40.0-44.9, adult ([facility]) (2/20/2023), CKD (chronic kidney disease) (2/8/2022), Colon polyp, COVID-19 long hauler (11/22/2022), Diabetes mellitus ([facility]), Dyslipidemia, Dysphagia, pharyngoesophageal, Edema of both lower legs (03/19/2021), Essential hypertension (01/23/2021), Gastroesophageal reflux disease without esophagitis (01/23/2021), GERD (gastroesophageal reflux disease), Hiatal hernia, HTN (hypertension), Hyperlipemia, Hypertension, Hypertensive arterionephrosclerosis of  transplanted kidney (01/23/2021), Hypokalemia (04/20/2021), Insomnia, Leukocytosis, Medicare annual wellness visit, subsequent (11/9/2021), Mixed hyperlipidemia (01/23/2021), Numbness, Obstructive sleep apnea syndrome (01/23/2021), Other chest pain (8/26/2022), Pain in ankle (01/23/2021), Pain in both feet (01/23/2021), Pain in both knees (01/26/2021), Pain in right ankle (5/10/2022), Paroxysmal atrial fibrillation (HCC) (11/09/2021), Personal history of COVID-19, Right foot pain (5/10/2022), Right shoulder pain (2/20/2023), Shortness of breath, Shoulder pain, right (2/8/2022), Skin lesions, Sleep apnea, Type 2 diabetes mellitus without complication, without long-term current use of insulin (HCC) (01/23/2021), and Urinary incontinence (2/20/2023).,  _______________________________________________________________________  Past Surgical History:   has a past surgical history that includes Carpal tunnel release (Bilateral, 1990); Gallbladder surgery (2004); Hysterectomy (2004); Ankle surgery (Right, 2009/2012); Mammo stereotactic breast biopsy right (all inc) (Right, 2014); Mammo (historical) (10/02/2018); Colonoscopy (03/07/2017); Breast biopsy (Right, 05/15/2014); Colonoscopy; Upper gastrointestinal endoscopy; and [place] tooth extraction.,  _______________________________________________________________________  Family History:  family history includes Breast cancer in her maternal grandmother and mother; Cancer in her mother; Colon cancer in her cousin; Diabetes in her mother; Hyperlipidemia in her mother; Lung cancer in her father; No Known Problems in her daughter, maternal aunt, maternal grandfather, sister, sister, and son.,  _______________________________________________________________________  Social History:   reports that she quit smoking about 19 years ago. Her smoking use included cigarettes. She has quit using smokeless tobacco. She reports current alcohol use. She reports that she does not currently  use drugs.,  _______________________________________________________________________  Allergies:  is allergic to aspirin and lisinopril..  _______________________________________________________________________  Current Outpatient Medications   Medication Sig Dispense Refill    atorvastatin (LIPITOR) 40 mg tablet TAKE 1 TABLET EVERY DAY 90 tablet 1    Biotin 2500 MCG CAPS Take by mouth 2 (two) times a day      Blood Glucose Monitoring Suppl (Accu-Chek Lina Plus) w/Device KIT Use in the morning 1 kit 0    Blood Glucose Monitoring Suppl (FreeStyle Lite) TOYIN daily      calcium carbonate (OS-AMEE) 600 MG tablet Take 600 mg by mouth 2 (two) times a day with meals With vitamin D3       FREESTYLE LITE test strip USE TO TEST BLOOD SUGAR TWICE DAILY AS INSTRUCTED 200 each 3    gabapentin (NEURONTIN) 300 mg capsule Take 300 mg by mouth 2 (two) times a day      glucosamine-chondroitin 500-400 MG tablet Take 1 tablet by mouth 3 (three) times a day      glucose blood test strip Use as instructed 50 strip 3    Lancets (freestyle) lancets Use 2 (two) times a day Use as instructed 200 each 3    losartan-hydrochlorothiazide (HYZAAR) 100-12.5 MG per tablet TAKE 1 TABLET EVERY DAY 90 tablet 1    meloxicam (MOBIC) 15 mg tablet Take 15 mg by mouth daily      metFORMIN (GLUCOPHAGE) 500 mg tablet TAKE 1 TABLET EVERY MORNING AND 2 TABLETS EVERY EVENING BEFORE MEALS 270 tablet 1    metoprolol tartrate (LOPRESSOR) 50 mg tablet TAKE 1 TABLET TWICE DAILY 180 tablet 1    Multiple Vitamin (MULTIVITAMIN) capsule Take 1 capsule by mouth daily      Omega-3 Fatty Acids (fish oil) 1,000 mg TAKE 2 CAPSULES EVERY  capsule 3    pantoprazole (PROTONIX) 40 mg tablet TAKE 1 TABLET 30 MINUTES BEFORE BREAKFAST 90 tablet 1    traMADol (ULTRAM) 50 mg tablet Take 50 mg by mouth every 8 (eight) hours as needed BID  0     No current facility-administered medications for this visit.  "    _______________________________________________________________________  Review of Systems   Constitutional:  Negative for chills and fever.   HENT:  Negative for congestion, ear pain, hearing loss, nosebleeds, sinus pain, sore throat and trouble swallowing.    Eyes:  Negative for discharge, redness and visual disturbance.   Respiratory:  Negative for cough, chest tightness and shortness of breath.    Cardiovascular:  Negative for chest pain and palpitations.   Gastrointestinal:  Negative for abdominal pain, blood in stool, constipation, diarrhea, nausea and vomiting.   Genitourinary:  Negative for dysuria, flank pain and hematuria.   Musculoskeletal:  Positive for arthralgias (pain in feet). Negative for myalgias and neck pain.   Skin:  Negative for color change and rash.   Neurological:  Negative for dizziness, speech difficulty, weakness and headaches.   Hematological:  Does not bruise/bleed easily.   Psychiatric/Behavioral:  Negative for agitation and behavioral problems.          Objective:  Vitals:    02/20/24 0933   BP: 138/70   BP Location: Left arm   Patient Position: Sitting   Cuff Size: Large   Pulse: 67   Resp: 18   Temp: 98 °F (36.7 °C)   TempSrc: Temporal   SpO2: 98%   Weight: 106 kg (233 lb)   Height: 5' 3\" (1.6 m)     Body mass index is 41.27 kg/m².     Physical Exam  Vitals and nursing note reviewed.   Constitutional:       General: She is not in acute distress.     Appearance: She is obese.   HENT:      Head: Normocephalic and atraumatic.      Nose: Nose normal.      Mouth/Throat:      Mouth: Mucous membranes are moist.   Eyes:      General: No scleral icterus.        Right eye: No discharge.         Left eye: No discharge.      Extraocular Movements: Extraocular movements intact.      Conjunctiva/sclera: Conjunctivae normal.   Cardiovascular:      Rate and Rhythm: Normal rate and regular rhythm.      Pulses: Normal pulses.      Heart sounds: Normal heart sounds.   Pulmonary:      Effort: " Pulmonary effort is normal. No respiratory distress.      Breath sounds: Normal breath sounds. No wheezing, rhonchi or rales.   Abdominal:      General: Bowel sounds are normal.      Palpations: Abdomen is soft.      Tenderness: There is no abdominal tenderness.   Musculoskeletal:         General: Normal range of motion.      Cervical back: Normal range of motion and neck supple. No muscular tenderness.      Right lower leg: No edema.      Left lower leg: No edema.   Skin:     General: Skin is warm.   Neurological:      General: No focal deficit present.      Mental Status: She is alert and oriented to person, place, and time.      Motor: No weakness.   Psychiatric:         Mood and Affect: Mood normal.         Behavior: Behavior normal.

## 2024-03-24 DIAGNOSIS — K44.9 HIATAL HERNIA: ICD-10-CM

## 2024-03-24 DIAGNOSIS — K21.00 GASTROESOPHAGEAL REFLUX DISEASE WITH ESOPHAGITIS, UNSPECIFIED WHETHER HEMORRHAGE: ICD-10-CM

## 2024-03-24 DIAGNOSIS — R13.14 PHARYNGOESOPHAGEAL DYSPHAGIA: ICD-10-CM

## 2024-03-25 RX ORDER — PANTOPRAZOLE SODIUM 40 MG/1
40 TABLET, DELAYED RELEASE ORAL
Qty: 90 TABLET | Refills: 1 | Status: SHIPPED | OUTPATIENT
Start: 2024-03-25

## 2024-04-06 DIAGNOSIS — E78.2 MIXED HYPERLIPIDEMIA: Chronic | ICD-10-CM

## 2024-04-08 RX ORDER — ATORVASTATIN CALCIUM 40 MG/1
TABLET, FILM COATED ORAL
Qty: 90 TABLET | Refills: 3 | Status: SHIPPED | OUTPATIENT
Start: 2024-04-08

## 2024-04-27 DIAGNOSIS — E11.9 TYPE 2 DIABETES MELLITUS WITHOUT COMPLICATION, WITHOUT LONG-TERM CURRENT USE OF INSULIN (HCC): Chronic | ICD-10-CM

## 2024-05-14 ENCOUNTER — APPOINTMENT (OUTPATIENT)
Dept: LAB | Facility: HOSPITAL | Age: 64
End: 2024-05-14
Payer: COMMERCIAL

## 2024-05-14 DIAGNOSIS — K21.9 GASTROESOPHAGEAL REFLUX DISEASE WITHOUT ESOPHAGITIS: Chronic | ICD-10-CM

## 2024-05-14 DIAGNOSIS — E78.2 MIXED HYPERLIPIDEMIA: Chronic | ICD-10-CM

## 2024-05-14 DIAGNOSIS — I10 ESSENTIAL HYPERTENSION: Chronic | ICD-10-CM

## 2024-05-14 DIAGNOSIS — E11.9 TYPE 2 DIABETES MELLITUS WITHOUT COMPLICATION, WITHOUT LONG-TERM CURRENT USE OF INSULIN (HCC): Chronic | ICD-10-CM

## 2024-05-14 DIAGNOSIS — Z79.899 HIGH RISK MEDICATION USE: ICD-10-CM

## 2024-05-14 LAB
ALBUMIN SERPL BCP-MCNC: 3.8 G/DL (ref 3.5–5)
ALP SERPL-CCNC: 89 U/L (ref 34–104)
ALT SERPL W P-5'-P-CCNC: 14 U/L (ref 7–52)
ANION GAP SERPL CALCULATED.3IONS-SCNC: 10 MMOL/L (ref 4–13)
AST SERPL W P-5'-P-CCNC: 15 U/L (ref 13–39)
BASOPHILS # BLD AUTO: 0.1 THOUSANDS/ÂΜL (ref 0–0.1)
BASOPHILS NFR BLD AUTO: 1 % (ref 0–1)
BILIRUB SERPL-MCNC: 0.47 MG/DL (ref 0.2–1)
BUN SERPL-MCNC: 18 MG/DL (ref 5–25)
CALCIUM SERPL-MCNC: 9.1 MG/DL (ref 8.4–10.2)
CHLORIDE SERPL-SCNC: 103 MMOL/L (ref 96–108)
CHOLEST SERPL-MCNC: 127 MG/DL
CO2 SERPL-SCNC: 27 MMOL/L (ref 21–32)
CREAT SERPL-MCNC: 1.05 MG/DL (ref 0.6–1.3)
EOSINOPHIL # BLD AUTO: 0.31 THOUSAND/ÂΜL (ref 0–0.61)
EOSINOPHIL NFR BLD AUTO: 4 % (ref 0–6)
ERYTHROCYTE [DISTWIDTH] IN BLOOD BY AUTOMATED COUNT: 14.4 % (ref 11.6–15.1)
EST. AVERAGE GLUCOSE BLD GHB EST-MCNC: 146 MG/DL
GFR SERPL CREATININE-BSD FRML MDRD: 56 ML/MIN/1.73SQ M
GLUCOSE P FAST SERPL-MCNC: 146 MG/DL (ref 65–99)
HBA1C MFR BLD: 6.7 %
HCT VFR BLD AUTO: 40.4 % (ref 34.8–46.1)
HDLC SERPL-MCNC: 54 MG/DL
HGB BLD-MCNC: 12.8 G/DL (ref 11.5–15.4)
IMM GRANULOCYTES # BLD AUTO: 0.01 THOUSAND/UL (ref 0–0.2)
IMM GRANULOCYTES NFR BLD AUTO: 0 % (ref 0–2)
LDLC SERPL CALC-MCNC: 53 MG/DL (ref 0–100)
LYMPHOCYTES # BLD AUTO: 3.59 THOUSANDS/ÂΜL (ref 0.6–4.47)
LYMPHOCYTES NFR BLD AUTO: 45 % (ref 14–44)
MCH RBC QN AUTO: 27.8 PG (ref 26.8–34.3)
MCHC RBC AUTO-ENTMCNC: 31.7 G/DL (ref 31.4–37.4)
MCV RBC AUTO: 88 FL (ref 82–98)
MONOCYTES # BLD AUTO: 0.79 THOUSAND/ÂΜL (ref 0.17–1.22)
MONOCYTES NFR BLD AUTO: 10 % (ref 4–12)
NEUTROPHILS # BLD AUTO: 3.15 THOUSANDS/ÂΜL (ref 1.85–7.62)
NEUTS SEG NFR BLD AUTO: 40 % (ref 43–75)
NONHDLC SERPL-MCNC: 73 MG/DL
NRBC BLD AUTO-RTO: 0 /100 WBCS
PLATELET # BLD AUTO: 316 THOUSANDS/UL (ref 149–390)
PMV BLD AUTO: 10.2 FL (ref 8.9–12.7)
POTASSIUM SERPL-SCNC: 4 MMOL/L (ref 3.5–5.3)
PROT SERPL-MCNC: 7 G/DL (ref 6.4–8.4)
RBC # BLD AUTO: 4.6 MILLION/UL (ref 3.81–5.12)
SODIUM SERPL-SCNC: 140 MMOL/L (ref 135–147)
TRIGL SERPL-MCNC: 100 MG/DL
WBC # BLD AUTO: 7.95 THOUSAND/UL (ref 4.31–10.16)

## 2024-05-14 PROCEDURE — 36415 COLL VENOUS BLD VENIPUNCTURE: CPT

## 2024-05-14 PROCEDURE — 83036 HEMOGLOBIN GLYCOSYLATED A1C: CPT

## 2024-05-14 PROCEDURE — 85025 COMPLETE CBC W/AUTO DIFF WBC: CPT

## 2024-05-14 PROCEDURE — 80053 COMPREHEN METABOLIC PANEL: CPT

## 2024-05-14 PROCEDURE — 80061 LIPID PANEL: CPT

## 2024-05-16 NOTE — PROGRESS NOTES
Ambulatory Visit  Name: Marilu Brunson      : 1960      MRN: [id number]  Encounter Provider: Sandra Woo MD  Encounter Date: 2024   Encounter department: [facility] INTERNAL MEDICINE    Assessment & Plan   1. Medicare annual wellness visit, subsequent  2. Type 2 diabetes mellitus without complication, without long-term current use of insulin (HCC)  -     Basic metabolic panel; Future  -     Basic metabolic panel; Future; Expected date: 2024  3. Essential hypertension  -     Basic metabolic panel; Future  -     Basic metabolic panel; Future; Expected date: 2024  4. Paroxysmal atrial fibrillation (HCC)  5. Gastroesophageal reflux disease without esophagitis  6. Pain in both feet  7. BMI 40.0-44.9, adult (Formerly [facility])  Assessment & Plan:  Patient  was advised to decrease portion size.  Advised to decrease carb, sugar, cholesterol intake.  Advised to exercise 3-5 times per week.  Advised to lose weight.  8. Mixed hyperlipidemia  9. Stage 3a chronic kidney disease (HCC)  -     Basic metabolic panel; Future  -     Basic metabolic panel; Future; Expected date: 2024  10. Encounter for screening mammogram for breast cancer  -     Mammo screening bilateral w 3d & cad; Future  11. Chronic pain of both knees  Discussion/summary/plan:    Diabetes mellitus well-controlled.  Hemoglobin A1c 6.7.  Advised to continue present medication and 1800-calorie ADA diet.  Blood pressure well-controlled advised to continue present medications and low-salt diet.  Has history of paroxysmal atrial fibrillation after COVID.  Off Eliquis.  Clinically appears to be in normal sinus rhythm.  Advised to follow with a cardiologist.  For GE reflux he takes pantoprazole which is effective.  Has been seen and followed by GI specialist.  Has been watching diet.  She has a chronic pain in the both foot for which she has been seen and followed by podiatrist Dr. Camp on meloxicam, gabapentin, tramadol.   Cholesterol 127, triglyceride 100, HDL 54, LDL 53 advised to continue present medication and low-cholesterol low-carb diet.  CKD stable advised to maintain hydration.  Will follow electrolyte renal function.  She was advised to have a mammogram after June 7, 2024.  She has pain in the knees most likely osteoarthritis.  Advised to see orthopedic specialist but at present she does not want to see orthopedic specialist as she does not want to have any injections or any intervention at present.  Advised to exercise and lose weight.     Preventive health issues were discussed with patient, and age appropriate screening tests were ordered as noted in patient's After Visit Summary. Personalized health advice and appropriate referrals for health education or preventive services given if needed, as noted in patient's After Visit Summary.    History of Present Illness     HPI   63-year-old white female patient presents for Medicare annual wellness exam as well as follow-up on medical problems.  Patient Care Team:  Sandra Woo MD as PCP - General (Internal Medicine)    Review of Systems   Constitutional:  Negative for chills and fever.   HENT:  Negative for congestion, ear pain, hearing loss, nosebleeds, sinus pain, sore throat and trouble swallowing.    Eyes:  Negative for discharge, redness and visual disturbance.   Respiratory:  Positive for shortness of breath (when goes up hills/up steps). Negative for cough and chest tightness.    Cardiovascular:  Negative for chest pain and palpitations.   Gastrointestinal:  Negative for abdominal pain, blood in stool, constipation, diarrhea, nausea and vomiting.   Genitourinary:  Negative for dysuria, flank pain and hematuria.   Musculoskeletal:  Positive for arthralgias (Complain of pain in the knees.). Negative for myalgias and neck pain.   Skin:  Negative for color change and rash.   Neurological:  Negative for dizziness, speech difficulty, weakness and headaches.    Hematological:  Does not bruise/bleed easily.   Psychiatric/Behavioral:  Negative for agitation and behavioral problems.      Medical History Reviewed by provider this encounter:       Annual Wellness Visit Questionnaire   Marilu is here for her Subsequent Wellness visit. Last Medicare Wellness visit information reviewed, patient interviewed and updates made to the record today.      Health Risk Assessment:   Patient rates overall health as very good. Patient feels that their physical health rating is same. Patient is satisfied with their life. Eyesight was rated as same. Hearing was rated as same. Patient feels that their emotional and mental health rating is same. Patients states they are sometimes angry. Patient states they are always unusually tired/fatigued. Pain experienced in the last 7 days has been a lot. Patient's pain rating has been 5/10. Patient states that she has experienced no weight loss or gain in last 6 months.     Depression Screening:   PHQ-2 Score: 1      Fall Risk Screening:   In the past year, patient has experienced: history of falling in past year    Number of falls: 1  Injured during fall?: No    Feels unsteady when standing or walking?: Yes    Worried about falling?: Yes      Urinary Incontinence Screening:   Patient has leaked urine accidently in the last six months.     Home Safety:  Patient has trouble with stairs inside or outside of their home. Patient has working smoke alarms and has working carbon monoxide detector. Home safety hazards include: none.     Nutrition:   Current diet is Limited junk food, Frequent junk food, Diabetic, Low Cholesterol and No Added Salt.     Medications:   Patient is currently taking over-the-counter supplements. OTC medications include: see medication list. Patient is able to manage medications.     Activities of Daily Living (ADLs)/Instrumental Activities of Daily Living (IADLs):   Walk and transfer into and out of bed and chair?: Yes  Dress and groom  yourself?: Yes    Bathe or shower yourself?: Yes    Feed yourself? Yes  Do your laundry/housekeeping?: Yes  Manage your money, pay your bills and track your expenses?: Yes  Make your own meals?: Yes    Do your own shopping?: Yes    Previous Hospitalizations:   Any hospitalizations or ED visits within the last 12 months?: No      Advance Care Planning:   Living will: No    Durable POA for healthcare: No    Advanced directive: No    Advanced directive counseling given: Yes    ACP document given: Yes    Patient declined ACP directive: No      Cognitive Screening:   Provider or family/friend/caregiver concerned regarding cognition?: No    PREVENTIVE SCREENINGS      Cardiovascular Screening:    General: History Lipid Disorder and Screening Current      Diabetes Screening:     General: History Diabetes and Screening Current      Colorectal Cancer Screening:     General: Screening Current      Breast Cancer Screening:     General: Screening Current      Cervical Cancer Screening:    General: Risks and Benefits Discussed      Abdominal Aortic Aneurysm (AAA) Screening:        General: Screening Not Indicated      Lung Cancer Screening:     General: Screening Not Indicated      Hepatitis C Screening:    General: Screening Current    Screening, Brief Intervention, and Referral to Treatment (SBIRT)    Screening  Typical number of drinks in a day: 0  Typical number of drinks in a week: 0  Interpretation: Low risk drinking behavior.    Single Item Drug Screening:  How often have you used an illegal drug (including marijuana) or a prescription medication for non-medical reasons in the past year? never    Single Item Drug Screen Score: 0  Interpretation: Negative screen for possible drug use disorder    Brief Intervention  Alcohol & drug use screenings were reviewed. No concerns regarding substance use disorder identified.     Review of Current Opioid Use    Opioid Risk Tool (ORT) Interpretation: Complete Opioid Risk Tool  (ORT)    Other Counseling Topics:   Car/seat belt/driving safety, skin self-exam, sunscreen and calcium and vitamin D intake and regular weightbearing exercise.   Diabetic Foot Exam    Patient's shoes and socks removed.    Right Foot/Ankle   Right Foot Inspection  Skin Exam: skin normal and skin intact. No dry skin, no warmth, no callus, no erythema, no maceration, no abnormal color, no pre-ulcer, no ulcer and no callus.     Toe Exam: ROM and strength within normal limits.     Sensory   Monofilament testing: intact    Vascular  Capillary refills: < 3 seconds  The right DP pulse is 2+.     Left Foot/Ankle  Left Foot Inspection  Skin Exam: skin normal and skin intact. No dry skin, no warmth, no erythema, no maceration, normal color, no pre-ulcer, no ulcer and no callus.     Toe Exam: ROM and strength within normal limits.     Sensory   Monofilament testing: intact    Vascular  Capillary refills: < 3 seconds  The left DP pulse is 2+.     Assign Risk Category  No deformity present  Loss of protective sensation  No weak pulses  Risk: 1        Social Determinants of Health     Financial Resource Strain: Medium Risk (11/22/2022)    Overall Financial Resource Strain (CARDIA)     Difficulty of Paying Living Expenses: Somewhat hard   Food Insecurity: No Food Insecurity (5/20/2024)    Hunger Vital Sign     Worried About Running Out of Food in the Last Year: Never true     Ran Out of Food in the Last Year: Never true   Transportation Needs: No Transportation Needs (5/20/2024)    PRAPARE - Transportation     Lack of Transportation (Medical): No     Lack of Transportation (Non-Medical): No   Housing Stability: Low Risk  (5/20/2024)    Housing Stability Vital Sign     Unable to Pay for Housing in the Last Year: No     Number of Times Moved in the Last Year: 1     Homeless in the Last Year: No   Utilities: Not At Risk (5/20/2024)    [facility] Utilities     Threatened with loss of utilities: No     No results found.    Objective     BP  "120/70 (BP Location: Left arm, Patient Position: Sitting, Cuff Size: Large)   Pulse 63   Temp 98.3 °F (36.8 °C) (Temporal)   Resp 18   Ht 5' 3\" (1.6 m)   Wt 105 kg (231 lb)   SpO2 99%   BMI 40.92 kg/m²     Physical Exam  Vitals and nursing note reviewed.   Constitutional:       General: She is not in acute distress.     Appearance: She is well-developed. She is obese.   HENT:      Head: Normocephalic and atraumatic.      Right Ear: Ear canal and external ear normal.      Left Ear: Ear canal and external ear normal.      Nose: Nose normal.      Mouth/Throat:      Mouth: Mucous membranes are moist.      Pharynx: Oropharynx is clear.   Eyes:      General: No scleral icterus.        Right eye: No discharge.         Left eye: No discharge.      Extraocular Movements: Extraocular movements intact.      Conjunctiva/sclera: Conjunctivae normal.   Cardiovascular:      Rate and Rhythm: Normal rate and regular rhythm.      Pulses: Normal pulses. no weak pulses.           Dorsalis pedis pulses are 2+ on the right side and 2+ on the left side.      Heart sounds: Normal heart sounds.   Pulmonary:      Effort: Pulmonary effort is normal. No respiratory distress.      Breath sounds: Normal breath sounds. No wheezing, rhonchi or rales.   Abdominal:      General: Bowel sounds are normal. There is no distension.      Palpations: Abdomen is soft.      Tenderness: There is no abdominal tenderness.   Musculoskeletal:         General: Tenderness present. No swelling. Injury: Has some discomfort on flexion extension of knees but able to ambulate well.  Good range of motion..Normal range of motion.      Cervical back: Normal range of motion and neck supple.      Right lower leg: No edema.      Left lower leg: No edema.        Feet:    Feet:      Right foot:      Skin integrity: No ulcer, skin breakdown, erythema, warmth, callus or dry skin.      Left foot:      Skin integrity: No ulcer, skin breakdown, erythema, warmth, callus or dry " skin.   Skin:     General: Skin is warm and dry.      Capillary Refill: Capillary refill takes less than 2 seconds.      Findings: No rash.   Neurological:      General: No focal deficit present.      Mental Status: She is alert and oriented to person, place, and time.      Motor: No weakness.   Psychiatric:         Mood and Affect: Mood normal.         Behavior: Behavior normal.       Administrative Statements   I have spent a total time of 30 minutes on 05/20/24 In caring for this patient including Diagnostic results.

## 2024-05-20 ENCOUNTER — OFFICE VISIT (OUTPATIENT)
Dept: INTERNAL MEDICINE CLINIC | Facility: CLINIC | Age: 64
End: 2024-05-20
Payer: COMMERCIAL

## 2024-05-20 VITALS
HEIGHT: 63 IN | WEIGHT: 231 LBS | HEART RATE: 63 BPM | OXYGEN SATURATION: 99 % | SYSTOLIC BLOOD PRESSURE: 120 MMHG | DIASTOLIC BLOOD PRESSURE: 70 MMHG | RESPIRATION RATE: 18 BRPM | BODY MASS INDEX: 40.93 KG/M2 | TEMPERATURE: 98.3 F

## 2024-05-20 DIAGNOSIS — I48.0 PAROXYSMAL ATRIAL FIBRILLATION (HCC): ICD-10-CM

## 2024-05-20 DIAGNOSIS — E11.9 TYPE 2 DIABETES MELLITUS WITHOUT COMPLICATION, WITHOUT LONG-TERM CURRENT USE OF INSULIN (HCC): Chronic | ICD-10-CM

## 2024-05-20 DIAGNOSIS — N18.31 STAGE 3A CHRONIC KIDNEY DISEASE (HCC): ICD-10-CM

## 2024-05-20 DIAGNOSIS — M79.672 PAIN IN BOTH FEET: Chronic | ICD-10-CM

## 2024-05-20 DIAGNOSIS — G89.29 CHRONIC PAIN OF BOTH KNEES: ICD-10-CM

## 2024-05-20 DIAGNOSIS — Z12.31 ENCOUNTER FOR SCREENING MAMMOGRAM FOR BREAST CANCER: ICD-10-CM

## 2024-05-20 DIAGNOSIS — M79.671 PAIN IN BOTH FEET: Chronic | ICD-10-CM

## 2024-05-20 DIAGNOSIS — K21.9 GASTROESOPHAGEAL REFLUX DISEASE WITHOUT ESOPHAGITIS: Chronic | ICD-10-CM

## 2024-05-20 DIAGNOSIS — M25.562 CHRONIC PAIN OF BOTH KNEES: ICD-10-CM

## 2024-05-20 DIAGNOSIS — Z00.00 MEDICARE ANNUAL WELLNESS VISIT, SUBSEQUENT: Primary | ICD-10-CM

## 2024-05-20 DIAGNOSIS — M25.561 CHRONIC PAIN OF BOTH KNEES: ICD-10-CM

## 2024-05-20 DIAGNOSIS — E78.2 MIXED HYPERLIPIDEMIA: Chronic | ICD-10-CM

## 2024-05-20 DIAGNOSIS — I10 ESSENTIAL HYPERTENSION: Chronic | ICD-10-CM

## 2024-05-20 PROCEDURE — 99213 OFFICE O/P EST LOW 20 MIN: CPT | Performed by: INTERNAL MEDICINE

## 2024-05-20 PROCEDURE — G0439 PPPS, SUBSEQ VISIT: HCPCS | Performed by: INTERNAL MEDICINE

## 2024-05-20 NOTE — PATIENT INSTRUCTIONS
Patient was advised to continue present medications. discussed with the patient medications and laboratory data in detail.  Follow-up with me in 3 months or as advised.  If any blood test was ordered please do 1 week prior to next appointment unless advise to get earlier.  If you have any questions please call the office [phone]  Type 2 Diabetes Management for Adults   AMBULATORY CARE:   Type 2 diabetes  is a disease that affects how your body uses glucose (sugar). Either your body cannot make enough insulin, or it cannot use the insulin correctly. It is important to keep diabetes controlled to prevent damage to your heart, blood vessels, and other organs. Management will help you feel well and enjoy your daily activities. Your diabetes care team providers can help you make a plan to fit diabetes care into your schedule. Your plan can change over time to fit your needs and your family's needs.       Have someone call your local emergency number (911 in the [place]) if:   You cannot be woken.    You have signs of diabetic ketoacidosis:     confusion, fatigue    vomiting    rapid heartbeat    fruity smelling breath    extreme thirst    dry mouth and skin    You have any of the following signs of a heart attack:      Squeezing, pressure, or pain in your chest    You may  also have any of the following:     Discomfort or pain in your back, neck, jaw, stomach, or arm    Shortness of breath    Nausea or vomiting    Lightheadedness or a sudden cold sweat    You have any of the following signs of a stroke:      Numbness or drooping on one side of your face     Weakness in an arm or leg    Confusion or difficulty speaking    Dizziness, a severe headache, or vision loss    Call your doctor or diabetes care team provider if:   You have a sore or wound that will not heal.    You have a change in the amount you urinate.    Your blood sugar levels are higher than your target goals.    You often have lower blood sugar levels than  your target goals.    Your skin is red, dry, warm, or swollen.    You have trouble coping with diabetes, or you feel anxious or depressed.    You have trouble following any part of your care plan, such as your meal plan.    You have questions or concerns about your condition or care.    What you need to know about high blood sugar levels:  High blood sugar levels may not cause any symptoms. You may feel more thirsty or urinate more often than usual. Over time, high blood sugar levels can damage your nerves, blood vessels, tissues, and organs. The following can increase your blood sugar levels:  Large meals or large amounts of carbohydrates at one time    Less physical activity    Stress    Illness    A lower dose of diabetes medicine or insulin, or a late dose    What you need to know about low blood sugar levels:  Symptoms include feeling shaky, dizzy, irritable, or confused. You can prevent symptoms by keeping your blood sugar levels from going too low.  Treat a low blood sugar level right away:      Drink 4 ounces of juice or have 1 tube of glucose gel.    Check your blood sugar level again 10 to 15 minutes later.    When the level goes back to normal, eat a meal or snack to prevent another decrease.       Keep glucose gel, raisins, or hard candy with you at all times to treat a low blood sugar level.     Your blood sugar level can get too low if you take diabetes medicine or insulin and do not eat enough food.     If you use insulin, check your blood sugar level before you exercise.      If your blood sugar level is below 100 mg/dL, eat 4 crackers or 2 ounces of raisins, or drink 4 ounces of juice.    Check your level every 30 minutes if you exercise longer than 1 hour.    You may need a snack during or after exercise.    What you can do to manage your blood sugar levels:   Check your blood sugar levels as directed and as needed.  Several items are available to use to check your levels. You may need to check by  testing a drop of blood in a glucose monitor. You may instead be given a continuous glucose monitoring (CGM) device. The device is worn at all times. The CGM checks your blood sugar level every 5 minutes. It sends results to an electronic device such as a smart phone. A CGM can be used with or without an insulin pump. You and your diabetes care team providers will decide on the best method for you. The goal for blood sugar levels before meals  is between 80 and 130 mg/dL and 2 hours after eating  is lower than 180 mg/dL.            Make healthy food choices.  Work with a dietitian to create a meal plan that works for you and your schedule. A dietitian can help you learn how to eat the right amount of carbohydrates (sugar and starchy foods) during your meals and snacks. Examples of carbohydrates are breads, cereals, rice, pasta, fruit, low-fat dairy, and sweets. Carbohydrates can raise your blood sugar level if you eat too many at one time.         Eat high-fiber foods as directed.  Fiber helps improve blood sugar levels. Fiber also lowers your risk for heart disease and other problems diabetes can cause. Examples of high-fiber foods include vegetables, whole-grain bread, and beans such as pritchard beans. Your dietitian can tell you how much fiber to have each day.         Get regular physical activity.  Physical activity can help you get to your target blood sugar level goal and manage your weight. Get at least 150 minutes of moderate to vigorous aerobic physical activity each week. Resistance training, such as lifting weights, should be done 3 times each week. Do not miss more than 2 days of physical activity in a row. Do not sit longer than 30 minutes at a time. Your healthcare provider can help you create an activity plan. The plan can include the best activities for you and can help you build your strength and endurance.            Maintain a healthy weight.  Ask your team what a healthy weight is for you. A  healthy weight can help you control diabetes and prevent heart disease. Ask your team to help you create a weight-loss plan, if needed. Even a loss of 3% to 7% of your excess body weight can help make a difference in managing diabetes. Your team will help you set a weight-loss goal, such as 10 to 15 pounds, or 5% of your extra weight. Together you and your team can set manageable weight-loss goals.    Take your diabetes medicine or insulin as directed.  You may need diabetes medicine, insulin, or both to help control your blood sugar levels. Your healthcare provider will teach you how and when to take your diabetes medicine or insulin. You will also be taught about side effects oral diabetes medicine can cause. Insulin may be injected or given through a pump or pen. You and your providers will decide on the best method for you:    An insulin pump  is an implanted device that gives your insulin 24 hours a day. An insulin pump prevents the need for multiple insulin injections in a day.         An insulin pen  is a device prefilled with the right amount of insulin.         You and your family members will be taught how to draw up and give insulin  if this is the best method for you. Your providers will also teach you how to dispose of needles and syringes.    You will learn how much insulin you need  and when to give it. You will be taught when not to give insulin. You will also be taught what to do if your blood sugar level drops too low. This may happen if you take insulin and do not eat the right amount of carbohydrates.    More ways to manage type 2 diabetes:   Wear medical alert identification.  Wear medical alert jewelry or carry a card that says you have diabetes. Ask your provider where to get these items.         Do not smoke.  Nicotine and other chemicals in cigarettes and cigars can cause lung and blood vessel damage. It also makes it more difficult to manage your diabetes. Ask your provider for information  if you currently smoke and need help to quit. Do not use e-cigarettes or smokeless tobacco in place of cigarettes or to help you quit. They still contain nicotine.    Check your feet each day for cuts, scratches, calluses, or other wounds.  Look for redness and swelling, and feel for warmth. Wear shoes that fit well. Check your shoes for rocks or other objects that can hurt your feet. Do not walk barefoot or wear shoes without socks. Wear cotton socks to help keep your feet dry.         Ask about vaccines you may need.  You have a higher risk for serious illness if you get the flu, pneumonia, COVID-19, or hepatitis. Ask your provider if you should get vaccines to prevent these or other diseases, and when to get the vaccines.    Talk to your provider if you become stressed about diabetes care.  Sometimes being able to fit diabetes care into your life can cause increased stress. The stress can cause you not to take care of yourself properly. Your provider can help by offering tips about self-care. A mental health provider can listen and offer help with self-care issues. Other types of counseling can help you make nutrition or physical activity changes.    Have your A1c checked as directed.  Your provider may check your A1c every 3 months, or 2 times each year if your diabetes is controlled. An A1c test shows the average amount of sugar in your blood over the past 2 to 3 months. Your provider will tell you what your A1c level should be.    Have screening tests as directed.  Your provider may recommend screening for complications of diabetes and other conditions that may develop. Some screenings may begin right away and some may happen within the first 5 years of diagnosis:    Examples of diabetes complications  include kidney problems, high cholesterol, high blood pressure, blood vessel problems, eye problems, and sleep apnea.    You may be screened for a low vitamin B level  if you take oral diabetes medicine for a  long time.    You may be screened for polycystic ovarian syndrome (PCOS)  if you are of childbearing age.    Follow up with your doctor or diabetes care team providers as directed:  You may need to have blood tests done before your follow-up visit. The test results will show if changes need to be made in your treatment or self-care. Talk to your provider if you cannot afford your medicine. Write down your questions so you remember to ask them during your visits.  © Copyright [employer] 2023 Information is for End User's use only and may not be sold, redistributed or otherwise used for commercial purposes.  The above information is an  only. It is not intended as medical advice for individual conditions or treatments. Talk to your doctor, nurse or pharmacist before following any medical regimen to see if it is safe and effective for you.

## 2024-06-19 PROBLEM — Z00.00 MEDICARE ANNUAL WELLNESS VISIT, SUBSEQUENT: Status: RESOLVED | Noted: 2024-05-20 | Resolved: 2024-06-19

## 2024-07-11 DIAGNOSIS — I10 ESSENTIAL HYPERTENSION: Chronic | ICD-10-CM

## 2024-07-11 RX ORDER — METOPROLOL TARTRATE 50 MG/1
TABLET, FILM COATED ORAL
Qty: 180 TABLET | Refills: 1 | Status: SHIPPED | OUTPATIENT
Start: 2024-07-11

## 2024-07-11 RX ORDER — LOSARTAN POTASSIUM AND HYDROCHLOROTHIAZIDE 12.5; 1 MG/1; MG/1
TABLET ORAL
Qty: 90 TABLET | Refills: 1 | Status: SHIPPED | OUTPATIENT
Start: 2024-07-11

## 2024-07-24 DIAGNOSIS — E78.2 MIXED HYPERLIPIDEMIA: ICD-10-CM

## 2024-07-24 RX ORDER — CHLORAL HYDRATE 500 MG
CAPSULE ORAL
Qty: 180 CAPSULE | Refills: 3 | Status: SHIPPED | OUTPATIENT
Start: 2024-07-24

## 2024-08-12 ENCOUNTER — OFFICE VISIT (OUTPATIENT)
Dept: CARDIOLOGY CLINIC | Facility: CLINIC | Age: 64
End: 2024-08-12
Payer: COMMERCIAL

## 2024-08-12 VITALS
WEIGHT: 225 LBS | HEIGHT: 63 IN | SYSTOLIC BLOOD PRESSURE: 120 MMHG | BODY MASS INDEX: 39.87 KG/M2 | OXYGEN SATURATION: 98 % | DIASTOLIC BLOOD PRESSURE: 70 MMHG | HEART RATE: 58 BPM

## 2024-08-12 DIAGNOSIS — I48.0 PAROXYSMAL ATRIAL FIBRILLATION (HCC): Primary | ICD-10-CM

## 2024-08-12 DIAGNOSIS — I10 ESSENTIAL HYPERTENSION: ICD-10-CM

## 2024-08-12 PROCEDURE — 99214 OFFICE O/P EST MOD 30 MIN: CPT | Performed by: NURSE PRACTITIONER

## 2024-08-12 PROCEDURE — 93000 ELECTROCARDIOGRAM COMPLETE: CPT | Performed by: NURSE PRACTITIONER

## 2024-08-12 NOTE — PROGRESS NOTES
Progress Note - Cardiology Office  [facility]    Marilu Brunson 63 y.o. female MRN: [id number]  : 1960  Encounter: [id number]      Assessment:     1. Paroxysmal atrial fibrillation (HCC)    2. Essential hypertension        Discussion Summary and Plan:  1. Hypertension: blood pressure stable and acceptable    -   continue Hyzaar 100/12.5 mg one today    -   continue Lopressor 50 mg BID    -   encourage low sodium diet    2. Paroxysmal atrial fibrillation: occurred in  in the setting of COVID respiratory infection    -   patient is currently maintaining sinus rhythm    -   continue Lopressor 50 mg BID    3. Mixed dyslipidemia: 2024 lipid panel: total cholesterol 127, triglycerides 100, HDL 54 and LDL 53    -   continue fish oil 1000 milligrams once a day    -   continue Lipitor 40 mg daily    4. Diabetes: hemoglobin A1c 6.7 with estimated average glucose of 146    -   managed per primary team      Patient / Caretaker was advised and educated to call our office  immediately if  patient has any new symptoms of chest pain/shortness of breath, near-syncope, syncope, light headedness sustained palpitations  or any other cardiovascular symptoms before their scheduled follow-up appointment.  Office number was provided #[phone].    Please call [phone] if any questions.    Counseling :  A description of the counseling.  Goals and Barriers.  Patient's ability to self care: Yes  Medication side effect reviewed with patient in detail and all their questions answered to their satisfaction.    HPI :     Marilu Brunson is a 63 y.o. year old female who came for routine follow up. Patient has been doing well since her last office visit. Blood pressures are stable. She does note some stress due to family issues at home. She denies any nausea, vomiting, shortness of breath, dizziness or lightheadedness. She denies any chest pressure or palpitations.    Patient has past history for  atrial fibrillation in the setting of Covid respiratory infection, dyslipidemia, hypertension, diabetes with neuropathy, and obesity.    Review of Systems   Constitutional: Negative.  Negative for activity change, fatigue and unexpected weight change.   HENT: Negative.  Negative for congestion, facial swelling, sinus pressure and trouble swallowing.    Eyes: Negative.  Negative for photophobia and visual disturbance.   Respiratory: Negative.  Negative for chest tightness and shortness of breath.    Cardiovascular: Negative.  Negative for chest pain, palpitations and leg swelling.   Gastrointestinal:  Negative for abdominal distention, constipation and diarrhea.   Endocrine: Negative.  Negative for polydipsia, polyphagia and polyuria.   Genitourinary: Negative.  Negative for difficulty urinating.   Musculoskeletal: Negative.  Negative for gait problem.   Skin: Negative.    Neurological: Negative.  Negative for dizziness, syncope, weakness and light-headedness.   Hematological: Negative.    Psychiatric/Behavioral: Negative.         Historical Information   Past Medical History:   Diagnosis Date    Abrasion of right leg 03/19/2021    Acute respiratory failure due to COVID-19 ([facility]) 10/01/2021    Age-related cataract of both eyes 05/23/2023    Arthritis     feet    BMI 39.0-39.9,adult 05/10/2022    BMI 40.0-44.9, adult ([facility]) 02/20/2023    Chronic pain of both knees 05/20/2024    CKD (chronic kidney disease) 02/08/2022    Colon polyp     COVID-19 long hauler 11/22/2022    Diabetes mellitus ([facility])     FBS today 2/28/22 was 147 at 0930 by patient--feels well    Dyslipidemia     Dysphagia, pharyngoesophageal     Edema of both lower legs 03/19/2021    Essential hypertension 01/23/2021    Gastroesophageal reflux disease without esophagitis 01/23/2021    GERD (gastroesophageal reflux disease)     Hiatal hernia     HTN (hypertension)     Hyperlipemia     Hypertension     Hypertensive arterionephrosclerosis of transplanted kidney  2021    Hypokalemia 2021    Insomnia     Leukocytosis     Medicare annual wellness visit, subsequent 2021    Medicare annual wellness visit, subsequent 2024    Mixed hyperlipidemia 2021    Numbness     Obstructive sleep apnea syndrome 2021    no device    Other chest pain 2022    Pain in ankle 2021    Pain in both feet 2021    Pain in both knees 2021    Pain in right ankle 05/10/2022    Paroxysmal atrial fibrillation (HCC) 2021    Personal history of COVID-2021    Right foot pain 05/10/2022    Right shoulder pain 2023    Shortness of breath     exertional    Shoulder pain, right 2022    Skin lesions     Sleep apnea     Type 2 diabetes mellitus without complication, without long-term current use of insulin (HCC) 2021    Urinary incontinence 2023     Past Surgical History:   Procedure Laterality Date    ANKLE SURGERY Right     BREAST BIOPSY Right 05/15/2014    stereotactic biopsy at [place]    CARPAL TUNNEL RELEASE Bilateral     COLONOSCOPY  2017    Dr. Wilson    COLONOSCOPY      GALLBLADDER SURGERY  2004    HYSTERECTOMY  2004    MAMMO (HISTORICAL)  10/02/2018    MAMMO STEREOTACTIC BREAST BIOPSY RIGHT (ALL INC) Right     UPPER GASTROINTESTINAL ENDOSCOPY      WISDOM TOOTH EXTRACTION       Social History     Substance and Sexual Activity   Alcohol Use Yes    Comment: -rare     Social History     Substance and Sexual Activity   Drug Use Not Currently    Comment: No drug use - As per [employer]     Social History     Tobacco Use   Smoking Status Former    Current packs/day: 0.00    Types: Cigarettes    Quit date: 2004    Years since quittin.9   Smokeless Tobacco Former   Tobacco Comments    quit at 44     Family History:   Family History   Problem Relation Age of Onset    Cancer Mother     Diabetes Mother     Hyperlipidemia Mother     Breast cancer Mother         30    Lung cancer  Father     No Known Problems Sister     No Known Problems Sister     No Known Problems Daughter     Breast cancer Maternal Grandmother     No Known Problems Maternal Grandfather     No Known Problems Son     No Known Problems Maternal Aunt     Colon cancer Cousin        Meds/Allergies     Allergies   Allergen Reactions    Aspirin GI Intolerance    Lisinopril Fatigue       Current Outpatient Medications:     atorvastatin (LIPITOR) 40 mg tablet, TAKE 1 TABLET EVERY DAY, Disp: 90 tablet, Rfl: 3    Biotin 2500 MCG CAPS, Take by mouth 2 (two) times a day, Disp: , Rfl:     Blood Glucose Monitoring Suppl (FreeStyle Lite) TOYIN, daily, Disp: , Rfl:     calcium carbonate (OS-AMEE) 600 MG tablet, Take 600 mg by mouth 2 (two) times a day with meals With vitamin D3 , Disp: , Rfl:     FREESTYLE LITE test strip, USE TO TEST BLOOD SUGAR TWICE DAILY AS INSTRUCTED, Disp: 200 each, Rfl: 3    gabapentin (NEURONTIN) 300 mg capsule, Take 300 mg by mouth 2 (two) times a day, Disp: , Rfl:     glucosamine-chondroitin 500-400 MG tablet, Take 1 tablet by mouth 3 (three) times a day, Disp: , Rfl:     losartan-hydrochlorothiazide (HYZAAR) 100-12.5 MG per tablet, TAKE 1 TABLET EVERY DAY, Disp: 90 tablet, Rfl: 1    meloxicam (MOBIC) 15 mg tablet, Take 15 mg by mouth daily, Disp: , Rfl:     metFORMIN (GLUCOPHAGE) 500 mg tablet, TAKE 1 TABLET EVERY MORNING AND 2 TABLETS EVERY EVENING BEFORE MEALS, Disp: 270 tablet, Rfl: 1    metoprolol tartrate (LOPRESSOR) 50 mg tablet, TAKE 1 TABLET TWICE DAILY, Disp: 180 tablet, Rfl: 1    Multiple Vitamin (MULTIVITAMIN) capsule, Take 1 capsule by mouth daily, Disp: , Rfl:     Omega-3 Fatty Acids (fish oil) 1,000 mg, TAKE 2 CAPSULES EVERY DAY, Disp: 180 capsule, Rfl: 3    pantoprazole (PROTONIX) 40 mg tablet, TAKE 1 TABLET 30 MINUTES BEFORE BREAKFAST, Disp: 90 tablet, Rfl: 1    traMADol (ULTRAM) 50 mg tablet, Take 50 mg by mouth every 8 (eight) hours as needed BID, Disp: , Rfl: 0    Blood Glucose Monitoring Suppl  "(Accu-Chek Lina Plus) w/Device KIT, Use in the morning (Patient not taking: Reported on 8/12/2024), Disp: 1 kit, Rfl: 0    glucose blood test strip, Use as instructed (Patient not taking: Reported on 8/12/2024), Disp: 50 strip, Rfl: 3    Lancets (freestyle) lancets, Use 2 (two) times a day Use as instructed (Patient not taking: Reported on 8/12/2024), Disp: 200 each, Rfl: 3    Vitals: Blood pressure 120/70, pulse 58, height 5' 3\" (1.6 m), weight 102 kg (225 lb), SpO2 98%.    Body mass index is 39.86 kg/m².  Wt Readings from Last 3 Encounters:   08/12/24 102 kg (225 lb)   05/20/24 105 kg (231 lb)   02/20/24 106 kg (233 lb)     Vitals:    08/12/24 1123   Weight: 102 kg (225 lb)     BP Readings from Last 3 Encounters:   08/12/24 120/70   05/20/24 120/70   02/20/24 138/70       Physical Exam:  Physical Exam  Vitals and nursing note reviewed.   Constitutional:       Appearance: Normal appearance. She is morbidly obese.   Eyes:      General: No scleral icterus.        Right eye: No discharge.         Left eye: No discharge.   Cardiovascular:      Rate and Rhythm: Normal rate and regular rhythm.      Pulses: Normal pulses.      Heart sounds: Normal heart sounds.   Pulmonary:      Effort: Pulmonary effort is normal. No respiratory distress.      Breath sounds: Normal breath sounds.   Abdominal:      General: Bowel sounds are normal. There is no distension.      Palpations: Abdomen is soft.   Musculoskeletal:      Right lower leg: No edema.      Left lower leg: No edema.   Skin:     General: Skin is warm and dry.      Capillary Refill: Capillary refill takes less than 2 seconds.   Neurological:      General: No focal deficit present.      Mental Status: She is alert and oriented to person, place, and time.   Psychiatric:         Mood and Affect: Mood normal.         Behavior: Behavior is cooperative.         Diagnostic Studies Review Cardio:  EKG:  sinus rhythm          Mirta ACHARYA  Cardiology        \"This note was " "completed in part utilizing [employer] fluency direct voice recognition software.   Grammatical errors, random word insertion, spelling mistakes, and incomplete sentences may be an occasional consequence of the system secondary to software limitations, ambient noise and hardware issues.    Please read the chart carefully and recognize, using context, where substitutions have occurred.  If you have any questions or concerns about the context, text or information contained within the body of this dictation, please contact myself, the provider, for further clarification.\"  "

## 2024-08-18 DIAGNOSIS — K21.00 GASTROESOPHAGEAL REFLUX DISEASE WITH ESOPHAGITIS, UNSPECIFIED WHETHER HEMORRHAGE: ICD-10-CM

## 2024-08-18 DIAGNOSIS — K44.9 HIATAL HERNIA: ICD-10-CM

## 2024-08-18 DIAGNOSIS — R13.14 PHARYNGOESOPHAGEAL DYSPHAGIA: ICD-10-CM

## 2024-08-19 ENCOUNTER — RA CDI HCC (OUTPATIENT)
Dept: OTHER | Facility: HOSPITAL | Age: 64
End: 2024-08-19

## 2024-08-19 PROBLEM — E11.22 TYPE 2 DIABETES MELLITUS WITH CHRONIC KIDNEY DISEASE (HCC): Chronic | Status: ACTIVE | Noted: 2021-01-23

## 2024-08-19 PROBLEM — I12.9 HYPERTENSIVE CKD (CHRONIC KIDNEY DISEASE): Status: ACTIVE | Noted: 2024-08-19

## 2024-08-19 PROBLEM — I12.9 HYPERTENSIVE CKD (CHRONIC KIDNEY DISEASE): Chronic | Status: ACTIVE | Noted: 2021-01-23

## 2024-08-19 PROBLEM — E11.22 TYPE 2 DIABETES MELLITUS WITH CHRONIC KIDNEY DISEASE (HCC): Status: ACTIVE | Noted: 2024-08-19

## 2024-08-19 RX ORDER — PANTOPRAZOLE SODIUM 40 MG/1
40 TABLET, DELAYED RELEASE ORAL
Qty: 90 TABLET | Refills: 3 | OUTPATIENT
Start: 2024-08-19

## 2024-08-19 NOTE — PROGRESS NOTES
HCC coding opportunities          Chart Reviewed number of suggestions sent to Provider: 2  E11.22  E66.01  I12.9     Patients Insurance     Medicare Insurance: [employer]

## 2024-08-20 NOTE — TELEPHONE ENCOUNTER
Lmm for pt to c/b to schedule med refill f/u. If pt calls back please offer pt 1st available appt.

## 2024-08-26 ENCOUNTER — OFFICE VISIT (OUTPATIENT)
Dept: INTERNAL MEDICINE CLINIC | Facility: CLINIC | Age: 64
End: 2024-08-26
Payer: COMMERCIAL

## 2024-08-26 VITALS
BODY MASS INDEX: 40.29 KG/M2 | WEIGHT: 227.4 LBS | OXYGEN SATURATION: 98 % | TEMPERATURE: 97.6 F | RESPIRATION RATE: 16 BRPM | DIASTOLIC BLOOD PRESSURE: 70 MMHG | SYSTOLIC BLOOD PRESSURE: 118 MMHG | HEIGHT: 63 IN | HEART RATE: 60 BPM

## 2024-08-26 DIAGNOSIS — Z78.0 POSTMENOPAUSAL: ICD-10-CM

## 2024-08-26 DIAGNOSIS — Z79.899 HIGH RISK MEDICATION USE: ICD-10-CM

## 2024-08-26 DIAGNOSIS — I10 PRIMARY HYPERTENSION: ICD-10-CM

## 2024-08-26 DIAGNOSIS — N18.31 STAGE 3A CHRONIC KIDNEY DISEASE (HCC): ICD-10-CM

## 2024-08-26 DIAGNOSIS — Z13.820 OSTEOPOROSIS SCREENING: ICD-10-CM

## 2024-08-26 DIAGNOSIS — E78.2 MIXED HYPERLIPIDEMIA: Chronic | ICD-10-CM

## 2024-08-26 DIAGNOSIS — I48.0 PAROXYSMAL ATRIAL FIBRILLATION (HCC): ICD-10-CM

## 2024-08-26 DIAGNOSIS — E11.9 TYPE 2 DIABETES MELLITUS WITHOUT COMPLICATION, WITHOUT LONG-TERM CURRENT USE OF INSULIN (HCC): Primary | Chronic | ICD-10-CM

## 2024-08-26 DIAGNOSIS — M79.672 PAIN IN BOTH FEET: Chronic | ICD-10-CM

## 2024-08-26 DIAGNOSIS — M79.671 PAIN IN BOTH FEET: Chronic | ICD-10-CM

## 2024-08-26 PROCEDURE — G2211 COMPLEX E/M VISIT ADD ON: HCPCS | Performed by: INTERNAL MEDICINE

## 2024-08-26 PROCEDURE — 99214 OFFICE O/P EST MOD 30 MIN: CPT | Performed by: INTERNAL MEDICINE

## 2024-08-26 NOTE — PROGRESS NOTES
Assessment/Plan:    1. Type 2 diabetes mellitus without complication, without long-term current use of insulin ([facility])  -     Comprehensive metabolic panel; Future  -     Albumin / creatinine urine ratio; Future  -     Hemoglobin A1C; Future  -     UA (URINE) with reflex to Scope; Future  -     DXA bone density spine hip and pelvis; Future  -     metFORMIN (GLUCOPHAGE) 500 mg tablet; Take 1 tablet (500 mg total) by mouth see administration instructions Take 1 tablet every morning and 2 tablet every evening  -     Hemoglobin A1C; Future; Expected date: 11/26/2024  2. Stage 3a chronic kidney disease ([facility])  -     Comprehensive metabolic panel; Future  3. Mixed hyperlipidemia  -     Comprehensive metabolic panel; Future  -     Lipid panel; Future  4. Primary hypertension  -     Comprehensive metabolic panel; Future  -     UA (URINE) with reflex to Scope; Future  5. BMI 40.0-44.9, adult ([facility])  Assessment & Plan:  Patient  was advised to decrease portion size.  Advised to decrease carb, sugar, cholesterol intake.  Advised to exercise 3-5 times per week.  Advised to lose weight.  Orders:  -     DXA bone density spine hip and pelvis; Future  6. Paroxysmal atrial fibrillation ([facility])  7. Osteoporosis screening  -     DXA bone density spine hip and pelvis; Future  8. Postmenopausal  -     DXA bone density spine hip and pelvis; Future  9. High risk medication use  -     Comprehensive metabolic panel; Future  10. Pain in both feet     Discussion/summary/plan:    Patient states he is scheduled for mammogram October 2024.  Diabetes mellitus well-controlled last hemoglobin A1c 6.7.  Patient is watching her diet 1800-calorie ADA diet as well as trying to exercise and lose weight.  Patient has been seen and followed by her podiatrist for pain in the feet and tingling numbness in the feet management.  Discussed with the patient about seeing a weight management program and also discussed about getting medication like Ozempic injection   for diabetes as well as to lose weight but she is not interested in getting  any medication in  injection form.  She denies any hypoglycemia.  Will follow hemoglobin A1c.  She also does not want to see weight management as patient states she will try her self to lose weight.  Hyperlipidemia well-controlled cholesterol 127, triglyceride 100, HDL 54, LDL 53 advised to continue present dose of atorvastatin and low-cholesterol low-carb diet we will follow lipid panel.  Blood pressure well-controlled advised to continue present medication.  Advised to have a bone density scan patient agreed size ordered.  Has been seen and followed by cardiologist for history of paroxysmal atrial fibrillation.  Recently she was seen by her cardiologist.  Presently clinically she is in normal sinus rhythm.    Subjective: Patient presents for follow-up.      Patient ID: Marilu Brunson is a 64 y.o. female.    HPI  64-year-old white female patient presents for follow-up of her medical problems.      The following portions of the patient's history were reviewed and updated as appropriate:     Past Medical History:  She has a past medical history of Abrasion of right leg (03/19/2021), Acute respiratory failure due to COVID-19 (HCC) (10/01/2021), Age-related cataract of both eyes (05/23/2023), Arthritis, BMI 39.0-39.9,adult (05/10/2022), BMI 40.0-44.9, adult (HCC) (02/20/2023), BMI 40.0-44.9, adult ([facility]) (08/26/2024), Chronic pain of both knees (05/20/2024), CKD (chronic kidney disease) (02/08/2022), Colon polyp, COVID-19 long hauler (11/22/2022), Diabetes mellitus ([facility]), Dyslipidemia, Dysphagia, pharyngoesophageal, Edema of both lower legs (03/19/2021), Essential hypertension (01/23/2021), Gastroesophageal reflux disease without esophagitis (01/23/2021), GERD (gastroesophageal reflux disease), Hiatal hernia, HTN (hypertension), Hyperlipemia, Hypertension, Hypertensive arterionephrosclerosis of transplanted kidney (01/23/2021), Hypokalemia  (04/20/2021), Insomnia, Leukocytosis, Medicare annual wellness visit, subsequent (11/09/2021), Medicare annual wellness visit, subsequent (05/20/2024), Mixed hyperlipidemia (01/23/2021), Numbness, Obstructive sleep apnea syndrome (01/23/2021), Other chest pain (08/26/2022), Pain in ankle (01/23/2021), Pain in both feet (01/23/2021), Pain in both knees (01/26/2021), Pain in right ankle (05/10/2022), Paroxysmal atrial fibrillation (HCC) (11/09/2021), Personal history of COVID-19, Primary hypertension (08/26/2024), Right foot pain (05/10/2022), Right shoulder pain (02/20/2023), Shortness of breath, Shoulder pain, right (02/08/2022), Skin lesions, Sleep apnea, Type 2 diabetes mellitus without complication, without long-term current use of insulin (HCC) (01/23/2021), and Urinary incontinence (02/20/2023).,  _______________________________________________________________________  Past Surgical History:   has a past surgical history that includes Carpal tunnel release (Bilateral, 1990); Gallbladder surgery (2004); Hysterectomy (2004); Ankle surgery (Right, 2009/2012); Mammo stereotactic breast biopsy right (all inc) (Right, 2014); Mammo (historical) (10/02/2018); Colonoscopy (03/07/2017); Breast biopsy (Right, 05/15/2014); Colonoscopy; Upper gastrointestinal endoscopy; and [place] tooth extraction.,  _______________________________________________________________________  Family History:  family history includes Breast cancer in her maternal grandmother and mother; Cancer in her mother; Colon cancer in her cousin; Diabetes in her mother; Hyperlipidemia in her mother; Lung cancer in her father; No Known Problems in her daughter, maternal aunt, maternal grandfather, sister, sister, and son.,  _______________________________________________________________________  Social History:   reports that she quit smoking about 19 years ago. Her smoking use included cigarettes. She has been exposed to tobacco smoke. She has quit using  smokeless tobacco. She reports current alcohol use. She reports that she does not currently use drugs.,  _______________________________________________________________________  Allergies:  is allergic to aspirin and lisinopril..  _______________________________________________________________________  Current Outpatient Medications   Medication Sig Dispense Refill   • atorvastatin (LIPITOR) 40 mg tablet TAKE 1 TABLET EVERY DAY 90 tablet 3   • Biotin 2500 MCG CAPS Take by mouth 2 (two) times a day     • Blood Glucose Monitoring Suppl (FreeStyle Lite) TOYIN daily     • calcium carbonate (OS-AMEE) 600 MG tablet Take 600 mg by mouth 2 (two) times a day with meals With vitamin D3      • FREESTYLE LITE test strip USE TO TEST BLOOD SUGAR TWICE DAILY AS INSTRUCTED 200 each 3   • gabapentin (NEURONTIN) 300 mg capsule Take 300 mg by mouth 2 (two) times a day     • glucosamine-chondroitin 500-400 MG tablet Take 1 tablet by mouth 3 (three) times a day     • losartan-hydrochlorothiazide (HYZAAR) 100-12.5 MG per tablet TAKE 1 TABLET EVERY DAY 90 tablet 1   • meloxicam (MOBIC) 15 mg tablet Take 15 mg by mouth daily     • metFORMIN (GLUCOPHAGE) 500 mg tablet Take 1 tablet (500 mg total) by mouth see administration instructions Take 1 tablet every morning and 2 tablet every evening 270 tablet 1   • metoprolol tartrate (LOPRESSOR) 50 mg tablet TAKE 1 TABLET TWICE DAILY 180 tablet 1   • Multiple Vitamin (MULTIVITAMIN) capsule Take 1 capsule by mouth daily     • Omega-3 Fatty Acids (fish oil) 1,000 mg TAKE 2 CAPSULES EVERY  capsule 3   • pantoprazole (PROTONIX) 40 mg tablet TAKE 1 TABLET 30 MINUTES BEFORE BREAKFAST 90 tablet 1   • traMADol (ULTRAM) 50 mg tablet Take 50 mg by mouth every 8 (eight) hours as needed BID  0   • Blood Glucose Monitoring Suppl (Accu-Chek Lina Plus) w/Device KIT Use in the morning (Patient not taking: Reported on 8/12/2024) 1 kit 0   • glucose blood test strip Use as instructed (Patient not taking:  "Reported on 8/12/2024) 50 strip 3   • Lancets (freestyle) lancets Use 2 (two) times a day Use as instructed (Patient not taking: Reported on 8/12/2024) 200 each 3     No current facility-administered medications for this visit.     _______________________________________________________________________  Review of Systems   Constitutional:  Negative for chills and fever.   HENT:  Negative for congestion, ear pain, hearing loss, nosebleeds, sinus pain, sore throat and trouble swallowing.    Eyes:  Negative for pain, discharge, redness and visual disturbance.   Respiratory:  Negative for cough, chest tightness and shortness of breath.    Cardiovascular:  Negative for chest pain and palpitations.   Gastrointestinal:  Negative for abdominal pain, blood in stool, constipation, diarrhea, nausea and vomiting.   Genitourinary:  Negative for dysuria, flank pain and hematuria.   Musculoskeletal:  Positive for arthralgias (Pain in both feet right more than left has been seen and followed by podiatrist). Negative for back pain, myalgias and neck pain.   Skin:  Negative for color change and rash.   Neurological:  Positive for numbness (Has some tingling numbness in feet). Negative for dizziness, seizures, syncope, speech difficulty, weakness and headaches.   Hematological:  Does not bruise/bleed easily.   Psychiatric/Behavioral:  Negative for agitation and behavioral problems.    All other systems reviewed and are negative.        Objective:  Vitals:    08/26/24 1114   BP: 118/70   BP Location: Left arm   Patient Position: Sitting   Cuff Size: Large   Pulse: 60   Resp: 16   Temp: 97.6 °F (36.4 °C)   TempSrc: Temporal   SpO2: 98%   Weight: 103 kg (227 lb 6.4 oz)   Height: 5' 3\" (1.6 m)     Body mass index is 40.28 kg/m².     Physical Exam  Vitals and nursing note reviewed.   Constitutional:       General: She is not in acute distress.     Appearance: She is obese.   HENT:      Head: Normocephalic and atraumatic.      Right Ear: " External ear normal.      Left Ear: External ear normal.      Nose: Nose normal.      Mouth/Throat:      Mouth: Mucous membranes are moist.   Eyes:      General: No scleral icterus.        Right eye: No discharge.         Left eye: No discharge.      Extraocular Movements: Extraocular movements intact.      Conjunctiva/sclera: Conjunctivae normal.   Cardiovascular:      Rate and Rhythm: Normal rate and regular rhythm.      Pulses: Normal pulses.      Heart sounds: Normal heart sounds.   Pulmonary:      Effort: Pulmonary effort is normal. No respiratory distress.      Breath sounds: Normal breath sounds. No wheezing, rhonchi or rales.   Abdominal:      General: Bowel sounds are normal.      Palpations: Abdomen is soft.      Tenderness: There is no abdominal tenderness.   Musculoskeletal:         General: Normal range of motion.      Cervical back: Normal range of motion and neck supple. No muscular tenderness.      Right lower leg: No edema.      Left lower leg: No edema.   Skin:     General: Skin is warm.      Findings: No rash.   Neurological:      General: No focal deficit present.      Mental Status: She is alert and oriented to person, place, and time.   Psychiatric:         Mood and Affect: Mood normal.         Behavior: Behavior normal.

## 2024-08-26 NOTE — PATIENT INSTRUCTIONS
Patient was advised to continue present medications. discussed with the patient medications and laboratory data in detail.  Follow-up with me in 3 months or as advised.  If any blood test was ordered please do 1 week prior to next appointment unless advise to get earlier.  If you have any questions please call the office [phone]  Patient Education     Type 2 diabetes   The Basics   Written by the doctors and editors at [facility]   What is type 2 diabetes? -- This is a disorder that disrupts the way the body uses sugar. It is sometimes called type 2 diabetes mellitus.  All of the cells in the body need sugar to work normally. Sugar gets into the cells with the help of a hormone called insulin. Insulin is made by the pancreas, an organ in the belly. If there is not enough insulin, or if cells in the body don't respond normally to insulin, sugar builds up in the blood. That is what happens to people with diabetes.  There are 2 different types of diabetes:   In type 1 diabetes, the pancreas makes little or no insulin.   In type 2 diabetes, the pancreas still makes some insulin, but the cells in the body stop responding normally. Eventually, the pancreas cannot make enough insulin to keep up.  Having excess body weight or obesity increases a person's risk of developing type 2 diabetes. But people without excess body weight can get diabetes, too.  What are the symptoms of type 2 diabetes? -- Type 2 diabetes usually causes no symptoms. When symptoms do happen, they include:   Needing to urinate often   Intense thirst   Blurry vision  Can diabetes lead to other health problems? -- Yes. Type 2 diabetes might not make you feel sick. But if it is not managed, it can lead to serious problems over time, such as:   Heart attacks   Strokes   Kidney disease   Vision problems (or even blindness)   Pain or loss of feeling in the hands and feet   Needing to have fingers, toes, or other body parts removed (amputated)  How do I know  "if I have type 2 diabetes? -- Your doctor or nurse can do a blood test. There are 2 tests that can be used for this. Both involve measuring the amount of sugar in your blood, called your \"blood sugar\" or \"blood glucose\":   One of the tests measures your blood sugar at the time the blood sample is taken. This test is done in the morning. You can't eat or drink anything except water for at least 8 hours before the test.   The other test shows what your average blood sugar has been for the past 2 to 3 months. This blood test is called \"hemoglobin A1C\" or just \"A1C.\" It can be checked at any time of the day, even if you have recently eaten.  How is type 2 diabetes treated? -- The goals of treatment are to manage your blood sugar and lower the risk of future problems that can happen in people with diabetes.  Treatment might include:   Lifestyle changes - This is an important part of managing diabetes. It includes eating healthy foods and getting plenty of physical activity.   Medicines - There are a few medicines that help lower blood sugar. Some people need to take pills that help the body make more insulin or that help insulin do its job. Others need insulin shots.  Depending on what medicines you take, you might need to check your blood sugar regularly at home. But not everyone with type 2 diabetes needs to do this. Your doctor or nurse will tell you if you should be checking your blood sugar, and when and how to do this.  Sometimes, people with type 2 diabetes also need medicines to help prevent problems caused by the disease. For instance, medicines used to lower blood pressure can reduce the chances of a heart attack or stroke.   General medical care - It's also important to take care of other areas of your health. This includes watching your blood pressure and cholesterol levels. You should also get certain vaccines, such as vaccines to protect against the flu and coronavirus disease 2019 (\"COVID-19\"). Some people " also need a vaccine to prevent pneumonia.  Can type 2 diabetes be prevented? -- Yes. To lower your chances of getting type 2 diabetes, the most important thing you can do is eat a healthy diet and get plenty of physical activity. This can help you lose weight if you are overweight. But eating well and being active are also good for your overall health. Even gentle activity, like walking, has benefits.  If you smoke, quitting can also lower your risk of type 2 diabetes. Quitting smoking can be difficult, but your doctor or nurse can help.  All topics are updated as new evidence becomes available and our peer review process is complete.  This topic retrieved from [employer] on: Apr 24, 2024.  Topic 94558 Version 23.0  Release: 32.3.2 - C32.113  © 2024 UpToDate, Inc. and/or its affiliates. All rights reserved.  Consumer Information Use and Disclaimer   Disclaimer: This generalized information is a limited summary of diagnosis, treatment, and/or medication information. It is not meant to be comprehensive and should be used as a tool to help the user understand and/or assess potential diagnostic and treatment options. It does NOT include all information about conditions, treatments, medications, side effects, or risks that may apply to a specific patient. It is not intended to be medical advice or a substitute for the medical advice, diagnosis, or treatment of a health care provider based on the health care provider's examination and assessment of a patient's specific and unique circumstances. Patients must speak with a health care provider for complete information about their health, medical questions, and treatment options, including any risks or benefits regarding use of medications. This information does not endorse any treatments or medications as safe, effective, or approved for treating a specific patient. UpToDate, Inc. and its affiliates disclaim any warranty or liability relating to this information or the use  thereof.The use of this information is governed by the Terms of Use, available at https://www.wolterskluwer.com/en/know/clinical-effectiveness-terms. 2024© UpToDate, Inc. and its affiliates and/or licensors. All rights reserved.  Copyright   © 2024 [employer] and/or its affiliates. All rights reserved.

## 2024-08-27 NOTE — TELEPHONE ENCOUNTER
Lmm for pt to c/b to schedule med refill f/u. Sending attempt to contact letter to address on file.

## 2024-09-13 ENCOUNTER — TELEPHONE (OUTPATIENT)
Age: 64
End: 2024-09-13

## 2024-09-13 DIAGNOSIS — K21.00 GASTROESOPHAGEAL REFLUX DISEASE WITH ESOPHAGITIS, UNSPECIFIED WHETHER HEMORRHAGE: Primary | ICD-10-CM

## 2024-09-13 RX ORDER — PANTOPRAZOLE SODIUM 40 MG/1
40 TABLET, DELAYED RELEASE ORAL DAILY
Qty: 90 TABLET | Refills: 1 | Status: SHIPPED | OUTPATIENT
Start: 2024-09-13 | End: 2024-12-12

## 2024-09-13 NOTE — TELEPHONE ENCOUNTER
Please call and inform patient pantoprazole 40 mg daily was sent into her mail in pharmacy with 1 refill this should get her by until she can be seen in the office in February.  Thank you

## 2024-09-13 NOTE — TELEPHONE ENCOUNTER
Patients GI provider:  Dr. Wilson    Number to return call: [phone]    Reason for call: Pt called to schedule an appt for a med refill but no appts available until Feb. Can we send her a refill of her Pantoprazole to get her through to her appt.     Scheduled procedure/appointment date if applicable: Apt/procedure 02/03/25

## 2024-09-13 NOTE — TELEPHONE ENCOUNTER
Spoke with patient to advise medication was sent to pharmacy. Advised to call with any questions or concerns

## 2024-10-28 ENCOUNTER — HOSPITAL ENCOUNTER (OUTPATIENT)
Facility: HOSPITAL | Age: 64
Discharge: HOME | End: 2024-10-28
Attending: INTERNAL MEDICINE
Payer: COMMERCIAL

## 2024-10-28 ENCOUNTER — APPOINTMENT (OUTPATIENT)
Facility: HOSPITAL | Age: 64
End: 2024-10-28
Attending: INTERNAL MEDICINE
Payer: COMMERCIAL

## 2024-10-28 VITALS — WEIGHT: 227 LBS | HEIGHT: 63 IN | BODY MASS INDEX: 40.22 KG/M2

## 2024-10-28 DIAGNOSIS — Z78.0 POSTMENOPAUSAL: ICD-10-CM

## 2024-10-28 DIAGNOSIS — Z12.31 ENCOUNTER FOR SCREENING MAMMOGRAM FOR BREAST CANCER: ICD-10-CM

## 2024-10-28 DIAGNOSIS — E11.9 TYPE 2 DIABETES MELLITUS WITHOUT COMPLICATION, WITHOUT LONG-TERM CURRENT USE OF INSULIN (HCC): Chronic | ICD-10-CM

## 2024-10-28 DIAGNOSIS — Z13.820 OSTEOPOROSIS SCREENING: ICD-10-CM

## 2024-10-28 PROCEDURE — 77067 SCR MAMMO BI INCL CAD: CPT

## 2024-10-28 PROCEDURE — 77063 BREAST TOMOSYNTHESIS BI: CPT

## 2024-10-28 PROCEDURE — 77080 DXA BONE DENSITY AXIAL: CPT

## 2024-10-29 ENCOUNTER — TELEPHONE (OUTPATIENT)
Dept: INTERNAL MEDICINE CLINIC | Facility: CLINIC | Age: 64
End: 2024-10-29

## 2024-10-29 NOTE — TELEPHONE ENCOUNTER
----- Message from Sandra Woo MD sent at 10/29/2024  2:51 PM EDT -----  Please call patient that her DEXA scan reveals she has a normal bone density.  No signs of osteopenia or osteoporosis.

## 2024-11-18 ENCOUNTER — APPOINTMENT (OUTPATIENT)
Dept: LAB | Facility: HOSPITAL | Age: 64
End: 2024-11-18
Attending: INTERNAL MEDICINE
Payer: COMMERCIAL

## 2024-11-18 DIAGNOSIS — I10 ESSENTIAL HYPERTENSION: Chronic | ICD-10-CM

## 2024-11-18 DIAGNOSIS — E11.9 TYPE 2 DIABETES MELLITUS WITHOUT COMPLICATION, WITHOUT LONG-TERM CURRENT USE OF INSULIN (HCC): Chronic | ICD-10-CM

## 2024-11-18 DIAGNOSIS — Z79.899 HIGH RISK MEDICATION USE: ICD-10-CM

## 2024-11-18 DIAGNOSIS — I10 PRIMARY HYPERTENSION: ICD-10-CM

## 2024-11-18 DIAGNOSIS — E78.2 MIXED HYPERLIPIDEMIA: Chronic | ICD-10-CM

## 2024-11-18 DIAGNOSIS — N18.31 STAGE 3A CHRONIC KIDNEY DISEASE (HCC): ICD-10-CM

## 2024-11-18 LAB
ALBUMIN SERPL BCG-MCNC: 3.8 G/DL (ref 3.5–5)
ALP SERPL-CCNC: 98 U/L (ref 34–104)
ALT SERPL W P-5'-P-CCNC: 13 U/L (ref 7–52)
ANION GAP SERPL CALCULATED.3IONS-SCNC: 7 MMOL/L (ref 4–13)
AST SERPL W P-5'-P-CCNC: 15 U/L (ref 13–39)
BILIRUB SERPL-MCNC: 0.44 MG/DL (ref 0.2–1)
BILIRUB UR QL STRIP: NEGATIVE
BUN SERPL-MCNC: 23 MG/DL (ref 5–25)
CALCIUM SERPL-MCNC: 8.8 MG/DL (ref 8.4–10.2)
CHLORIDE SERPL-SCNC: 102 MMOL/L (ref 96–108)
CHOLEST SERPL-MCNC: 142 MG/DL (ref ?–200)
CLARITY UR: CLEAR
CO2 SERPL-SCNC: 30 MMOL/L (ref 21–32)
COLOR UR: YELLOW
CREAT SERPL-MCNC: 1.11 MG/DL (ref 0.6–1.3)
CREAT UR-MCNC: 120.5 MG/DL
EST. AVERAGE GLUCOSE BLD GHB EST-MCNC: 148 MG/DL
GFR SERPL CREATININE-BSD FRML MDRD: 52 ML/MIN/1.73SQ M
GLUCOSE P FAST SERPL-MCNC: 144 MG/DL (ref 65–99)
GLUCOSE UR STRIP-MCNC: NEGATIVE MG/DL
HBA1C MFR BLD: 6.8 %
HDLC SERPL-MCNC: 59 MG/DL
HGB UR QL STRIP.AUTO: NEGATIVE
KETONES UR STRIP-MCNC: NEGATIVE MG/DL
LDLC SERPL CALC-MCNC: 63 MG/DL (ref 0–100)
LEUKOCYTE ESTERASE UR QL STRIP: NEGATIVE
MICROALBUMIN UR-MCNC: <7 MG/L
NITRITE UR QL STRIP: NEGATIVE
NONHDLC SERPL-MCNC: 83 MG/DL
PH UR STRIP.AUTO: 6 [PH]
POTASSIUM SERPL-SCNC: 4.3 MMOL/L (ref 3.5–5.3)
PROT SERPL-MCNC: 6.9 G/DL (ref 6.4–8.4)
PROT UR STRIP-MCNC: NEGATIVE MG/DL
SODIUM SERPL-SCNC: 139 MMOL/L (ref 135–147)
SP GR UR STRIP.AUTO: 1.02 (ref 1–1.03)
TRIGL SERPL-MCNC: 100 MG/DL (ref ?–150)
UROBILINOGEN UR QL STRIP.AUTO: 0.2 E.U./DL

## 2024-11-18 PROCEDURE — 36415 COLL VENOUS BLD VENIPUNCTURE: CPT

## 2024-11-18 PROCEDURE — 80061 LIPID PANEL: CPT

## 2024-11-18 PROCEDURE — 82043 UR ALBUMIN QUANTITATIVE: CPT

## 2024-11-18 PROCEDURE — 80053 COMPREHEN METABOLIC PANEL: CPT

## 2024-11-18 PROCEDURE — 81003 URINALYSIS AUTO W/O SCOPE: CPT

## 2024-11-18 PROCEDURE — 83036 HEMOGLOBIN GLYCOSYLATED A1C: CPT

## 2024-11-18 PROCEDURE — 82570 ASSAY OF URINE CREATININE: CPT

## 2024-11-25 ENCOUNTER — OFFICE VISIT (OUTPATIENT)
Dept: INTERNAL MEDICINE CLINIC | Facility: CLINIC | Age: 64
End: 2024-11-25
Payer: COMMERCIAL

## 2024-11-25 VITALS
DIASTOLIC BLOOD PRESSURE: 74 MMHG | BODY MASS INDEX: 40.75 KG/M2 | HEIGHT: 63 IN | TEMPERATURE: 97.7 F | WEIGHT: 230 LBS | RESPIRATION RATE: 18 BRPM | SYSTOLIC BLOOD PRESSURE: 128 MMHG | HEART RATE: 62 BPM | OXYGEN SATURATION: 97 %

## 2024-11-25 DIAGNOSIS — K63.5 POLYP OF COLON, UNSPECIFIED PART OF COLON, UNSPECIFIED TYPE: ICD-10-CM

## 2024-11-25 DIAGNOSIS — K21.9 GASTROESOPHAGEAL REFLUX DISEASE WITHOUT ESOPHAGITIS: Chronic | ICD-10-CM

## 2024-11-25 DIAGNOSIS — I10 ESSENTIAL HYPERTENSION: Chronic | ICD-10-CM

## 2024-11-25 DIAGNOSIS — N18.31 TYPE 2 DIABETES MELLITUS WITH STAGE 3A CHRONIC KIDNEY DISEASE, WITHOUT LONG-TERM CURRENT USE OF INSULIN (HCC): Primary | Chronic | ICD-10-CM

## 2024-11-25 DIAGNOSIS — E11.22 TYPE 2 DIABETES MELLITUS WITH STAGE 3A CHRONIC KIDNEY DISEASE, WITHOUT LONG-TERM CURRENT USE OF INSULIN (HCC): Primary | Chronic | ICD-10-CM

## 2024-11-25 DIAGNOSIS — M79.672 PAIN IN BOTH FEET: Chronic | ICD-10-CM

## 2024-11-25 DIAGNOSIS — E78.2 MIXED HYPERLIPIDEMIA: Chronic | ICD-10-CM

## 2024-11-25 DIAGNOSIS — M79.671 PAIN IN BOTH FEET: Chronic | ICD-10-CM

## 2024-11-25 DIAGNOSIS — I48.0 PAROXYSMAL ATRIAL FIBRILLATION (HCC): ICD-10-CM

## 2024-11-25 DIAGNOSIS — Z79.899 HIGH RISK MEDICATION USE: ICD-10-CM

## 2024-11-25 PROBLEM — G47.33 OBSTRUCTIVE SLEEP APNEA SYNDROME: Chronic | Status: RESOLVED | Noted: 2021-01-23 | Resolved: 2024-11-25

## 2024-11-25 PROCEDURE — G2211 COMPLEX E/M VISIT ADD ON: HCPCS | Performed by: INTERNAL MEDICINE

## 2024-11-25 PROCEDURE — 99214 OFFICE O/P EST MOD 30 MIN: CPT | Performed by: INTERNAL MEDICINE

## 2024-11-25 RX ORDER — ATORVASTATIN CALCIUM 40 MG/1
40 TABLET, FILM COATED ORAL DAILY
Qty: 90 TABLET | Refills: 1 | Status: SHIPPED | OUTPATIENT
Start: 2024-11-25

## 2024-11-25 RX ORDER — LOSARTAN POTASSIUM AND HYDROCHLOROTHIAZIDE 12.5; 1 MG/1; MG/1
1 TABLET ORAL DAILY
Qty: 90 TABLET | Refills: 1 | Status: SHIPPED | OUTPATIENT
Start: 2024-11-25

## 2024-11-25 RX ORDER — METOPROLOL TARTRATE 50 MG
50 TABLET ORAL 2 TIMES DAILY
Qty: 180 TABLET | Refills: 1 | Status: SHIPPED | OUTPATIENT
Start: 2024-11-25

## 2024-11-25 NOTE — ASSESSMENT & PLAN NOTE
Her symptoms are controlled with pantoprazole has been seen and followed by GI specialist.  Patient has appointment to see GI specialist February 2025.  Advised to watch diet.  Orders:    CBC and differential; Future

## 2024-11-25 NOTE — ASSESSMENT & PLAN NOTE
Diabetes mellitus well-controlled.  Hemoglobin A1c 6.8.  Advised to continue present medication and 1800-calorie ADA diet.  She has been seen and followed by podiatrist as well as eye doctor.  Lab Results   Component Value Date    HGBA1C 6.8 (H) 11/18/2024       Orders:    Ambulatory Referral to Podiatry; Future    Comprehensive metabolic panel; Future    Comprehensive metabolic panel; Future

## 2024-11-25 NOTE — ASSESSMENT & PLAN NOTE
She has a chronic pain in the both foot has been seen and followed by podiatrist Dr. Camp she is on following medications prescribed by Dr. Camp podiatrist she takes tramadol 50 mg 2 times a day, meloxicam 15 mg once a day, gabapentin 300 mg 3 times a day.  Patient stated Dr. Camp is retiring and she would like to see another podiatrist so I referred her on another podiatrist.  Orders:    Ambulatory Referral to Podiatry; Future    Comprehensive metabolic panel; Future    CBC and differential; Future    Comprehensive metabolic panel; Future

## 2024-11-25 NOTE — PATIENT INSTRUCTIONS
Patient was advised to continue present medications. discussed with the patient medications and laboratory data in detail.  Follow-up with me in 3 months or as advised.  If any blood test was ordered please do 1 week prior to next appointment unless advise to get earlier.  If you have any questions please call the office [phone]  Patient Education     Type 2 diabetes   The Basics   Written by the doctors and editors at [facility]   What is type 2 diabetes? -- This is a disorder that disrupts the way the body uses sugar. It is sometimes called type 2 diabetes mellitus.  All of the cells in the body need sugar to work normally. Sugar gets into the cells with the help of a hormone called insulin. Insulin is made by the pancreas, an organ in the belly. If there is not enough insulin, or if cells in the body don't respond normally to insulin, sugar builds up in the blood. That is what happens to people with diabetes.  There are 2 different types of diabetes:   In type 1 diabetes, the pancreas makes little or no insulin.   In type 2 diabetes, the pancreas still makes some insulin, but the cells in the body stop responding normally. Eventually, the pancreas cannot make enough insulin to keep up.  Having excess body weight or obesity increases a person's risk of developing type 2 diabetes. But people without excess body weight can get diabetes, too.  What are the symptoms of type 2 diabetes? -- Type 2 diabetes usually causes no symptoms. When symptoms do happen, they include:   Needing to urinate often   Intense thirst   Blurry vision  Can diabetes lead to other health problems? -- Yes. Type 2 diabetes might not make you feel sick. But if it is not managed, it can lead to serious problems over time, such as:   Heart attacks   Strokes   Kidney disease   Vision problems (or even blindness)   Pain or loss of feeling in the hands and feet   Needing to have fingers, toes, or other body parts removed (amputated)  How do I know  "if I have type 2 diabetes? -- Your doctor or nurse can do a blood test. There are 2 tests that can be used for this. Both involve measuring the amount of sugar in your blood, called your \"blood sugar\" or \"blood glucose\":   One of the tests measures your blood sugar at the time the blood sample is taken. This test is done in the morning. You can't eat or drink anything except water for at least 8 hours before the test.   The other test shows what your average blood sugar has been for the past 2 to 3 months. This blood test is called \"hemoglobin A1C\" or just \"A1C.\" It can be checked at any time of the day, even if you have recently eaten.  How is type 2 diabetes treated? -- The goals of treatment are to manage your blood sugar and lower the risk of future problems that can happen in people with diabetes.  Treatment might include:   Lifestyle changes - This is an important part of managing diabetes. It includes eating healthy foods and getting plenty of physical activity.   Medicines - There are a few medicines that help lower blood sugar. Some people need to take pills that help the body make more insulin or that help insulin do its job. Others need insulin shots.  Depending on what medicines you take, you might need to check your blood sugar regularly at home. But not everyone with type 2 diabetes needs to do this. Your doctor or nurse will tell you if you should be checking your blood sugar, and when and how to do this.  Sometimes, people with type 2 diabetes also need medicines to help prevent problems caused by the disease. For instance, medicines used to lower blood pressure can reduce the chances of a heart attack or stroke.   General medical care - It's also important to take care of other areas of your health. This includes watching your blood pressure and cholesterol levels. You should also get certain vaccines, such as vaccines to protect against the flu and coronavirus disease 2019 (\"COVID-19\"). Some people " also need a vaccine to prevent pneumonia.  Can type 2 diabetes be prevented? -- Yes. To lower your chances of getting type 2 diabetes, the most important thing you can do is eat a healthy diet and get plenty of physical activity. This can help you lose weight if you are overweight. But eating well and being active are also good for your overall health. Even gentle activity, like walking, has benefits.  If you smoke, quitting can also lower your risk of type 2 diabetes. Quitting smoking can be difficult, but your doctor or nurse can help.  All topics are updated as new evidence becomes available and our peer review process is complete.  This topic retrieved from [employer] on: Apr 24, 2024.  Topic 21434 Version 23.0  Release: 32.3.2 - C32.113  © 2024 UpToDate, Inc. and/or its affiliates. All rights reserved.  Consumer Information Use and Disclaimer   Disclaimer: This generalized information is a limited summary of diagnosis, treatment, and/or medication information. It is not meant to be comprehensive and should be used as a tool to help the user understand and/or assess potential diagnostic and treatment options. It does NOT include all information about conditions, treatments, medications, side effects, or risks that may apply to a specific patient. It is not intended to be medical advice or a substitute for the medical advice, diagnosis, or treatment of a health care provider based on the health care provider's examination and assessment of a patient's specific and unique circumstances. Patients must speak with a health care provider for complete information about their health, medical questions, and treatment options, including any risks or benefits regarding use of medications. This information does not endorse any treatments or medications as safe, effective, or approved for treating a specific patient. UpToDate, Inc. and its affiliates disclaim any warranty or liability relating to this information or the use  thereof.The use of this information is governed by the Terms of Use, available at https://www.wolterskluwer.com/en/know/clinical-effectiveness-terms. 2024© UpToDate, Inc. and its affiliates and/or licensors. All rights reserved.  Copyright   © 2024 [employer] and/or its affiliates. All rights reserved.

## 2024-11-25 NOTE — ASSESSMENT & PLAN NOTE
Well-controlled.  Cholesterol 142, triglyceride 100, HDL 59, LDL 63 advised to continue present medication and low-cholesterol low carbs diet.  Orders:    Comprehensive metabolic panel; Future    atorvastatin (LIPITOR) 40 mg tablet; Take 1 tablet (40 mg total) by mouth daily    Comprehensive metabolic panel; Future

## 2024-11-25 NOTE — ASSESSMENT & PLAN NOTE
She had a colonoscopy November 2020.  She is going to see GI specialist February 2025 discussed with the patient to discuss with her GI about follow-up colonoscopy.

## 2024-11-25 NOTE — PROGRESS NOTES
Name: Marilu Brunson      : 1960      MRN: [id number]  Encounter Provider: Sandra Woo MD  Encounter Date: 2024   Encounter department: [facility] INTERNAL MEDICINE  :  Assessment & Plan  Type 2 diabetes mellitus with stage 3a chronic kidney disease, without long-term current use of insulin (HCC)  Diabetes mellitus well-controlled.  Hemoglobin A1c 6.8.  Advised to continue present medication and 1800-calorie ADA diet.  She has been seen and followed by podiatrist as well as eye doctor.  Lab Results   Component Value Date    HGBA1C 6.8 (H) 2024       Orders:    Ambulatory Referral to Podiatry; Future    Comprehensive metabolic panel; Future    Comprehensive metabolic panel; Future    Paroxysmal atrial fibrillation (HCC)  Clinically appears to be normal sinus rhythm.  Has been seen and followed by cardiologist.  Orders:    Comprehensive metabolic panel; Future    CBC and differential; Future    Comprehensive metabolic panel; Future    Gastroesophageal reflux disease without esophagitis  Her symptoms are controlled with pantoprazole has been seen and followed by GI specialist.  Patient has appointment to see GI specialist 2025.  Advised to watch diet.  Orders:    CBC and differential; Future    Mixed hyperlipidemia  Well-controlled.  Cholesterol 142, triglyceride 100, HDL 59, LDL 63 advised to continue present medication and low-cholesterol low carbs diet.  Orders:    Comprehensive metabolic panel; Future    atorvastatin (LIPITOR) 40 mg tablet; Take 1 tablet (40 mg total) by mouth daily    Comprehensive metabolic panel; Future    BMI 40.0-44.9, adult (HCC)  Patient  was advised to decrease portion size.  Advised to decrease carb, sugar, cholesterol intake.  Advised to exercise 3-5 times per week.  Advised to lose weight.   Discussed with the patient about seeing weight management but she refused patient states he does not want any medication to help lose  weight.  Advised to see nutrition therapy but patient states she has been watching her diet and she knows about the diet.         Pain in both feet  She has a chronic pain in the both foot has been seen and followed by podiatrist Dr. Camp she is on following medications prescribed by Dr. Camp podiatrist she takes tramadol 50 mg 2 times a day, meloxicam 15 mg once a day, gabapentin 300 mg 3 times a day.  Patient stated Dr. Camp is retiring and she would like to see another podiatrist so I referred her on another podiatrist.  Orders:    Ambulatory Referral to Podiatry; Future    Comprehensive metabolic panel; Future    CBC and differential; Future    Comprehensive metabolic panel; Future    High risk medication use    Orders:    Comprehensive metabolic panel; Future    CBC and differential; Future    Essential hypertension  Blood pressure well-controlled advised to continue present medications and low-salt diet.  Orders:    Comprehensive metabolic panel; Future    CBC and differential; Future    losartan-hydrochlorothiazide (HYZAAR) 100-12.5 MG per tablet; Take 1 tablet by mouth daily    metoprolol tartrate (LOPRESSOR) 50 mg tablet; Take 1 tablet (50 mg total) by mouth 2 (two) times a day    Comprehensive metabolic panel; Future    Polyp of colon, unspecified part of colon, unspecified type  She had a colonoscopy November 2020.  She is going to see GI specialist February 2025 discussed with the patient to discuss with her GI about follow-up colonoscopy.         Patient refused for influenza vaccination, COVID-19 vaccination, pneumococcal vaccination.     History of Present Illness     HPI  Patient presents for follow-up.    Review of Systems   Constitutional:  Negative for chills and fever.   HENT:  Negative for congestion, ear pain, hearing loss, nosebleeds, sinus pain, sore throat and trouble swallowing.    Eyes:  Negative for discharge, redness and visual disturbance.   Respiratory:  Negative for cough, chest  "tightness and shortness of breath.    Cardiovascular:  Negative for chest pain and palpitations.   Gastrointestinal:  Negative for abdominal pain, blood in stool, constipation, diarrhea, nausea and vomiting.   Genitourinary:  Negative for dysuria, flank pain and hematuria.   Musculoskeletal:  Positive for arthralgias (Gets pain in the both feet if she does not take her pain medications). Negative for myalgias and neck pain.   Skin:  Negative for color change and rash.   Neurological:  Negative for dizziness, speech difficulty, weakness and headaches.   Hematological:  Does not bruise/bleed easily.   Psychiatric/Behavioral:  Negative for agitation and behavioral problems.           Objective   /74 (BP Location: Left arm, Patient Position: Sitting, Cuff Size: Large)   Pulse 62   Temp 97.7 °F (36.5 °C) (Temporal)   Resp 18   Ht 5' 3\" (1.6 m)   Wt 104 kg (230 lb)   SpO2 97%   BMI 40.74 kg/m²      Physical Exam  Vitals and nursing note reviewed.   Constitutional:       General: She is not in acute distress.     Appearance: She is well-developed. She is obese.   HENT:      Head: Normocephalic and atraumatic.      Right Ear: External ear normal.      Left Ear: External ear normal.      Nose:      Comments: She has a facemask on.  Eyes:      General: No scleral icterus.        Right eye: No discharge.         Left eye: No discharge.      Extraocular Movements: Extraocular movements intact.      Conjunctiva/sclera: Conjunctivae normal.   Cardiovascular:      Rate and Rhythm: Normal rate and regular rhythm.      Pulses: Normal pulses.      Heart sounds: Normal heart sounds. No murmur heard.  Pulmonary:      Effort: Pulmonary effort is normal. No respiratory distress.      Breath sounds: Normal breath sounds. No wheezing.   Abdominal:      General: Bowel sounds are normal. There is no distension.      Palpations: Abdomen is soft.      Tenderness: There is no abdominal tenderness.   Musculoskeletal:         " General: No swelling. Normal range of motion.      Cervical back: Normal range of motion and neck supple.      Right lower leg: No edema.      Left lower leg: No edema.   Skin:     General: Skin is warm and dry.      Capillary Refill: Capillary refill takes less than 2 seconds.      Findings: No rash.   Neurological:      General: No focal deficit present.      Mental Status: She is alert and oriented to person, place, and time.   Psychiatric:         Mood and Affect: Mood normal.         Behavior: Behavior normal.

## 2024-11-25 NOTE — ASSESSMENT & PLAN NOTE
Patient  was advised to decrease portion size.  Advised to decrease carb, sugar, cholesterol intake.  Advised to exercise 3-5 times per week.  Advised to lose weight.   Discussed with the patient about seeing weight management but she refused patient states he does not want any medication to help lose weight.  Advised to see nutrition therapy but patient states she has been watching her diet and she knows about the diet.

## 2024-11-25 NOTE — ASSESSMENT & PLAN NOTE
Clinically appears to be normal sinus rhythm.  Has been seen and followed by cardiologist.  Orders:    Comprehensive metabolic panel; Future    CBC and differential; Future    Comprehensive metabolic panel; Future

## 2024-12-09 DIAGNOSIS — E11.9 TYPE 2 DIABETES MELLITUS WITHOUT COMPLICATION, WITHOUT LONG-TERM CURRENT USE OF INSULIN (HCC): Primary | ICD-10-CM

## 2024-12-09 NOTE — TELEPHONE ENCOUNTER
Pt. Called and needs to order  Accu chek glucose monitoring blood test strips.  She needs to use these now for insurance.  And pls. Send them to [facility] [address] [place] [address].

## 2024-12-09 NOTE — TELEPHONE ENCOUNTER
I spoke with patient. She states she has been using up the supplies from her previous device which is no longer covered by her insurance. She will now be using the Accucheck Guide which she already has but now needs test strips. Order pended for provider approval.

## 2025-02-03 ENCOUNTER — OFFICE VISIT (OUTPATIENT)
Dept: GASTROENTEROLOGY | Facility: CLINIC | Age: 65
End: 2025-02-03
Payer: COMMERCIAL

## 2025-02-03 VITALS
HEIGHT: 63 IN | SYSTOLIC BLOOD PRESSURE: 126 MMHG | HEART RATE: 57 BPM | WEIGHT: 231 LBS | BODY MASS INDEX: 40.93 KG/M2 | DIASTOLIC BLOOD PRESSURE: 79 MMHG

## 2025-02-03 DIAGNOSIS — K44.9 HIATAL HERNIA: ICD-10-CM

## 2025-02-03 DIAGNOSIS — I48.0 PAROXYSMAL ATRIAL FIBRILLATION (HCC): ICD-10-CM

## 2025-02-03 DIAGNOSIS — K21.00 GASTROESOPHAGEAL REFLUX DISEASE WITH ESOPHAGITIS, UNSPECIFIED WHETHER HEMORRHAGE: Primary | ICD-10-CM

## 2025-02-03 DIAGNOSIS — Z86.0101 PERSONAL HISTORY OF ADENOMATOUS AND SERRATED COLON POLYPS: ICD-10-CM

## 2025-02-03 PROCEDURE — 99213 OFFICE O/P EST LOW 20 MIN: CPT | Performed by: INTERNAL MEDICINE

## 2025-02-03 RX ORDER — PANTOPRAZOLE SODIUM 40 MG/1
40 TABLET, DELAYED RELEASE ORAL DAILY
Qty: 90 TABLET | Refills: 7 | Status: SHIPPED | OUTPATIENT
Start: 2025-02-03 | End: 2025-05-04

## 2025-02-03 NOTE — PROGRESS NOTES
Name: Marilu Brunson      : 1960      MRN: [id number]  Encounter Provider: Livan Gibson MD  Encounter Date: 2/3/2025   Encounter department: [facility] GASTROENTEROLOGY [place] [address]  :  Assessment & Plan  Gastroesophageal reflux disease with esophagitis, unspecified whether hemorrhage  History of GERD, hiatal hernia, antireflux measure reviewed and discussed, encouraged to lose some weight, avoid late-night meals fatty fried disease acidly foods and caffeine.  Side effects of long-term PPI use reviewed.  She can try taking pantoprazole 40 mg every other day to mitigate long-term use side effects.  Orders:  •  pantoprazole (PROTONIX) 40 mg tablet; Take 1 tablet (40 mg total) by mouth daily    Hiatal hernia         Personal history of adenomatous and serrated colon polyps  History history of colon polyps, will be due for a colonoscopy in 5 years in 2022 can be done with EGD at the time, patient aware.       BMI 40.0-44.9, adult (HCC)         Paroxysmal atrial fibrillation (HCC)             History of Present Illness   HPI  Marilu Brunson is a 64 y.o. female who presents for prescription for ongoing history of GERD, has been on taking pantoprazole once a day, if she misses it for more than couple days she can have heartburn indigestion bloating, appetite is fair weight stable denies dysphagia coughing choking spells melena hematochezia nocturnal reflux regurgitation bronchitis pneumonias.  Denies any chest pains, reasonably active at home, history of paroxysmal A-fib, but denies being on any blood thinners.  Diabetic, retired bakery.  Lives with her .  Diet medications more than 10 pertinent systems reviewed.  Last EGD in 2022 had some hiatal hernia and GERD.  Colonoscopy 2020 small polyp.  His on afib no cva ca       Review of Systems   All other systems reviewed and are negative.         Objective   /79 (Patient Position: Sitting, Cuff Size:  "Standard)   Pulse 57   Ht 5' 3\" (1.6 m)   Wt 105 kg (231 lb)   BMI 40.92 kg/m²      Physical Exam  Constitutional:       General: She is not in acute distress.     Appearance: She is normal weight. She is not ill-appearing.   HENT:      Mouth/Throat:      Mouth: Mucous membranes are moist.   Cardiovascular:      Rate and Rhythm: Normal rate.   Pulmonary:      Effort: No respiratory distress.      Breath sounds: No stridor. No wheezing or rales.   Abdominal:      General: Bowel sounds are normal. There is no distension.      Palpations: There is no mass.      Tenderness: There is no abdominal tenderness. There is no guarding or rebound.      Hernia: No hernia is present.   Skin:     Coloration: Skin is not jaundiced or pale.   Neurological:      Mental Status: She is oriented to person, place, and time.           "

## 2025-02-06 DIAGNOSIS — E11.9 TYPE 2 DIABETES MELLITUS WITHOUT COMPLICATION, WITHOUT LONG-TERM CURRENT USE OF INSULIN (HCC): Chronic | ICD-10-CM

## 2025-02-06 DIAGNOSIS — E11.9 TYPE 2 DIABETES MELLITUS WITHOUT COMPLICATION, WITHOUT LONG-TERM CURRENT USE OF INSULIN (HCC): ICD-10-CM

## 2025-02-06 NOTE — TELEPHONE ENCOUNTER
Patient wants to go back on Freestyle Lite test strips as the new strips wont give her an accurate reading. Darian there was a continual problem with the battery. She hasn't testing in almost a week. Send to local pharmacy.

## 2025-02-07 ENCOUNTER — TELEPHONE (OUTPATIENT)
Age: 65
End: 2025-02-07

## 2025-02-07 DIAGNOSIS — E11.9 TYPE 2 DIABETES MELLITUS WITHOUT COMPLICATION, WITHOUT LONG-TERM CURRENT USE OF INSULIN (HCC): ICD-10-CM

## 2025-02-07 RX ORDER — BLOOD-GLUCOSE METER
1 KIT MISCELLANEOUS DAILY
Qty: 200 EACH | Refills: 1 | Status: SHIPPED | OUTPATIENT
Start: 2025-02-07

## 2025-02-07 RX ORDER — BLOOD-GLUCOSE METER
KIT MISCELLANEOUS
Qty: 200 EACH | Refills: 1 | Status: SHIPPED | OUTPATIENT
Start: 2025-02-07 | End: 2025-02-07 | Stop reason: SDUPTHER

## 2025-02-07 NOTE — TELEPHONE ENCOUNTER
Notice received from [facility] that freestyle test strips require prior auth. After review they were sent in to test twice per day. Insurance will not cover twice per day if patient is not on insulin. Sig updated for once per day and sent to covering provider for approval.     Patient advised and states she only tests once per day.

## 2025-02-07 NOTE — PROGRESS NOTES
Progress Note - Cardiology Office  [facility]    Marilu Brunson 64 y.o. female MRN: [id number]  : 1960  Encounter: [id number]      Assessment & Plan  Paroxysmal atrial fibrillation (HCC)    Mixed hyperlipidemia    Primary hypertension    Type 2 diabetes mellitus with hyperglycemia, without long-term current use of insulin (HCC)    Lab Results   Component Value Date    HGBA1C 6.8 (H) 2024     Obesity, morbid, BMI 50 or higher ([facility])       ASSESSMENT:  History of Chest Pain,  Had mild recurrence when she was stressed/angry     Pharmacologic nuclear stress test, 2022:  No evidence of ischemia or scar,  Paradoxical small mid anterior perfusion defect  EF 74%     COVID-19 long debra  patient had COVID-19 pneumonia and hospitalized for 16 days with significant hypoxemia.  Did not require mechanical ventilation .  She was admitted from  to 2021. Thereafter she went to [facility]   Generally feels better and has had no recurrence of symptoms     PAF:  During hospitalization patient had episode of atrial fibrillation and was put on Eliquis which was subsequently discontinued when she went back to  normal sinus rhythm  Has not had any recurrence of symptoms     TTE, 2021  EF 50-55%     48 hour Holter monitor:  10/26/2021  Sinus rhythm with average heart rate of 63 per minute.  0 PVCs, 72 PACs, no arrhythmias     Obesity: BMI 40.57     Type 2 diabetes mellitus     Hypertension  BP today is 130/64 with heart rate of 59/min     Mixed hyperlipidemia  LDL 76, triglycerides 157, HDL 39  2022: LDL 64, , HDL 55                            l  Currently on atorvastatin 40 mg daily  08/15/2023: LDL 51, , HDL 59, normal AST and ALT  2024: LDL 63, , HDL 59, normal AST and ALT  On atorvastatin 40 mg and fish oil 2 g daily     Abnormal EKG:  Normal sinus rhythm, heart rate 60 per minute cannot rule out old anterior infarct, age  undetermined     Arthritis of left knee and right foot  Difficulty in ambulation        RECOMMENDATIONS:  Continue atorvastatin 40 mg, fish oil 2 g daily, losartan hydrochlorothiazide 100-12.5 mg and metoprolol 50 mg twice daily  20 to 30 mmHg compression stockings to be worn regularly during the day  Regular cardiovascular exercise/walking, as much as tolerated aiming for 30 minutes daily   Low-salt, low-carbohydrate, low-cholesterol and low sugar diet  Weight loss        Please call [phone] if any questions.    HPI :     Marilu Brunson is a 64 y.o. year old female who came for follow up.  She complains of dyspnea on climbing uphill which is probably secondary to obesity, age and deconditioning.  Her blood pressure is in acceptable range and her last lipid panel in November 2024 also showed normal cholesterol values    REVIEW OF SYSTEMS:  Denies any chest pain, palpitations or syncope  Complains of dyspnea on climbing uphill and pain due to arthritis      Historical Information   Past Medical History:   Diagnosis Date    Abrasion of right leg 03/19/2021    Acute respiratory failure due to COVID-19 (HCC) 10/01/2021    Age-related cataract of both eyes 05/23/2023    Arthritis     feet    BMI 39.0-39.9,adult 05/10/2022    BMI 40.0-44.9, adult (HCC) 02/20/2023    BMI 40.0-44.9, adult ([facility]) 08/26/2024    Chronic pain of both knees 05/20/2024    CKD (chronic kidney disease) 02/08/2022    Colon polyp     Colon polyp 11/25/2024    COVID-19 long hauler 11/22/2022    Diabetes mellitus (HCC)     FBS today 2/28/22 was 147 at 0930 by patient--feels well    Dyslipidemia     Dysphagia, pharyngoesophageal     Edema of both lower legs 03/19/2021    Essential hypertension 01/23/2021    Gastroesophageal reflux disease without esophagitis 01/23/2021    GERD (gastroesophageal reflux disease)     Hiatal hernia     HTN (hypertension)     Hyperlipemia     Hypertension     Hypertensive arterionephrosclerosis of transplanted kidney  2021    Hypokalemia 2021    Insomnia     Leukocytosis     Medicare annual wellness visit, subsequent 2021    Medicare annual wellness visit, subsequent 2024    Mixed hyperlipidemia 2021    Numbness     Obstructive sleep apnea syndrome 2021    no device    Other chest pain 2022    Pain in ankle 2021    Pain in both feet 2021    Pain in both knees 2021    Pain in right ankle 05/10/2022    Paroxysmal atrial fibrillation (HCC) 2021    Personal history of COVID-2021    Primary hypertension 2024    Right foot pain 05/10/2022    Right shoulder pain 2023    Shortness of breath     exertional    Shoulder pain, right 2022    Skin lesions     Sleep apnea     Type 2 diabetes mellitus without complication, without long-term current use of insulin (HCC) 2021    Urinary incontinence 2023     Past Surgical History:   Procedure Laterality Date    ANKLE SURGERY Right     BREAST BIOPSY Right 05/15/2014    stereotactic biopsy at [facility]    CARPAL TUNNEL RELEASE Bilateral 1990    CHOLECYSTECTOMY  2004    COLONOSCOPY  2017    Dr. Wilson    COLONOSCOPY      GALLBLADDER SURGERY  2004    HYSTERECTOMY  2004    MAMMO (HISTORICAL)  10/02/2018    MAMMO STEREOTACTIC BREAST BIOPSY RIGHT (ALL INC) Right     UPPER GASTROINTESTINAL ENDOSCOPY      WISDOM TOOTH EXTRACTION       Social History     Substance and Sexual Activity   Alcohol Use Not Currently    Comment: -rare     Social History     Substance and Sexual Activity   Drug Use Not Currently    Comment: No drug use - As per [employer]     Social History     Tobacco Use   Smoking Status Former    Current packs/day: 0.00    Types: Cigarettes    Quit date: 2004    Years since quittin.4    Passive exposure: Past   Smokeless Tobacco Former   Tobacco Comments    quit at 44     Family History:   Family History   Problem Relation Age of Onset    Cancer Mother      Diabetes Mother     Hyperlipidemia Mother     Breast cancer Mother         30    Lung cancer Father     No Known Problems Sister     No Known Problems Sister     No Known Problems Daughter     Breast cancer Maternal Grandmother     No Known Problems Maternal Grandfather     No Known Problems Son     No Known Problems Maternal Aunt     Colon cancer Cousin        Meds/Allergies     Allergies   Allergen Reactions    Aspirin GI Intolerance    Lisinopril Fatigue       Current Outpatient Medications:     atorvastatin (LIPITOR) 40 mg tablet, Take 1 tablet (40 mg total) by mouth daily, Disp: 90 tablet, Rfl: 1    Biotin 2500 MCG CAPS, Take by mouth 2 (two) times a day, Disp: , Rfl:     Blood Glucose Monitoring Suppl (Accu-Chek Lina Plus) w/Device KIT, Use in the morning, Disp: 1 kit, Rfl: 0    Blood Glucose Monitoring Suppl (FreeStyle Lite) TOYIN, daily, Disp: , Rfl:     calcium carbonate (OS-AMEE) 600 MG tablet, Take 600 mg by mouth 2 (two) times a day with meals With vitamin D3 , Disp: , Rfl:     FREESTYLE LITE test strip, Use 1 each daily Use as instructed, Disp: 200 each, Rfl: 1    gabapentin (NEURONTIN) 300 mg capsule, Take 300 mg by mouth 3 (three) times a day, Disp: , Rfl:     glucosamine-chondroitin 500-400 MG tablet, Take 1 tablet by mouth 3 (three) times a day, Disp: , Rfl:     glucose blood test strip, Use as instructed, Once daily., Disp: 100 strip, Rfl: 3    Lancets (freestyle) lancets, Use 2 (two) times a day Use as instructed, Disp: 200 each, Rfl: 3    losartan-hydrochlorothiazide (HYZAAR) 100-12.5 MG per tablet, Take 1 tablet by mouth daily, Disp: 90 tablet, Rfl: 1    meloxicam (MOBIC) 15 mg tablet, Take 15 mg by mouth daily, Disp: , Rfl:     metFORMIN (GLUCOPHAGE) 500 mg tablet, TAKE 1 TABLET EVERY MORNING AND 2 TABLETS EVERY EVENING AS DIRECTED, Disp: 270 tablet, Rfl: 1    metoprolol tartrate (LOPRESSOR) 50 mg tablet, Take 1 tablet (50 mg total) by mouth 2 (two) times a day, Disp: 180 tablet, Rfl: 1     "Multiple Vitamin (MULTIVITAMIN) capsule, Take 1 capsule by mouth daily, Disp: , Rfl:     Omega-3 Fatty Acids (fish oil) 1,000 mg, TAKE 2 CAPSULES EVERY DAY, Disp: 180 capsule, Rfl: 3    pantoprazole (PROTONIX) 40 mg tablet, Take 1 tablet (40 mg total) by mouth daily, Disp: 90 tablet, Rfl: 7    traMADol (ULTRAM) 50 mg tablet, Take 50 mg by mouth every 8 (eight) hours as needed BID, Disp: , Rfl: 0    Vitals: Blood pressure 130/64, pulse 59, height 5' 3\" (1.6 m), weight 104 kg (229 lb), SpO2 97%.    Body mass index is 40.57 kg/m².  Vitals:    02/10/25 1020   Weight: 104 kg (229 lb)     BP Readings from Last 3 Encounters:   02/10/25 130/64   25 126/79   24 128/74       Physical Exam:  Physical Exam    Neurologic:  Alert & oriented x 3, no new focal deficits, Not in any acute distress,  Constitutional: Obese  Eyes:  Pupil equal and reacting to light, conjunctiva normal,   HENT:  Atraumatic, oropharynx moist, Neck- normal range of motion, no tenderness,  Neck supple, No JVP, No LNP   Respiratory:  Bilateral air entry, mostly clear to auscultation  Cardiovascular: S1-S2 regular with a I/VI systolic murmur   GI:  Soft, nondistended, normal bowel sounds, nontender, no hepatosplenomegaly appreciated.  Musculoskeletal:  No tenderness, no deformities.   Skin:  Well hydrated, no rash   Lymphatic:  No lymphadenopathy noted   Extremities:  No edema         Diagnostic Studies Review Cardio:      EKG: Sinus bradycardia, heart rate 59/min, R wave progression    Cardiac testing:   Results for orders placed during the hospital encounter of 21    Echo complete with contrast if indicated    Narrative  [address] [address] [phone]    Transthoracic Echocardiogram  Limited 2D, M-mode, Doppler, and Color Doppler    Study date:  26-Aug-2021    Patient: MARLENY LAGUNA  MR number: [id number]  Account number: [id number]  : 1960  Age: 61 years  Gender: " Female  Status: Inpatient  Location: Bedside  Height: 63 in  Weight: 239.6 lb  BP: 129/ 75 mmHg    Indications: Acute Respiratory failure due to Covid 19.    Diagnoses: I48.0 - Atrial fibrillation    Sonographer:  CARLOS Tavares  Primary Physician:  Sandra Woo MD  Referring Physician:  Nahed Rea DO  Group:  [facility]  Interpreting Physician:  Hi Thomas MD    SUMMARY    PROCEDURE INFORMATION:  This was a technically difficult study.    LEFT VENTRICLE:  Systolic function was normal. Ejection fraction was estimated in the range of 50 % to 55 %.  There were no regional wall motion abnormalities.  Wall thickness was mildly increased.    HISTORY: PRIOR HISTORY: Hyperlipidemia,A.Fib.,HTN,Diabetes,obstructive sleep apnea,edema,Covid 19 virus.    PROCEDURE: The procedure was performed at the bedside. This was a routine study. The transthoracic approach was used. The study included limited 2D imaging, M-mode, limited spectral Doppler, and color Doppler. The heart rate was 92 bpm, at  the start of the study. Images were obtained from the parasternal, apical, subcostal, and suprasternal notch acoustic windows. This was a technically difficult study.    LEFT VENTRICLE: Size was normal. Systolic function was normal. Ejection fraction was estimated in the range of 50 % to 55 %. There were no regional wall motion abnormalities. Wall thickness was mildly increased. No evidence of apical  thrombus.    RIGHT VENTRICLE: The size was normal. Systolic function was normal. Wall thickness was normal.    LEFT ATRIUM: Size was at the upper limits of normal.    RIGHT ATRIUM: Size was at the upper limits of normal.    MITRAL VALVE: Valve structure was normal. There was normal leaflet separation. DOPPLER: The transmitral velocity was within the normal range. There was no evidence for stenosis. There was no significant regurgitation.    AORTIC VALVE: The valve was probably trileaflet. Leaflets  exhibited normal thickness and normal cuspal separation. DOPPLER: Transaortic velocity was within the normal range. There was no evidence for stenosis. There was no significant  regurgitation.    TRICUSPID VALVE: The valve structure was normal. There was normal leaflet separation. DOPPLER: The transtricuspid velocity was within the normal range. There was no evidence for stenosis. There was trace regurgitation.    PULMONIC VALVE: Leaflets exhibited normal thickness, no calcification, and normal cuspal separation. DOPPLER: The transpulmonic velocity was within the normal range. There was no significant regurgitation.    PERICARDIUM: There was no pericardial effusion. The pericardium was normal in appearance.    AORTA: The root exhibited normal size.    SYSTEMIC VEINS: IVC: The inferior vena cava was not well visualized.    SYSTEM MEASUREMENT TABLES    2D  %FS: 25.19 %  Ao Diam: 3.38 cm  EDV(Teich): 91.99 ml  EF Biplane: 44.38 %  ESV(Teich): 46.07 ml  IVSd: 1.15 cm  LA Area: 22.09 cm2  LA Diam: 3.12 cm  LVEDV MOD A2C: 80.2 ml  LVEDV MOD A4C: 98.11 ml  LVEDV MOD BP: 92.99 ml  LVEDVInd MOD BP: 44.49 ml/m2  LVEF MOD A2C: 26.42 %  LVEF MOD A4C: 54.46 %  LVESV MOD A2C: 59.01 ml  LVESV MOD A4C: 44.68 ml  LVESV MOD BP: 51.72 ml  LVESVInd MOD BP: 24.75 ml/m2  LVIDd: 4.49 cm  LVIDs: 3.36 cm  LVLd A2C: 6.93 cm  LVLd A4C: 7.65 cm  LVLs A2C: 6.03 cm  LVLs A4C: 6.19 cm  LVPWd: 1.06 cm  RA Area: 16.37 cm2  RVIDd: 2.66 cm  SV (Teich): 45.92 ml  SV MOD A2C: 21.19 ml  SV MOD A4C: 53.43 ml    [employer] Accredited Echocardiography Laboratory    Prepared and electronically signed by    Hi Thomas MD  Signed 29-Aug-2021 15:42:44    Results for orders placed during the hospital encounter of 11/14/22    NM myocardial perfusion spect (rx stress and/or rest)    Interpretation Summary    Stress ECG: The ECG was not diagnostic due to pharmacological (vasodilator) stress.    Perfusion: There is a left ventricular perfusion  "defect that is small in size with mild reduction in uptake present in the mid anterior location(s) that is paradoxical.    Stress Function: Left ventricular function post-stress is normal. Post-stress ejection fraction is 74 %.    No ischemia or scar.    Results for orders placed during the hospital encounter of 10/26/21    Holter monitor    Interpretation Summary  Indication: PAF    The patient was in sinus rhythm throughout the recording.    Minimum HR: 57  Average HR: 73  Maximum HR: 124    Premature ventricular contractions: 0  Ventricular runs: 0    Supraventricular contractions: 72  Supraventricular runs: 0    Longest RR: 1.2 sec    Arrhythmias: None    Diary submitted: yes but no symptoms reported      Impression    Normal holter with rare PAC's      Signed by: Brandon Hernandez MD      Imaging:  Chest X-Ray:   No Chest XR results available for this patient.    CT-scan of the chest:     No CTA results available for this patient.  Lab Review   Lab Results   Component Value Date    WBC 7.95 05/14/2024    HGB 12.8 05/14/2024    HCT 40.4 05/14/2024    MCV 88 05/14/2024    RDW 14.4 05/14/2024     05/14/2024     BMP:  Lab Results   Component Value Date    SODIUM 139 11/18/2024    K 4.3 11/18/2024     11/18/2024    CO2 30 11/18/2024    BUN 23 11/18/2024    CREATININE 1.11 11/18/2024    GLUC 144 (H) 11/01/2021    GLUF 144 (H) 11/18/2024    CALCIUM 8.8 11/18/2024    CORRECTEDCA 9.2 08/21/2021    EGFR 52 11/18/2024    MG 1.6 11/01/2021     LFT:  Lab Results   Component Value Date    AST 15 11/18/2024    ALT 13 11/18/2024    ALKPHOS 98 11/18/2024    TP 6.9 11/18/2024    ALB 3.8 11/18/2024      No components found for: \"TSH3\"  No results found for: \"[id number]\"  Lab Results   Component Value Date    HGBA1C 6.8 (H) 11/18/2024     Lipid Profile:   Lab Results   Component Value Date    CHOLESTEROL 142 11/18/2024    HDL 59 11/18/2024    LDLCALC 63 11/18/2024    TRIG 100 11/18/2024     Lab Results   Component Value " "Date    CHOLESTEROL 142 11/18/2024    CHOLESTEROL 127 05/14/2024     Lab Results   Component Value Date    CKTOTAL 341 (H) 08/14/2021    CKMB 1.1 08/14/2021    CKMBINDEX <1.0 08/14/2021    TROPONINI <0.02 08/14/2021     No results found for: \"NTBNP\"   No results found for this or any previous visit (from the past 4 weeks).          Dr. Sharmila Retana MD, FACC      \"This note has been constructed using a voice recognition system.Therefore there may be syntax, spelling, and/or grammatical errors. Please call if you have any questions. \"  "

## 2025-02-10 ENCOUNTER — OFFICE VISIT (OUTPATIENT)
Dept: CARDIOLOGY CLINIC | Facility: CLINIC | Age: 65
End: 2025-02-10
Payer: COMMERCIAL

## 2025-02-10 VITALS
BODY MASS INDEX: 40.57 KG/M2 | WEIGHT: 229 LBS | SYSTOLIC BLOOD PRESSURE: 130 MMHG | HEART RATE: 59 BPM | OXYGEN SATURATION: 97 % | DIASTOLIC BLOOD PRESSURE: 64 MMHG | HEIGHT: 63 IN

## 2025-02-10 DIAGNOSIS — E78.2 MIXED HYPERLIPIDEMIA: Chronic | ICD-10-CM

## 2025-02-10 DIAGNOSIS — I48.0 PAROXYSMAL ATRIAL FIBRILLATION (HCC): Primary | ICD-10-CM

## 2025-02-10 DIAGNOSIS — E11.65 TYPE 2 DIABETES MELLITUS WITH HYPERGLYCEMIA, WITHOUT LONG-TERM CURRENT USE OF INSULIN (HCC): ICD-10-CM

## 2025-02-10 DIAGNOSIS — I10 PRIMARY HYPERTENSION: ICD-10-CM

## 2025-02-10 DIAGNOSIS — E66.01 OBESITY, MORBID, BMI 50 OR HIGHER (HCC): ICD-10-CM

## 2025-02-10 PROCEDURE — 99214 OFFICE O/P EST MOD 30 MIN: CPT | Performed by: INTERNAL MEDICINE

## 2025-02-10 PROCEDURE — 93000 ELECTROCARDIOGRAM COMPLETE: CPT | Performed by: INTERNAL MEDICINE

## 2025-02-17 ENCOUNTER — TELEPHONE (OUTPATIENT)
Age: 65
End: 2025-02-17

## 2025-02-17 NOTE — TELEPHONE ENCOUNTER
Pt wants to know if [facility] will cover it the Freestyle test strips. Please contact pt and advise. Thank you for your kind assistance.

## 2025-02-18 NOTE — TELEPHONE ENCOUNTER
Pt called in requesting to speak with Chrsitie in the office. Warm transferred her over to Christie.

## 2025-02-18 NOTE — TELEPHONE ENCOUNTER
Spoke with [employer] to request override for test strips. They declined the override request stating the Accucheck glucometer not working or being broken is not a valid reason for override. Patient will have to pay out of pocket or wait until refill is due again.

## 2025-02-18 NOTE — TELEPHONE ENCOUNTER
LM on VM - pt needs to check with Cherelle to see if they cover.    Also asked if she needed refill.  We could just send them and see if they cover.

## 2025-02-18 NOTE — TELEPHONE ENCOUNTER
Jeanne Michel tried to  the freestyle test strips but insurance will not pay for them until next month because she got new Accucheck ones.    She wants to know if we can call [employer] and get them authorized due to her not being able to use the accucheck ones.    She has not tested her BS in 2 weeks.    Jeanne can you work on this maybe tomorrow pm while @ [facility]?

## 2025-02-19 NOTE — TELEPHONE ENCOUNTER
LVM advised override was not approved. Patient will have to pay out of pocket or wait until refill is due again. On [employer] the freestyle light test strips are around $56. Unsure if this is more affordable than out of pocket cost at the pharmacy. Advised patient to call office with any further questions or concerns.

## 2025-03-10 ENCOUNTER — OFFICE VISIT (OUTPATIENT)
Dept: INTERNAL MEDICINE CLINIC | Facility: CLINIC | Age: 65
End: 2025-03-10
Payer: COMMERCIAL

## 2025-03-10 VITALS
OXYGEN SATURATION: 99 % | TEMPERATURE: 97.8 F | DIASTOLIC BLOOD PRESSURE: 68 MMHG | SYSTOLIC BLOOD PRESSURE: 130 MMHG | BODY MASS INDEX: 40.57 KG/M2 | HEIGHT: 63 IN | RESPIRATION RATE: 18 BRPM | WEIGHT: 229 LBS | HEART RATE: 64 BPM

## 2025-03-10 DIAGNOSIS — N18.31 STAGE 3A CHRONIC KIDNEY DISEASE (HCC): ICD-10-CM

## 2025-03-10 DIAGNOSIS — I10 PRIMARY HYPERTENSION: ICD-10-CM

## 2025-03-10 DIAGNOSIS — M79.671 PAIN IN BOTH FEET: Chronic | ICD-10-CM

## 2025-03-10 DIAGNOSIS — K21.9 GASTROESOPHAGEAL REFLUX DISEASE WITHOUT ESOPHAGITIS: Chronic | ICD-10-CM

## 2025-03-10 DIAGNOSIS — E66.812 CLASS 2 SEVERE OBESITY WITH BODY MASS INDEX (BMI) OF 35 TO 39.9 WITH SERIOUS COMORBIDITY (HCC): ICD-10-CM

## 2025-03-10 DIAGNOSIS — E11.22 TYPE 2 DIABETES MELLITUS WITH STAGE 3A CHRONIC KIDNEY DISEASE, WITHOUT LONG-TERM CURRENT USE OF INSULIN (HCC): Primary | Chronic | ICD-10-CM

## 2025-03-10 DIAGNOSIS — E66.01 CLASS 2 SEVERE OBESITY WITH BODY MASS INDEX (BMI) OF 35 TO 39.9 WITH SERIOUS COMORBIDITY (HCC): ICD-10-CM

## 2025-03-10 DIAGNOSIS — M79.672 PAIN IN BOTH FEET: Chronic | ICD-10-CM

## 2025-03-10 DIAGNOSIS — N18.31 TYPE 2 DIABETES MELLITUS WITH STAGE 3A CHRONIC KIDNEY DISEASE, WITHOUT LONG-TERM CURRENT USE OF INSULIN (HCC): Primary | Chronic | ICD-10-CM

## 2025-03-10 DIAGNOSIS — Z79.899 HIGH RISK MEDICATION USE: ICD-10-CM

## 2025-03-10 DIAGNOSIS — I48.0 PAROXYSMAL ATRIAL FIBRILLATION (HCC): ICD-10-CM

## 2025-03-10 DIAGNOSIS — E78.2 MIXED HYPERLIPIDEMIA: Chronic | ICD-10-CM

## 2025-03-10 PROCEDURE — G2211 COMPLEX E/M VISIT ADD ON: HCPCS | Performed by: INTERNAL MEDICINE

## 2025-03-10 PROCEDURE — 99213 OFFICE O/P EST LOW 20 MIN: CPT | Performed by: INTERNAL MEDICINE

## 2025-03-10 NOTE — ASSESSMENT & PLAN NOTE
Clinically appears to be in normal sinus rhythm seen and followed by cardiologist not on any anticoagulation.  No recurrence of A-fib.  Orders:  •  CBC and differential; Future  •  Comprehensive metabolic panel; Future

## 2025-03-10 NOTE — ASSESSMENT & PLAN NOTE
Well-controlled.  Cholesterol 142, triglyceride 100, HDL 51, LDL 63 advised to continue present medication low-cholesterol low carbs diet.  Will follow lipid panel.  Orders:  •  Comprehensive metabolic panel; Future  •  Lipid panel; Future

## 2025-03-10 NOTE — ASSESSMENT & PLAN NOTE
She has been seen and followed by GI specialist on pantoprazole.  Advised to watch diet and reflux precautions.  Orders:  •  CBC and differential; Future

## 2025-03-10 NOTE — ASSESSMENT & PLAN NOTE
Diabetes mellitus well-controlled.  No hypoglycemia.  Advised to continue 1800-calorie ADA diet and present medication.  Will follow blood sugar hemoglobin A1c.  She has been seen and followed by eye doctor as well as foot doctor.  Lab Results   Component Value Date    HGBA1C 6.8 (H) 11/18/2024       Orders:  •  Hemoglobin A1C; Future  •  CBC and differential; Future  •  Comprehensive metabolic panel; Future

## 2025-03-10 NOTE — ASSESSMENT & PLAN NOTE
She has a chronic pain of both feet has been seen and followed by foot doctor.  On gabapentin, meloxicam, tramadol.  Advised to follow with the podiatrist.

## 2025-03-10 NOTE — PROGRESS NOTES
Name: Marilu Brunson      : 1960      MRN: [id number]  Encounter Provider: Sandra Woo MD  Encounter Date: 3/10/2025   Encounter department: [facility] INTERNAL MEDICINE  :  Assessment & Plan  Type 2 diabetes mellitus with stage 3a chronic kidney disease, without long-term current use of insulin (HCC)  Diabetes mellitus well-controlled.  No hypoglycemia.  Advised to continue 1800-calorie ADA diet and present medication.  Will follow blood sugar hemoglobin A1c.  She has been seen and followed by eye doctor as well as foot doctor.  Lab Results   Component Value Date    HGBA1C 6.8 (H) 2024       Orders:  •  Hemoglobin A1C; Future  •  CBC and differential; Future  •  Comprehensive metabolic panel; Future    Primary hypertension  Blood pressure well-controlled advised to continue present medication2 low-salt diet.  Orders:  •  CBC and differential; Future  •  Comprehensive metabolic panel; Future    Stage 3a chronic kidney disease (HCC)  Lab Results   Component Value Date    EGFR 52 2024    EGFR 56 2024    EGFR 49 2023    CREATININE 1.11 2024    CREATININE 1.05 2024    CREATININE 1.16 2023   GFR stable advised to maintain hydration and fluid intake.  Will follow renal function and electrolytes.    Orders:  •  Comprehensive metabolic panel; Future    Pain in both feet  She has a chronic pain of both feet has been seen and followed by foot doctor.  On gabapentin, meloxicam, tramadol.  Advised to follow with the podiatrist.       Mixed hyperlipidemia  Well-controlled.  Cholesterol 142, triglyceride 100, HDL 51, LDL 63 advised to continue present medication low-cholesterol low carbs diet.  Will follow lipid panel.  Orders:  •  Comprehensive metabolic panel; Future  •  Lipid panel; Future    Gastroesophageal reflux disease without esophagitis  She has been seen and followed by GI specialist on pantoprazole.  Advised to watch diet and reflux  precautions.  Orders:  •  CBC and differential; Future    Class 2 severe obesity with body mass index (BMI) of 35 to 39.9 with serious comorbidity (HCC)  Discussed with the patient about seeing a weight management and nutrition therapy but at present she does not want.  She will try to watch her diet and try to exercise as much as she can to lose weight.         Paroxysmal atrial fibrillation (HCC)  Clinically appears to be in normal sinus rhythm seen and followed by cardiologist not on any anticoagulation.  No recurrence of A-fib.  Orders:  •  CBC and differential; Future  •  Comprehensive metabolic panel; Future    High risk medication use    Orders:  •  Comprehensive metabolic panel; Future           History of Present Illness   HPI  64-year-old white female patient presents to follow-up her medical problems.      Current Outpatient Medications:   •  atorvastatin (LIPITOR) 40 mg tablet, Take 1 tablet (40 mg total) by mouth daily, Disp: 90 tablet, Rfl: 1  •  Biotin 2500 MCG CAPS, Take by mouth 2 (two) times a day, Disp: , Rfl:   •  Blood Glucose Monitoring Suppl (Accu-Chek Lina Plus) w/Device KIT, Use in the morning, Disp: 1 kit, Rfl: 0  •  Blood Glucose Monitoring Suppl (FreeStyle Lite) TOYIN, daily, Disp: , Rfl:   •  calcium carbonate (OS-AMEE) 600 MG tablet, Take 600 mg by mouth 2 (two) times a day with meals With vitamin D3 , Disp: , Rfl:   •  FREESTYLE LITE test strip, Use 1 each daily Use as instructed, Disp: 200 each, Rfl: 1  •  gabapentin (NEURONTIN) 300 mg capsule, Take 300 mg by mouth 3 (three) times a day, Disp: , Rfl:   •  glucosamine-chondroitin 500-400 MG tablet, Take 1 tablet by mouth 3 (three) times a day, Disp: , Rfl:   •  glucose blood test strip, Use as instructed, Once daily., Disp: 100 strip, Rfl: 3  •  Lancets (freestyle) lancets, Use 2 (two) times a day Use as instructed, Disp: 200 each, Rfl: 3  •  losartan-hydrochlorothiazide (HYZAAR) 100-12.5 MG per tablet, Take 1 tablet by mouth daily,  Disp: 90 tablet, Rfl: 1  •  meloxicam (MOBIC) 15 mg tablet, Take 15 mg by mouth daily, Disp: , Rfl:   •  metFORMIN (GLUCOPHAGE) 500 mg tablet, TAKE 1 TABLET EVERY MORNING AND 2 TABLETS EVERY EVENING AS DIRECTED, Disp: 270 tablet, Rfl: 1  •  metoprolol tartrate (LOPRESSOR) 50 mg tablet, Take 1 tablet (50 mg total) by mouth 2 (two) times a day, Disp: 180 tablet, Rfl: 1  •  Multiple Vitamin (MULTIVITAMIN) capsule, Take 1 capsule by mouth daily, Disp: , Rfl:   •  Omega-3 Fatty Acids (fish oil) 1,000 mg, TAKE 2 CAPSULES EVERY DAY, Disp: 180 capsule, Rfl: 3  •  pantoprazole (PROTONIX) 40 mg tablet, Take 1 tablet (40 mg total) by mouth daily, Disp: 90 tablet, Rfl: 7  •  traMADol (ULTRAM) 50 mg tablet, Take 50 mg by mouth every 8 (eight) hours as needed BID, Disp: , Rfl: 0     Past Medical History:   Diagnosis Date   • Abrasion of right leg 03/19/2021   • Acute respiratory failure due to COVID-19 (HCC) 10/01/2021   • Age-related cataract of both eyes 05/23/2023   • Arthritis     feet   • BMI 39.0-39.9,adult 05/10/2022   • BMI 40.0-44.9, adult (HCC) 02/20/2023   • BMI 40.0-44.9, adult (HCC) 08/26/2024   • Chronic pain of both knees 05/20/2024   • CKD (chronic kidney disease) 02/08/2022   • Colon polyp    • Colon polyp 11/25/2024   • COVID-19 long hauler 11/22/2022   • Diabetes mellitus (HCC)     FBS today 2/28/22 was 147 at 0930 by patient--feels well   • Dyslipidemia    • Dysphagia, pharyngoesophageal    • Edema of both lower legs 03/19/2021   • Essential hypertension 01/23/2021   • Gastroesophageal reflux disease without esophagitis 01/23/2021   • GERD (gastroesophageal reflux disease)    • Hiatal hernia    • HTN (hypertension)    • Hyperlipemia    • Hypertension    • Hypertensive arterionephrosclerosis of transplanted kidney 01/23/2021   • Hypokalemia 04/20/2021   • Insomnia    • Leukocytosis    • Medicare annual wellness visit, subsequent 11/09/2021   • Medicare annual wellness visit, subsequent 05/20/2024   • Mixed  "hyperlipidemia 01/23/2021   • Numbness    • Obstructive sleep apnea syndrome 01/23/2021    no device   • Other chest pain 08/26/2022   • Pain in ankle 01/23/2021   • Pain in both feet 01/23/2021   • Pain in both knees 01/26/2021   • Pain in right ankle 05/10/2022   • Paroxysmal atrial fibrillation (HCC) 11/09/2021   • Personal history of COVID-19 August 2021   • Primary hypertension 08/26/2024   • Right foot pain 05/10/2022   • Right shoulder pain 02/20/2023   • Shortness of breath     exertional   • Shoulder pain, right 02/08/2022   • Skin lesions    • Sleep apnea    • Type 2 diabetes mellitus without complication, without long-term current use of insulin (HCC) 01/23/2021   • Urinary incontinence 02/20/2023      Review of Systems   Constitutional:  Negative for chills and fever.   HENT:  Negative for congestion, ear pain, hearing loss, nosebleeds, sinus pain, sore throat and trouble swallowing.    Eyes:  Negative for discharge, redness and visual disturbance.   Respiratory:  Negative for cough, chest tightness and shortness of breath.    Cardiovascular:  Negative for chest pain and palpitations.   Gastrointestinal:  Negative for abdominal pain, blood in stool, constipation, diarrhea, nausea and vomiting.   Genitourinary:  Negative for dysuria, flank pain and hematuria.   Musculoskeletal:  Positive for arthralgias (Gets pain in the feet has been seen and followed by podiatrist presently controlled on present medications.  Sometime she gets pain in the knees.). Negative for myalgias and neck pain.   Skin:  Negative for color change and rash.   Neurological:  Negative for dizziness, speech difficulty, weakness and headaches.   Hematological:  Does not bruise/bleed easily.   Psychiatric/Behavioral:  Negative for agitation and behavioral problems.          Objective   /68 (BP Location: Left arm, Patient Position: Sitting, Cuff Size: Large)   Pulse 64   Temp 97.8 °F (36.6 °C) (Temporal)   Resp 18   Ht 5' 3\" " (1.6 m)   Wt 104 kg (229 lb)   SpO2 99%   BMI 40.57 kg/m²      Physical Exam  Vitals and nursing note reviewed.   Constitutional:       General: She is not in acute distress.     Appearance: She is well-developed. She is obese.   HENT:      Head: Normocephalic and atraumatic.      Right Ear: External ear normal.      Left Ear: External ear normal.      Nose: Nose normal.      Mouth/Throat:      Mouth: Mucous membranes are moist.      Pharynx: Oropharynx is clear.   Eyes:      General: No scleral icterus.        Right eye: No discharge.         Left eye: No discharge.      Extraocular Movements: Extraocular movements intact.      Conjunctiva/sclera: Conjunctivae normal.   Cardiovascular:      Rate and Rhythm: Normal rate and regular rhythm.      Pulses: Normal pulses.   Pulmonary:      Effort: Pulmonary effort is normal. No respiratory distress.      Breath sounds: Normal breath sounds. No wheezing.   Abdominal:      General: Bowel sounds are normal.      Palpations: Abdomen is soft.      Tenderness: There is no abdominal tenderness.   Musculoskeletal:         General: No swelling. Normal range of motion.      Cervical back: Normal range of motion and neck supple.      Right lower leg: No edema.      Left lower leg: No edema.   Skin:     General: Skin is warm and dry.      Capillary Refill: Capillary refill takes less than 2 seconds.      Findings: No rash.   Neurological:      General: No focal deficit present.      Mental Status: She is alert and oriented to person, place, and time.   Psychiatric:         Mood and Affect: Mood normal.         Behavior: Behavior normal.

## 2025-03-10 NOTE — ASSESSMENT & PLAN NOTE
Lab Results   Component Value Date    EGFR 52 11/18/2024    EGFR 56 05/14/2024    EGFR 49 11/14/2023    CREATININE 1.11 11/18/2024    CREATININE 1.05 05/14/2024    CREATININE 1.16 11/14/2023   GFR stable advised to maintain hydration and fluid intake.  Will follow renal function and electrolytes.    Orders:  •  Comprehensive metabolic panel; Future

## 2025-03-10 NOTE — ASSESSMENT & PLAN NOTE
Blood pressure well-controlled advised to continue present medication2 low-salt diet.  Orders:  •  CBC and differential; Future  •  Comprehensive metabolic panel; Future

## 2025-03-10 NOTE — ASSESSMENT & PLAN NOTE
Discussed with the patient about seeing a weight management and nutrition therapy but at present she does not want.  She will try to watch her diet and try to exercise as much as she can to lose weight.

## 2025-03-10 NOTE — PATIENT INSTRUCTIONS
Patient was advised to continue present medications. discussed with the patient medications and laboratory data in detail.  Follow-up with me in 3 months or as advised.  If any blood test was ordered please do 1 week prior to next appointment unless advise to get earlier.  If you have any questions please call the office [phone] Education     Type 2 diabetes   The Basics   Written by the doctors and editors at [facility]   What is type 2 diabetes? -- This is a disorder that disrupts the way the body uses sugar. It is sometimes called type 2 diabetes mellitus.  All of the cells in the body need sugar to work normally. Sugar gets into the cells with the help of a hormone called insulin. Insulin is made by the pancreas, an organ in the belly. If there is not enough insulin, or if cells in the body don't respond normally to insulin, sugar builds up in the blood. That is what happens to people with diabetes.  There are 2 different types of diabetes:   In type 1 diabetes, the pancreas makes little or no insulin.   In type 2 diabetes, the pancreas still makes some insulin, but the cells in the body stop responding normally. Eventually, the pancreas cannot make enough insulin to keep up.  Having excess body weight or obesity increases a person's risk of developing type 2 diabetes. But people without excess body weight can get diabetes, too.  What are the symptoms of type 2 diabetes? -- Type 2 diabetes usually causes no symptoms. When symptoms do happen, they include:   Needing to urinate often   Intense thirst   Blurry vision  Can diabetes lead to other health problems? -- Yes. Type 2 diabetes might not make you feel sick. But if it is not managed, it can lead to serious problems over time, such as:   Heart attacks   Strokes   Kidney disease   Vision problems (or even blindness)   Pain or loss of feeling in the hands and feet   Needing to have fingers, toes, or other body parts removed (amputated)  How do I know  "if I have type 2 diabetes? -- Your doctor or nurse can do a blood test. There are 2 tests that can be used for this. Both involve measuring the amount of sugar in your blood, called your \"blood sugar\" or \"blood glucose\":   One of the tests measures your blood sugar at the time the blood sample is taken. This test is done in the morning. You can't eat or drink anything except water for at least 8 hours before the test.   The other test shows what your average blood sugar has been for the past 2 to 3 months. This blood test is called \"hemoglobin A1C\" or just \"A1C.\" It can be checked at any time of the day, even if you have recently eaten.  How is type 2 diabetes treated? -- The goals of treatment are to manage your blood sugar and lower the risk of future problems that can happen in people with diabetes.  Treatment might include:   Lifestyle changes - This is an important part of managing diabetes. It includes eating healthy foods and getting plenty of physical activity.   Medicines - There are a few medicines that help lower blood sugar. Some people need to take pills that help the body make more insulin or that help insulin do its job. Others need insulin shots.  Depending on what medicines you take, you might need to check your blood sugar regularly at home. But not everyone with type 2 diabetes needs to do this. Your doctor or nurse will tell you if you should be checking your blood sugar, and when and how to do this.  Sometimes, people with type 2 diabetes also need medicines to help prevent problems caused by the disease. For instance, medicines used to lower blood pressure can reduce the chances of a heart attack or stroke.   General medical care - It's also important to take care of other areas of your health. This includes watching your blood pressure and cholesterol levels. You should also get certain vaccines, such as vaccines to protect against the flu and coronavirus disease 2019 (\"COVID-19\"). Some people " also need a vaccine to prevent pneumonia.  Can type 2 diabetes be prevented? -- Yes. To lower your chances of getting type 2 diabetes, the most important thing you can do is eat a healthy diet and get plenty of physical activity. This can help you lose weight if you are overweight. But eating well and being active are also good for your overall health. Even gentle activity, like walking, has benefits.  If you smoke, quitting can also lower your risk of type 2 diabetes. Quitting smoking can be difficult, but your doctor or nurse can help.  All topics are updated as new evidence becomes available and our peer review process is complete.  This topic retrieved from [employer] on: Apr 24, 2024.  Topic 84278 Version 23.0  Release: 32.3.2 - C32.113  © 2024 UpToDate, Inc. and/or its affiliates. All rights reserved.  Consumer Information Use and Disclaimer   Disclaimer: This generalized information is a limited summary of diagnosis, treatment, and/or medication information. It is not meant to be comprehensive and should be used as a tool to help the user understand and/or assess potential diagnostic and treatment options. It does NOT include all information about conditions, treatments, medications, side effects, or risks that may apply to a specific patient. It is not intended to be medical advice or a substitute for the medical advice, diagnosis, or treatment of a health care provider based on the health care provider's examination and assessment of a patient's specific and unique circumstances. Patients must speak with a health care provider for complete information about their health, medical questions, and treatment options, including any risks or benefits regarding use of medications. This information does not endorse any treatments or medications as safe, effective, or approved for treating a specific patient. UpToDate, Inc. and its affiliates disclaim any warranty or liability relating to this information or the use  thereof.The use of this information is governed by the Terms of Use, available at https://www.wolterskluwer.com/en/know/clinical-effectiveness-terms. 2024© UpToDate, Inc. and its affiliates and/or licensors. All rights reserved.  Copyright   © 2024 [employer] and/or its affiliates. All rights reserved.

## 2025-03-18 ENCOUNTER — HOSPITAL ENCOUNTER (OUTPATIENT)
Dept: RADIOLOGY | Facility: HOSPITAL | Age: 65
Discharge: HOME | End: 2025-03-18
Attending: PODIATRIST
Payer: COMMERCIAL

## 2025-03-18 ENCOUNTER — OFFICE VISIT (OUTPATIENT)
Dept: PODIATRY | Facility: CLINIC | Age: 65
End: 2025-03-18
Payer: COMMERCIAL

## 2025-03-18 VITALS — OXYGEN SATURATION: 99 % | WEIGHT: 234 LBS | HEIGHT: 63 IN | HEART RATE: 57 BPM | BODY MASS INDEX: 41.46 KG/M2

## 2025-03-18 DIAGNOSIS — M54.17 LUMBOSACRAL RADICULOPATHY: ICD-10-CM

## 2025-03-18 DIAGNOSIS — M79.672 PAIN IN BOTH FEET: ICD-10-CM

## 2025-03-18 DIAGNOSIS — E11.42 DIABETIC POLYNEUROPATHY ASSOCIATED WITH TYPE 2 DIABETES MELLITUS (HCC): ICD-10-CM

## 2025-03-18 DIAGNOSIS — M79.671 PAIN IN BOTH FEET: ICD-10-CM

## 2025-03-18 DIAGNOSIS — M77.8 ENTHESOPATHY OF FOOT: Primary | ICD-10-CM

## 2025-03-18 PROCEDURE — 99213 OFFICE O/P EST LOW 20 MIN: CPT | Performed by: PODIATRIST

## 2025-03-18 PROCEDURE — 73630 X-RAY EXAM OF FOOT: CPT

## 2025-03-18 RX ORDER — GABAPENTIN 300 MG/1
600 CAPSULE ORAL 3 TIMES DAILY
Qty: 90 CAPSULE | Refills: 2 | Status: SHIPPED | OUTPATIENT
Start: 2025-03-18

## 2025-03-18 NOTE — PROGRESS NOTES
:  Assessment & Plan  Pain in both feet    Orders:    XR foot 3+ vw left; Future    XR foot 3+ vw right; Future    Diabetic polyneuropathy associated with type 2 diabetes mellitus (HCC)    Lab Results   Component Value Date    HGBA1C 6.8 (H) 11/18/2024       Orders:    gabapentin (NEURONTIN) 300 mg capsule; Take 2 capsules (600 mg total) by mouth 3 (three) times a day    Lumbosacral radiculopathy    Orders:    gabapentin (NEURONTIN) 300 mg capsule; Take 2 capsules (600 mg total) by mouth 3 (three) times a day    The patient's clinical examination today significant for mild to moderate tenderness palpation along the fifth metatarsal bases bilaterally.  There is no erythema nor edema no calor or ecchymosis.  Findings are concerning for insertional tendinitis of the peroneus brevis tendon bilaterally.  She also notes tenderness and burning sensations along the dorsums of both feet, this does appear more to be in line with a radiculopathy or diabetic neuropathy.  Pedal pulses are palpable.  There are no open lesions.  There is no erythema nor edema nor calor nor ecchymosis.    X-rays of both feet taken today were personally viewed interpreted.  There are no signs of fracture or dislocation identified.  Diffuse arthritic changes noted to the midfoot and ankles bilaterally, right worse than the left.    X-ray images were reviewed with the patient in detail.  The patient's clinical examination is consistent with insertional tendinitis of the peroneus brevis tendon bilaterally.  She does have insoles at home and I did recommend that she try to go back into them.  Supporting her feet should reduce tension along the brevis tendons.  Ankle exercises can also be helpful on some exercises were included in her AVS today.    I would recommend that she transition away from tramadol.  She states that she has a couple of doses left and will save those for when she is having significant pain.  I do believe that her current dose of  "gabapentin is slightly subtherapeutic at 300 mg 3 times a day.  I will titrate her up to 600 mg 3 times a day.  Consider adding dual therapy with another medication depending on her clinical course.    Recommend follow-up in 4 to 6 weeks.  Enthesopathy of foot             History of Present Illness     Marilu Brunson is a 64 y.o. female   The patient presents today for her initial consultation with [facility] podiatry with a chief complaint of bilateral foot pain that is chronic in nature.  Pain has been present for several years.  She does note a prior history of right ankle surgery.  In looking at the scars it appears that she had arthroscopic procedures to clean of her ankle joint.  She states that these procedures were never successful and she notes persistent tenderness in the ankle joint and tightness as well.  Her main issue is tenderness along the outsides of both feet which tends to then radiate along the dorsums of both feet.  She does note some burning sensations that can be present with or without weightbearing.  She does have a history of diabetes and neuropathy.  She also has a history of chronic low back pain and sciatica which is worse on the right side.  Incidentally, her symptomatology her feet is also worse on the right.      Review of Systems   Constitutional: Negative.    HENT: Negative.     Eyes: Negative.    Respiratory: Negative.     Cardiovascular: Negative.    Endocrine: Negative.    Musculoskeletal: Negative.    Neurological: Negative.    Hematological: Negative.    Psychiatric/Behavioral: Negative.       Objective   Pulse 57   Ht 5' 3\" (1.6 m)   Wt 106 kg (234 lb)   SpO2 99%   BMI 41.45 kg/m²      Physical Exam  Vitals and nursing note reviewed.   Constitutional:       General: She is not in acute distress.     Appearance: She is well-developed.   HENT:      Head: Normocephalic and atraumatic.   Cardiovascular:      Pulses:           Dorsalis pedis pulses are 2+ on the right side and " 2+ on the left side.        Posterior tibial pulses are 1+ on the right side and 1+ on the left side.   Pulmonary:      Effort: Pulmonary effort is normal.   Abdominal:      Palpations: Abdomen is soft.   Musculoskeletal:        Feet:    Feet:      Right foot:      Skin integrity: Skin integrity normal.      Toenail Condition: Right toenails are abnormally thick. Fungal disease present.     Left foot:      Skin integrity: Skin integrity normal.      Toenail Condition: Left toenails are abnormally thick. Fungal disease present.     Comments: The patient's clinical examination today significant for mild to moderate tenderness palpation along the fifth metatarsal bases bilaterally.  There is no erythema nor edema no calor or ecchymosis.  Findings are concerning for insertional tendinitis of the peroneus brevis tendon bilaterally.  She also notes tenderness and burning sensations along the dorsums of both feet, this does appear more to be in line with a radiculopathy or diabetic neuropathy.  Pedal pulses are palpable.  There are no open lesions.  There is no erythema nor edema nor calor nor ecchymosis.    Skin:     General: Skin is warm and dry.      Capillary Refill: Capillary refill takes less than 2 seconds.   Neurological:      General: No focal deficit present.      Mental Status: She is alert and oriented to person, place, and time.   Psychiatric:         Mood and Affect: Mood normal.

## 2025-04-15 DIAGNOSIS — E11.9 TYPE 2 DIABETES MELLITUS WITHOUT COMPLICATION, WITHOUT LONG-TERM CURRENT USE OF INSULIN (HCC): ICD-10-CM

## 2025-04-15 NOTE — TELEPHONE ENCOUNTER
Reason for call:   [x] Refill   [] Prior Auth  [] Other:     Office:   [x] PCP/Provider -   [] Specialty/Provider -     Medication: Lancets    Dose/Frequency: freestyle    Quantity: 200 each     [address] [phone]     Local Pharmacy   Does the patient have enough for 3 days?   [x] Yes   [] No - Send as HP to POD    Mail Away Pharmacy   Does the patient have enough for 10 days?   [] Yes   [] No - Send as HP to POD

## 2025-04-16 RX ORDER — LANCETS 28 GAUGE
EACH MISCELLANEOUS 2 TIMES DAILY
Qty: 200 EACH | Refills: 1 | Status: SHIPPED | OUTPATIENT
Start: 2025-04-16

## 2025-04-17 DIAGNOSIS — I10 ESSENTIAL HYPERTENSION: Chronic | ICD-10-CM

## 2025-04-18 RX ORDER — LOSARTAN POTASSIUM AND HYDROCHLOROTHIAZIDE 12.5; 1 MG/1; MG/1
1 TABLET ORAL DAILY
Qty: 90 TABLET | Refills: 1 | Status: SHIPPED | OUTPATIENT
Start: 2025-04-18

## 2025-04-18 RX ORDER — METOPROLOL TARTRATE 50 MG
50 TABLET ORAL 2 TIMES DAILY
Qty: 180 TABLET | Refills: 1 | Status: SHIPPED | OUTPATIENT
Start: 2025-04-18

## 2025-04-21 ENCOUNTER — OFFICE VISIT (OUTPATIENT)
Dept: PODIATRY | Facility: CLINIC | Age: 65
End: 2025-04-21
Payer: COMMERCIAL

## 2025-04-21 VITALS — WEIGHT: 230 LBS | HEIGHT: 63 IN | OXYGEN SATURATION: 100 % | HEART RATE: 61 BPM | BODY MASS INDEX: 40.75 KG/M2

## 2025-04-21 DIAGNOSIS — M77.8 ENTHESOPATHY OF FOOT: ICD-10-CM

## 2025-04-21 DIAGNOSIS — E11.42 DIABETIC POLYNEUROPATHY ASSOCIATED WITH TYPE 2 DIABETES MELLITUS (HCC): Primary | ICD-10-CM

## 2025-04-21 PROCEDURE — 99213 OFFICE O/P EST LOW 20 MIN: CPT | Performed by: PODIATRIST

## 2025-04-21 NOTE — PROGRESS NOTES
:  Assessment & Plan  Diabetic polyneuropathy associated with type 2 diabetes mellitus (HCC)    Lab Results   Component Value Date    HGBA1C 6.8 (H) 11/18/2024       Orders:    Ambulatory referral to Physical Therapy; Future    Enthesopathy of foot    Orders:    Ambulatory referral to Physical Therapy; Future    The patient's clinical examination today significant for persistent neuritic symptoms to her bilateral lower extremities.  She does seem to be tolerating gabapentin well at 600 mg.  She still notes some persistent tenderness along the outer aspects of both feet along the fifth metatarsals consistent with metatarsalgia.  This typically occurs after being on her feet for any prolonged period of time.  Her pedal pulses are palpable bilaterally.  There are no open lesions.    I do recommend continuing gabapentin at her current dose.  She could potentially benefit from custom of the foot orthoses as I feel that some of her symptoms is secondary to overloading of her lateral column.  A course of physical therapy can also be helpful to strengthen and improve her balance as well as her range of motion to decrease plantar pressures of the forefoot.  A referral was placed for [facility] physical therapy for a course of PT as well as evaluation for custom molded foot orthoses.    Recommend follow-up in 6 to 8 weeks.    History of Present Illness     Marilu Brunson is a 64 y.o. female   The patient presents today for follow-up of bilateral neuritic pain stemming from diabetic peripheral neuropathy.  At her last visit, her gabapentin dose was increased to 600 mg and she seems to be tolerating that well.  While her breast pain has improved, she still notes some persistent tenderness along the sides of both of her feet after standing for prolonged periods of time.      PAST MEDICAL HISTORY:  Past Medical History:   Diagnosis Date    Abrasion of right leg 03/19/2021    Acute respiratory failure due to COVID-19 ([facility])  10/01/2021    Age-related cataract of both eyes 05/23/2023    Arthritis     feet    BMI 39.0-39.9,adult 05/10/2022    BMI 40.0-44.9, adult (HCC) 02/20/2023    BMI 40.0-44.9, adult (HCC) 08/26/2024    Chronic pain of both knees 05/20/2024    CKD (chronic kidney disease) 02/08/2022    Colon polyp     Colon polyp 11/25/2024    COVID-19 long hauler 11/22/2022    Diabetes mellitus (HCC)     FBS today 2/28/22 was 147 at 0930 by patient--feels well    Dyslipidemia     Dysphagia, pharyngoesophageal     Edema of both lower legs 03/19/2021    Essential hypertension 01/23/2021    Gastroesophageal reflux disease without esophagitis 01/23/2021    GERD (gastroesophageal reflux disease)     Hiatal hernia     HTN (hypertension)     Hyperlipemia     Hypertension     Hypertensive arterionephrosclerosis of transplanted kidney 01/23/2021    Hypokalemia 04/20/2021    Insomnia     Leukocytosis     Medicare annual wellness visit, subsequent 11/09/2021    Medicare annual wellness visit, subsequent 05/20/2024    Mixed hyperlipidemia 01/23/2021    Numbness     Obstructive sleep apnea syndrome 01/23/2021    no device    Other chest pain 08/26/2022    Pain in ankle 01/23/2021    Pain in both feet 01/23/2021    Pain in both knees 01/26/2021    Pain in right ankle 05/10/2022    Paroxysmal atrial fibrillation (HCC) 11/09/2021    Personal history of COVID-19 August 2021    Primary hypertension 08/26/2024    Right foot pain 05/10/2022    Right shoulder pain 02/20/2023    Shortness of breath     exertional    Shoulder pain, right 02/08/2022    Skin lesions     Sleep apnea     Type 2 diabetes mellitus without complication, without long-term current use of insulin (HCC) 01/23/2021    Urinary incontinence 02/20/2023       PAST SURGICAL HISTORY:  Past Surgical History:   Procedure Laterality Date    ANKLE SURGERY Right 2009/2012    BREAST BIOPSY Right 05/15/2014    stereotactic biopsy at [place]    CARPAL TUNNEL RELEASE Bilateral 1990     CHOLECYSTECTOMY  05/2004    COLONOSCOPY  03/07/2017    Dr. Wilson    COLONOSCOPY      GALLBLADDER SURGERY  2004    HYSTERECTOMY  2004    MAMMO (HISTORICAL)  10/02/2018    MAMMO STEREOTACTIC BREAST BIOPSY RIGHT (ALL INC) Right 2014    UPPER GASTROINTESTINAL ENDOSCOPY      WISDOM TOOTH EXTRACTION          ALLERGIES:  Aspirin and Lisinopril    MEDICATIONS:  Current Outpatient Medications   Medication Sig Dispense Refill    atorvastatin (LIPITOR) 40 mg tablet Take 1 tablet (40 mg total) by mouth daily 90 tablet 1    Biotin 2500 MCG CAPS Take by mouth 2 (two) times a day      Blood Glucose Monitoring Suppl (Accu-Chek Lina Plus) w/Device KIT Use in the morning 1 kit 0    Blood Glucose Monitoring Suppl (FreeStyle Lite) TOYIN daily      calcium carbonate (OS-AMEE) 600 MG tablet Take 600 mg by mouth 2 (two) times a day with meals With vitamin D3       FREESTYLE LITE test strip Use 1 each daily Use as instructed 200 each 1    gabapentin (NEURONTIN) 300 mg capsule Take 2 capsules (600 mg total) by mouth 3 (three) times a day 90 capsule 2    glucosamine-chondroitin 500-400 MG tablet Take 1 tablet by mouth 3 (three) times a day      glucose blood test strip Use as instructed, Once daily. 100 strip 3    Lancets (freestyle) lancets Use 2 (two) times a day Use as instructed 200 each 1    losartan-hydrochlorothiazide (HYZAAR) 100-12.5 MG per tablet TAKE 1 TABLET EVERY DAY 90 tablet 1    meloxicam (MOBIC) 15 mg tablet Take 15 mg by mouth daily      metFORMIN (GLUCOPHAGE) 500 mg tablet TAKE 1 TABLET EVERY MORNING AND 2 TABLETS EVERY EVENING AS DIRECTED 270 tablet 1    metoprolol tartrate (LOPRESSOR) 50 mg tablet TAKE 1 TABLET TWICE DAILY 180 tablet 1    Multiple Vitamin (MULTIVITAMIN) capsule Take 1 capsule by mouth daily      Omega-3 Fatty Acids (fish oil) 1,000 mg TAKE 2 CAPSULES EVERY  capsule 3    pantoprazole (PROTONIX) 40 mg tablet Take 1 tablet (40 mg total) by mouth daily 90 tablet 7    traMADol (ULTRAM) 50 mg tablet  Take 50 mg by mouth every 8 (eight) hours as needed BID  0     No current facility-administered medications for this visit.       SOCIAL HISTORY:  Social History     Socioeconomic History    Marital status: Single     Spouse name: None    Number of children: None    Years of education: None    Highest education level: None   Occupational History    Occupation: Unemployed, looking for work   Tobacco Use    Smoking status: Former     Current packs/day: 0.00     Types: Cigarettes     Quit date: 2004     Years since quittin.6     Passive exposure: Past    Smokeless tobacco: Former    Tobacco comments:     quit at 44   Vaping Use    Vaping status: Never Used   Substance and Sexual Activity    Alcohol use: Not Currently     Comment: -rare    Drug use: Not Currently     Comment: No drug use - As per [employer]    Sexual activity: Yes     Partners: Male   Other Topics Concern    None   Social History Narrative    Lives with spouse    Tea, 3 servings/day    Lives with spouse    Annual eye exam: Advised    Pap smear: By her gyn    - As per [employer]     Social Drivers of Health     Financial Resource Strain: Medium Risk (2022)    Overall Financial Resource Strain (CARDIA)     Difficulty of Paying Living Expenses: Somewhat hard   Food Insecurity: No Food Insecurity (2024)    Nursing - Inadequate Food Risk Classification     Worried About Running Out of Food in the Last Year: Never true     Ran Out of Food in the Last Year: Never true     Ran Out of Food in the Last Year: Not on file   Transportation Needs: No Transportation Needs (2024)    PRAPARE - Transportation     Lack of Transportation (Medical): No     Lack of Transportation (Non-Medical): No   Physical Activity: Not on file   Stress: Not on file   Social Connections: Not on file   Intimate Partner Violence: Not on file   Housing Stability: Low Risk  (2024)    Housing Stability Vital Sign     Unable to Pay for Housing in the Last Year:  "No     Number of Times Moved in the Last Year: 1     Homeless in the Last Year: No      Review of Systems   Constitutional: Negative.    HENT: Negative.     Eyes: Negative.    Respiratory: Negative.     Cardiovascular: Negative.    Endocrine: Negative.    Musculoskeletal: Negative.    Neurological: Negative.    Hematological: Negative.    Psychiatric/Behavioral: Negative.       Objective   Pulse 61   Ht 5' 3\" (1.6 m)   Wt 104 kg (230 lb)   SpO2 100%   BMI 40.74 kg/m²      Physical Exam  Vitals and nursing note reviewed.   Constitutional:       General: She is not in acute distress.     Appearance: She is well-developed.   HENT:      Head: Normocephalic and atraumatic.   Cardiovascular:      Pulses:           Dorsalis pedis pulses are 2+ on the right side and 2+ on the left side.        Posterior tibial pulses are 1+ on the right side and 1+ on the left side.   Pulmonary:      Effort: Pulmonary effort is normal.   Feet:      Right foot:      Skin integrity: Skin integrity normal.      Left foot:      Skin integrity: Skin integrity normal.      Comments: Bilateral forefoot metatarsalgia radiating along the fifth metatarsal bilaterally; mild tenderness is also noted along the dorsum of both feet along the extensor tendons  Skin:     General: Skin is warm and dry.      Capillary Refill: Capillary refill takes less than 2 seconds.   Neurological:      General: No focal deficit present.      Mental Status: She is alert and oriented to person, place, and time.   Psychiatric:         Mood and Affect: Mood normal.           "

## 2025-05-13 LAB
LEFT EYE DIABETIC RETINOPATHY: NORMAL
RIGHT EYE DIABETIC RETINOPATHY: NORMAL

## 2025-06-09 ENCOUNTER — OFFICE VISIT (OUTPATIENT)
Dept: PODIATRY | Facility: CLINIC | Age: 65
End: 2025-06-09
Payer: COMMERCIAL

## 2025-06-09 VITALS — WEIGHT: 231 LBS | OXYGEN SATURATION: 99 % | HEIGHT: 63 IN | BODY MASS INDEX: 40.93 KG/M2 | HEART RATE: 63 BPM

## 2025-06-09 DIAGNOSIS — M79.672 PAIN IN BOTH FEET: ICD-10-CM

## 2025-06-09 DIAGNOSIS — M79.671 PAIN IN BOTH FEET: ICD-10-CM

## 2025-06-09 DIAGNOSIS — E11.42 DIABETIC POLYNEUROPATHY ASSOCIATED WITH TYPE 2 DIABETES MELLITUS (HCC): Primary | ICD-10-CM

## 2025-06-09 PROCEDURE — 99213 OFFICE O/P EST LOW 20 MIN: CPT | Performed by: PODIATRIST

## 2025-06-10 NOTE — PROGRESS NOTES
":  Assessment & Plan  Diabetic polyneuropathy associated with type 2 diabetes mellitus (HCC)    Lab Results   Component Value Date    HGBA1C 6.8 (H) 11/18/2024            Pain in both feet         The patient's clinical examination today significant for persistent neuritic symptoms to her bilateral lower extremities.  Overall, her neuritic pain now seems to be under decent control.  She is tolerating gabapentin without any significant side effects.  Her pedal pulses are palpable bilaterally.  There are no open lesions.     Continue current dosing of gabapentin as it this seems to be helping her and she is tolerating it well.  She does still note some occasional midfoot arthritis pain to both feet that tends to come and go.  Overall, she seems content with her current status.    Recommend follow-up in 6 to 8 weeks.    History of Present Illness     Marilu Brunson is a 64 y.o. female   The patient presents today for follow-up of bilateral neuritic pain secondary to diabetic neuropathy.  She is currently on gabapentin 600 mg 3 times a day and is tolerating it well.  She does find helpful terms of controlling her nerve pain.  She notes no other acute pedal issues today.      PAST MEDICAL HISTORY:  Past Medical History[1]    PAST SURGICAL HISTORY:  Past Surgical History[2]     ALLERGIES:  Aspirin and Lisinopril    MEDICATIONS:  Current Medications[3]    SOCIAL HISTORY:  Social History[4]   Review of Systems   Constitutional: Negative.    Respiratory: Negative.     Cardiovascular: Negative.    Skin: Negative.    Psychiatric/Behavioral: Negative.       Objective   Pulse 63   Ht 5' 3\" (1.6 m)   Wt 105 kg (231 lb)   SpO2 99%   BMI 40.92 kg/m²      Physical Exam  Vitals and nursing note reviewed.   Constitutional:       General: She is not in acute distress.     Appearance: She is well-developed.   HENT:      Head: Normocephalic and atraumatic.     Eyes:      Conjunctiva/sclera: Conjunctivae normal.       Cardiovascular: "      Pulses:           Dorsalis pedis pulses are 1+ on the right side and 1+ on the left side.        Posterior tibial pulses are 1+ on the right side and 1+ on the left side.   Pulmonary:      Effort: Pulmonary effort is normal.   Abdominal:      Palpations: Abdomen is soft.   Feet:      Right foot:      Skin integrity: Dry skin present.      Left foot:      Skin integrity: Dry skin present.      Comments: The patient's clinical examination today significant for persistent neuritic symptoms to her bilateral lower extremities.  Overall, her neuritic pain now seems to be under decent control.  She is tolerating gabapentin without any significant side effects.  Her pedal pulses are palpable bilaterally.  There are no open lesions.    Skin:     General: Skin is warm and dry.      Capillary Refill: Capillary refill takes 2 to 3 seconds.     Neurological:      General: No focal deficit present.      Mental Status: She is alert and oriented to person, place, and time.     Psychiatric:         Mood and Affect: Mood normal.                [1]   Past Medical History:  Diagnosis Date    Abrasion of right leg 03/19/2021    Acute respiratory failure due to COVID-19 (HCC) 10/01/2021    Age-related cataract of both eyes 05/23/2023    Arthritis     feet    BMI 39.0-39.9,adult 05/10/2022    BMI 40.0-44.9, adult (HCC) 02/20/2023    BMI 40.0-44.9, adult (HCC) 08/26/2024    Chronic pain of both knees 05/20/2024    CKD (chronic kidney disease) 02/08/2022    Colon polyp     Colon polyp 11/25/2024    COVID-19 long hauler 11/22/2022    Diabetes mellitus (HCC)     FBS today 2/28/22 was 147 at 0930 by patient--feels well    Dyslipidemia     Dysphagia, pharyngoesophageal     Edema of both lower legs 03/19/2021    Essential hypertension 01/23/2021    Gastroesophageal reflux disease without esophagitis 01/23/2021    GERD (gastroesophageal reflux disease)     Hiatal hernia     HTN (hypertension)     Hyperlipemia     Hypertension      Hypertensive arterionephrosclerosis of transplanted kidney 01/23/2021    Hypokalemia 04/20/2021    Insomnia     Leukocytosis     Medicare annual wellness visit, subsequent 11/09/2021    Medicare annual wellness visit, subsequent 05/20/2024    Mixed hyperlipidemia 01/23/2021    Numbness     Obstructive sleep apnea syndrome 01/23/2021    no device    Other chest pain 08/26/2022    Pain in ankle 01/23/2021    Pain in both feet 01/23/2021    Pain in both knees 01/26/2021    Pain in right ankle 05/10/2022    Paroxysmal atrial fibrillation (HCC) 11/09/2021    Personal history of COVID-19 August 2021    Primary hypertension 08/26/2024    Right foot pain 05/10/2022    Right shoulder pain 02/20/2023    Shortness of breath     exertional    Shoulder pain, right 02/08/2022    Skin lesions     Sleep apnea     Type 2 diabetes mellitus without complication, without long-term current use of insulin (HCC) 01/23/2021    Urinary incontinence 02/20/2023   [2]   Past Surgical History:  Procedure Laterality Date    ANKLE SURGERY Right 2009/2012    BREAST BIOPSY Right 05/15/2014    stereotactic biopsy at [place]    CARPAL TUNNEL RELEASE Bilateral 1990    CHOLECYSTECTOMY  05/2004    COLONOSCOPY  03/07/2017    Dr. Wilson    COLONOSCOPY      GALLBLADDER SURGERY  2004    HYSTERECTOMY  2004    MAMMO (HISTORICAL)  10/02/2018    MAMMO STEREOTACTIC BREAST BIOPSY RIGHT (ALL INC) Right 2014    UPPER GASTROINTESTINAL ENDOSCOPY      WISDOM TOOTH EXTRACTION     [3]   Current Outpatient Medications   Medication Sig Dispense Refill    atorvastatin (LIPITOR) 40 mg tablet Take 1 tablet (40 mg total) by mouth daily 90 tablet 1    Biotin 2500 MCG CAPS Take by mouth in the morning and in the evening.      Blood Glucose Monitoring Suppl (Accu-Chek Lina Plus) w/Device KIT Use in the morning 1 kit 0    Blood Glucose Monitoring Suppl (FreeStyle Lite) TOYIN in the morning.      calcium carbonate (OS-AMEE) 600 MG tablet Take 600 mg by mouth in the morning  and 600 mg in the evening. Take with meals. With vitamin D3.      FREESTYLE LITE test strip Use 1 each daily Use as instructed 200 each 1    gabapentin (NEURONTIN) 300 mg capsule Take 2 capsules (600 mg total) by mouth 3 (three) times a day 90 capsule 2    glucosamine-chondroitin 500-400 MG tablet Take 1 tablet by mouth in the morning and 1 tablet in the evening and 1 tablet before bedtime.      glucose blood test strip Use as instructed, Once daily. 100 strip 3    Lancets (freestyle) lancets Use 2 (two) times a day Use as instructed 200 each 1    losartan-hydrochlorothiazide (HYZAAR) 100-12.5 MG per tablet TAKE 1 TABLET EVERY DAY 90 tablet 1    meloxicam (MOBIC) 15 mg tablet Take 15 mg by mouth in the morning.      metFORMIN (GLUCOPHAGE) 500 mg tablet TAKE 1 TABLET EVERY MORNING AND 2 TABLETS EVERY EVENING AS DIRECTED 270 tablet 1    metoprolol tartrate (LOPRESSOR) 50 mg tablet TAKE 1 TABLET TWICE DAILY 180 tablet 1    Multiple Vitamin (MULTIVITAMIN) capsule Take 1 capsule by mouth in the morning.      Omega-3 Fatty Acids (fish oil) 1,000 mg TAKE 2 CAPSULES EVERY  capsule 3    pantoprazole (PROTONIX) 40 mg tablet Take 1 tablet (40 mg total) by mouth daily 90 tablet 7    traMADol (ULTRAM) 50 mg tablet Take 50 mg by mouth every 8 (eight) hours as needed BID  0     No current facility-administered medications for this visit.   [4]   Social History  Socioeconomic History    Marital status: Single   Occupational History    Occupation: Unemployed, looking for work   Tobacco Use    Smoking status: Former     Current packs/day: 0.00     Types: Cigarettes     Quit date: 2004     Years since quittin.7     Passive exposure: Past    Smokeless tobacco: Former    Tobacco comments:     quit at 44   Vaping Use    Vaping status: Never Used   Substance and Sexual Activity    Alcohol use: Not Currently     Comment: -rare    Drug use: Not Currently     Comment: No drug use - As per [employer]    Sexual activity: Yes      Partners: Male   Social History Narrative    Lives with spouse    Tea, 3 servings/day    Lives with spouse    Annual eye exam: Advised    Pap smear: By her gyn    - As per [employer]     Social Drivers of Health     Financial Resource Strain: Medium Risk (11/22/2022)    Overall Financial Resource Strain (CARDIA)     Difficulty of Paying Living Expenses: Somewhat hard   Food Insecurity: No Food Insecurity (5/20/2024)    Nursing - Inadequate Food Risk Classification     Worried About Running Out of Food in the Last Year: Never true     Ran Out of Food in the Last Year: Never true   Transportation Needs: No Transportation Needs (5/20/2024)    PRAPARE - Transportation     Lack of Transportation (Medical): No     Lack of Transportation (Non-Medical): No   Housing Stability: Low Risk  (5/20/2024)    Housing Stability Vital Sign     Unable to Pay for Housing in the Last Year: No     Number of Times Moved in the Last Year: 1     Homeless in the Last Year: No

## 2025-06-11 DIAGNOSIS — E78.2 MIXED HYPERLIPIDEMIA: Chronic | ICD-10-CM

## 2025-06-11 RX ORDER — ATORVASTATIN CALCIUM 40 MG/1
40 TABLET, FILM COATED ORAL DAILY
Qty: 90 TABLET | Refills: 1 | Status: SHIPPED | OUTPATIENT
Start: 2025-06-11

## 2025-06-12 DIAGNOSIS — E78.2 MIXED HYPERLIPIDEMIA: ICD-10-CM

## 2025-06-12 RX ORDER — CHLORAL HYDRATE 500 MG
2000 CAPSULE ORAL DAILY
Qty: 180 CAPSULE | Refills: 3 | Status: SHIPPED | OUTPATIENT
Start: 2025-06-12

## 2025-06-16 ENCOUNTER — OFFICE VISIT (OUTPATIENT)
Dept: INTERNAL MEDICINE CLINIC | Facility: CLINIC | Age: 65
End: 2025-06-16
Payer: COMMERCIAL

## 2025-06-16 VITALS
TEMPERATURE: 98.1 F | HEIGHT: 63 IN | DIASTOLIC BLOOD PRESSURE: 78 MMHG | BODY MASS INDEX: 40.75 KG/M2 | WEIGHT: 230 LBS | SYSTOLIC BLOOD PRESSURE: 128 MMHG | HEART RATE: 60 BPM | OXYGEN SATURATION: 98 %

## 2025-06-16 DIAGNOSIS — M79.672 PAIN IN BOTH FEET: Chronic | ICD-10-CM

## 2025-06-16 DIAGNOSIS — I48.0 PAROXYSMAL ATRIAL FIBRILLATION (HCC): ICD-10-CM

## 2025-06-16 DIAGNOSIS — I10 PRIMARY HYPERTENSION: ICD-10-CM

## 2025-06-16 DIAGNOSIS — N18.31 STAGE 3A CHRONIC KIDNEY DISEASE (HCC): ICD-10-CM

## 2025-06-16 DIAGNOSIS — N18.31 TYPE 2 DIABETES MELLITUS WITH STAGE 3A CHRONIC KIDNEY DISEASE, WITHOUT LONG-TERM CURRENT USE OF INSULIN (HCC): Chronic | ICD-10-CM

## 2025-06-16 DIAGNOSIS — K21.9 GASTROESOPHAGEAL REFLUX DISEASE WITHOUT ESOPHAGITIS: Chronic | ICD-10-CM

## 2025-06-16 DIAGNOSIS — M79.671 PAIN IN BOTH FEET: Chronic | ICD-10-CM

## 2025-06-16 DIAGNOSIS — E11.42 DIABETIC POLYNEUROPATHY ASSOCIATED WITH TYPE 2 DIABETES MELLITUS (HCC): ICD-10-CM

## 2025-06-16 DIAGNOSIS — Z00.00 MEDICARE ANNUAL WELLNESS VISIT, SUBSEQUENT: Primary | ICD-10-CM

## 2025-06-16 DIAGNOSIS — E78.2 MIXED HYPERLIPIDEMIA: Chronic | ICD-10-CM

## 2025-06-16 DIAGNOSIS — K63.5 POLYP OF COLON, UNSPECIFIED PART OF COLON, UNSPECIFIED TYPE: ICD-10-CM

## 2025-06-16 DIAGNOSIS — E11.22 TYPE 2 DIABETES MELLITUS WITH STAGE 3A CHRONIC KIDNEY DISEASE, WITHOUT LONG-TERM CURRENT USE OF INSULIN (HCC): Chronic | ICD-10-CM

## 2025-06-16 PROCEDURE — 99214 OFFICE O/P EST MOD 30 MIN: CPT | Performed by: INTERNAL MEDICINE

## 2025-06-16 PROCEDURE — G2211 COMPLEX E/M VISIT ADD ON: HCPCS | Performed by: INTERNAL MEDICINE

## 2025-06-16 PROCEDURE — G0439 PPPS, SUBSEQ VISIT: HCPCS | Performed by: INTERNAL MEDICINE

## 2025-06-16 NOTE — ASSESSMENT & PLAN NOTE
She was advised to decrease calorie intake, decrease sugar and carbs intake as well as exercise to lose weight.

## 2025-06-16 NOTE — ASSESSMENT & PLAN NOTE
Patient states he needs to take pantoprazole daily to control symptoms.  Advised to watch diet.  Follow-up with the GI.

## 2025-06-16 NOTE — ASSESSMENT & PLAN NOTE
Last cholesterol 142, triglyceride 100, HDL 59, LDL 63 she takes atorvastatin advised to continue same medication and low-cholesterol low-carb diet we will follow lipid panel.

## 2025-06-16 NOTE — PROGRESS NOTES
Name: Marilu Brunson      : 1960      MRN: [id number]  Encounter Provider: Sandra Woo MD  Encounter Date: 2025   Encounter department: [facility] INTERNAL MEDICINE  :  Assessment & Plan  Diabetic polyneuropathy associated with type 2 diabetes mellitus (HCC)  She has been seen and followed by podiatrist for pain and numbness of the feet due to diabetic neuropathy.  On gabapentin and takes meloxicam as needed.  After she finished the meloxicam she will continue gabapentin 600 mg 3 times daily as prescribed by podiatrist.  Advised to follow with the podiatrist.  Lab Results   Component Value Date    HGBA1C 6.8 (H) 2024            Medicare annual wellness visit, subsequent  Patient declined for immunizations.  Discussed with the patient about seeing GYN for GYN examination.       Primary hypertension  Blood pressure well-controlled advised to continue present medication low-salt diet.       Paroxysmal atrial fibrillation (HCC)  Clinically appears to be in normal sinus rhythm.  Has been seen and followed by cardiologist.       Polyp of colon, unspecified part of colon, unspecified type  Last colonoscopy 2022.  Patient will need to follow-up colonoscopy 2025.       Gastroesophageal reflux disease without esophagitis  Patient states he needs to take pantoprazole daily to control symptoms.  Advised to watch diet.  Follow-up with the GI.       Type 2 diabetes mellitus with stage 3a chronic kidney disease, without long-term current use of insulin (HCC)  Diabetes mellitus well-controlled.  Advised to continue present medication and 1800-calorie ADA diet.  Blood sugar reviewed no hypoglycemia.  Patient's recently she was seen by eye doctor and also podiatry as well.  Advised to go for blood test as requested check hemoglobin A1c and blood sugar.  Lab Results   Component Value Date    HGBA1C 6.8 (H) 2024       Orders:  •  metFORMIN (GLUCOPHAGE) 500 mg  tablet; Take 1 tablet (500 mg total) by mouth see administration instructions Daily morning and 2 tablets daily evening    Stage 3a chronic kidney disease (HCC)  Lab Results   Component Value Date    EGFR 52 11/18/2024    EGFR 56 05/14/2024    EGFR 49 11/14/2023    CREATININE 1.11 11/18/2024    CREATININE 1.05 05/14/2024    CREATININE 1.16 11/14/2023     CKD stable advised to maintain hydration will follow electrolyte level function.       Mixed hyperlipidemia  Last cholesterol 142, triglyceride 100, HDL 59, LDL 63 she takes atorvastatin advised to continue same medication and low-cholesterol low-carb diet we will follow lipid panel.       BMI 40.0-44.9, adult ([facility])  She was advised to decrease calorie intake, decrease sugar and carbs intake as well as exercise to lose weight.         Pain in both feet  As mentioned above she has been seen and followed by podiatrist.        She was advised to go for blood test as requested last visit.  She will go for blood test next week we will follow and advise accordingly.      Preventive health issues were discussed with patient, and age appropriate screening tests were ordered as noted in patient's After Visit Summary. Personalized health advice and appropriate referrals for health education or preventive services given if needed, as noted in patient's After Visit Summary.    History of Present Illness   64 old white female patient presents for Medicare annual wellness exam as well as follow-up of her medical problems.    Current Medications[1]     Past Medical History[2]         HPI   64-year-old white female patient presents for Medicare annual wellness exam as well as follow-up for medical problems.      Patient Care Team:  Sandra Woo MD as PCP - General (Internal Medicine)    Review of Systems   Constitutional:  Negative for chills and fever.   HENT:  Negative for congestion, ear pain, hearing loss, nosebleeds, sinus pain, sore throat and trouble  swallowing.    Eyes:  Negative for discharge, redness and visual disturbance.   Respiratory:  Negative for cough, chest tightness and shortness of breath.    Cardiovascular:  Negative for chest pain and palpitations.   Gastrointestinal:  Negative for abdominal pain, blood in stool, constipation, diarrhea, nausea and vomiting.   Genitourinary:  Negative for dysuria, flank pain and hematuria.   Musculoskeletal:  Positive for arthralgias (Gets pain in the feet). Negative for myalgias and neck pain.   Skin:  Negative for rash.   Neurological:  Negative for dizziness, speech difficulty, weakness and headaches.   Hematological:  Does not bruise/bleed easily.   Psychiatric/Behavioral:  Negative for agitation and behavioral problems.        Medical History Reviewed by provider this encounter:  Tobacco  Allergies  Meds  Problems  Med Hx  Surg Hx  Fam Hx       Annual Wellness Visit Questionnaire   Marilu is here for her Subsequent Wellness visit.     Health Risk Assessment:   Patient rates overall health as good. Patient feels that their physical health rating is same. Patient is satisfied with their life. Eyesight was rated as same. Hearing was rated as same. Patient feels that their emotional and mental health rating is same. Patients states they are sometimes angry. Patient states they are sometimes unusually tired/fatigued. Pain experienced in the last 7 days has been a lot. Patient's pain rating has been 6/10. Patient states that she has experienced no weight loss or gain in last 6 months.     Depression Screening:   PHQ-2 Score: 0      Fall Risk Screening:   In the past year, patient has experienced: no history of falling in past year      Urinary Incontinence Screening:   Patient has leaked urine accidently in the last six months.     Home Safety:  Patient has trouble with stairs inside or outside of their home. Patient has working smoke alarms and has working carbon monoxide detector. Home safety hazards  include: none.     Nutrition:   Current diet is Low Cholesterol, Diabetic, No Added Salt and Low Carb.     Medications:   Patient is currently taking over-the-counter supplements. OTC medications include: see medication list. Patient is able to manage medications.     Activities of Daily Living (ADLs)/Instrumental Activities of Daily Living (IADLs):   Walk and transfer into and out of bed and chair?: Yes  Dress and groom yourself?: Yes    Bathe or shower yourself?: Yes    Feed yourself? Yes  Do your laundry/housekeeping?: Yes  Manage your money, pay your bills and track your expenses?: Yes  Make your own meals?: Yes    Do your own shopping?: Yes    Previous Hospitalizations:   Any hospitalizations or ED visits within the last 12 months?: No      Advance Care Planning:   Living will: No    Durable POA for healthcare: No    Advanced directive: No    Advanced directive counseling given: Yes    ACP document given: Yes    Patient declined ACP directive: No      Cognitive Screening:   Provider or family/friend/caregiver concerned regarding cognition?: No    Preventive Screenings      Cardiovascular Screening:    General: History Lipid Disorder and Screening Current      Diabetes Screening:     General: History Diabetes and Screening Current      Colorectal Cancer Screening:     General: Screening Current      Breast Cancer Screening:     General: Screening Current      Cervical Cancer Screening:    General: Screening Not Indicated      Osteoporosis Screening:    General: Screening Current      Abdominal Aortic Aneurysm (AAA) Screening:        General: Screening Not Indicated      Lung Cancer Screening:     General: Screening Not Indicated      Hepatitis C Screening:    General: Screening Current    Immunizations:  - Immunizations due: Prevnar 20 and Zoster (Shingrix)  - The patient declines recommended vaccines currently despite my recommendations      Screening, Brief Intervention, and Referral to Treatment (SBIRT)  "    Screening  Typical number of drinks in a day: 0  Typical number of drinks in a week: 0  Interpretation: Low risk drinking behavior.    Single Item Drug Screening:  How often have you used an illegal drug (including marijuana) or a prescription medication for non-medical reasons in the past year? never    Single Item Drug Screen Score: 0  Interpretation: Negative screen for possible drug use disorder    Brief Intervention  Alcohol & drug use screenings were reviewed. No concerns regarding substance use disorder identified.     Review of Current Opioid Use    Opioid Risk Tool (ORT) Interpretation: Complete Opioid Risk Tool (ORT)    Other Counseling Topics:   Car/seat belt/driving safety, skin self-exam, sunscreen and calcium and vitamin D intake and regular weightbearing exercise.     Social Drivers of Health     Financial Resource Strain: Medium Risk (11/22/2022)    Overall Financial Resource Strain (CARDIA)    • Difficulty of Paying Living Expenses: Somewhat hard   Food Insecurity: No Food Insecurity (5/20/2024)    Nursing - Inadequate Food Risk Classification    • Worried About Running Out of Food in the Last Year: Never true    • Ran Out of Food in the Last Year: Never true   Transportation Needs: No Transportation Needs (5/20/2024)    PRAPARE - Transportation    • Lack of Transportation (Medical): No    • Lack of Transportation (Non-Medical): No   Housing Stability: Low Risk  (5/20/2024)    Housing Stability Vital Sign    • Unable to Pay for Housing in the Last Year: No    • Number of Times Moved in the Last Year: 1    • Homeless in the Last Year: No   Utilities: Not At Risk (5/20/2024)    [facility] Utilities    • Threatened with loss of utilities: No     No results found.    Objective   /78 (BP Location: Right arm, Patient Position: Sitting, Cuff Size: Adult)   Pulse 60   Temp 98.1 °F (36.7 °C) (Temporal)   Ht 5' 3\" (1.6 m)   Wt 104 kg (230 lb)   SpO2 98%   BMI 40.74 kg/m²     Physical Exam  Vitals and " nursing note reviewed.   Constitutional:       General: She is not in acute distress.     Appearance: She is well-developed. She is obese.   HENT:      Head: Normocephalic and atraumatic.      Right Ear: External ear normal.      Left Ear: External ear normal.      Nose: Nose normal.      Mouth/Throat:      Mouth: Mucous membranes are moist.      Pharynx: Oropharynx is clear.     Eyes:      General: No scleral icterus.        Right eye: No discharge.         Left eye: No discharge.      Extraocular Movements: Extraocular movements intact.      Conjunctiva/sclera: Conjunctivae normal.       Cardiovascular:      Rate and Rhythm: Normal rate and regular rhythm.      Pulses: Normal pulses.      Heart sounds: Normal heart sounds.   Pulmonary:      Effort: Pulmonary effort is normal. No respiratory distress.      Breath sounds: Normal breath sounds. No wheezing or rales.   Abdominal:      General: There is no distension.      Palpations: Abdomen is soft.      Tenderness: There is no abdominal tenderness.     Musculoskeletal:         General: No swelling. Normal range of motion.      Cervical back: Normal range of motion and neck supple.      Right lower leg: No edema.      Left lower leg: No edema.     Skin:     General: Skin is warm and dry.      Capillary Refill: Capillary refill takes less than 2 seconds.      Findings: No rash.     Neurological:      General: No focal deficit present.      Mental Status: She is alert and oriented to person, place, and time.      Motor: No weakness.      Coordination: Coordination normal.     Psychiatric:         Mood and Affect: Mood normal.         Behavior: Behavior normal.                [1]    Current Outpatient Medications:   •  atorvastatin (LIPITOR) 40 mg tablet, TAKE 1 TABLET EVERY DAY, Disp: 90 tablet, Rfl: 1  •  Biotin 2500 MCG CAPS, Take by mouth in the morning and in the evening., Disp: , Rfl:   •  Blood Glucose Monitoring Suppl (Accu-Chek Lina Plus) w/Device KIT, Use in  the morning, Disp: 1 kit, Rfl: 0  •  Blood Glucose Monitoring Suppl (FreeStyle Lite) TOYIN, in the morning., Disp: , Rfl:   •  calcium carbonate (OS-AMEE) 600 MG tablet, Take 600 mg by mouth in the morning and 600 mg in the evening. Take with meals. With vitamin D3., Disp: , Rfl:   •  FIBER GUMMIES PO, Take 5 g by mouth in the morning and 5 g before bedtime., Disp: , Rfl:   •  FREESTYLE LITE test strip, Use 1 each daily Use as instructed, Disp: 200 each, Rfl: 1  •  gabapentin (NEURONTIN) 300 mg capsule, Take 2 capsules (600 mg total) by mouth 3 (three) times a day, Disp: 90 capsule, Rfl: 2  •  glucosamine-chondroitin 500-400 MG tablet, Take 1 tablet by mouth in the morning and 1 tablet in the evening and 1 tablet before bedtime., Disp: , Rfl:   •  glucose blood test strip, Use as instructed, Once daily., Disp: 100 strip, Rfl: 3  •  Lancets (freestyle) lancets, Use 2 (two) times a day Use as instructed, Disp: 200 each, Rfl: 1  •  losartan-hydrochlorothiazide (HYZAAR) 100-12.5 MG per tablet, TAKE 1 TABLET EVERY DAY, Disp: 90 tablet, Rfl: 1  •  meloxicam (MOBIC) 15 mg tablet, Take 15 mg by mouth in the morning., Disp: , Rfl:   •  metFORMIN (GLUCOPHAGE) 500 mg tablet, Take 1 tablet (500 mg total) by mouth see administration instructions Daily morning and 2 tablets daily evening, Disp: 270 tablet, Rfl: 1  •  metoprolol tartrate (LOPRESSOR) 50 mg tablet, TAKE 1 TABLET TWICE DAILY, Disp: 180 tablet, Rfl: 1  •  Multiple Vitamin (MULTIVITAMIN) capsule, Take 1 capsule by mouth in the morning., Disp: , Rfl:   •  Multiple Vitamins-Minerals (HAIR/SKIN/NAILS/BIOTIN PO), Take 2,500 mcg by mouth in the morning and 2,500 mcg before bedtime., Disp: , Rfl:   •  Omega-3 Fatty Acids (fish oil) 1,000 mg, Take 2 capsules (2,000 mg total) by mouth daily, Disp: 180 capsule, Rfl: 3  •  pantoprazole (PROTONIX) 40 mg tablet, Take 1 tablet (40 mg total) by mouth daily, Disp: 90 tablet, Rfl: 7[2]  Past Medical History:  Diagnosis Date   • Abrasion  of right leg 03/19/2021   • Acute respiratory failure due to COVID-19 (HCC) 10/01/2021   • Age-related cataract of both eyes 05/23/2023   • Arthritis     feet   • BMI 39.0-39.9,adult 05/10/2022   • BMI 40.0-44.9, adult (HCC) 02/20/2023   • BMI 40.0-44.9, adult (HCC) 08/26/2024   • Chronic pain of both knees 05/20/2024   • CKD (chronic kidney disease) 02/08/2022   • Colon polyp    • Colon polyp 11/25/2024   • COVID-19 long hauler 11/22/2022   • Diabetes mellitus (HCC)     FBS today 2/28/22 was 147 at 0930 by patient--feels well   • Dyslipidemia    • Dysphagia, pharyngoesophageal    • Edema of both lower legs 03/19/2021   • Essential hypertension 01/23/2021   • Gastroesophageal reflux disease without esophagitis 01/23/2021   • GERD (gastroesophageal reflux disease)    • Hiatal hernia    • High arches    • HTN (hypertension)    • Hyperlipemia    • Hypertension    • Hypertensive arterionephrosclerosis of transplanted kidney 01/23/2021   • Hypokalemia 04/20/2021   • Insomnia    • Leukocytosis    • Medicare annual wellness visit, subsequent 11/09/2021   • Medicare annual wellness visit, subsequent 05/20/2024   • Medicare annual wellness visit, subsequent 06/16/2025   • Mixed hyperlipidemia 01/23/2021   • Numbness    • Obstructive sleep apnea syndrome 01/23/2021    no device   • Other chest pain 08/26/2022   • Pain in ankle 01/23/2021   • Pain in both feet 01/23/2021   • Pain in both knees 01/26/2021   • Pain in right ankle 05/10/2022   • Paroxysmal atrial fibrillation (HCC) 11/09/2021   • Personal history of COVID-19     August 2021   • Primary hypertension 08/26/2024   • Right foot pain 05/10/2022   • Right shoulder pain 02/20/2023   • Shortness of breath     exertional   • Shoulder pain, right 02/08/2022   • Skin lesions    • Sleep apnea    • Type 2 diabetes mellitus without complication, without long-term current use of insulin (HCC) 01/23/2021   • Urinary incontinence 02/20/2023

## 2025-06-16 NOTE — ASSESSMENT & PLAN NOTE
Patient declined for immunizations.  Discussed with the patient about seeing GYN for GYN examination.

## 2025-06-16 NOTE — PATIENT INSTRUCTIONS
Patient was advised to continue present medications. discussed with the patient medications and laboratory data in detail.  Follow-up with me in 3 months or as advised.  If any blood test was ordered please do 1 week prior to next appointment unless advise to get earlier.  If you have any questions please call the office [phone]

## 2025-06-16 NOTE — ASSESSMENT & PLAN NOTE
Lab Results   Component Value Date    EGFR 52 11/18/2024    EGFR 56 05/14/2024    EGFR 49 11/14/2023    CREATININE 1.11 11/18/2024    CREATININE 1.05 05/14/2024    CREATININE 1.16 11/14/2023     CKD stable advised to maintain hydration will follow electrolyte level function.

## 2025-06-24 ENCOUNTER — RESULTS FOLLOW-UP (OUTPATIENT)
Dept: INTERNAL MEDICINE CLINIC | Facility: CLINIC | Age: 65
End: 2025-06-24

## 2025-06-24 ENCOUNTER — APPOINTMENT (OUTPATIENT)
Dept: LAB | Facility: HOSPITAL | Age: 65
End: 2025-06-24
Attending: INTERNAL MEDICINE
Payer: COMMERCIAL

## 2025-06-24 DIAGNOSIS — I10 ESSENTIAL HYPERTENSION: Chronic | ICD-10-CM

## 2025-06-24 DIAGNOSIS — E11.22 TYPE 2 DIABETES MELLITUS WITH STAGE 3A CHRONIC KIDNEY DISEASE, WITHOUT LONG-TERM CURRENT USE OF INSULIN (HCC): Chronic | ICD-10-CM

## 2025-06-24 DIAGNOSIS — K21.9 GASTROESOPHAGEAL REFLUX DISEASE WITHOUT ESOPHAGITIS: Chronic | ICD-10-CM

## 2025-06-24 DIAGNOSIS — N18.31 TYPE 2 DIABETES MELLITUS WITH STAGE 3A CHRONIC KIDNEY DISEASE, WITHOUT LONG-TERM CURRENT USE OF INSULIN (HCC): Chronic | ICD-10-CM

## 2025-06-24 DIAGNOSIS — M79.671 PAIN IN BOTH FEET: Chronic | ICD-10-CM

## 2025-06-24 DIAGNOSIS — Z79.899 HIGH RISK MEDICATION USE: ICD-10-CM

## 2025-06-24 DIAGNOSIS — N18.31 STAGE 3A CHRONIC KIDNEY DISEASE (HCC): ICD-10-CM

## 2025-06-24 DIAGNOSIS — I10 PRIMARY HYPERTENSION: ICD-10-CM

## 2025-06-24 DIAGNOSIS — I48.0 PAROXYSMAL ATRIAL FIBRILLATION (HCC): ICD-10-CM

## 2025-06-24 DIAGNOSIS — E78.2 MIXED HYPERLIPIDEMIA: Chronic | ICD-10-CM

## 2025-06-24 DIAGNOSIS — M79.672 PAIN IN BOTH FEET: Chronic | ICD-10-CM

## 2025-06-24 LAB
ALBUMIN SERPL BCG-MCNC: 4.1 G/DL (ref 3.5–5)
ALP SERPL-CCNC: 93 U/L (ref 34–104)
ALT SERPL W P-5'-P-CCNC: 13 U/L (ref 7–52)
ANION GAP SERPL CALCULATED.3IONS-SCNC: 8 MMOL/L (ref 4–13)
AST SERPL W P-5'-P-CCNC: 16 U/L (ref 13–39)
BASOPHILS # BLD AUTO: 0.05 THOUSANDS/ÂΜL (ref 0–0.1)
BASOPHILS NFR BLD AUTO: 1 % (ref 0–1)
BILIRUB SERPL-MCNC: 0.52 MG/DL (ref 0.2–1)
BUN SERPL-MCNC: 18 MG/DL (ref 5–25)
CALCIUM SERPL-MCNC: 9 MG/DL (ref 8.4–10.2)
CHLORIDE SERPL-SCNC: 104 MMOL/L (ref 96–108)
CHOLEST SERPL-MCNC: 147 MG/DL (ref ?–200)
CO2 SERPL-SCNC: 27 MMOL/L (ref 21–32)
CREAT SERPL-MCNC: 1.09 MG/DL (ref 0.6–1.3)
EOSINOPHIL # BLD AUTO: 0.28 THOUSAND/ÂΜL (ref 0–0.61)
EOSINOPHIL NFR BLD AUTO: 4 % (ref 0–6)
ERYTHROCYTE [DISTWIDTH] IN BLOOD BY AUTOMATED COUNT: 13.8 % (ref 11.6–15.1)
EST. AVERAGE GLUCOSE BLD GHB EST-MCNC: 154 MG/DL
GFR SERPL CREATININE-BSD FRML MDRD: 53 ML/MIN/1.73SQ M
GLUCOSE P FAST SERPL-MCNC: 137 MG/DL (ref 65–99)
HBA1C MFR BLD: 7 %
HCT VFR BLD AUTO: 40.4 % (ref 34.8–46.1)
HDLC SERPL-MCNC: 57 MG/DL
HGB BLD-MCNC: 12.6 G/DL (ref 11.5–15.4)
IMM GRANULOCYTES # BLD AUTO: 0.02 THOUSAND/UL (ref 0–0.2)
IMM GRANULOCYTES NFR BLD AUTO: 0 % (ref 0–2)
LDLC SERPL CALC-MCNC: 70 MG/DL (ref 0–100)
LYMPHOCYTES # BLD AUTO: 2.43 THOUSANDS/ÂΜL (ref 0.6–4.47)
LYMPHOCYTES NFR BLD AUTO: 37 % (ref 14–44)
MCH RBC QN AUTO: 27.9 PG (ref 26.8–34.3)
MCHC RBC AUTO-ENTMCNC: 31.2 G/DL (ref 31.4–37.4)
MCV RBC AUTO: 89 FL (ref 82–98)
MONOCYTES # BLD AUTO: 0.62 THOUSAND/ÂΜL (ref 0.17–1.22)
MONOCYTES NFR BLD AUTO: 10 % (ref 4–12)
NEUTROPHILS # BLD AUTO: 3.13 THOUSANDS/ÂΜL (ref 1.85–7.62)
NEUTS SEG NFR BLD AUTO: 48 % (ref 43–75)
NONHDLC SERPL-MCNC: 90 MG/DL
NRBC BLD AUTO-RTO: 0 /100 WBCS
PLATELET # BLD AUTO: 251 THOUSANDS/UL (ref 149–390)
PMV BLD AUTO: 10.6 FL (ref 8.9–12.7)
POTASSIUM SERPL-SCNC: 4.2 MMOL/L (ref 3.5–5.3)
PROT SERPL-MCNC: 7.3 G/DL (ref 6.4–8.4)
RBC # BLD AUTO: 4.52 MILLION/UL (ref 3.81–5.12)
SODIUM SERPL-SCNC: 139 MMOL/L (ref 135–147)
TRIGL SERPL-MCNC: 99 MG/DL (ref ?–150)
WBC # BLD AUTO: 6.53 THOUSAND/UL (ref 4.31–10.16)

## 2025-06-24 PROCEDURE — 83036 HEMOGLOBIN GLYCOSYLATED A1C: CPT

## 2025-06-24 PROCEDURE — 80061 LIPID PANEL: CPT

## 2025-06-24 PROCEDURE — 36415 COLL VENOUS BLD VENIPUNCTURE: CPT

## 2025-06-24 PROCEDURE — 80053 COMPREHEN METABOLIC PANEL: CPT

## 2025-06-24 PROCEDURE — 85025 COMPLETE CBC W/AUTO DIFF WBC: CPT

## 2025-07-05 DIAGNOSIS — E11.42 DIABETIC POLYNEUROPATHY ASSOCIATED WITH TYPE 2 DIABETES MELLITUS (HCC): ICD-10-CM

## 2025-07-05 DIAGNOSIS — M54.17 LUMBOSACRAL RADICULOPATHY: ICD-10-CM

## 2025-07-07 RX ORDER — GABAPENTIN 300 MG/1
600 CAPSULE ORAL 3 TIMES DAILY
Qty: 90 CAPSULE | Refills: 2 | Status: SHIPPED | OUTPATIENT
Start: 2025-07-07

## 2025-07-16 PROBLEM — Z00.00 MEDICARE ANNUAL WELLNESS VISIT, SUBSEQUENT: Status: RESOLVED | Noted: 2025-06-16 | Resolved: 2025-07-16
